# Patient Record
Sex: FEMALE | Race: WHITE | NOT HISPANIC OR LATINO | Employment: FULL TIME | URBAN - METROPOLITAN AREA
[De-identification: names, ages, dates, MRNs, and addresses within clinical notes are randomized per-mention and may not be internally consistent; named-entity substitution may affect disease eponyms.]

---

## 2017-01-10 ENCOUNTER — TELEPHONE (OUTPATIENT)
Dept: PALLIATIVE MEDICINE | Facility: CLINIC | Age: 52
End: 2017-01-10

## 2017-01-10 NOTE — TELEPHONE ENCOUNTER
Patient called wanting to see if Dr. Nunez would refill pain medication.     Elena Blanchard    [facility]

## 2017-01-11 NOTE — TELEPHONE ENCOUNTER
Contact Attempt      Outreach attempted x 1 to pt.  Left message on voicemail with call back information and requested return call with a good time to reach her. Pt is not a pain patient. She has not been seen in the office since 02/2016; she has been seen for injections only.     Plan:  Will await for CB.     Yelitza Saleh RN, [facility]  Pain Clinic Care Coordinator

## 2017-01-12 NOTE — TELEPHONE ENCOUNTER
Patient is procedure only, so I would not prescribe.  She needs to work with her normal prescribers.    Gabbi Nunez MD  [facility]

## 2017-01-12 NOTE — TELEPHONE ENCOUNTER
Called and discussed with pt how she was an injection only patient and that we do not give medications to our injection only patients. She asked if she had to make an appt and explained that we would need an order from her PCP for comprehensive care. She stated she understood.     closing encounter.     Yelitza Saleh RN, [facility]  Pain Clinic Care Coordinator

## 2017-01-12 NOTE — TELEPHONE ENCOUNTER
Dr. Nunez- this is a procedure only pt are you willing to prescribe?    Yelitza Saleh RN, [facility]  Pain Clinic Care Coordinator

## 2017-01-13 ENCOUNTER — OFFICE VISIT (OUTPATIENT)
Dept: FAMILY MEDICINE | Facility: CLINIC | Age: 52
End: 2017-01-13
Payer: COMMERCIAL

## 2017-01-13 VITALS
OXYGEN SATURATION: 99 % | DIASTOLIC BLOOD PRESSURE: 78 MMHG | SYSTOLIC BLOOD PRESSURE: 106 MMHG | WEIGHT: 150 LBS | HEART RATE: 102 BPM | HEIGHT: 68 IN | BODY MASS INDEX: 22.73 KG/M2 | TEMPERATURE: 97.5 F

## 2017-01-13 DIAGNOSIS — M54.2 NECK PAIN: ICD-10-CM

## 2017-01-13 DIAGNOSIS — G47.09 OTHER INSOMNIA: ICD-10-CM

## 2017-01-13 DIAGNOSIS — M19.90 ARTHRITIS: Primary | ICD-10-CM

## 2017-01-13 PROCEDURE — 99213 OFFICE O/P EST LOW 20 MIN: CPT | Performed by: NURSE PRACTITIONER

## 2017-01-13 RX ORDER — HYDROCODONE BITARTRATE AND ACETAMINOPHEN 5; 325 MG/1; MG/1
1 TABLET ORAL DAILY
Qty: 30 TABLET | Refills: 0 | Status: SHIPPED | OUTPATIENT
Start: 2017-02-11 | End: 2017-09-11

## 2017-01-13 RX ORDER — HYDROCODONE BITARTRATE AND ACETAMINOPHEN 5; 325 MG/1; MG/1
TABLET ORAL
Qty: 30 TABLET | Refills: 0 | Status: SHIPPED | OUTPATIENT
Start: 2017-01-13 | End: 2018-01-19

## 2017-01-13 RX ORDER — HYDROCODONE BITARTRATE AND ACETAMINOPHEN 5; 325 MG/1; MG/1
1 TABLET ORAL DAILY PRN
Qty: 30 TABLET | Refills: 0 | Status: SHIPPED | OUTPATIENT
Start: 2017-03-12 | End: 2017-08-04

## 2017-01-13 RX ORDER — HYDROCODONE BITARTRATE AND ACETAMINOPHEN 5; 325 MG/1; MG/1
1 TABLET ORAL DAILY PRN
Qty: 20 TABLET | Refills: 0 | Status: SHIPPED | OUTPATIENT
Start: 2017-01-13 | End: 2017-01-13

## 2017-01-13 RX ORDER — ZOLPIDEM TARTRATE 5 MG/1
5 TABLET ORAL
Qty: 30 TABLET | Refills: 0 | Status: SHIPPED | OUTPATIENT
Start: 2017-01-13 | End: 2017-03-13

## 2017-01-13 NOTE — MR AVS SNAPSHOT
"              After Visit Summary   1/13/2017    Maryann Riggs    MRN: [id number]           Patient Information     Date Of Birth          1965        Visit Information        Provider Department      1/13/2017 10:20 AM Rosa Sanabria APRN CNP [facility]        Today's Diagnoses     Arthritis    -  1     Neck pain         Other insomnia         Need for prophylactic vaccination and inoculation against influenza           Care Instructions    You have prescriptions for 3 months of your pain medication. We will bring prescriptions for the next 2 months over to thee pharmacy.        Follow-ups after your visit        Who to contact     If you have questions or need follow up information about today's clinic visit or your schedule please contact [facility] directly at [phone].  Normal or non-critical lab and imaging results will be communicated to you by [employer], letter or phone within 4 business days after the clinic has received the results. If you do not hear from us within 7 days, please contact the clinic through [employer] or phone. If you have a critical or abnormal lab result, we will notify you by phone as soon as possible.  Submit refill requests through [employer] or call your pharmacy and they will forward the refill request to us. Please allow 3 business days for your refill to be completed.          Additional Information About Your Visit        MyChart Information     [employer] lets you send messages to your doctor, view your test results, renew your prescriptions, schedule appointments and more. To sign up, go to [email or web address] . Click on \"Log in\" on the left side of the screen, which will take you to the Welcome page. Then click on \"Sign up Now\" on the right side of the page.     You will be asked to enter the access code listed below, as well as some personal information. Please follow the directions to create your username and password.     Your " "access code is: [email or web address]  Expires: 2017 11:13 AM     Your access code will  in 90 days. If you need help or a new code, please call your [facility] or [phone].        Care EveryWhere ID     This is your Care EveryWhere ID. This could be used by other organizations to access your [place] medical records  [id number]        Your Vitals Were     Pulse Temperature Height BMI (Body Mass Index) Pulse Oximetry Last Period    102 97.5  F (36.4  C) (Tympanic) 5' 7.75\" (1.721 m) 22.97 kg/m2 99% 2014       Blood Pressure from Last 3 Encounters:   17 106/78   12/15/16 112/75   16 126/80    Weight from Last 3 Encounters:   17 150 lb (68.04 kg)   16 152 lb 6.4 oz (69.128 kg)   16 155 lb (70.308 kg)              Today, you had the following     No orders found for display         Today's Medication Changes          These changes are accurate as of: 17 11:13 AM.  If you have any questions, ask your nurse or doctor.               These medicines have changed or have updated prescriptions.        Dose/Directions    * HYDROcodone-acetaminophen 5-325 MG per tablet   Commonly known as:  NORCO   This may have changed:  Another medication with the same name was added. Make sure you understand how and when to take each.   Used for:  Arthritis, Neck pain   Changed by:  Rosa Sanabria APRN CNP        1/2 -1 daily prn low back pain   Quantity:  30 tablet   Refills:  0       * HYDROcodone-acetaminophen 5-325 MG per tablet   Commonly known as:  NORCO   This may have changed:  You were already taking a medication with the same name, and this prescription was added. Make sure you understand how and when to take each.   Used for:  Arthritis, Neck pain   Changed by:  Rosa Sanabria APRN CNP        Dose:  1 tablet   Take 1 tablet by mouth daily as needed for moderate to severe pain maximum 1 tablet(s) per day   Quantity:  20 tablet   Refills:  0       * " HYDROcodone-acetaminophen 5-325 MG per tablet   Commonly known as:  NORCO   This may have changed:  You were already taking a medication with the same name, and this prescription was added. Make sure you understand how and when to take each.   Used for:  Neck pain, Arthritis   Changed by:  Rosa Sanabria APRN CNP        Dose:  1 tablet   Start taking on:  2/11/2017   Take 1 tablet by mouth daily maximum 1 tablet(s) per day   Quantity:  30 tablet   Refills:  0       * Notice:  This list has 3 medication(s) that are the same as other medications prescribed for you. Read the directions carefully, and ask your doctor or other care provider to review them with you.      Stop taking these medicines if you haven't already. Please contact your care team if you have questions.     gabapentin 300 MG capsule   Commonly known as:  NEURONTIN   Stopped by:  Rosa Sanabria APRN CNP           VITAMIN D PO   Stopped by:  Rosa Sanabria APRN CNP                Where to get your medicines      Some of these will need a paper prescription and others can be bought over the counter.  Ask your nurse if you have questions.     Bring a paper prescription for each of these medications    - HYDROcodone-acetaminophen 5-325 MG per tablet  - HYDROcodone-acetaminophen 5-325 MG per tablet  - HYDROcodone-acetaminophen 5-325 MG per tablet  - zolpidem 5 MG tablet             Primary Care Provider Office Phone # Fax #    CONTRERAS Alvares [phone]       [facility] [address] [place] [id number]        Thank you!     Thank you for choosing [facility]  for your care. Our goal is always to provide you with excellent care. Hearing back from our patients is one way we can continue to improve our services. Please take a few minutes to complete the written survey that you may receive in the mail after your visit with us. Thank you!             Your Updated Medication List -  Protect others around you: Learn how to safely use, store and throw away your medicines at www.disposemymeds.org.          This list is accurate as of: 1/13/17 11:13 AM.  Always use your most recent med list.                   Brand Name Dispense Instructions for use    CALTRATE 600+D 600-400 MG-UNIT Chew   Generic drug:  calcium carbonate-vitamin D      Take  by mouth.       clobetasol 0.05 % ointment    TEMOVATE    45 g    as needed. Only as needed occasionally needs after working in garden Profile Rx: patient will contact pharmacy when needed       cyclobenzaprine 10 MG tablet    FLEXERIL    90 tablet    Take 1 tablet (10 mg) by mouth daily as needed for muscle spasms       GLUCOSAMINE CHONDROITIN COMPLX PO      Take  by mouth.       * HYDROcodone-acetaminophen 5-325 MG per tablet    NORCO    30 tablet    1/2 -1 daily prn low back pain       * HYDROcodone-acetaminophen 5-325 MG per tablet    NORCO    20 tablet    Take 1 tablet by mouth daily as needed for moderate to severe pain maximum 1 tablet(s) per day       * HYDROcodone-acetaminophen 5-325 MG per tablet   Start taking on:  2/11/2017    NORCO    30 tablet    Take 1 tablet by mouth daily maximum 1 tablet(s) per day       IRON CR PO      Take  by mouth.       lidocaine 5 % ointment    XYLOCAINE    70.88 g    Apply dime size amount topically to painful areas (choose 2-3 per application) 3 times daily as needed.       melatonin 3 MG tablet     30 tablet    Take 1 tablet by mouth nightly as needed for sleep.       NIFEdipine ER osmotic 30 MG 24 hr tablet    PROCARDIA XL    90 tablet    Take 1 tablet (30 mg) by mouth daily       nitroglycerin 2 % Oint ointment    NITRO-BID    30 packet    Place 1 inch (15 mg) onto the skin daily       omeprazole 40 MG capsule    priLOSEC    90 capsule    Take 1 capsule (40 mg) by mouth daily Take 30-60 minutes before a meal.       order for DME     1 Units    Equipment being ordered: TENS       POTASSIUM CARBONATE           sucralfate 1 GM/10ML suspension    CARAFATE    420 mL    Take 10 mLs (1 g) by mouth 4 times daily       TYLENOL 325 MG tablet   Generic drug:  acetaminophen      2 TABLETS BY MOUTH EVERY 6 HOURS AS NEEDED       VITAMIN B 12 PO      Take  by mouth.       VITAMIN C CR PO      Take  by mouth.       zolpidem 5 MG tablet    AMBIEN    30 tablet    Take 1 tablet (5 mg) by mouth nightly as needed for sleep       * Notice:  This list has 3 medication(s) that are the same as other medications prescribed for you. Read the directions carefully, and ask your doctor or other care provider to review them with you.

## 2017-01-13 NOTE — PROGRESS NOTES
"  SUBJECTIVE:                                                    Maryann Riggs is a 51 year old female who presents to clinic today for the following health issues:      Back Pain Follow Up       Description:   Location of pain:  bilateral  Character of pain: sharp, stabbing, gnawing and constant  Pain radiation: Does not radiate  Since last visit, pain is:  unchanged  New numbness or weakness in legs, not attributed to pain:  no     Intensity:  moderate    History:   Pain interferes with job: No  Therapies tried without relief: acetaminophen (Tylenol) and chiropractor  Therapies tried with relief: steroid injection, last given         Accompanying Signs & Symptoms:  Risk of Fracture:  None  Risk of Cauda Equina:  None  Risk of Infection:  None  Risk of Cancer:  None         Works as a  for [employer]. Job entails a lot of heavy lifting and transferring of [employer].  Had Injection of the sacral iliac joint on 12/15. Put off injection for 7 months until she had More trouble moving.  Doesn't take hydrocodone unless doing something physical. Tense muscles.  Has used 30 tabs over the past 3 months.  Takes 1/2 tab at a time.         Amount of exercise or physical activity: 4-5 days/week for an average of greater than 60 minutes physical activity in her work.    Problems taking medications regularly: No    Medication side effects: none    Diet: regular (no restrictions)      Problem list and histories reviewed & adjusted, as indicated.  Additional history: as documented    Problem list, Medication list, Allergies, and Medical/Social/Surgical histories reviewed in [facility] and updated as appropriate.    ROS:  Constitutional, HEENT, cardiovascular, pulmonary, gi and gu systems are negative, except as otherwise noted.    OBJECTIVE:                                                    /78 mmHg  Pulse 102  Temp(Src) 97.5  F (36.4  C) (Tympanic)  Ht 5' 7.75\" (1.721 m)  Wt 150 lb (68.04 kg)  BMI 22.97 kg/m2  SpO2 " 99%  LMP 01/04/2014  Body mass index is 22.97 kg/(m^2).  GENERAL: healthy, alert and no distress  MS: deformities of several digit joints bilaterally  PSYCH: mentation appears normal, affect normal/bright    Diagnostic Test Results:  none      ASSESSMENT/PLAN:                                                      1. Arthritis with  Neck pain  Patient uses one half tab when she needs it. Typically on workdays only. She has used 30 tablets since last refill in late October. Responsible with the Norco. Controlled substance agreement signed  - HYDROcodone-acetaminophen (NORCO) 5-325 MG per tablet; 1/2 -1 daily prn low back pain  Dispense: 30 tablet; Refill: 0  - HYDROcodone-acetaminophen (NORCO) 5-325 MG per tablet; Take 1 tablet by mouth daily maximum 1 tablet(s) per day  Dispense: 30 tablet; Refill: 0  - HYDROcodone-acetaminophen (NORCO) 5-325 MG per tablet; Take 1 tablet by mouth daily as needed for moderate to severe pain  Dispense: 30 tablet; Refill: 0    2. Other insomnia  Stable. Has good results with the Ambien.  - zolpidem (AMBIEN) 5 MG tablet; Take 1 tablet (5 mg) by mouth nightly as needed for sleep  Dispense: 30 tablet; Refill: 0        Patient Instructions   You have prescriptions for 3 months of your pain medication. We will bring prescriptions for the next 2 months over to the pharmacy.        Rosa Sanabria, NP, APRN [facility]

## 2017-01-13 NOTE — NURSING NOTE
"Chief Complaint   Patient presents with     Back Pain     refill meds     /78 mmHg  Pulse 102  Temp(Src) 97.5  F (36.4  C) (Tympanic)  Ht 5' 7.75\" (1.721 m)  Wt 150 lb (68.04 kg)  BMI 22.97 kg/m2  SpO2 99%  LMP 01/04/2014 Estimated body mass index is 22.97 kg/(m^2) as calculated from the following:    Height as of this encounter: 5' 7.75\" (1.721 m).    Weight as of this encounter: 150 lb (68.04 kg).  bp completed using cuff size: regular        "

## 2017-01-13 NOTE — Clinical Note
[facility]    01/13/2017    Patient: Maryann Riggs  YOB: 1965  Medical Record Number: [id number]    Controlled Substance Agreement  I understand that my care provider has prescribed controlled substances (narcotics, tranquilizers, and/or stimulants) to help manage my condition(s).  I am taking this medicine to help me function or work.  I know that this is strong medicine.  It could have serious side effects and even cause a dependency on the drug.  If I stop these medicines suddenly, I could have severe withdrawal symptoms.    The risks, benefits, and side effects of these medication(s) were explained to me.  I agree that:  1. I will take part in other treatments as advised by my provider.  This may be psychiatry or counseling, physical therapy, behavioral therapy, group treatment, or a referral to a pain clinic.  I will reduce or stop my medicine when my provider tells me to do so.   2. I will take my medicines as prescribed.  I will not change the dose or schedule unless my provider tells me to.  There will be no refills if I  run out early.   I may be contacted at any time without warning and asked to complete a drug test or pill count.   3. I will keep all my appointments at the clinic.  If I miss appointments or fail to follow instructions, my provider may stop my medicine.  4. I will not ask other providers to prescribe controlled substances. And I will not accept controlled substances from other people. If I need another prescribed controlled substance for a new reason, I will notify my provider within one business day.  5. If I enroll in the [place] Medical Marijuana program, I will tell my provider.  I will also sign an agreement to share my medical records with my provider.  6. I will use one pharmacy to fill all of my controlled substance prescriptions.  If my prescription is mailed to my pharmacy, it may take 5 to 7 days for my medicine to be ready.  7. I  understand that my provider, clinic care team, and pharmacy can track controlled substance prescriptions from other providers through a central database (prescription monitoring program).  8. I will bring in my list of medications (or my medicine bottles) each time I come to the clinic.  Page 1 of 2      [facility]    01/13/2017    Patient: Maryann Riggs  YOB: 1965  Medical Record Number: [id number]    9. Refills of controlled substances will be made only during office hours.  It is up to me to make sure that I do not run out of my medicines on weekends or holidays.    10. I am responsible for my prescriptions.  If the medicine is lost or stolen, it will not be replaced.   I also agree not to share these medicines with anyone.  11. I agree to not use ANY illegal or recreational drugs.  This includes marijuana, cocaine, bath salts or other drugs.  I agree not to use alcohol unless my provider says I may.  I agree to give urine samples whenever asked.  If I fail to give a urine sample, the provider may stop my medicine.     12. I will tell my nurse or provider right away if I become pregnant or have a new medical problem treated outside of [facility].  13. I understand that this medicine can affect my thinking and judgment.  It may be unsafe for me to drive, use machinery and do dangerous tasks.  I will not do any of these things until I know how the medicine affects me.  If my dose changes, I will wait to see how it affects me.  I will contact my provider if I have concerns about medicine side effects.  I understand that if I do not follow any of the conditions above, my prescriptions or treatment may be stopped.    I agree that my provider, clinic care team, and pharmacy may work with any city, state or federal law enforcement agency that investigates the misuse, sale, or other diversion of my controlled medicine. I will allow my provider to discuss my care with or share a copy  of this agreement with any other treating provider, pharmacy or emergency room where I receive care.  I agree to give up (waive) any right of privacy or confidentiality with respect to these authorizations.   I have read this agreement and have asked questions about anything I did not understand.   ___________________________________    ___________________________  Patient Signature                                                             Date and Time  ___________________________________     ____________________________  Witness                                                                              Date and Time  ___________________________________  Rosa Sanabria NP, APRN CNP  Page 2 of 2  Opioid Pain Medicines (also known as Narcotics)  What You Need to Know      What are opioids?   Opioids are pain medicines that must be prescribed by a doctor. Examples are:     morphine (MS Contin, Yanique)    oxycodone (Oxycontin)    oxycodone and acetaminophen (Percocet)    hydrocodone and acetaminophen (Vicodin, Norco)     fentanyl patch (Duragesic)     hydromorphone (Dilaudid)     methadone     What do opioids do well?   Opioids are best for short-term pain after a surgery or injury. They also work well for cancer pain. Unlike other pain medicines, they do not cause liver or kidney failure or ulcers. They may help some people with long-lasting (chronic) pain.     What do opioids NOT do well?   Opioids never get rid of pain entirely, and they do not work well for most patients with chronic pain. Opioids do not reduce swelling, one of the causes of pain. They also don t work well for nerve pain.     Side effects  Talk to your doctor before you start or decide to keep taking one of these medicines. Side effects include:    Lowers your breathing rate enough that it could cause death    Death due to taking more than the prescribed dose    Serious lifelong opioid use      Dependence is not the same as addiction.  Addiction is when people keep using a substance that harms their body, their mind or their relations with others. If you have a history of drug or alcohol abuse, taking opioids can cause a relapse.  Over time, opioids don t work as well. Most people will need higher and higher doses. The higher the dose, the more serious the side effects. We don t know the long-term effects of opioids.   People who have used opioids for a long time have a lower quality of life, worse depression, higher levels of pain and more visits to doctors.  Overdose from prescription drugs is the second leading cause of death in the [place] The risk of overdose rises when opioids are taken with other drugs such as:    Medicines used for anxiety and panic attacks (such as lorazepam, alprazolam, clonazepam    Other sedatives    Alcohol    Illegal drugs such as heroin  Never share your opioids with others. Be sure to store opioids in a secure place, locked if possible.Young children can easily swallow them and overdose.     Are there other ways to manage pain?  Ways to help reduce pain:    Exercise every day.    Treat health problems that may be causing pain.    Treat mental health problems like depression and anxiety.     Worse depression symptoms; Less pleasure in things you usually enjoy    Feeling tired or sluggish    Slower thoughts or cloudy thinking    Being more sensitive to pain over time; Pain is harder to control.    Trouble sleeping or restless sleep    Changes in hormone levels (for example, less testosterone).     Changes in sex drive or ability to have sex    Long lasting nausea and constipation    Trouble breathing while asleep; This is worse with lung problems like COPD or sleep apnea.    Unsafe driving    Getting sick more often    Itching    Feeling dizzy    Dry mouth    Sweating    Trouble emptying the bladder (peeing). This is worse if you have an enlarged prostate or get urinary tract infections (UTIs).    What else should I  know about opioids?  When someone takes opioids for too long or too often, they become dependent. This means that if you stop or reduce the medicine too quickly, you will have withdrawal symptoms.          Practice good sleep habits.  Try to go to bed and get up at the same time every day.    Stop smoking.  Tobacco use can make pain worse.    Do things that you enjoy.    Find a way to work through pain without drugs.  Try deep breathing, meditation, visual imagery and aromatherapy.    Ask your doctor to help you create a plan to manage your pain.

## 2017-01-13 NOTE — PATIENT INSTRUCTIONS
You have prescriptions for 3 months of your pain medication. We will bring prescriptions for the next 2 months over to thee pharmacy.

## 2017-01-16 ENCOUNTER — TELEPHONE (OUTPATIENT)
Dept: FAMILY MEDICINE | Facility: CLINIC | Age: 52
End: 2017-01-16

## 2017-01-16 NOTE — TELEPHONE ENCOUNTER
This pt came on the CV suite weekly scorecard as needing chronic pain care package,   Requests: Controlled substance agreement signed and on file more recent than 1/1/2014  Problem list overview smart phrases: <dot>controlledsubstandproblemlistoverviw  <dot>chronicpainproblemlistoverview more recent than 3/1/2016

## 2017-02-02 DIAGNOSIS — I73.00 RAYNAUD'S DISEASE WITHOUT GANGRENE: Primary | ICD-10-CM

## 2017-02-02 RX ORDER — NIFEDIPINE 30 MG/1
30 TABLET, EXTENDED RELEASE ORAL DAILY
Qty: 90 TABLET | Refills: 1 | Status: SHIPPED | OUTPATIENT
Start: 2017-02-02 | End: 2018-01-19

## 2017-02-02 NOTE — TELEPHONE ENCOUNTER
Procardia xl 30mg      Last Written Prescription Date: 01/04/16  Last Fill Quantity: 90, # refills: 3  Last Office Visit with [facility], [facility] or [facility] prescribing provider: 01/13/17       BP Readings from Last 3 Encounters:   01/13/17 106/78   12/15/16 112/75   09/16/16 126/80       Thank you,  Elizabeth Friedman CPhT  On behalf of [facility]

## 2017-02-02 NOTE — TELEPHONE ENCOUNTER
Routing refill request to provider for review/approval because:  Sending to refill pool as this medication was last prescribed by Dr. Lopez and she is no longer at clinic. Thanks!     Mayra Abarca, Pharm.D.  on behalf of [facility] Pharmacist   [email or web address]

## 2017-03-06 DIAGNOSIS — G47.09 OTHER INSOMNIA: ICD-10-CM

## 2017-03-06 RX ORDER — ZOLPIDEM TARTRATE 5 MG/1
5 TABLET ORAL
Qty: 30 TABLET | Refills: 0 | OUTPATIENT
Start: 2017-03-06

## 2017-03-06 NOTE — TELEPHONE ENCOUNTER
Ambien 5mg        Last Written Prescription Date: 01/13/17  Last Fill Quantity: 30,  # refills: 0   Last Office Visit with [facility], [facility] or [facility] prescribing provider: 01/13/17    Elder Brown Pharmacy Technician  [facility]

## 2017-03-13 RX ORDER — ZOLPIDEM TARTRATE 5 MG/1
5 TABLET ORAL
Qty: 30 TABLET | Refills: 0 | Status: SHIPPED | OUTPATIENT
Start: 2017-03-13 | End: 2017-04-24

## 2017-04-24 DIAGNOSIS — G47.09 OTHER INSOMNIA: ICD-10-CM

## 2017-04-24 RX ORDER — ZOLPIDEM TARTRATE 5 MG/1
5 TABLET ORAL
Qty: 30 TABLET | Refills: 0 | Status: SHIPPED | OUTPATIENT
Start: 2017-04-24 | End: 2017-06-02

## 2017-04-24 NOTE — TELEPHONE ENCOUNTER
Zolpidem      Last Written Prescription Date:  03/13/2017  Last Fill Quantity: 30,   # refills: 0  Last Office Visit with [facility] prescribing provider: 01/13/2017  Future Office visit:       Routing refill request to provider for review/approval because:  Drug not on the [facility] refill protocol or controlled substance      Cynthia Yan [facility]  Float Technician  [place]

## 2017-06-02 DIAGNOSIS — G47.09 OTHER INSOMNIA: ICD-10-CM

## 2017-06-02 RX ORDER — ZOLPIDEM TARTRATE 5 MG/1
5 TABLET ORAL
Qty: 30 TABLET | Refills: 0 | Status: SHIPPED | OUTPATIENT
Start: 2017-06-02 | End: 2017-08-10

## 2017-06-02 NOTE — TELEPHONE ENCOUNTER
Ambien 5mg        Last Written Prescription Date: 04/24/17  Last Fill Quantity: 30,  # refills: 0   Last Office Visit with [facility] or [facility] prescribing provider: 01/13/17    Elder Brown Pharmacy Technician ([facility]

## 2017-06-13 DIAGNOSIS — G47.00 PERSISTENT INSOMNIA: Primary | ICD-10-CM

## 2017-06-13 RX ORDER — ZOLPIDEM TARTRATE 5 MG/1
5 TABLET ORAL
Qty: 30 TABLET | Refills: 1 | Status: SHIPPED | OUTPATIENT
Start: 2017-06-13 | End: 2017-09-11

## 2017-06-13 NOTE — PROGRESS NOTES
Patient arrived to  Rx for Ambien written 6/2/17. Unable to locate Rx in clinic or at pharmacy. Will give one month refill. Patient must see me prior to next refill. Rx given to Nehal Campoverde. Rosa Sanabria RNC, NP  [facility]

## 2017-06-13 NOTE — TELEPHONE ENCOUNTER
Pt stopped in today requesting RX for Ambien.  Did not find RX. MADHURI Sanabria redid the Ambien RX.  Pt waited in Lobby - handed RX to pt.  Nehal [facility]

## 2017-08-04 DIAGNOSIS — M54.2 NECK PAIN: ICD-10-CM

## 2017-08-04 DIAGNOSIS — M19.90 ARTHRITIS: ICD-10-CM

## 2017-08-04 NOTE — TELEPHONE ENCOUNTER
Norco 5/325      Last Written Prescription Date:  3/12/17  Last Fill Quantity: 30,   # refills: 0  Last Office Visit with [facility] prescribing provider: 1/13/17  Future Office visit:       Routing refill request to provider for review/approval because:  Drug not on the [facility], [facility] or [facility] refill protocol or controlled substance      Pedro Tripp CPhT  [facility]    On behalf of [facility]

## 2017-08-09 RX ORDER — HYDROCODONE BITARTRATE AND ACETAMINOPHEN 5; 325 MG/1; MG/1
1 TABLET ORAL DAILY PRN
Qty: 15 TABLET | Refills: 0 | Status: SHIPPED | OUTPATIENT
Start: 2017-08-09 | End: 2018-01-19

## 2017-08-09 NOTE — TELEPHONE ENCOUNTER
Per Beth Sanabria NP, left rx at desk but patient needs appointment before any future rx. Please let patient know needs appointment.

## 2017-08-10 DIAGNOSIS — G47.09 OTHER INSOMNIA: ICD-10-CM

## 2017-08-10 RX ORDER — ZOLPIDEM TARTRATE 5 MG/1
5 TABLET ORAL
Qty: 30 TABLET | Refills: 0 | Status: SHIPPED | OUTPATIENT
Start: 2017-08-10 | End: 2017-09-11

## 2017-08-10 NOTE — TELEPHONE ENCOUNTER
zolpidem      Last Written Prescription Date: 06/02/2017  -  LAST P/U 7/11/2017  Last Fill Quantity: 30,  # refills: 0   Last Office Visit with [facility], [facility] or [facility] prescribing provider: 01/13/2017                                         Next 5 appointments (look out 90 days)     Sep 11, 2017 12:40 PM CDT   SHORT with CONTRERAS Alvares CNP   [facility] ([facility])    [address] [facility] [address]   [phone]                 ALEJANDRA ANDINO [facility]

## 2017-09-05 ENCOUNTER — RADIANT APPOINTMENT (OUTPATIENT)
Dept: GENERAL RADIOLOGY | Facility: CLINIC | Age: 52
End: 2017-09-05
Attending: FAMILY MEDICINE
Payer: COMMERCIAL

## 2017-09-05 ENCOUNTER — OFFICE VISIT (OUTPATIENT)
Dept: ORTHOPEDICS | Facility: CLINIC | Age: 52
End: 2017-09-05
Payer: COMMERCIAL

## 2017-09-05 VITALS
BODY MASS INDEX: 22.62 KG/M2 | SYSTOLIC BLOOD PRESSURE: 140 MMHG | WEIGHT: 158 LBS | HEIGHT: 70 IN | DIASTOLIC BLOOD PRESSURE: 87 MMHG

## 2017-09-05 DIAGNOSIS — M17.0 PRIMARY OSTEOARTHRITIS OF BOTH KNEES: ICD-10-CM

## 2017-09-05 DIAGNOSIS — G89.29 CHRONIC PAIN OF LEFT KNEE: Primary | ICD-10-CM

## 2017-09-05 DIAGNOSIS — G89.29 CHRONIC PAIN OF LEFT KNEE: ICD-10-CM

## 2017-09-05 DIAGNOSIS — M25.562 CHRONIC PAIN OF LEFT KNEE: Primary | ICD-10-CM

## 2017-09-05 DIAGNOSIS — M25.562 CHRONIC PAIN OF LEFT KNEE: ICD-10-CM

## 2017-09-05 PROCEDURE — 73562 X-RAY EXAM OF KNEE 3: CPT

## 2017-09-05 PROCEDURE — 20610 DRAIN/INJ JOINT/BURSA W/O US: CPT | Mod: LT | Performed by: FAMILY MEDICINE

## 2017-09-05 PROCEDURE — 99214 OFFICE O/P EST MOD 30 MIN: CPT | Mod: 25 | Performed by: FAMILY MEDICINE

## 2017-09-05 NOTE — MR AVS SNAPSHOT
After Visit Summary   9/5/2017    Maryann Riggs    MRN: [id number]           Patient Information     Date Of Birth          1965        Visit Information        Provider Department      9/5/2017 1:00 PM Philipp Peña,  [facility]        Today's Diagnoses     Chronic pain of left knee    -  1    Primary osteoarthritis of both knees           Follow-ups after your visit        Your next 10 appointments already scheduled     Nov 06, 2017  2:15 PM CST   MA SCREENING DIGITAL BILATERAL with [id number]   [facility]    [address] [id number] [phone]           Do not use any powder, lotion or deodorant under your arms or on your breast. If you do, we will ask you to remove it before your exam.  Wear comfortable, two-piece clothing.  If you have any allergies, tell your care team.  Bring any previous mammograms from other facilities or have them mailed to the breast center.              Who to contact     If you have questions or need follow up information about today's clinic visit or your schedule please contact [facility] directly at [phone].  Normal or non-critical lab and imaging results will be communicated to you by MyChart, letter or phone within 4 business days after the clinic has received the results. If you do not hear from us within 7 days, please contact the clinic through [employer] or phone. If you have a critical or abnormal lab result, we will notify you by phone as soon as possible.  Submit refill requests through [employer] or call your pharmacy and they will forward the refill request to us. Please allow 3 business days for your refill to be completed.          Additional Information About Your Visit        MyChart Information     [employer] lets you send messages to your doctor, view your test results, renew your prescriptions, schedule  "appointments and more. To sign up, go to [email or web address] . Click on \"Log in\" on the left side of the screen, which will take you to the Welcome page. Then click on \"Sign up Now\" on the right side of the page.     You will be asked to enter the access code listed below, as well as some personal information. Please follow the directions to create your username and password.     Your access code is: [email or web address]  Expires: 12/10/2017  1:34 PM     Your access code will  in 90 days. If you need help or a new code, please call your [place] clinic or [phone].        Care EveryWhere ID     This is your Care EveryWhere ID. This could be used by other organizations to access your [place] medical records  [id number]        Your Vitals Were     Height Last Period BMI (Body Mass Index)             5' 9.5\" (1.765 m) 2014 23 kg/m2          Blood Pressure from Last 3 Encounters:   17 124/64   17 140/87   17 106/78    Weight from Last 3 Encounters:   17 149 lb 12.8 oz (67.9 kg)   17 158 lb (71.7 kg)   17 150 lb (68 kg)              We Performed the Following     DRAIN/INJECT LARGE JOINT/BURSA     SYNVISC-ONE INJECTION          Today's Medication Changes          These changes are accurate as of: 17 11:59 PM.  If you have any questions, ask your nurse or doctor.               Start taking these medicines.        Dose/Directions    Hylan 48 MG/6ML Sosy injection   Used for:  Chronic pain of left knee   Started by:  Philipp Peña DO        Dose:  48 mg   48 mg by INTRA-ARTICULAR route once for 1 dose   Quantity:  6 mL   Refills:  0            Where to get your medicines      Some of these will need a paper prescription and others can be bought over the counter.  Ask your nurse if you have questions.     You don't need a prescription for these medications     Hylan 48 MG/6ML Sosy injection                Primary Care Provider Office Phone # Fax #    Rosa " Charmaine Sanabria, APRN [phone]       [address] [facility] [address]        Equal Access to Services     USMAN MAYER : Hadii aad ku hadpeggyjose Cohen, kevinda prudencejohnyha, cristina kadanny gonzalez, helen fabiin hayaajose greenmanjit dodge migdalia chery. So [facility] [phone].    ATENCIÓN: Si habla español, tiene a iglesias disposición servicios gratuitos de asistencia lingüística. Llame al [phone].    We comply with applicable federal civil rights laws and [place] laws. We do not discriminate on the basis of race, color, national origin, age, disability, sex, sexual orientation, or gender identity.            Thank you!     Thank you for choosing [facility]  for your care. Our goal is always to provide you with excellent care. Hearing back from our patients is one way we can continue to improve our services. Please take a few minutes to complete the written survey that you may receive in the mail after your visit with us. Thank you!             Your Updated Medication List - Protect others around you: Learn how to safely use, store and throw away your medicines at www.disposemymeds.org.          This list is accurate as of: 9/5/17 11:59 PM.  Always use your most recent med list.                   Brand Name Dispense Instructions for use Diagnosis    CALTRATE 600+D 600-400 MG-UNIT Chew   Generic drug:  calcium carbonate-vitamin D      Take  by mouth.        clobetasol 0.05 % ointment    TEMOVATE    45 g    as needed. Only as needed occasionally needs after working in garden Profile Rx: patient will contact pharmacy when needed    Dermatitis       GLUCOSAMINE CHONDROITIN COMPLX PO      Take  by mouth.        * HYDROcodone-acetaminophen 5-325 MG per tablet    NORCO    30 tablet    1/2 -1 daily prn low back pain    Arthritis, Neck pain       * HYDROcodone-acetaminophen 5-325 MG per tablet    NORCO    15 tablet    Take 1 tablet by mouth daily as needed for moderate to severe pain     Arthritis, Neck pain       Hylan 48 MG/6ML Sosy injection     6 mL    48 mg by INTRA-ARTICULAR route once for 1 dose    Chronic pain of left knee       IRON CR PO      Take  by mouth.        lidocaine 5 % ointment    XYLOCAINE    70.88 g    Apply dime size amount topically to painful areas (choose 2-3 per application) 3 times daily as needed.    Pain of finger of left hand, SI (sacroiliac) joint dysfunction       melatonin 3 MG tablet     30 tablet    Take 1 tablet by mouth nightly as needed for sleep.    Medication monitoring encounter       NIFEdipine ER osmotic 30 MG 24 hr tablet    PROCARDIA XL    90 tablet    Take 1 tablet (30 mg) by mouth daily    Raynaud's disease without gangrene       nitroGLYcerin 2 % Oint ointment    NITRO-BID    30 packet    Place 1 inch (15 mg) onto the skin daily    Raynaud's disease without gangrene       omeprazole 40 MG capsule    priLOSEC    90 capsule    Take 1 capsule (40 mg) by mouth daily Take 30-60 minutes before a meal.    Esophageal stricture       order for DME     1 Units    Equipment being ordered: TENS    SI (sacroiliac) joint dysfunction, Myofascial pain, Facet arthropathy       POTASSIUM CARBONATE           TYLENOL 325 MG tablet   Generic drug:  acetaminophen      2 TABLETS BY MOUTH EVERY 6 HOURS AS NEEDED        VITAMIN B 12 PO      Take  by mouth.        VITAMIN C CR PO      Take  by mouth.        * Notice:  This list has 2 medication(s) that are the same as other medications prescribed for you. Read the directions carefully, and ask your doctor or other care provider to review them with you.

## 2017-09-05 NOTE — PROGRESS NOTES
"Maryann Riggs  :  1965  DOS: 2017  MRN: [id number]    Sports Medicine Clinic Visit    PCP: Rosa Sanabria    Maryann Riggs is a 52 year old female who is seen as a self referral presenting with left knee pain.    Injury: Gradual onset of left knee pain over the last ~ 2 months.  Pain located over left deep medial knee, nonradiating.  Additional Features:  Positive: swelling and deep aching sensation.  Symptoms are better with Rest.  Symptoms are worse with: prolonged driving, going from sit to stand, descending stairs, kneeling.  Other evaluation and/or treatments so far consists of: Ibuprofen and Rest.  Recent imaging completed: No recent imaging completed.  Prior History of related problems: Previously treated by Sports Medicine - Zoie Parisi PA-C for similar knee in .  Synvisc/steroid injection completed at that time provided good relief for ~ 18+ months.  She is desiring repeat injection today.     Social History: works as semi-truck truck    Review of Systems  Musculoskeletal: as above  Remainder of review of systems is negative including constitutional, CV, pulmonary, GI, Skin and Neurologic except as noted in HPI or medical history.    Past Medical History:   Diagnosis Date     Anger reaction     buspar helping     Cholelithiasis      Esophageal stricture     dilatation every 3 months     Generalized osteoarthrosis, unspecified site (aka ARTHRITIS)     small joints     PTSD (post-traumatic stress disorder)      Raynaud's disease      Past Surgical History:   Procedure Laterality Date     MAMMOPLASTY REDUCTION       OTHER SURGICAL HISTORY      s/p left knee arthroscopinc surgery     ROTATOR CUFF REPAIR RT/LT       SMALL BOWEL RESECTION       TUBAL LIGATION         Objective  /87  Ht 5' 9.5\" (1.765 m)  Wt 158 lb (71.7 kg)  LMP 2014  BMI 23 kg/m2    General: healthy, alert and in no distress    HEENT: no scleral icterus or conjunctival erythema   Skin: no " suspicious lesions or rash. No jaundice.   CV: regular rhythm by palpation, 2+ distal pulses, no pedal edema    Resp: normal respiratory effort without conversational dyspnea   Psych: normal mood and affect    Gait: nonantalgic, appropriate coordination and balance   Neuro: normal light touch sensory exam of the extremities. Motor strength as noted below     Left Knee exam    ROM:        Flexion 140 degrees       Extension 0 degrees       Range of motion limited by mild pain in terminal flexion    Inspection:       no visible ecchymosis        effusion noted trace    Skin:       no visible deformities       well perfused       capillary refill brisk    Patellar Motion:        Lateral tilt noted in patella       Crepitus noted in the patellofemoral joint    Tender:        lateral patellar border       medial joint line    Non Tender:         remainder of knee area        medial patellar border        lateral joint line        along MCL        distal IT Band        infrapatellar tendon        tibial tubercle       pes anserine bursa    Special Tests:        + Sandie       neg (-) Lachmans       neg (-) anterior drawer       neg (-) posterior drawer       neg (-) varus at 0 deg and 30 deg       neg (-) valgus at 0 deg and 30 deg    Evaluation of ipsilateral kinetic chain       normal strength with hip extension and abduction, but somewhat deconditioned b/l       Decreased strength with single leg squat b/l, R>L    Procedure  After risks, benefits and complications of steroid injection were discussed with the patient, a consent form was signed and the patient elected to proceed.  Using sterile technique, the area was first prepped with betadyne and an alcohol swab.  A 22 gauge needle was used to inject 6cc of SynviscOne into the knee on the left.  The patient tolerated the procedure well without complications.      Radiology  XR KNEE STANDING AP BILAT SUNRISE BILAT LAT LT, 9/5/2017 1:23 PM     Comparison:  5/1/2014     History: Pain in left knee, Other chronic pain         Impression: Small amount of fluid in the left knee joint. No fractures  are seen. Joint spaces are preserved and in normal alignment. No  significant soft tissue swelling.    Assessment:  1. Chronic pain of left knee    2. Primary osteoarthritis of both knees        Plan:  Discussed the assessment with the patient.  Follow up: prn based on improvement  likely chondromalacia flare   Prior synvisc/steroid combination  Will try synvisc only today, can return for CSI for labile sx after one month  No instability on exam, no mechanical sx reported  consider advanced imaging based on progress  PT offered, HEP reviewed  Expectations and limitations of synvisc were reviewed in detail  Often 4-6 weeks before full effect may be noticed  Usually covered up to every 6 months by insurance, but does not need to be repeated unless pain returns, at which point we would re-evaluate  Potential use of CSI in future for flares of pain reviewed in detail  Encouraged modified progressive pain-free activity as tolerated  HEP and Supportive care reviewed  All questions were answered today  Contact us with additional questions or concerns  Signs and sx of concern reviewed      Philipp Peña DO, JOSELINE  Primary Care Sports Medicine  [facility]               Disclaimer: This note consists of symbols derived from keyboarding, dictation and/or voice recognition software. As a result, there may be errors in the script that have gone undetected. Please consider this when interpreting information found in this chart.

## 2017-09-05 NOTE — LETTER
2017         RE: Maryann Riggs  [address]  [place] [address]        Dear Colleague,    Thank you for referring your patient, Maryann Riggs, to the [facility]. Please see a copy of my visit note below.    Maryann Riggs  :  1965  DOS: 2017  MRN: [id number]    Sports Medicine Clinic Visit    PCP: Rosa Sanabria    Maryann Riggs is a 52 year old female who is seen as a self referral presenting with left knee pain.    Injury: Gradual onset of left knee pain over the last ~ 2 months.  Pain located over left deep medial knee, nonradiating.  Additional Features:  Positive: swelling and deep aching sensation.  Symptoms are better with Rest.  Symptoms are worse with: prolonged driving, going from sit to stand, descending stairs, kneeling.  Other evaluation and/or treatments so far consists of: Ibuprofen and Rest.  Recent imaging completed: No recent imaging completed.  Prior History of related problems: Previously treated by Sports Medicine - Zoie Parisi PA-C for similar knee in .  Synvisc/steroid injection completed at that time provided good relief for ~ 18+ months.  She is desiring repeat injection today.     Social History: works as semi-truck truck    Review of Systems  Musculoskeletal: as above  Remainder of review of systems is negative including constitutional, CV, pulmonary, GI, Skin and Neurologic except as noted in HPI or medical history.    Past Medical History:   Diagnosis Date     Anger reaction     buspar helping     Cholelithiasis      Esophageal stricture     dilatation every 3 months     Generalized osteoarthrosis, unspecified site (aka ARTHRITIS)     small joints     PTSD (post-traumatic stress disorder)      Raynaud's disease      Past Surgical History:   Procedure Laterality Date     MAMMOPLASTY REDUCTION       OTHER SURGICAL HISTORY      s/p left knee arthroscopinc surgery     ROTATOR CUFF REPAIR RT/LT    "    SMALL BOWEL RESECTION       TUBAL LIGATION         Objective  /87  Ht 5' 9.5\" (1.765 m)  Wt 158 lb (71.7 kg)  LMP 01/04/2014  BMI 23 kg/m2    General: healthy, alert and in no distress    HEENT: no scleral icterus or conjunctival erythema   Skin: no suspicious lesions or rash. No jaundice.   CV: regular rhythm by palpation, 2+ distal pulses, no pedal edema    Resp: normal respiratory effort without conversational dyspnea   Psych: normal mood and affect    Gait: nonantalgic, appropriate coordination and balance   Neuro: normal light touch sensory exam of the extremities. Motor strength as noted below     Left Knee exam    ROM:        Flexion 140 degrees       Extension 0 degrees       Range of motion limited by mild pain in terminal flexion    Inspection:       no visible ecchymosis        effusion noted trace    Skin:       no visible deformities       well perfused       capillary refill brisk    Patellar Motion:        Lateral tilt noted in patella       Crepitus noted in the patellofemoral joint    Tender:        lateral patellar border       medial joint line    Non Tender:         remainder of knee area        medial patellar border        lateral joint line        along MCL        distal IT Band        infrapatellar tendon        tibial tubercle       pes anserine bursa    Special Tests:        + Sandie       neg (-) Lachmans       neg (-) anterior drawer       neg (-) posterior drawer       neg (-) varus at 0 deg and 30 deg       neg (-) valgus at 0 deg and 30 deg    Evaluation of ipsilateral kinetic chain       normal strength with hip extension and abduction, but somewhat deconditioned b/l       Decreased strength with single leg squat b/l, R>L    Procedure  After risks, benefits and complications of steroid injection were discussed with the patient, a consent form was signed and the patient elected to proceed.  Using sterile technique, the area was first prepped with betadyne and an alcohol " swab.  A 22 gauge needle was used to inject 6cc of SynviscOne into the knee on the left.  The patient tolerated the procedure well without complications.      Radiology  XR KNEE STANDING AP BILAT SUNRISE BILAT LAT LT, 9/5/2017 1:23 PM     Comparison: 5/1/2014     History: Pain in left knee, Other chronic pain         Impression: Small amount of fluid in the left knee joint. No fractures  are seen. Joint spaces are preserved and in normal alignment. No  significant soft tissue swelling.    Assessment:  1. Chronic pain of left knee    2. Primary osteoarthritis of both knees        Plan:  Discussed the assessment with the patient.  Follow up: prn based on improvement  likely chondromalacia flare   Prior synvisc/steroid combination  Will try synvisc only today, can return for CSI for labile sx after one month  No instability on exam, no mechanical sx reported  consider advanced imaging based on progress  PT offered, HEP reviewed  Expectations and limitations of synvisc were reviewed in detail  Often 4-6 weeks before full effect may be noticed  Usually covered up to every 6 months by insurance, but does not need to be repeated unless pain returns, at which point we would re-evaluate  Potential use of CSI in future for flares of pain reviewed in detail  Encouraged modified progressive pain-free activity as tolerated  HEP and Supportive care reviewed  All questions were answered today  Contact us with additional questions or concerns  Signs and sx of concern reviewed      Philipp Peña DO, JOSELINE  Primary Care Sports Medicine  [facility]               Disclaimer: This note consists of symbols derived from keyboarding, dictation and/or voice recognition software. As a result, there may be errors in the script that have gone undetected. Please consider this when interpreting information found in this chart.    Again, thank you for allowing me to participate in the care of your patient.         Sincerely,        Philipp Peña, DO

## 2017-09-11 ENCOUNTER — OFFICE VISIT (OUTPATIENT)
Dept: FAMILY MEDICINE | Facility: CLINIC | Age: 52
End: 2017-09-11
Payer: COMMERCIAL

## 2017-09-11 VITALS
TEMPERATURE: 98.7 F | BODY MASS INDEX: 21.8 KG/M2 | DIASTOLIC BLOOD PRESSURE: 64 MMHG | SYSTOLIC BLOOD PRESSURE: 124 MMHG | HEART RATE: 78 BPM | OXYGEN SATURATION: 98 % | RESPIRATION RATE: 24 BRPM | WEIGHT: 149.8 LBS

## 2017-09-11 DIAGNOSIS — M19.90 ARTHRITIS: ICD-10-CM

## 2017-09-11 DIAGNOSIS — K20.90 ESOPHAGITIS: ICD-10-CM

## 2017-09-11 DIAGNOSIS — M54.2 NECK PAIN: Primary | ICD-10-CM

## 2017-09-11 DIAGNOSIS — M26.609 TMJ (TEMPOROMANDIBULAR JOINT SYNDROME): ICD-10-CM

## 2017-09-11 DIAGNOSIS — G47.09 OTHER INSOMNIA: ICD-10-CM

## 2017-09-11 DIAGNOSIS — Z51.81 ENCOUNTER FOR THERAPEUTIC DRUG MONITORING: ICD-10-CM

## 2017-09-11 PROCEDURE — 99214 OFFICE O/P EST MOD 30 MIN: CPT | Performed by: NURSE PRACTITIONER

## 2017-09-11 RX ORDER — HYDROCODONE BITARTRATE AND ACETAMINOPHEN 5; 325 MG/1; MG/1
1 TABLET ORAL DAILY
Qty: 30 TABLET | Refills: 0 | Status: SHIPPED | OUTPATIENT
Start: 2017-09-11 | End: 2017-11-06

## 2017-09-11 RX ORDER — SUCRALFATE ORAL 1 G/10ML
1 SUSPENSION ORAL 4 TIMES DAILY
Qty: 210 ML | Refills: 11 | Status: SHIPPED | OUTPATIENT
Start: 2017-09-11 | End: 2019-01-21

## 2017-09-11 RX ORDER — HYDROCODONE BITARTRATE AND ACETAMINOPHEN 5; 325 MG/1; MG/1
1 TABLET ORAL DAILY PRN
Qty: 15 TABLET | Refills: 0 | Status: CANCELLED | OUTPATIENT
Start: 2017-09-11

## 2017-09-11 RX ORDER — CYCLOBENZAPRINE HCL 10 MG
10 TABLET ORAL DAILY PRN
Qty: 90 TABLET | Refills: 4 | Status: SHIPPED | OUTPATIENT
Start: 2017-09-11 | End: 2018-10-01

## 2017-09-11 RX ORDER — HYDROCODONE BITARTRATE AND ACETAMINOPHEN 5; 325 MG/1; MG/1
TABLET ORAL
Qty: 30 TABLET | Refills: 0 | Status: CANCELLED | OUTPATIENT
Start: 2017-09-11

## 2017-09-11 RX ORDER — ZOLPIDEM TARTRATE 5 MG/1
5 TABLET ORAL
Qty: 30 TABLET | Refills: 0 | Status: SHIPPED | OUTPATIENT
Start: 2017-09-11 | End: 2017-10-11

## 2017-09-11 ASSESSMENT — PATIENT HEALTH QUESTIONNAIRE - PHQ9
SUM OF ALL RESPONSES TO PHQ QUESTIONS 1-9: 1
5. POOR APPETITE OR OVEREATING: NOT AT ALL

## 2017-09-11 ASSESSMENT — ANXIETY QUESTIONNAIRES
IF YOU CHECKED OFF ANY PROBLEMS ON THIS QUESTIONNAIRE, HOW DIFFICULT HAVE THESE PROBLEMS MADE IT FOR YOU TO DO YOUR WORK, TAKE CARE OF THINGS AT HOME, OR GET ALONG WITH OTHER PEOPLE: NOT DIFFICULT AT ALL
3. WORRYING TOO MUCH ABOUT DIFFERENT THINGS: NOT AT ALL
GAD7 TOTAL SCORE: 0
1. FEELING NERVOUS, ANXIOUS, OR ON EDGE: NOT AT ALL
2. NOT BEING ABLE TO STOP OR CONTROL WORRYING: NOT AT ALL
7. FEELING AFRAID AS IF SOMETHING AWFUL MIGHT HAPPEN: NOT AT ALL
6. BECOMING EASILY ANNOYED OR IRRITABLE: NOT AT ALL
5. BEING SO RESTLESS THAT IT IS HARD TO SIT STILL: NOT AT ALL

## 2017-09-11 NOTE — PROGRESS NOTES
SUBJECTIVE:   Maryann Riggs is a 52 year old female who presents to clinic today for the following health issues:      Esophagitis - Medication Followup of Carafate    Taking Medication as prescribed: NO-taking once a day because the bottle is too large to bring with her, will reorder half the bottle    Side Effects:  None    Medication Helping Symptoms:  yes       Chronic Pain Follow-Up       Type / Location of Pain: low back and knee, neck  Analgesia/pain control:       Recent changes:  same      Overall control: Tolerable with discomfort  Activity level/function:      Daily activities:  Able to do all daily activities    Work:  Able to work  Adverse effects:  No  Adherance    Taking medication as directed?  Yes    Participating in other treatments: yes seeing ortho for knee  Risk Factors:    Sleep:  Good with use of Ambien    Mood/anxiety:  controlled    Recent family or social stressors:  none noted    Other aggravating factors: prolonged sitting  PHQ-9 SCORE 12/16/2011 2/23/2016 9/11/2017   Total Score 2 - -   Total Score - 0 1     JEFFREY-7 SCORE 10/7/2011 12/16/2011 9/11/2017   Total Score 6 0 -   Total Score - - 0     Encounter-Level CSA - 01/13/2017:          Controlled Substance Agreement - Scan on 1/24/2017 10:42 AM : CONTROLLED SUBSTANCE AGREEMENT (below)            Encounter-Level CSA - 01/13/2017:          Controlled Substance Agreement - Scan on 3/17/2016  2:14 PM : CONTROLLED SUBSTANCE AGREEMENT 3/1/2016 (below)              Increased stress  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   Lots of drivers leaving company so hours have increased. Lots of tension at work as company has been sold and new management difficult to work with.  Tries to get away up to [facility] for [employer].      Insomnia  Hours vary- often home in the morning   Sleeps well with Ambien. Wants to make sure that she gets adequate rest especially given the hours she is working. Concerned about hers and others safety on  the road    TMJ   Pain managed with Norco        Amount of exercise or physical activity: 6-7 days/week for an average of 15-30 minutes    Problems taking medications regularly: No    Medication side effects: none  Diet: regular (no restrictions)    Problem list and histories reviewed & adjusted, as indicated.  Additional history: as documented    Reviewed and updated as needed this visit by clinical staffTobacco  Allergies  Meds  Problems  Med Hx  Surg Hx  Fam Hx  Soc Hx        Reviewed and updated as needed this visit by Provider  Allergies  Meds  Problems         ROS:  Constitutional, HEENT, cardiovascular, pulmonary, gi and gu systems are negative, except as otherwise noted.      OBJECTIVE:   /64 (BP Location: Left arm, Patient Position: Left side, Cuff Size: Adult Regular)  Pulse 78  Temp 98.7  F (37.1  C) (Tympanic)  Resp 24  Wt 149 lb 12.8 oz (67.9 kg)  LMP 01/04/2014  SpO2 98%  Breastfeeding? No  BMI 21.8 kg/m2  Body mass index is 21.8 kg/(m^2).  GENERAL: healthy, alert and no distress  HENT: normal cephalic/atraumatic, ear canals and TM's normal, nose and mouth without ulcers or lesions, oropharynx clear, oral mucous membranes moist   RESP: normal     Diagnostic Test Results:      ASSESSMENT/PLAN:     ASSESSMENT:  1. Neck pain    2. Arthritis    3. Esophagitis        4. Other insomnia    5. TMJ (temporomandibular joint syndrome)        PLAN:  Orders Placed This Encounter     HYDROcodone-acetaminophen (NORCO) 5-325 MG per tablet     sucralfate (CARAFATE) 1 GM/10ML suspension     zolpidem (AMBIEN) 5 MG tablet     cyclobenzaprine (FLEXERIL) 10 MG tablet       Patient Instructions   No change in plan of care.      Rosa Sanabria, NP, APRN [facility]

## 2017-09-11 NOTE — NURSING NOTE
"Chief Complaint   Patient presents with     Pain       Initial LMP 01/04/2014 Estimated body mass index is 23 kg/(m^2) as calculated from the following:    Height as of 9/5/17: 5' 9.5\" (1.765 m).    Weight as of 9/5/17: 158 lb (71.7 kg).  Medication Reconciliation: complete      Health Maintenance that is potentially due pending provider review:  PHQ9 and GAD7    Possibly completing today per provider review.  "

## 2017-09-11 NOTE — MR AVS SNAPSHOT
"              After Visit Summary   2017    Maryann Riggs    MRN: [id number]           Patient Information     Date Of Birth          1965        Visit Information        Provider Department      2017 12:40 PM Rosa Sanabria APRN CNP [facility]        Today's Diagnoses     Encounter for therapeutic drug monitoring    -  1    Arthritis        Neck pain        Esophagitis          Care Instructions    No change in plan of care.            Follow-ups after your visit        Who to contact     If you have questions or need follow up information about today's clinic visit or your schedule please contact [facility] directly at [phone].  Normal or non-critical lab and imaging results will be communicated to you by [employer], letter or phone within 4 business days after the clinic has received the results. If you do not hear from us within 7 days, please contact the clinic through [employer] or phone. If you have a critical or abnormal lab result, we will notify you by phone as soon as possible.  Submit refill requests through [employer] or call your pharmacy and they will forward the refill request to us. Please allow 3 business days for your refill to be completed.          Additional Information About Your Visit        MyChart Information     [employer] lets you send messages to your doctor, view your test results, renew your prescriptions, schedule appointments and more. To sign up, go to [email or web address] . Click on \"Log in\" on the left side of the screen, which will take you to the Welcome page. Then click on \"Sign up Now\" on the right side of the page.     You will be asked to enter the access code listed below, as well as some personal information. Please follow the directions to create your username and password.     Your access code is: [email or web address]  Expires: 12/10/2017  1:34 PM     Your access code will  in 90 days. If you need help or a new code, " please call your [facility] clinic or [phone].        Care EveryWhere ID     This is your Care EveryWhere ID. This could be used by other organizations to access your [facility] medical records  [id number]        Your Vitals Were     Pulse Temperature Respirations Last Period Pulse Oximetry Breastfeeding?    78 98.7  F (37.1  C) (Tympanic) 24 01/04/2014 98% No    BMI (Body Mass Index)                   21.8 kg/m2            Blood Pressure from Last 3 Encounters:   09/11/17 124/64   09/05/17 140/87   01/13/17 106/78    Weight from Last 3 Encounters:   09/11/17 149 lb 12.8 oz (67.9 kg)   09/05/17 158 lb (71.7 kg)   01/13/17 150 lb (68 kg)              We Performed the Following     Drug Abuse Screen Panel 13, Urine (Pain Care Package)          Where to get your medicines      These medications were sent to [facility] [place] [id number]     Phone:  [phone]     sucralfate 1 GM/10ML suspension         Some of these will need a paper prescription and others can be bought over the counter.  Ask your nurse if you have questions.     Bring a paper prescription for each of these medications     HYDROcodone-acetaminophen 5-325 MG per tablet          Primary Care Provider Office Phone # Fax #    Rosa Sanabria CONTRERAS [facility] [phone]       [address] [place] [id number]        Equal Access to Services     USMAN MAYER AH: Hadii isabel goodmano Somarisela, waaxda luqadaha, qaybta kaalmada adeegyada, wax oscar chery. So [facility] [phone].    ATENCIÓN: Si habla español, tiene a iglesias disposición servicios gratuitos de asistencia lingüística. Llame al [phone].    We comply with applicable federal civil rights laws and [place] laws. We do not discriminate on the basis of race, color, national origin, age, disability sex, sexual orientation or gender identity.            Thank you!     Thank you for choosing  [facility]  for your care. Our goal is always to provide you with excellent care. Hearing back from our patients is one way we can continue to improve our services. Please take a few minutes to complete the written survey that you may receive in the mail after your visit with us. Thank you!             Your Updated Medication List - Protect others around you: Learn how to safely use, store and throw away your medicines at www.disposemymeds.org.          This list is accurate as of: 9/11/17  1:34 PM.  Always use your most recent med list.                   Brand Name Dispense Instructions for use Diagnosis    CALTRATE 600+D 600-400 MG-UNIT Chew   Generic drug:  calcium carbonate-vitamin D      Take  by mouth.        clobetasol 0.05 % ointment    TEMOVATE    45 g    as needed. Only as needed occasionally needs after working in garden Profile Rx: patient will contact pharmacy when needed    Dermatitis       cyclobenzaprine 10 MG tablet    FLEXERIL    90 tablet    Take 1 tablet (10 mg) by mouth daily as needed for muscle spasms    TMJ (temporomandibular joint syndrome)       GLUCOSAMINE CHONDROITIN COMPLX PO      Take  by mouth.        * HYDROcodone-acetaminophen 5-325 MG per tablet    NORCO    30 tablet    1/2 -1 daily prn low back pain    Arthritis, Neck pain       * HYDROcodone-acetaminophen 5-325 MG per tablet    NORCO    15 tablet    Take 1 tablet by mouth daily as needed for moderate to severe pain    Arthritis, Neck pain       * HYDROcodone-acetaminophen 5-325 MG per tablet    NORCO    30 tablet    Take 1 tablet by mouth daily maximum 1 tablet(s) per day    Neck pain, Arthritis       IRON CR PO      Take  by mouth.        lidocaine 5 % ointment    XYLOCAINE    70.88 g    Apply dime size amount topically to painful areas (choose 2-3 per application) 3 times daily as needed.    Pain of finger of left hand, SI (sacroiliac) joint dysfunction       melatonin 3 MG tablet     30 tablet    Take 1 tablet  by mouth nightly as needed for sleep.    Medication monitoring encounter       NIFEdipine ER osmotic 30 MG 24 hr tablet    PROCARDIA XL    90 tablet    Take 1 tablet (30 mg) by mouth daily    Raynaud's disease without gangrene       nitroGLYcerin 2 % Oint ointment    NITRO-BID    30 packet    Place 1 inch (15 mg) onto the skin daily    Raynaud's disease without gangrene       omeprazole 40 MG capsule    priLOSEC    90 capsule    Take 1 capsule (40 mg) by mouth daily Take 30-60 minutes before a meal.    Esophageal stricture       order for DME     1 Units    Equipment being ordered: TENS    SI (sacroiliac) joint dysfunction, Myofascial pain, Facet arthropathy       POTASSIUM CARBONATE           sucralfate 1 GM/10ML suspension    CARAFATE    210 mL    Take 10 mLs (1 g) by mouth 4 times daily    Esophagitis       TYLENOL 325 MG tablet   Generic drug:  acetaminophen      2 TABLETS BY MOUTH EVERY 6 HOURS AS NEEDED        VITAMIN B 12 PO      Take  by mouth.        VITAMIN C CR PO      Take  by mouth.        * zolpidem 5 MG tablet    AMBIEN    30 tablet    Take 1 tablet (5 mg) by mouth nightly as needed for sleep    Persistent insomnia       * zolpidem 5 MG tablet    AMBIEN    30 tablet    Take 1 tablet (5 mg) by mouth nightly as needed for sleep    Other insomnia       * Notice:  This list has 5 medication(s) that are the same as other medications prescribed for you. Read the directions carefully, and ask your doctor or other care provider to review them with you.

## 2017-09-12 ASSESSMENT — ANXIETY QUESTIONNAIRES: GAD7 TOTAL SCORE: 0

## 2017-10-11 DIAGNOSIS — G47.09 OTHER INSOMNIA: ICD-10-CM

## 2017-10-11 NOTE — TELEPHONE ENCOUNTER
zolpidem (AMBIEN) 5 MG tablet      Last Written Prescription Date: 09/11/2017  Last Fill Quantity: 30 tablet,  # refills: 0   Last Office Visit with [facility], [facility] or [facility] prescribing provider: 09/11/2017

## 2017-10-12 RX ORDER — ZOLPIDEM TARTRATE 5 MG/1
5 TABLET ORAL
Qty: 30 TABLET | Refills: 0 | Status: SHIPPED | OUTPATIENT
Start: 2017-10-12 | End: 2017-11-24

## 2017-10-18 DIAGNOSIS — G47.09 OTHER INSOMNIA: ICD-10-CM

## 2017-10-18 RX ORDER — ZOLPIDEM TARTRATE 5 MG/1
5 TABLET ORAL
Qty: 30 TABLET | Refills: 0 | Status: CANCELLED | OUTPATIENT
Start: 2017-10-18

## 2017-10-24 NOTE — TELEPHONE ENCOUNTER
Called in Ambian that was ordered on 10/12/17 as pharmacy indicates pt has not filled since 9/12/17 and prescription has been lost.    Meri GOLDEN RN

## 2017-11-06 ENCOUNTER — RADIANT APPOINTMENT (OUTPATIENT)
Dept: MAMMOGRAPHY | Facility: CLINIC | Age: 52
End: 2017-11-06
Attending: NURSE PRACTITIONER
Payer: COMMERCIAL

## 2017-11-06 DIAGNOSIS — M19.90 ARTHRITIS: ICD-10-CM

## 2017-11-06 DIAGNOSIS — M54.2 NECK PAIN: ICD-10-CM

## 2017-11-06 DIAGNOSIS — Z12.31 VISIT FOR SCREENING MAMMOGRAM: ICD-10-CM

## 2017-11-06 PROCEDURE — G0202 SCR MAMMO BI INCL CAD: HCPCS | Mod: TC

## 2017-11-06 RX ORDER — HYDROCODONE BITARTRATE AND ACETAMINOPHEN 5; 325 MG/1; MG/1
1 TABLET ORAL DAILY
Qty: 30 TABLET | Refills: 0 | Status: SHIPPED | OUTPATIENT
Start: 2017-11-06 | End: 2018-01-19

## 2017-11-06 NOTE — TELEPHONE ENCOUNTER
norco 5-325mg      Last Written Prescription Date:  09/11/17  Last Fill Quantity: 30,   # refills: 0  Future Office visit:       Routing refill request to provider for review/approval because:  Drug not on the [facility], [facility] or [facility] refill protocol or controlled substance    Thank you,    Elizabeth Ingram  [facility]  [place], [place]  [phone]

## 2017-11-07 NOTE — TELEPHONE ENCOUNTER
Made pt aware that prescription is available and will walk over to the pharmacy.    Meri GOLDEN RN

## 2017-11-22 ENCOUNTER — TELEPHONE (OUTPATIENT)
Dept: FAMILY MEDICINE | Facility: CLINIC | Age: 52
End: 2017-11-22

## 2017-11-22 DIAGNOSIS — G89.29 CHRONIC BILATERAL LOW BACK PAIN WITHOUT SCIATICA: Primary | ICD-10-CM

## 2017-11-22 DIAGNOSIS — M53.3 SI (SACROILIAC) JOINT DYSFUNCTION: ICD-10-CM

## 2017-11-22 DIAGNOSIS — M54.50 CHRONIC BILATERAL LOW BACK PAIN WITHOUT SCIATICA: Primary | ICD-10-CM

## 2017-11-22 NOTE — TELEPHONE ENCOUNTER
Reason for call:  Patient reporting a symptom    Symptom or request: Back Pain-Lower back- Pt is requesting a referral/Order back injection. Dr[facility]. Pt has had in the past but needs a new order. Dr. Lopez ordered the last one.    Duration (how long have symptoms been present): yrs.    Have you been treated for this before? Yes    Phone Number patient can be reached at:  Home number on file [phone] (home)    Best Time:  Any Time      Can we leave a detailed message on this number:  YES    Call taken on 11/22/2017 at 12:34 PM by Nehal Campoverde

## 2017-11-24 DIAGNOSIS — G47.09 OTHER INSOMNIA: ICD-10-CM

## 2017-11-24 NOTE — TELEPHONE ENCOUNTER
Routing refill request to provider for review/approval because:  Drug not on the [facility] refill protocol     Xiomraa Gomez RN

## 2017-11-28 RX ORDER — ZOLPIDEM TARTRATE 5 MG/1
5 TABLET ORAL
Qty: 30 TABLET | Refills: 0 | Status: SHIPPED | OUTPATIENT
Start: 2017-11-28 | End: 2018-01-10

## 2017-11-28 NOTE — TELEPHONE ENCOUNTER
Order in place.    Genaro- patient called on 11/22, I was off until today. Should have been routed to another provider. Rosa Sanabria RNC, NP  [facility]

## 2017-11-29 ENCOUNTER — TELEPHONE (OUTPATIENT)
Dept: FAMILY MEDICINE | Facility: CLINIC | Age: 52
End: 2017-11-29

## 2017-11-29 ENCOUNTER — TELEPHONE (OUTPATIENT)
Dept: PALLIATIVE MEDICINE | Facility: CLINIC | Age: 52
End: 2017-11-29

## 2017-11-29 DIAGNOSIS — K22.9 ESOPHAGEAL ABNORMALITY: Primary | ICD-10-CM

## 2017-11-29 NOTE — TELEPHONE ENCOUNTER
Patient is calling stating her copay for her Carafate is the same no matter what the dose. She is calling to request 420 ml like it was before. So she can get a larger dose for the same copay      Angela [facility]

## 2017-11-29 NOTE — TELEPHONE ENCOUNTER
Left VM for patient to schedule Lumbar TBD vs SI joint injection.        Delmy MOE    [facility]

## 2017-11-29 NOTE — TELEPHONE ENCOUNTER
Pre-screening questions for Radiology Injections:    Injection to be done at which interventional clinic site? [facility]    Procedure ordered by Dr. Sanabria, Rosa Montano    Procedure ordered? TBD Medial Branch Block    What insurance would patient like us to bill for this procedure? selectcare      Worker's comp- Any injection DO NOT SCHEDULE and route to Elena Blanchard.      [employer] insurance - If scheduling an SI joint injection DO NOT SCHEDULE and route to Delmy Hines.     [employer]- MBB's must be scheduled at LEAST two weeks apart      [employer] - Any injection besides hip/shoulder/knee joint DO NOT SCHEDULE and route to Delmy Hines. She will obtain PA and call pt back to schedule procedure or notify pt of denial.     [employer]- PA required for all MBB's    Any chance of pregnancy? Not Applicable   If YES, do NOT schedule and route to RN pool    Is an  needed? No     Patient has a drive home? (mandatory) Yes     Is patient taking any blood thinners (plavix, coumadin, jantoven, warfarin, heparin, pradaxa or dabigatran )? No    If hold needed, do NOT schedule, route to RN pool     Is patient taking any aspirin products? No     If more than 325mg/day do NOT schedule; route to RN pool     For CERVICAL procedures, hold all aspirin products for 6 days.      Does the patient have a bleeding or clotting disorder? No    If YES, okay to schedule AND route to RN nurse pool    **For any patients with platelet count <100, must be forwarded to provider**    Is patient diabetic? no If YES, have them bring their glucometer.    Does patient have an active infection or treated for one within the past week? No    Is patient currently taking any antibiotics?  No    For patients on chronic, preventative, or prophylacti antibiotics, procedures can be scheduled.     For patients on antibiotics for active or recent infection:    Vivek Lobo, Enrique Wade Burton, Snitzer-antibiotic course must  have been completed for 4 days     Dr. Umanzor-antibiotic course must have been completed for 7 days    Is patient currently taking any steroid medications? (i.e. Prednisone, Medrol)  No     For patients on steroid medications:    Enrique Mccain Burton, Snitzer-steroid course must have been completed for 4 days    Dr. Umanzor-steroid course must have been completed for 7 days    Review with patient:  If you are started on any steroids or antibiotics between now and your appointment, you must contact us because it may affect our ability to perform your procedure     Is patient actively being treated for cancer or immunocompromised? No   If YES, do NOT schedule and route to RN    Are you able to get on and off an exam table with minimal or no assistance? Yes   If NO, do NOT schedule and route to RN    Are you able to roll over and lay on your stomach with minimal or no assistance? Yes   If NO, do NOT schedule and route to RN    Any allergies to contrast dye, iodine, shellfish, or numbing and steroid medications? No  (If so, inform nursing and note in scheduling comments.)    Allergies: Desyrel [trazodone hcl] and Lamictal [lamotrigine]     Has the patient had a flu shot or any other vaccinations within 7 days before or after the procedure.  No     Does patient have an MRI/CT?  NO  (SI joint, hip injections, lumbar sympathetic blocks, and stellate ganglion blocks do not require an MRI)    Was the MRI done w/in the last 3 years?  NA    Was MRI done at [facility]? No      If not, where was it done? N/A       If [facility] was not done at [facility], [facility] do NOT schedule and route to nursing.  If pt has an imaging disc, the injection may be scheduled but pt has to bring disc to appt. If they show up w/out disc the injection cannot be done    **Must be scheduled with elapsed time interval of at least 2 weeks and not more than 6 months between the First MBB and the Second MBB**       Medial Branch Block  Pre-Procedure Instructions    It is okay to take long acting pain medications (if you are on them) the day of the procedure but try not to take any short acting medications unless absolutely necessary.         Long acting meds would include: Gabapentin (Neurontin), MS Contin, Oxycontin        Short acting meds would include:  Percocet, Oxycodone, Vicodin, Ibuprofen     The day of the procedure, you should try to do things that provoke your pain, since the injection is being done to see if it will relieve your pain .     If your pain level is a 4 out of 10 or less on the day of the procedure, please call [phone] to reschedule.    Reminders (please tell patient if applicable):      Instructed pt to arrive 30 minutes early for IV start if this is for a cervical procedure, ALL sympathetic (stellate ganglion, hypogastric, or lumbar sympathetic block) and all sedation procedures (RFA, spinal cord stimulation trials).         -IVs are not routinely placed for Dr. Abel cervical cases        -Dr. Tafoya: no IV needed for CMBB       If NPO for sedation, it is okay to take medications with sips of water (except if they are to hold blood thinners).    *DO take blood pressure medication if it is prescribed*      If this is for a cervical MBB aspirin needs to be held for 6 days.        Do not schedule procedures requiring IV placement in the first appointment after lunch       For patients 85 or older we recommend having an adult stay w/ them for the remainder of the day.      Does the patient have any questions? no

## 2017-11-30 RX ORDER — SUCRALFATE ORAL 1 G/10ML
1 SUSPENSION ORAL 4 TIMES DAILY
Qty: 420 ML | Refills: 3 | Status: SHIPPED | OUTPATIENT
Start: 2017-11-30 | End: 2019-01-21

## 2017-12-12 ENCOUNTER — RADIANT APPOINTMENT (OUTPATIENT)
Dept: GENERAL RADIOLOGY | Facility: CLINIC | Age: 52
End: 2017-12-12
Attending: PAIN MEDICINE
Payer: COMMERCIAL

## 2017-12-12 ENCOUNTER — RADIOLOGY INJECTION OFFICE VISIT (OUTPATIENT)
Dept: PALLIATIVE MEDICINE | Facility: CLINIC | Age: 52
End: 2017-12-12
Payer: COMMERCIAL

## 2017-12-12 VITALS — DIASTOLIC BLOOD PRESSURE: 88 MMHG | SYSTOLIC BLOOD PRESSURE: 140 MMHG | HEART RATE: 80 BPM | OXYGEN SATURATION: 98 %

## 2017-12-12 DIAGNOSIS — G89.29 CHRONIC BILATERAL LOW BACK PAIN WITHOUT SCIATICA: ICD-10-CM

## 2017-12-12 DIAGNOSIS — M53.3 SI (SACROILIAC) JOINT DYSFUNCTION: Primary | ICD-10-CM

## 2017-12-12 DIAGNOSIS — M54.50 CHRONIC BILATERAL LOW BACK PAIN WITHOUT SCIATICA: ICD-10-CM

## 2017-12-12 PROCEDURE — 27096 INJECT SACROILIAC JOINT: CPT | Mod: 50 | Performed by: PAIN MEDICINE

## 2017-12-12 NOTE — NURSING NOTE
Discharge Information    IV Discontiued Time:  NA    Amount of Fluid Infused:  NA    Discharge Criteria = When patient returns to baseline or as per MD order    Consciousness:  Pt is fully awake    Circulation:  BP +/- 20% of pre-procedure level    Respiration:  Patient is able to breathe deeply    O2 Sat:  Patient is able to maintain O2 Sat >92% on room air    Activity:  Moves 4 extremities on command    Ambulation:  Patient is able to stand and walk or stand and pivot into wheelchair    Dressing:  Clean/dry or No Dressing    Notes:   Discharge instructions and AVS given to patient    Patient meets criteria for discharge?  YES    Admitted to PCU?  No    Responsible adult present to accompany patient home?  Yes    Signature/Title:    Darline Tsang  RN Care Coordinator  [facility]

## 2017-12-12 NOTE — PROGRESS NOTES
Pre procedure Diagnosis: SI joint dysfunction    Post procedure Diagnosis: Same  Procedure performed: bilateral SI joint injection  Anesthesia: none  Complications: none  Operators: Bay Tafoya MD     Indications:   Maryann Riggs is a 52 year old female. The patient has a history of chronic bilat lbp/buttocks pain. The pt has SI joint injections in the past with sig relief. Other conservative treatments prior to injection include meds.    Options/alternatives, benefits and risks were discussed with the patient including bleeding, infection, tissue trauma, exposure to radiation, reaction to medications including seizure, nerve injury, weakness, and numbness.  Questions were answered to her satisfaction and she agrees to proceed. Voluntary informed consent was obtained and signed.     Vitals were reviewed: Yes  Allergies were reviewed:  Yes   Medications were reviewed:  Yes   Pre-procedure pain score: 6/10    Procedure:  After obtaining signed informed consent, the patient was brought into the procedure suite and was placed in a prone position on the procedure table.   A Pause for the Cause was performed.  The patient was prepped and draped in the usual sterile fashion.     After identifying the bilateral SI joint, the C-arm was rotated obliquely to obtain the best view of the inferior angle of the joint.  Then 5 ml of Lidocaine 1%  was used to anesthetize the skin at a skin entry site coaxial with the fluoroscopy beam at this location.  A 22 gauge 3.5 inch needle was advanced under intermittent fluoroscopy until it was felt to enter the SI joint.  Aspiration was negative.    A total of 1ml of Omnipaque-300 was injected, confirming appropriate position, with spread into the intraarticular space, with no intravascular uptake noted.  9ml of Omnipaque was wasted. Location was verified in lateral view.    2 ml of 0.5% bupivacaine with 40mg of Kenalog was injected.  The needle was removed.     Hemostasis was  achieved, the area was cleaned, and bandaids were placed when appropriate.  The patient tolerated the procedure well, and was taken to the recovery room.  Images were saved to PACS.    Post-procedure pain score: 0/10  Follow-up includes:   -f/u phone call in one week  -f/u with referring Physician     Bay Tafoya MD  [facility]

## 2017-12-12 NOTE — NURSING NOTE
Pre-procedure Intake    Have you been fasting? NA    If yes, for how long?     Are you taking a prescribed blood thinner such as coumadin, Plavix, Xarelto?    No    If yes, when did you take your last dose?     Do you take aspirin?  No    If cervical procedure, have you held aspirin for 6 days?   NA    Do you have any allergies to contrast dye, iodine, steroid and/or numbing medications?  NO    Are you currently taking antibiotics or have an active infection?  NO    Have you had a fever/elevated temperature within the past week? NO    Are you currently taking oral steroids? NO    Do you have a ? Yes       Are you pregnant or breastfeeding?  No    Are the vital signs normal?  Yes

## 2017-12-12 NOTE — PATIENT INSTRUCTIONS
[facility] Procedure Discharge Instructions    Today you saw: Dr. Bay Tafoya    Your procedure:  Sacro-iliac joint injection      Medications used:  Lidocaine (anesthetic)  Bupivacaine (anesthetic) Kenalog (steroid)  Omnipaque (contrast)             Be cautious when walking as numbness and/or weakness in the legs may occur up to 6-8 hours after the procedure due to effect of the local anesthetic    Do not drive for 6 hours. The effect of the local anesthetic could slow your reflexes.     Avoid strenuous activity for the first 24 hours. You may resume your regular activities after that.     You may shower, however avoid swimming, tub baths or hot tubs for 24 hours following your procedure    You may have a mild to moderate increase in pain for several days following the injection.      You may use ice packs for 10-15 minutes, 3 to 4 times a day at the injection site for comfort    Do not use heat to painful areas for 6 to 8 hours. This will give the local anesthetic time to wear off and prevent you from accidentally burning your skin.    You may use anti-inflammatory medications (such as Ibuprofen/Advil or Aleve) or Tylenol for pain control if necessary    With diabetes, check your blood sugar more frequently than usual as your blood sugar may be higher than normal for 10-14 days following a steroid injection. Contact your doctor who manages your diabetes if your blood sugar is higher than usual    It may take up to 14 days for the steroid medication to start working although you may feel the effect as early as a few days after the procedure.     Follow up with your referring provider in 2-3 weeks      If you experience any of the following, call the pain center line during work hours at [phone] or on-call physician after hours at [phone]:  -Fever over 100 degree F  -Swelling, bleeding, redness, drainage, warmth at the injection site  -Progressive weakness or numbness in your legs or  arms  -Loss of bowel or bladder function  -Unusual headache that is not relieved by Tylenol or your regular headache medication  -Unusual new onset of pain that is not improving    Phone #s:  Nurse triage line for general questions: [phone]

## 2017-12-12 NOTE — MR AVS SNAPSHOT
After Visit Summary   12/12/2017    Maryann Riggs    MRN: [id number]           Patient Information     Date Of Birth          1965        Visit Information        Provider Department      12/12/2017 10:45 AM Bay Tafoya MD [facility]        Care Instructions    [facility] Procedure Discharge Instructions    Today you saw: Dr. Bay Tafoya    Your procedure:  Sacro-iliac joint injection      Medications used:  Lidocaine (anesthetic)  Bupivacaine (anesthetic) Kenalog (steroid)  Omnipaque (contrast)             Be cautious when walking as numbness and/or weakness in the legs may occur up to 6-8 hours after the procedure due to effect of the local anesthetic    Do not drive for 6 hours. The effect of the local anesthetic could slow your reflexes.     Avoid strenuous activity for the first 24 hours. You may resume your regular activities after that.     You may shower, however avoid swimming, tub baths or hot tubs for 24 hours following your procedure    You may have a mild to moderate increase in pain for several days following the injection.      You may use ice packs for 10-15 minutes, 3 to 4 times a day at the injection site for comfort    Do not use heat to painful areas for 6 to 8 hours. This will give the local anesthetic time to wear off and prevent you from accidentally burning your skin.    You may use anti-inflammatory medications (such as Ibuprofen/Advil or Aleve) or Tylenol for pain control if necessary    With diabetes, check your blood sugar more frequently than usual as your blood sugar may be higher than normal for 10-14 days following a steroid injection. Contact your doctor who manages your diabetes if your blood sugar is higher than usual    It may take up to 14 days for the steroid medication to start working although you may feel the effect as early as a few days after the procedure.     Follow up with your referring provider in  "2-3 weeks      If you experience any of the following, call the pain center line during work hours at [phone] or on-call physician after hours at [phone]:  -Fever over 100 degree F  -Swelling, bleeding, redness, drainage, warmth at the injection site  -Progressive weakness or numbness in your legs or arms  -Loss of bowel or bladder function  -Unusual headache that is not relieved by Tylenol or your regular headache medication  -Unusual new onset of pain that is not improving    Phone #s:  Nurse triage line for general questions: [phone]            Follow-ups after your visit        Who to contact     If you have questions or need follow up information about today's clinic visit or your schedule please contact [facility] directly at [phone].  Normal or non-critical lab and imaging results will be communicated to you by Kaseyahart, letter or phone within 4 business days after the clinic has received the results. If you do not hear from us within 7 days, please contact the clinic through Kaseyahart or phone. If you have a critical or abnormal lab result, we will notify you by phone as soon as possible.  Submit refill requests through [employer] or call your pharmacy and they will forward the refill request to us. Please allow 3 business days for your refill to be completed.          Additional Information About Your Visit        [employer] Information     [employer] lets you send messages to your doctor, view your test results, renew your prescriptions, schedule appointments and more. To sign up, go to [email or web address] . Click on \"Log in\" on the left side of the screen, which will take you to the Welcome page. Then click on \"Sign up Now\" on the right side of the page.     You will be asked to enter the access code listed below, as well as some personal information. Please follow the directions to create your username and password.     Your access code is: [email or web address]  Expires: 3/12/2018 " 10:49 AM     Your access code will  in 90 days. If you need help or a new code, please call your [facility] clinic or [phone].        Care EveryWhere ID     This is your Care EveryWhere ID. This could be used by other organizations to access your [facility] medical records  [id number]        Your Vitals Were     Pulse Last Period Pulse Oximetry             72 2014 99%          Blood Pressure from Last 3 Encounters:   17 145/85   17 124/64   17 140/87    Weight from Last 3 Encounters:   17 67.9 kg (149 lb 12.8 oz)   17 71.7 kg (158 lb)   17 68 kg (150 lb)              Today, you had the following     No orders found for display       Primary Care Provider Office Phone # Fax #    CONTRERAS [facility] [phone]       [address] [place] [address]        Equal Access to Services     [facility] : Hadii aad ku hadasho Soomaali, waaxda luqadaha, qaybta kaalmada adeegyada, waxay idiin hayaan ella villaaramoi mauricio'heather . So [facility] [phone].    ATENCIÓN: Si habla español, tiene a iglesias disposición servicios gratuitos de asistencia lingüística. Llame al [phone].    We comply with applicable federal civil rights laws and [place] laws. We do not discriminate on the basis of race, color, national origin, age, disability, sex, sexual orientation, or gender identity.            Thank you!     Thank you for choosing [facility]  for your care. Our goal is always to provide you with excellent care. Hearing back from our patients is one way we can continue to improve our services. Please take a few minutes to complete the written survey that you may receive in the mail after your visit with us. Thank you!             Your Updated Medication List - Protect others around you: Learn how to safely use, store and throw away your medicines at www.disposemymeds.org.          This list is accurate as of: 17 10:49 AM.  Always use your most  recent med list.                   Brand Name Dispense Instructions for use Diagnosis    CALTRATE 600+D 600-400 MG-UNIT Chew   Generic drug:  calcium carbonate-vitamin D      Take  by mouth.        clobetasol 0.05 % ointment    TEMOVATE    45 g    as needed. Only as needed occasionally needs after working in garden Profile Rx: patient will contact pharmacy when needed    Dermatitis       cyclobenzaprine 10 MG tablet    FLEXERIL    90 tablet    Take 1 tablet (10 mg) by mouth daily as needed for muscle spasms    TMJ (temporomandibular joint syndrome)       GLUCOSAMINE CHONDROITIN COMPLX PO      Take  by mouth.        * HYDROcodone-acetaminophen 5-325 MG per tablet    NORCO    30 tablet    1/2 -1 daily prn low back pain    Arthritis, Neck pain       * HYDROcodone-acetaminophen 5-325 MG per tablet    NORCO    15 tablet    Take 1 tablet by mouth daily as needed for moderate to severe pain    Arthritis, Neck pain       * HYDROcodone-acetaminophen 5-325 MG per tablet    NORCO    30 tablet    Take 1 tablet by mouth daily maximum 1 tablet(s) per day    Neck pain, Arthritis       IRON CR PO      Take  by mouth.        lidocaine 5 % ointment    XYLOCAINE    70.88 g    Apply dime size amount topically to painful areas (choose 2-3 per application) 3 times daily as needed.    Pain of finger of left hand, SI (sacroiliac) joint dysfunction       melatonin 3 MG tablet     30 tablet    Take 1 tablet by mouth nightly as needed for sleep.    Medication monitoring encounter       NIFEdipine ER osmotic 30 MG 24 hr tablet    PROCARDIA XL    90 tablet    Take 1 tablet (30 mg) by mouth daily    Raynaud's disease without gangrene       nitroGLYcerin 2 % Oint ointment    NITRO-BID    30 packet    Place 1 inch (15 mg) onto the skin daily    Raynaud's disease without gangrene       omeprazole 40 MG capsule    priLOSEC    90 capsule    Take 1 capsule (40 mg) by mouth daily Take 30-60 minutes before a meal.    Esophageal stricture       order for  DME     1 Units    Equipment being ordered: TENS    SI (sacroiliac) joint dysfunction, Myofascial pain, Facet arthropathy       POTASSIUM CARBONATE           * sucralfate 1 GM/10ML suspension    CARAFATE    210 mL    Take 10 mLs (1 g) by mouth 4 times daily    Esophagitis       * sucralfate 1 GM/10ML suspension    CARAFATE    420 mL    Take 10 mLs (1 g) by mouth 4 times daily    Esophageal abnormality       TYLENOL 325 MG tablet   Generic drug:  acetaminophen      2 TABLETS BY MOUTH EVERY 6 HOURS AS NEEDED        VITAMIN B 12 PO      Take  by mouth.        VITAMIN C CR PO      Take  by mouth.        zolpidem 5 MG tablet    AMBIEN    30 tablet    Take 1 tablet (5 mg) by mouth nightly as needed for sleep    Other insomnia       * Notice:  This list has 5 medication(s) that are the same as other medications prescribed for you. Read the directions carefully, and ask your doctor or other care provider to review them with you.

## 2017-12-19 ENCOUNTER — TELEPHONE (OUTPATIENT)
Dept: PALLIATIVE MEDICINE | Facility: CLINIC | Age: 52
End: 2017-12-19

## 2017-12-19 NOTE — TELEPHONE ENCOUNTER
Patient had a bilateral SI joint injection on 12/12/17.  Called patient for an update.      Left message that we were calling for an update about how she was doing after the injection.  LM that if she has any problems or questions to call the nurse line at [phone].

## 2017-12-26 DIAGNOSIS — I73.00 RAYNAUD'S DISEASE WITHOUT GANGRENE: ICD-10-CM

## 2017-12-26 NOTE — TELEPHONE ENCOUNTER
Maryann requesting a refill on Nitro-Bid ointment. Pharmacy not able to order the packets so have been filling with a 30 gram tube. Last filled 11/15/16    ALEJANDRA MONTANO [facility]

## 2018-01-15 DIAGNOSIS — M54.2 NECK PAIN: ICD-10-CM

## 2018-01-15 DIAGNOSIS — M19.90 ARTHRITIS: ICD-10-CM

## 2018-01-15 RX ORDER — HYDROCODONE BITARTRATE AND ACETAMINOPHEN 5; 325 MG/1; MG/1
1 TABLET ORAL DAILY
Qty: 30 TABLET | Refills: 0 | Status: CANCELLED | OUTPATIENT
Start: 2018-01-15

## 2018-01-16 NOTE — TELEPHONE ENCOUNTER
HYDROcodone-acetaminophen (NORCO) 5-325 MG per tablet      Last Written Prescription Date:  1/13/17  Last Fill Quantity: 30,   # refills: 1  Last Office Visit: 9/11/17  Future Office visit:       Routing refill request to provider for review/approval because:  Drug not on the [facility], [facility] or [facility] refill protocol or controlled substance

## 2018-01-16 NOTE — TELEPHONE ENCOUNTER
Pain medications - Narcotics need follow up appt in clinic for pain management. .  Thanks Maria Del Carmen Rodriguez P-BC

## 2018-01-19 ENCOUNTER — OFFICE VISIT (OUTPATIENT)
Dept: FAMILY MEDICINE | Facility: CLINIC | Age: 53
End: 2018-01-19
Payer: COMMERCIAL

## 2018-01-19 VITALS
SYSTOLIC BLOOD PRESSURE: 122 MMHG | DIASTOLIC BLOOD PRESSURE: 78 MMHG | TEMPERATURE: 97.7 F | WEIGHT: 149 LBS | RESPIRATION RATE: 16 BRPM | BODY MASS INDEX: 21.69 KG/M2

## 2018-01-19 DIAGNOSIS — Z23 NEED FOR PROPHYLACTIC VACCINATION AND INOCULATION AGAINST INFLUENZA: ICD-10-CM

## 2018-01-19 DIAGNOSIS — I73.00 RAYNAUD'S DISEASE WITHOUT GANGRENE: ICD-10-CM

## 2018-01-19 DIAGNOSIS — M79.645 PAIN OF FINGER OF LEFT HAND: ICD-10-CM

## 2018-01-19 DIAGNOSIS — M19.90 ARTHRITIS: ICD-10-CM

## 2018-01-19 DIAGNOSIS — M53.3 SI (SACROILIAC) JOINT DYSFUNCTION: ICD-10-CM

## 2018-01-19 DIAGNOSIS — M54.2 NECK PAIN: Primary | ICD-10-CM

## 2018-01-19 PROCEDURE — 90686 IIV4 VACC NO PRSV 0.5 ML IM: CPT | Performed by: NURSE PRACTITIONER

## 2018-01-19 PROCEDURE — 99214 OFFICE O/P EST MOD 30 MIN: CPT | Mod: 25 | Performed by: NURSE PRACTITIONER

## 2018-01-19 PROCEDURE — 90471 IMMUNIZATION ADMIN: CPT | Performed by: NURSE PRACTITIONER

## 2018-01-19 RX ORDER — HYDROCODONE BITARTRATE AND ACETAMINOPHEN 5; 325 MG/1; MG/1
TABLET ORAL
Qty: 30 TABLET | Refills: 0 | Status: SHIPPED | OUTPATIENT
Start: 2018-01-19 | End: 2018-11-16

## 2018-01-19 RX ORDER — LIDOCAINE 50 MG/G
OINTMENT TOPICAL
Qty: 70.88 G | Refills: 2 | Status: SHIPPED | OUTPATIENT
Start: 2018-01-19 | End: 2019-08-29

## 2018-01-19 RX ORDER — HYDROCODONE BITARTRATE AND ACETAMINOPHEN 5; 325 MG/1; MG/1
1 TABLET ORAL DAILY
Qty: 30 TABLET | Refills: 0 | Status: SHIPPED | OUTPATIENT
Start: 2018-03-19 | End: 2018-05-25

## 2018-01-19 RX ORDER — HYDROCODONE BITARTRATE AND ACETAMINOPHEN 5; 325 MG/1; MG/1
1 TABLET ORAL DAILY PRN
Qty: 30 TABLET | Refills: 0 | Status: SHIPPED | OUTPATIENT
Start: 2018-02-19 | End: 2018-11-16

## 2018-01-19 RX ORDER — NIFEDIPINE 30 MG/1
30 TABLET, EXTENDED RELEASE ORAL DAILY
Qty: 90 TABLET | Refills: 1 | Status: SHIPPED | OUTPATIENT
Start: 2018-01-19 | End: 2019-01-21

## 2018-01-19 NOTE — PROGRESS NOTES
SUBJECTIVE:   Maryann Riggs is a 52 year old female who presents to clinic today for the following health issues:      Medication Followup of pain    Taking Medication as prescribed: yes    Side Effects:  None    Medication Helping Symptoms:  yes   Primary area of pain is in the low back.  She does have a history of SI joint dysfunction.  Also has neck pain and generalized arthritis as well as Raynaud's disease.    Recently has increased low back pain when clearing her driveway of ice and snow.  She had acute back pain.  Following work on the driveway week ago she describes this as feeling like she has fists poking into her back.    This acute pain is in the low back. Has some pressure points in base of skull    Nifedipine helps with raynauds- hands get very cold, and uses nitrobid cream which is also helpful.       Problem list and histories reviewed & adjusted, as indicated.  Additional history: as documented    Patient Active Problem List   Diagnosis     Raynaud's disease     TMJ (temporomandibular joint syndrome)     CARDIOVASCULAR SCREENING; LDL GOAL LESS THAN 160     PTSD (post-traumatic stress disorder)     Muscle tension headache     Esophageal abnormality     Episodic mood disorder (H)     Esophageal stricture     Sprain of MCP joint of hand     Rotator cuff injury; bilateral repairs 2005     Primary generalized (osteo)arthritis     SI (sacroiliac) joint dysfunction; see details, pain management contract     Chronic bilateral low back pain without sciatica     Past Surgical History:   Procedure Laterality Date     MAMMOPLASTY REDUCTION       OTHER SURGICAL HISTORY      s/p left knee arthroscopinc surgery     ROTATOR CUFF REPAIR RT/LT  2005     SMALL BOWEL RESECTION       TUBAL LIGATION         Social History   Substance Use Topics     Smoking status: Never Smoker     Smokeless tobacco: Never Used     Alcohol use Yes      Comment: scoial     Family History   Problem Relation Age of Onset      Unknown/Adopted Mother      Unknown/Adopted Father      Unknown/Adopted Maternal Grandmother      Unknown/Adopted Maternal Grandfather      Unknown/Adopted Paternal Grandmother      Unknown/Adopted Paternal Grandfather          Current Outpatient Prescriptions   Medication Sig Dispense Refill     [START ON 3/19/2018] HYDROcodone-acetaminophen (NORCO) 5-325 MG per tablet Take 1 tablet by mouth daily maximum 1 tablet(s) per day 30 tablet 0     [START ON 2/19/2018] HYDROcodone-acetaminophen (NORCO) 5-325 MG per tablet Take 1 tablet by mouth daily as needed for moderate to severe pain 30 tablet 0     HYDROcodone-acetaminophen (NORCO) 5-325 MG per tablet 1/2 -1 daily prn low back pain 30 tablet 0     NIFEdipine ER osmotic (PROCARDIA XL) 30 MG 24 hr tablet Take 1 tablet (30 mg) by mouth daily 90 tablet 1     lidocaine (XYLOCAINE) 5 % ointment Apply dime size amount topically to painful areas (choose 2-3 per application) 3 times daily as needed. 70.88 g 2     zolpidem (AMBIEN) 5 MG tablet Take 1 tablet (5 mg) by mouth nightly as needed for sleep 30 tablet 0     nitroGLYcerin (NITRO-BID) 2 % OINT ointment Place 1 inch (15 mg) onto the skin daily 30 packet 12     sucralfate (CARAFATE) 1 GM/10ML suspension Take 10 mLs (1 g) by mouth 4 times daily 420 mL 3     sucralfate (CARAFATE) 1 GM/10ML suspension Take 10 mLs (1 g) by mouth 4 times daily 210 mL 11     cyclobenzaprine (FLEXERIL) 10 MG tablet Take 1 tablet (10 mg) by mouth daily as needed for muscle spasms 90 tablet 4     clobetasol (TEMOVATE) 0.05 % ointment as needed. Only as needed occasionally needs after working in garden  Profile Rx: patient will contact pharmacy when needed 45 g 5     order for DME Equipment being ordered: TENS 1 Units 0     omeprazole (PRILOSEC) 40 MG capsule Take 1 capsule (40 mg) by mouth daily Take 30-60 minutes before a meal. 90 capsule 3     melatonin 3 MG tablet Take 1 tablet by mouth nightly as needed for sleep. 30 tablet 0     Cyanocobalamin  (VITAMIN B 12 PO) Take  by mouth.       POTASSIUM CARBONATE        Ascorbic Acid (VITAMIN C CR PO) Take  by mouth.       Calcium Carbonate-Vitamin D (CALTRATE 600+D) 600-400 MG-UNIT CHEW Take  by mouth.       GLUCOSAMINE CHONDROITIN COMPLX PO Take  by mouth.       Ferrous Sulfate (IRON CR PO) Take  by mouth.       TYLENOL 325 MG OR TABS 2 TABLETS BY MOUTH EVERY 6 HOURS AS NEEDED       BP Readings from Last 3 Encounters:   01/19/18 122/78   12/12/17 140/88   09/11/17 124/64    Wt Readings from Last 3 Encounters:   01/19/18 149 lb (67.6 kg)   09/11/17 149 lb 12.8 oz (67.9 kg)   09/05/17 158 lb (71.7 kg)                    Reviewed and updated as needed this visit by clinical staffTobacco  Allergies  Meds  Problems  Med Hx  Surg Hx  Fam Hx  Soc Hx        Reviewed and updated as needed this visit by Provider  Allergies  Meds  Problems         ROS:  Constitutional, HEENT, cardiovascular, pulmonary, gi and gu systems are negative, except as otherwise noted.    OBJECTIVE:     /78  Temp 97.7  F (36.5  C) (Tympanic)  Resp 16  Wt 149 lb (67.6 kg)  LMP 06/26/2014  BMI 21.69 kg/m2  Body mass index is 21.69 kg/(m^2).  GENERAL: healthy, alert and no distress  EYES: Eyes grossly normal to inspection and conjunctivae and sclerae normal  NECK: no adenopathy, no asymmetry, masses, or scars, thyroid normal to palpation and tenderness at posterior base of skull.  RESP: lungs clear to auscultation - no rales, rhonchi or wheezes  CV: regular rate and rhythm, normal S1 S2, no S3 or S4, no murmur, click or rub, no peripheral edema and peripheral pulses strong  ABDOMEN: soft, nontender, no hepatosplenomegaly, no masses and bowel sounds normal  MS: no gross musculoskeletal defects noted, no edema  Comprehensive back pain exam:  Tenderness of Paralumbar spine, Range of motion not limited by pain and Lower extremity strength functional and equal on both sides  PSYCH: mentation appears normal, affect  normal/bright    Diagnostic Test Results:  none     ASSESSMENT/PLAN:     ASSESSMENT:  1. Neck pain.  Acute without injury.   2. Arthritis.  Chronic   3. Raynaud's disease without gangrene.  Chronic, relieved with nifedipine and Nitro-Bid cream.   4. Pain of finger of left hand    5. SI (sacroiliac) joint dysfunction.  Chronic   6. Need for prophylactic vaccination and inoculation against influenza        PLAN:  Orders Placed This Encounter     FLU VAC, SPLIT VIRUS IM > 3 YO (QUADRIVALENT) [62224]     Vaccine Administration, Initial [63080]     HYDROcodone-acetaminophen (NORCO) 5-325 MG per tablet     HYDROcodone-acetaminophen (NORCO) 5-325 MG per tablet     HYDROcodone-acetaminophen (NORCO) 5-325 MG per tablet     NIFEdipine ER osmotic (PROCARDIA XL) 30 MG 24 hr tablet     lidocaine (XYLOCAINE) 5 % ointment       Patient Instructions   Return in 3 months    Patient agrees with plan of care as outlined  This note consists of symbols derived from keyboarding, dictation and/or voice recognition software. As a result, there may be errors in the script that have gone undetected. Please consider this when interpreting information found in this chart.    Rosa Sanabria, NP, APRN [facility]    Injectable Influenza Immunization Documentation    1.  Is the person to be vaccinated sick today?   No    2. Does the person to be vaccinated have an allergy to a component   of the vaccine?   No  Egg Allergy Algorithm Link    3. Has the person to be vaccinated ever had a serious reaction   to influenza vaccine in the past?   No    4. Has the person to be vaccinated ever had Guillain-Barré syndrome?   No    Form completed by misael

## 2018-01-19 NOTE — MR AVS SNAPSHOT
"              After Visit Summary   2018    Maryann Riggs    MRN: [id number]           Patient Information     Date Of Birth          1965        Visit Information        Provider Department      2018 1:20 PM Rosa Sanabria APRN CNP [facility]        Today's Diagnoses     Need for prophylactic vaccination and inoculation against influenza    -  1    Neck pain        Arthritis        Raynaud's disease without gangrene          Care Instructions    Return in 3 months          Follow-ups after your visit        Who to contact     If you have questions or need follow up information about today's clinic visit or your schedule please contact [facility] directly at [phone].  Normal or non-critical lab and imaging results will be communicated to you by MyChart, letter or phone within 4 business days after the clinic has received the results. If you do not hear from us within 7 days, please contact the clinic through MyChart or phone. If you have a critical or abnormal lab result, we will notify you by phone as soon as possible.  Submit refill requests through [employer] or call your pharmacy and they will forward the refill request to us. Please allow 3 business days for your refill to be completed.          Additional Information About Your Visit        MyChart Information     [employer] lets you send messages to your doctor, view your test results, renew your prescriptions, schedule appointments and more. To sign up, go to [email or web address] . Click on \"Log in\" on the left side of the screen, which will take you to the Welcome page. Then click on \"Sign up Now\" on the right side of the page.     You will be asked to enter the access code listed below, as well as some personal information. Please follow the directions to create your username and password.     Your access code is: [email or web address]  Expires: 3/12/2018 10:49 AM     Your access code will  in 90 " days. If you need help or a new code, please call your [place] clinic or [phone].        Care EveryWhere ID     This is your Care EveryWhere ID. This could be used by other organizations to access your [place] medical records  [id number]        Your Vitals Were     Temperature Respirations Last Period BMI (Body Mass Index)          97.7  F (36.5  C) (Tympanic) 16 01/04/2014 21.69 kg/m2         Blood Pressure from Last 3 Encounters:   01/19/18 122/78   12/12/17 140/88   09/11/17 124/64    Weight from Last 3 Encounters:   01/19/18 149 lb (67.6 kg)   09/11/17 149 lb 12.8 oz (67.9 kg)   09/05/17 158 lb (71.7 kg)              We Performed the Following     FLU VAC, SPLIT VIRUS IM > 3 YO (QUADRIVALENT) [71934]     Vaccine Administration, Initial [08910]          Where to get your medicines      These medications were sent to [facility] [address] [address]     Phone:  [phone]     NIFEdipine ER osmotic 30 MG 24 hr tablet         Some of these will need a paper prescription and others can be bought over the counter.  Ask your nurse if you have questions.     Bring a paper prescription for each of these medications     HYDROcodone-acetaminophen 5-325 MG per tablet    HYDROcodone-acetaminophen 5-325 MG per tablet    HYDROcodone-acetaminophen 5-325 MG per tablet          Primary Care Provider Office Phone # Fax #    Rosa Sanabria, APRN [facility] [phone]       [address] [place] [id number]        Equal Access to Services     [facility] MORENO : Hadii aad ku hadasho Sorichardali, waaxda luqadaha, qaybta kaalmada helen gonzalez. So [facility] [phone].    ATENCIÓN: Si habla español, tiene a iglesias disposición servicios gratuitos de asistencia lingüística. Llame al [phone].    We comply with applicable federal civil rights laws and [place] laws. We do not discriminate on the basis of race, color,  national origin, age, disability, sex, sexual orientation, or gender identity.            Thank you!     Thank you for choosing [facility]  for your care. Our goal is always to provide you with excellent care. Hearing back from our patients is one way we can continue to improve our services. Please take a few minutes to complete the written survey that you may receive in the mail after your visit with us. Thank you!             Your Updated Medication List - Protect others around you: Learn how to safely use, store and throw away your medicines at www.disposemymeds.org.          This list is accurate as of: 1/19/18  2:25 PM.  Always use your most recent med list.                   Brand Name Dispense Instructions for use Diagnosis    CALTRATE 600+D 600-400 MG-UNIT Chew   Generic drug:  calcium carbonate-vitamin D      Take  by mouth.        clobetasol 0.05 % ointment    TEMOVATE    45 g    as needed. Only as needed occasionally needs after working in garden Profile Rx: patient will contact pharmacy when needed    Dermatitis       cyclobenzaprine 10 MG tablet    FLEXERIL    90 tablet    Take 1 tablet (10 mg) by mouth daily as needed for muscle spasms    TMJ (temporomandibular joint syndrome)       GLUCOSAMINE CHONDROITIN COMPLX PO      Take  by mouth.        * HYDROcodone-acetaminophen 5-325 MG per tablet    NORCO    30 tablet    1/2 -1 daily prn low back pain    Arthritis, Neck pain       * HYDROcodone-acetaminophen 5-325 MG per tablet   Start taking on:  2/19/2018    NORCO    30 tablet    Take 1 tablet by mouth daily as needed for moderate to severe pain    Arthritis, Neck pain       * HYDROcodone-acetaminophen 5-325 MG per tablet   Start taking on:  3/19/2018    NORCO    30 tablet    Take 1 tablet by mouth daily maximum 1 tablet(s) per day    Neck pain, Arthritis       IRON CR PO      Take  by mouth.        lidocaine 5 % ointment    XYLOCAINE    70.88 g    Apply dime size amount topically to  painful areas (choose 2-3 per application) 3 times daily as needed.    Pain of finger of left hand, SI (sacroiliac) joint dysfunction       melatonin 3 MG tablet     30 tablet    Take 1 tablet by mouth nightly as needed for sleep.    Medication monitoring encounter       NIFEdipine ER osmotic 30 MG 24 hr tablet    PROCARDIA XL    90 tablet    Take 1 tablet (30 mg) by mouth daily    Raynaud's disease without gangrene       nitroGLYcerin 2 % Oint ointment    NITRO-BID    30 packet    Place 1 inch (15 mg) onto the skin daily    Raynaud's disease without gangrene       omeprazole 40 MG capsule    priLOSEC    90 capsule    Take 1 capsule (40 mg) by mouth daily Take 30-60 minutes before a meal.    Esophageal stricture       order for DME     1 Units    Equipment being ordered: TENS    SI (sacroiliac) joint dysfunction, Myofascial pain, Facet arthropathy       POTASSIUM CARBONATE           * sucralfate 1 GM/10ML suspension    CARAFATE    210 mL    Take 10 mLs (1 g) by mouth 4 times daily    Esophagitis       * sucralfate 1 GM/10ML suspension    CARAFATE    420 mL    Take 10 mLs (1 g) by mouth 4 times daily    Esophageal abnormality       TYLENOL 325 MG tablet   Generic drug:  acetaminophen      2 TABLETS BY MOUTH EVERY 6 HOURS AS NEEDED        VITAMIN B 12 PO      Take  by mouth.        VITAMIN C CR PO      Take  by mouth.        zolpidem 5 MG tablet    AMBIEN    30 tablet    Take 1 tablet (5 mg) by mouth nightly as needed for sleep    Other insomnia       * Notice:  This list has 5 medication(s) that are the same as other medications prescribed for you. Read the directions carefully, and ask your doctor or other care provider to review them with you.

## 2018-01-19 NOTE — NURSING NOTE
"Chief Complaint   Patient presents with     Pain     refill meds     Flu Shot       Initial /78  Temp 97.7  F (36.5  C) (Tympanic)  Resp 16  Wt 149 lb (67.6 kg)  LMP 01/04/2014  BMI 21.69 kg/m2 Estimated body mass index is 21.69 kg/(m^2) as calculated from the following:    Height as of 9/5/17: 5' 9.5\" (1.765 m).    Weight as of this encounter: 149 lb (67.6 kg).  Medication Reconciliation: complete    Health Maintenance that is potentially due pending provider review:  NONE    n/a    Is there anyone who you would like to be able to receive your results? No  If yes have patient fill out [facility]    "

## 2018-02-20 DIAGNOSIS — G47.09 OTHER INSOMNIA: ICD-10-CM

## 2018-02-26 NOTE — TELEPHONE ENCOUNTER
Any word on refills for Maryann's Zolpidem. Original request sent 02/20/2018  Thank you    ALEJANDRA ANDINO [facility]

## 2018-02-28 RX ORDER — ZOLPIDEM TARTRATE 5 MG/1
5 TABLET ORAL
Qty: 30 TABLET | Refills: 0 | Status: SHIPPED | OUTPATIENT
Start: 2018-02-28 | End: 2018-03-19

## 2018-03-19 DIAGNOSIS — G47.09 OTHER INSOMNIA: ICD-10-CM

## 2018-03-19 NOTE — TELEPHONE ENCOUNTER
Controlled Substance Refill Request for zolpidem (AMBIEN) 5 MG tablet  Problem List Complete:  Yes, Other insomnia [G47.09]    checked in past 6 months?  No, route to RN     Last Written Prescription Date:  2/28/16  Last Fill Quantity: 30,  # refills: 0   Last office visit: 1/19/2018 with prescribing provider:     Future Office Visit:

## 2018-03-19 NOTE — TELEPHONE ENCOUNTER
Last P/U 30 days on 3/2/18. Pat aware it is early but knows Beth does not work everyday and needs refill for future fills.    ALEJANDRA ANDINO [facility]

## 2018-03-21 RX ORDER — ZOLPIDEM TARTRATE 5 MG/1
5 TABLET ORAL
Qty: 30 TABLET | Refills: 0 | Status: SHIPPED | OUTPATIENT
Start: 2018-03-21 | End: 2018-05-01

## 2018-05-01 DIAGNOSIS — G47.09 OTHER INSOMNIA: ICD-10-CM

## 2018-05-01 RX ORDER — ZOLPIDEM TARTRATE 5 MG/1
5 TABLET ORAL
Qty: 30 TABLET | Refills: 2 | Status: SHIPPED | OUTPATIENT
Start: 2018-05-01 | End: 2018-08-16

## 2018-05-25 ENCOUNTER — OFFICE VISIT (OUTPATIENT)
Dept: FAMILY MEDICINE | Facility: CLINIC | Age: 53
End: 2018-05-25
Payer: COMMERCIAL

## 2018-05-25 VITALS
WEIGHT: 148 LBS | BODY MASS INDEX: 21.19 KG/M2 | HEIGHT: 70 IN | TEMPERATURE: 98.6 F | HEART RATE: 86 BPM | RESPIRATION RATE: 12 BRPM | DIASTOLIC BLOOD PRESSURE: 87 MMHG | OXYGEN SATURATION: 96 % | SYSTOLIC BLOOD PRESSURE: 126 MMHG

## 2018-05-25 DIAGNOSIS — M54.2 NECK PAIN: Primary | ICD-10-CM

## 2018-05-25 DIAGNOSIS — M19.90 ARTHRITIS: ICD-10-CM

## 2018-05-25 DIAGNOSIS — F11.90 CHRONIC, CONTINUOUS USE OF OPIOIDS: ICD-10-CM

## 2018-05-25 LAB
AMPHETAMINES UR QL: NOT DETECTED NG/ML
BARBITURATES UR QL SCN: NOT DETECTED NG/ML
BENZODIAZ UR QL SCN: NOT DETECTED NG/ML
BUPRENORPHINE UR QL: NOT DETECTED NG/ML
CANNABINOIDS UR QL: NOT DETECTED NG/ML
COCAINE UR QL SCN: NOT DETECTED NG/ML
D-METHAMPHET UR QL: NOT DETECTED NG/ML
METHADONE UR QL SCN: NOT DETECTED NG/ML
OPIATES UR QL SCN: NOT DETECTED NG/ML
OXYCODONE UR QL SCN: NOT DETECTED NG/ML
PCP UR QL SCN: NOT DETECTED NG/ML
PROPOXYPH UR QL: NOT DETECTED NG/ML
TRICYCLICS UR QL SCN: NOT DETECTED NG/ML

## 2018-05-25 PROCEDURE — 99213 OFFICE O/P EST LOW 20 MIN: CPT | Performed by: NURSE PRACTITIONER

## 2018-05-25 PROCEDURE — 80306 DRUG TEST PRSMV INSTRMNT: CPT | Performed by: NURSE PRACTITIONER

## 2018-05-25 RX ORDER — HYDROCODONE BITARTRATE AND ACETAMINOPHEN 5; 325 MG/1; MG/1
1 TABLET ORAL DAILY
Qty: 30 TABLET | Refills: 0 | Status: SHIPPED | OUTPATIENT
Start: 2018-05-25 | End: 2018-05-25

## 2018-05-25 RX ORDER — HYDROCODONE BITARTRATE AND ACETAMINOPHEN 5; 325 MG/1; MG/1
1 TABLET ORAL DAILY
Qty: 30 TABLET | Refills: 0 | Status: SHIPPED | OUTPATIENT
Start: 2018-07-25 | End: 2018-11-16

## 2018-05-25 RX ORDER — HYDROCODONE BITARTRATE AND ACETAMINOPHEN 5; 325 MG/1; MG/1
1 TABLET ORAL DAILY
Qty: 30 TABLET | Refills: 0 | Status: SHIPPED | OUTPATIENT
Start: 2018-05-25 | End: 2018-11-16

## 2018-05-25 RX ORDER — HYDROCODONE BITARTRATE AND ACETAMINOPHEN 5; 325 MG/1; MG/1
1 TABLET ORAL DAILY
Qty: 30 TABLET | Refills: 0 | Status: SHIPPED | OUTPATIENT
Start: 2018-06-25 | End: 2018-11-16

## 2018-05-25 ASSESSMENT — PAIN SCALES - GENERAL: PAINLEVEL: MODERATE PAIN (4)

## 2018-05-25 NOTE — PROGRESS NOTES
SUBJECTIVE:   Maryann Riggs is a 53 year old female who presents to clinic today for the following health issues:      Back Pain Follow Up      Description:   Location of pain:  bilateral  Character of pain: dull ache and gnawing  Pain radiation: Does not radiate  Since last visit, pain is:  worsened  New numbness or weakness in legs, not attributed to pain:  no     Intensity: Currently 4/10    History:   Pain interferes with job: No, is painful but manage  Therapies tried without relief: cold and heat  Therapies tried with relief: cold, opioids and steroid injection, stretch       Accompanying Signs & Symptoms:  Risk of Fracture:  None  Risk of Cauda Equina:  None  Risk of Infection:  None  Risk of Cancer:  None      Amount of exercise or physical activity: 2-3 days/week for an average of 15-30 minutes    Problems taking medications regularly: No    Medication side effects: none    Diet: regular (no restrictions)    No change in patient's normal activities.  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  Take one Norco per day        Problem list and histories reviewed & adjusted, as indicated.  Additional history: as documented    Patient Active Problem List   Diagnosis     Raynaud's disease     TMJ (temporomandibular joint syndrome)     CARDIOVASCULAR SCREENING; LDL GOAL LESS THAN 160     PTSD (post-traumatic stress disorder)     Muscle tension headache     Esophageal abnormality     Episodic mood disorder (H)     Esophageal stricture     Sprain of MCP joint of hand     Rotator cuff injury; bilateral repairs 2005     Primary generalized (osteo)arthritis     SI (sacroiliac) joint dysfunction; see details, pain management contract     Chronic bilateral low back pain without sciatica     Past Surgical History:   Procedure Laterality Date     MAMMOPLASTY REDUCTION       OTHER  "SURGICAL HISTORY      s/p left knee arthroscopinc surgery     ROTATOR CUFF REPAIR RT/LT  2005     SMALL BOWEL RESECTION       TUBAL LIGATION         Social History   Substance Use Topics     Smoking status: Never Smoker     Smokeless tobacco: Never Used     Alcohol use Yes      Comment: scoial     Family History   Problem Relation Age of Onset     Unknown/Adopted Mother      Unknown/Adopted Father      Unknown/Adopted Maternal Grandmother      Unknown/Adopted Maternal Grandfather      Unknown/Adopted Paternal Grandmother      Unknown/Adopted Paternal Grandfather            Reviewed and updated as needed this visit by clinical staff  Tobacco  Allergies  Meds  Problems  Med Hx  Surg Hx  Fam Hx  Soc Hx        Reviewed and updated as needed this visit by Provider  Allergies  Meds  Problems         ROS:  Constitutional, HEENT, cardiovascular, pulmonary, gi and gu systems are negative, except as otherwise noted.    OBJECTIVE:     /87 (BP Location: Right arm, Patient Position: Chair, Cuff Size: Adult Large)  Pulse 86  Temp 98.6  F (37  C) (Tympanic)  Resp 12  Ht 5' 9.5\" (1.765 m)  Wt 148 lb (67.1 kg)  LMP 06/26/2014  SpO2 96%  Breastfeeding? No  BMI 21.54 kg/m2  Body mass index is 21.54 kg/(m^2).  GENERAL: healthy, alert and no distress  NECK: no adenopathy, no asymmetry, masses, or scars and thyroid normal to palpation  RESP: lungs clear to auscultation - no rales, rhonchi or wheezes  CV: regular rate and rhythm, normal S1 S2, no S3 or S4, no murmur, click or rub, no peripheral edema and peripheral pulses strong  MS: no gross musculoskeletal defects noted, no edema    Diagnostic Test Results:    ASSESSMENT/PLAN:     ASSESSMENT:  1. Neck pain. managed with current plan of care.  No changes   2. Arthritis    3. Chronic, continuous use of opioids        PLAN:  Orders Placed This Encounter     Drug Abuse Screen Panel 13, Urine (Pain Care Package)     HYDROcodone-acetaminophen (NORCO) 5-325 MG per " tablet     HYDROcodone-acetaminophen (NORCO) 5-325 MG per tablet     HYDROcodone-acetaminophen (NORCO) 5-325 MG per tablet       Patient Instructions   We will call you with the results of your labs     Return in 3 months      Rosa Sanabria NP, APRN [facility]

## 2018-05-25 NOTE — MR AVS SNAPSHOT
"              After Visit Summary   5/25/2018    Maryann Riggs    MRN: [id number]           Patient Information     Date Of Birth          1965        Visit Information        Provider Department      5/25/2018 10:00 AM Rosa Sanabria APRN CNP [facility]        Today's Diagnoses     Chronic, continuous use of opioids    -  1    Neck pain        Arthritis          Care Instructions    We will call you with the results of your labs     Return in 3 months          Follow-ups after your visit        Who to contact     If you have questions or need follow up information about today's clinic visit or your schedule please contact [facility] directly at [phone].  Normal or non-critical lab and imaging results will be communicated to you by MyChart, letter or phone within 4 business days after the clinic has received the results. If you do not hear from us within 7 days, please contact the clinic through MyChart or phone. If you have a critical or abnormal lab result, we will notify you by phone as soon as possible.  Submit refill requests through [employer] or call your pharmacy and they will forward the refill request to us. Please allow 3 business days for your refill to be completed.          Additional Information About Your Visit        Care EveryWhere ID     This is your Care EveryWhere ID. This could be used by other organizations to access your [facility] medical records  [id number]        Your Vitals Were     Pulse Temperature Respirations Height Last Period Pulse Oximetry    86 98.6  F (37  C) (Tympanic) 12 5' 9.5\" (1.765 m) 06/26/2014 96%    Breastfeeding? BMI (Body Mass Index)                No 21.54 kg/m2           Blood Pressure from Last 3 Encounters:   05/25/18 126/87   01/19/18 122/78   12/12/17 140/88    Weight from Last 3 Encounters:   05/25/18 148 lb (67.1 kg)   01/19/18 149 lb (67.6 kg)   09/11/17 149 lb 12.8 oz (67.9 kg)              We Performed " the Following     Drug  Screen Comprehensive , Urine with Reported Meds (Med[facility]ox) (Pain Care Package)          Today's Medication Changes          These changes are accurate as of 5/25/18 10:45 AM.  If you have any questions, ask your nurse or doctor.               These medicines have changed or have updated prescriptions.        Dose/Directions    * HYDROcodone-acetaminophen 5-325 MG per tablet   Commonly known as:  NORCO   This may have changed:  Another medication with the same name was added. Make sure you understand how and when to take each.   Used for:  Arthritis, Neck pain   Changed by:  Rosa Sanabria APRN CNP        1/2 -1 daily prn low back pain   Quantity:  30 tablet   Refills:  0       * HYDROcodone-acetaminophen 5-325 MG per tablet   Commonly known as:  NORCO   This may have changed:  Another medication with the same name was added. Make sure you understand how and when to take each.   Used for:  Arthritis, Neck pain   Changed by:  Rosa Sanabria APRN CNP        Dose:  1 tablet   Take 1 tablet by mouth daily as needed for moderate to severe pain   Quantity:  30 tablet   Refills:  0       * HYDROcodone-acetaminophen 5-325 MG per tablet   Commonly known as:  NORCO   This may have changed:  Another medication with the same name was added. Make sure you understand how and when to take each.   Used for:  Neck pain, Arthritis   Changed by:  Rosa Sanabria APRN CNP        Dose:  1 tablet   Take 1 tablet by mouth daily maximum 1 tablet(s) per day   Quantity:  30 tablet   Refills:  0       * HYDROcodone-acetaminophen 5-325 MG per tablet   Commonly known as:  NORCO   This may have changed:  You were already taking a medication with the same name, and this prescription was added. Make sure you understand how and when to take each.   Used for:  Neck pain, Arthritis   Changed by:  Rosa Sanabria APRN CNP        Dose:  1 tablet   Start taking on:  6/25/2018   Take 1 tablet by mouth daily    Quantity:  30 tablet   Refills:  0       * HYDROcodone-acetaminophen 5-325 MG per tablet   Commonly known as:  NORCO   This may have changed:  You were already taking a medication with the same name, and this prescription was added. Make sure you understand how and when to take each.   Used for:  Neck pain, Arthritis   Changed by:  Rosa Sanabria APRN CNP        Dose:  1 tablet   Start taking on:  7/25/2018   Take 1 tablet by mouth daily   Quantity:  30 tablet   Refills:  0       * Notice:  This list has 5 medication(s) that are the same as other medications prescribed for you. Read the directions carefully, and ask your doctor or other care provider to review them with you.         Where to get your medicines      Some of these will need a paper prescription and others can be bought over the counter.  Ask your nurse if you have questions.     Bring a paper prescription for each of these medications     HYDROcodone-acetaminophen 5-325 MG per tablet    HYDROcodone-acetaminophen 5-325 MG per tablet    HYDROcodone-acetaminophen 5-325 MG per tablet               Information about OPIOIDS     PRESCRIPTION OPIOIDS: WHAT YOU NEED TO KNOW   You have a prescription for an opioid (narcotic) pain medicine. Opioids can cause addiction. If you have a history of chemical dependency of any type, you are at a higher risk of becoming addicted to opioids. Only take this medicine after all other options have been tried. Take it for as short a time and as few doses as possible.     Do not:    Drive. If you drive while taking these medicines, you could be arrested for driving under the influence (DUI).    Operate heavy machinery    Do any other dangerous activities while taking these medicines.     Drink any alcohol while taking these medicines.      Take with any other medicines that contain acetaminophen. Read all labels carefully. Look for the word  acetaminophen  or  Tylenol.  Ask your pharmacist if you have questions or are  unsure.    Store your pills in a secure place, locked if possible. We will not replace any lost or stolen medicine. If you don t finish your medicine, please throw away (dispose) as directed by your pharmacist. The [place] [employer] has more information about safe disposal: https://www.pca.[facility].[place].us/living-green/managing-unwanted-medications    All opioids tend to cause constipation. Drink plenty of water and eat foods that have a lot of fiber, such as fruits, vegetables, prune juice, apple juice and high-fiber cereal. Take a laxative (Miralax, milk of magnesia, Colace, Senna) if you don t move your bowels at least every other day.          Primary Care Provider Office Phone # Fax #    Rosa MatuteCONTRERAS [facility] [phone]       [address] [place] [id number]        Equal Access to Services     [facility] : Hadii isabel vale hadasho Soomaali, waaxda luqadaha, qaybta kaalmada adeegyada, helen negron . So [facility] [phone].    ATENCIÓN: Si habla español, tiene a iglesias disposición servicios gratuitos de asistencia lingüística. Timmyame al [phone].    We comply with applicable federal civil rights laws and [place] laws. We do not discriminate on the basis of race, color, national origin, age, disability, sex, sexual orientation, or gender identity.            Thank you!     Thank you for choosing [facility]  for your care. Our goal is always to provide you with excellent care. Hearing back from our patients is one way we can continue to improve our services. Please take a few minutes to complete the written survey that you may receive in the mail after your visit with us. Thank you!             Your Updated Medication List - Protect others around you: Learn how to safely use, store and throw away your medicines at www.disposemymeds.org.          This list is accurate as of 5/25/18 10:45 AM.  Always use your most recent med list.                    Brand Name Dispense Instructions for use Diagnosis    CALTRATE 600+D 600-400 MG-UNIT Chew   Generic drug:  calcium carbonate-vitamin D      Take  by mouth.        clobetasol 0.05 % ointment    TEMOVATE    45 g    as needed. Only as needed occasionally needs after working in garden Profile Rx: patient will contact pharmacy when needed    Dermatitis       cyclobenzaprine 10 MG tablet    FLEXERIL    90 tablet    Take 1 tablet (10 mg) by mouth daily as needed for muscle spasms    TMJ (temporomandibular joint syndrome)       GLUCOSAMINE CHONDROITIN COMPLX PO      Take  by mouth.        * HYDROcodone-acetaminophen 5-325 MG per tablet    NORCO    30 tablet    1/2 -1 daily prn low back pain    Arthritis, Neck pain       * HYDROcodone-acetaminophen 5-325 MG per tablet    NORCO    30 tablet    Take 1 tablet by mouth daily as needed for moderate to severe pain    Arthritis, Neck pain       * HYDROcodone-acetaminophen 5-325 MG per tablet    NORCO    30 tablet    Take 1 tablet by mouth daily maximum 1 tablet(s) per day    Neck pain, Arthritis       * HYDROcodone-acetaminophen 5-325 MG per tablet   Start taking on:  6/25/2018    NORCO    30 tablet    Take 1 tablet by mouth daily    Neck pain, Arthritis       * HYDROcodone-acetaminophen 5-325 MG per tablet   Start taking on:  7/25/2018    NORCO    30 tablet    Take 1 tablet by mouth daily    Neck pain, Arthritis       IRON CR PO      Take  by mouth.        lidocaine 5 % ointment    XYLOCAINE    70.88 g    Apply dime size amount topically to painful areas (choose 2-3 per application) 3 times daily as needed.    Pain of finger of left hand, SI (sacroiliac) joint dysfunction       melatonin 3 MG tablet     30 tablet    Take 1 tablet by mouth nightly as needed for sleep.    Medication monitoring encounter       NIFEdipine ER osmotic 30 MG 24 hr tablet    PROCARDIA XL    90 tablet    Take 1 tablet (30 mg) by mouth daily    Raynaud's disease without gangrene        nitroGLYcerin 2 % Oint ointment    NITRO-BID    30 packet    Place 1 inch (15 mg) onto the skin daily    Raynaud's disease without gangrene       omeprazole 40 MG capsule    priLOSEC    90 capsule    Take 1 capsule (40 mg) by mouth daily Take 30-60 minutes before a meal.    Esophageal stricture       order for DME     1 Units    Equipment being ordered: TENS    SI (sacroiliac) joint dysfunction, Myofascial pain, Facet arthropathy       POTASSIUM CARBONATE           * sucralfate 1 GM/10ML suspension    CARAFATE    210 mL    Take 10 mLs (1 g) by mouth 4 times daily    Esophagitis       * sucralfate 1 GM/10ML suspension    CARAFATE    420 mL    Take 10 mLs (1 g) by mouth 4 times daily    Esophageal abnormality       TYLENOL 325 MG tablet   Generic drug:  acetaminophen      2 TABLETS BY MOUTH EVERY 6 HOURS AS NEEDED        VITAMIN B 12 PO      Take  by mouth.        VITAMIN C CR PO      Take  by mouth.        zolpidem 5 MG tablet    AMBIEN    30 tablet    Take 1 tablet (5 mg) by mouth nightly as needed for sleep    Other insomnia       * Notice:  This list has 7 medication(s) that are the same as other medications prescribed for you. Read the directions carefully, and ask your doctor or other care provider to review them with you.

## 2018-08-16 DIAGNOSIS — G47.09 OTHER INSOMNIA: ICD-10-CM

## 2018-08-16 NOTE — TELEPHONE ENCOUNTER
Requested Prescriptions   Pending Prescriptions Disp Refills     zolpidem (AMBIEN) 5 MG tablet 30 tablet 2     Sig: Take 1 tablet (5 mg) by mouth nightly as needed for sleep    There is no refill protocol information for this order        Routing refill request to provider for review/approval because:  Drug not on the [facility] refill protocol     Harper OROZCO Rn

## 2018-08-17 RX ORDER — ZOLPIDEM TARTRATE 5 MG/1
5 TABLET ORAL
Qty: 30 TABLET | Refills: 2 | Status: SHIPPED | OUTPATIENT
Start: 2018-08-17 | End: 2018-11-16

## 2018-10-01 DIAGNOSIS — M26.609 TMJ (TEMPOROMANDIBULAR JOINT SYNDROME): ICD-10-CM

## 2018-10-01 NOTE — TELEPHONE ENCOUNTER
cyclobenzaprine (FLEXERIL) 10 MG tablet      Last Written Prescription Date:  9/11/17  Last Fill Quantity: 90,   # refills: 4  Last Office Visit: 5/26/18  Future Office visit:       Routing refill request to provider for review/approval because:  Drug not on the [facility], [facility] or [facility] refill protocol or controlled substance

## 2018-10-01 NOTE — TELEPHONE ENCOUNTER
Routing refill request to provider for review/approval because:  Drug not on the [facility] refill protocol   Terese HOPSON RN

## 2018-10-08 NOTE — TELEPHONE ENCOUNTER
What is the status of the refill for Maryann's Cyclobenzaprine? Please address. Thank you.  Original request sent 10/01/18    ALEJANDRA ANDINO [facility]

## 2018-10-09 RX ORDER — CYCLOBENZAPRINE HCL 10 MG
TABLET ORAL
Qty: 90 TABLET | Refills: 4 | Status: SHIPPED | OUTPATIENT
Start: 2018-10-09 | End: 2019-09-17

## 2018-11-15 DIAGNOSIS — M19.90 ARTHRITIS: ICD-10-CM

## 2018-11-15 DIAGNOSIS — M54.2 NECK PAIN: ICD-10-CM

## 2018-11-15 DIAGNOSIS — G47.09 OTHER INSOMNIA: ICD-10-CM

## 2018-11-15 RX ORDER — HYDROCODONE BITARTRATE AND ACETAMINOPHEN 5; 325 MG/1; MG/1
1 TABLET ORAL DAILY
Qty: 30 TABLET | Refills: 0 | Status: CANCELLED | OUTPATIENT
Start: 2018-11-15

## 2018-11-15 RX ORDER — ZOLPIDEM TARTRATE 5 MG/1
5 TABLET ORAL
Qty: 30 TABLET | Refills: 2 | Status: CANCELLED | OUTPATIENT
Start: 2018-11-15

## 2018-11-15 NOTE — TELEPHONE ENCOUNTER
Controlled Substance Refill Request for HYDROcodone-acetaminophen (NORCO) 5-325 MG per tablet  Problem List Complete:  Yes   checked in past 3 months?  No, route to RN       Last Written Prescription Date:  7/25/18  Last Fill Quantity: 30,  # refills: 0   Last office visit: 5/25/2018 with prescribing provider:     Future Office Visit:   Next 5 appointments (look out 90 days)     Nov 16, 2018  2:40 PM CST   SHORT with CONTRERAS Alvares CNP   [facility]    [id number] [facility] [address]   [phone]                 Associated Diagnoses   Neck pain [M54.2]  - Primary       Arthritis [M19.90]               zolpidem (AMBIEN) 5 MG tablet      Last Written Prescription Date:  8/17/18  Last Fill Quantity: 30,   # refills: 2  Last Office Visit: 5/25/18  Future Office visit:    Next 5 appointments (look out 90 days)     Nov 16, 2018  2:40 PM CST   SHORT with CONTRERAS Alvares CNP   [facility]    [address] [facility] [address]   [phone]                   Routing refill request to provider for review/approval because:  Drug not on the [facility], [facility] or [facility] refill protocol or controlled substance

## 2018-11-15 NOTE — TELEPHONE ENCOUNTER
Routing refill requests to provider for review/approval because:  Drugs not on the [facility] refill protocol   Scheduled for OV with Beth Sanabria, CNP 11/16/18    Terese HOPSON RN

## 2018-11-15 NOTE — TELEPHONE ENCOUNTER
Last P/U of Zolpidem  30 days on 10/26/18, she knows she is early but we are hoping to fill before Thanksgiving    Last P/U of Hydrocodone 30 days on 9/18/18    ALEJANDRA ANDINO [facility]

## 2018-11-16 ENCOUNTER — OFFICE VISIT (OUTPATIENT)
Dept: FAMILY MEDICINE | Facility: CLINIC | Age: 53
End: 2018-11-16
Payer: COMMERCIAL

## 2018-11-16 VITALS
OXYGEN SATURATION: 97 % | SYSTOLIC BLOOD PRESSURE: 127 MMHG | BODY MASS INDEX: 22.19 KG/M2 | RESPIRATION RATE: 12 BRPM | HEART RATE: 93 BPM | DIASTOLIC BLOOD PRESSURE: 79 MMHG | WEIGHT: 155 LBS | HEIGHT: 70 IN | TEMPERATURE: 97.8 F

## 2018-11-16 DIAGNOSIS — Z23 NEED FOR PROPHYLACTIC VACCINATION AND INOCULATION AGAINST INFLUENZA: ICD-10-CM

## 2018-11-16 DIAGNOSIS — M53.3 SI (SACROILIAC) JOINT DYSFUNCTION: Primary | ICD-10-CM

## 2018-11-16 DIAGNOSIS — G47.09 OTHER INSOMNIA: ICD-10-CM

## 2018-11-16 DIAGNOSIS — M19.90 ARTHRITIS: ICD-10-CM

## 2018-11-16 DIAGNOSIS — M54.2 NECK PAIN: ICD-10-CM

## 2018-11-16 PROCEDURE — 90686 IIV4 VACC NO PRSV 0.5 ML IM: CPT | Performed by: NURSE PRACTITIONER

## 2018-11-16 PROCEDURE — 90471 IMMUNIZATION ADMIN: CPT | Performed by: NURSE PRACTITIONER

## 2018-11-16 PROCEDURE — 99214 OFFICE O/P EST MOD 30 MIN: CPT | Mod: 25 | Performed by: NURSE PRACTITIONER

## 2018-11-16 RX ORDER — ZOLPIDEM TARTRATE 5 MG/1
5 TABLET ORAL
Qty: 30 TABLET | Refills: 2 | Status: SHIPPED | OUTPATIENT
Start: 2018-11-17 | End: 2019-01-21

## 2018-11-16 RX ORDER — HYDROCODONE BITARTRATE AND ACETAMINOPHEN 5; 325 MG/1; MG/1
1 TABLET ORAL DAILY
Qty: 30 TABLET | Refills: 0 | Status: SHIPPED | OUTPATIENT
Start: 2019-01-16 | End: 2019-01-21

## 2018-11-16 RX ORDER — HYDROCODONE BITARTRATE AND ACETAMINOPHEN 5; 325 MG/1; MG/1
1 TABLET ORAL DAILY
Qty: 30 TABLET | Refills: 0 | Status: SHIPPED | OUTPATIENT
Start: 2018-11-16 | End: 2019-06-06

## 2018-11-16 RX ORDER — HYDROCODONE BITARTRATE AND ACETAMINOPHEN 5; 325 MG/1; MG/1
1 TABLET ORAL DAILY
Qty: 30 TABLET | Refills: 0 | Status: SHIPPED | OUTPATIENT
Start: 2018-12-16 | End: 2019-06-06

## 2018-11-16 ASSESSMENT — ANXIETY QUESTIONNAIRES
IF YOU CHECKED OFF ANY PROBLEMS ON THIS QUESTIONNAIRE, HOW DIFFICULT HAVE THESE PROBLEMS MADE IT FOR YOU TO DO YOUR WORK, TAKE CARE OF THINGS AT HOME, OR GET ALONG WITH OTHER PEOPLE: NOT DIFFICULT AT ALL
5. BEING SO RESTLESS THAT IT IS HARD TO SIT STILL: NOT AT ALL
6. BECOMING EASILY ANNOYED OR IRRITABLE: NOT AT ALL
1. FEELING NERVOUS, ANXIOUS, OR ON EDGE: NOT AT ALL
7. FEELING AFRAID AS IF SOMETHING AWFUL MIGHT HAPPEN: NOT AT ALL
2. NOT BEING ABLE TO STOP OR CONTROL WORRYING: NOT AT ALL
GAD7 TOTAL SCORE: 0
3. WORRYING TOO MUCH ABOUT DIFFERENT THINGS: NOT AT ALL

## 2018-11-16 ASSESSMENT — PATIENT HEALTH QUESTIONNAIRE - PHQ9
5. POOR APPETITE OR OVEREATING: NOT AT ALL
SUM OF ALL RESPONSES TO PHQ QUESTIONS 1-9: 0

## 2018-11-16 ASSESSMENT — PAIN SCALES - GENERAL: PAINLEVEL: SEVERE PAIN (7)

## 2018-11-16 NOTE — PROGRESS NOTES

## 2018-11-16 NOTE — MR AVS SNAPSHOT
"              After Visit Summary   11/16/2018    Maryann Riggs    MRN: [id number]           Patient Information     Date Of Birth          1965        Visit Information        Provider Department      11/16/2018 2:20 PM Rosa Sanabria APRN CNP [facility]        Today's Diagnoses     Neck pain        Arthritis        Other insomnia           Follow-ups after your visit        Follow-up notes from your care team     Return in about 3 months (around 2/16/2019) for med check.      Who to contact     If you have questions or need follow up information about today's clinic visit or your schedule please contact [facility] directly at [phone].  Normal or non-critical lab and imaging results will be communicated to you by MyChart, letter or phone within 4 business days after the clinic has received the results. If you do not hear from us within 7 days, please contact the clinic through MyChart or phone. If you have a critical or abnormal lab result, we will notify you by phone as soon as possible.  Submit refill requests through [facility] or call your pharmacy and they will forward the refill request to us. Please allow 3 business days for your refill to be completed.          Additional Information About Your Visit        Care EveryWhere ID     This is your Care EveryWhere ID. This could be used by other organizations to access your [facility] medical records  [id number]        Your Vitals Were     Pulse Temperature Respirations Height Last Period Pulse Oximetry    93 97.8  F (36.6  C) (Tympanic) 12 5' 9.5\" (1.765 m) 06/26/2014 97%    Breastfeeding? BMI (Body Mass Index)                No 22.56 kg/m2           Blood Pressure from Last 3 Encounters:   11/16/18 127/79   05/25/18 126/87   01/19/18 122/78    Weight from Last 3 Encounters:   11/16/18 155 lb (70.3 kg)   05/25/18 148 lb (67.1 kg)   01/19/18 149 lb (67.6 kg)              Today, you had the following     No " orders found for display         Today's Medication Changes          These changes are accurate as of 11/16/18  3:06 PM.  If you have any questions, ask your nurse or doctor.               These medicines have changed or have updated prescriptions.        Dose/Directions    * HYDROcodone-acetaminophen 5-325 MG per tablet   Commonly known as:  NORCO   This may have changed:  Another medication with the same name was added. Make sure you understand how and when to take each.   Used for:  Neck pain, Arthritis   Changed by:  Rosa Sanabria APRN CNP        Dose:  1 tablet   Take 1 tablet by mouth daily   Quantity:  30 tablet   Refills:  0       * HYDROcodone-acetaminophen 5-325 MG per tablet   Commonly known as:  NORCO   This may have changed:  You were already taking a medication with the same name, and this prescription was added. Make sure you understand how and when to take each.   Used for:  Neck pain, Arthritis   Changed by:  Rosa Sanabria APRN CNP        Dose:  1 tablet   Start taking on:  12/16/2018   Take 1 tablet by mouth daily   Quantity:  30 tablet   Refills:  0       * HYDROcodone-acetaminophen 5-325 MG per tablet   Commonly known as:  NORCO   This may have changed:  You were already taking a medication with the same name, and this prescription was added. Make sure you understand how and when to take each.   Used for:  Neck pain, Arthritis   Changed by:  Rosa Sanabria APRN CNP        Dose:  1 tablet   Start taking on:  1/16/2019   Take 1 tablet by mouth daily   Quantity:  30 tablet   Refills:  0       zolpidem 5 MG tablet   Commonly known as:  AMBIEN   This may have changed:  These instructions start on 11/17/2018. If you are unsure what to do until then, ask your doctor or other care provider.   Used for:  Other insomnia   Changed by:  Rosa Sanabria APRN CNP        Dose:  5 mg   Start taking on:  11/17/2018   Take 1 tablet (5 mg) by mouth nightly as needed for sleep   Quantity:   30 tablet   Refills:  2       * Notice:  This list has 3 medication(s) that are the same as other medications prescribed for you. Read the directions carefully, and ask your doctor or other care provider to review them with you.         Where to get your medicines      Some of these will need a paper prescription and others can be bought over the counter.  Ask your nurse if you have questions.     Bring a paper prescription for each of these medications     HYDROcodone-acetaminophen 5-325 MG per tablet    HYDROcodone-acetaminophen 5-325 MG per tablet    HYDROcodone-acetaminophen 5-325 MG per tablet    zolpidem 5 MG tablet               Information about OPIOIDS     PRESCRIPTION OPIOIDS: WHAT YOU NEED TO KNOW   We gave you an opioid (narcotic) pain medicine. It is important to manage your pain, but opioids are not always the best choice. You should first try all the other options your care team gave you. Take this medicine for as short a time (and as few doses) as possible.    Some activities can increase your pain, such as bandage changes or therapy sessions. It may help to take your pain medicine 30 to 60 minutes before these activities. Reduce your stress by getting enough sleep, working on hobbies you enjoy and practicing relaxation or meditation. Talk to your care team about ways to manage your pain beyond prescription opioids.    These medicines have risks:    DO NOT drive when on new or higher doses of pain medicine. These medicines can affect your alertness and reaction times, and you could be arrested for driving under the influence (DUI). If you need to use opioids long-term, talk to your care team about driving.    DO NOT operate heavy machinery    DO NOT do any other dangerous activities while taking these medicines.    DO NOT drink any alcohol while taking these medicines.     If the opioid prescribed includes acetaminophen, DO NOT take with any other medicines that contain acetaminophen. Read all labels  carefully. Look for the word  acetaminophen  or  Tylenol.  Ask your pharmacist if you have questions or are unsure.    You can get addicted to pain medicines, especially if you have a history of addiction (chemical, alcohol or substance dependence). Talk to your care team about ways to reduce this risk.    All opioids tend to cause constipation. Drink plenty of water and eat foods that have a lot of fiber, such as fruits, vegetables, prune juice, apple juice and high-fiber cereal. Take a laxative (Miralax, milk of magnesia, Colace, Senna) if you don t move your bowels at least every other day. Other side effects include upset stomach, sleepiness, dizziness, throwing up, tolerance (needing more of the medicine to have the same effect), physical dependence and slowed breathing.    Store your pills in a secure place, locked if possible. We will not replace any lost or stolen medicine. If you don t finish your medicine, please throw away (dispose) as directed by your pharmacist. The [place] Pollution [employer] has more information about safe disposal: https://www.pca.[facility].mn.us/living-green/managing-unwanted-medications         Primary Care Provider Office Phone # Fax #    Rosa MatuteCONTRERAS [facility] [phone]       [address] [place] [id number]        Equal Access to Services     USMAN MAYER : Saúl goodmano Sorichardali, waaxda luqadaha, qaybta kaalmada adeegyada, helen chery. So [facility] [phone].    ATENCIÓN: Si habla español, tiene a iglesias disposición servicios gratuitos de asistencia lingüística. Peewee al [phone].    We comply with applicable federal civil rights laws and [place] laws. We do not discriminate on the basis of race, color, national origin, age, disability, sex, sexual orientation, or gender identity.            Thank you!     Thank you for choosing [facility]  for your care. Our goal is always to provide you with  excellent care. Hearing back from our patients is one way we can continue to improve our services. Please take a few minutes to complete the written survey that you may receive in the mail after your visit with us. Thank you!             Your Updated Medication List - Protect others around you: Learn how to safely use, store and throw away your medicines at www.disposemymeds.org.          This list is accurate as of 11/16/18  3:06 PM.  Always use your most recent med list.                   Brand Name Dispense Instructions for use Diagnosis    CALTRATE 600+D 600-400 MG-UNIT chewable tablet   Generic drug:  calcium carbonate-vitamin D      Take  by mouth.        clobetasol 0.05 % ointment    TEMOVATE    45 g    as needed. Only as needed occasionally needs after working in garden Profile Rx: patient will contact pharmacy when needed    Dermatitis       cyclobenzaprine 10 MG tablet    FLEXERIL    90 tablet    TAKE ONE TABLET BY MOUTH DAILY AS NEEDED FOR MUSCLE SPASMS    TMJ (temporomandibular joint syndrome)       GLUCOSAMINE CHONDROITIN COMPLX PO      Take  by mouth.        * HYDROcodone-acetaminophen 5-325 MG per tablet    NORCO    30 tablet    Take 1 tablet by mouth daily    Neck pain, Arthritis       * HYDROcodone-acetaminophen 5-325 MG per tablet   Start taking on:  12/16/2018    NORCO    30 tablet    Take 1 tablet by mouth daily    Neck pain, Arthritis       * HYDROcodone-acetaminophen 5-325 MG per tablet   Start taking on:  1/16/2019    NORCO    30 tablet    Take 1 tablet by mouth daily    Neck pain, Arthritis       IRON CR PO      Take  by mouth.        lidocaine 5 % ointment    XYLOCAINE    70.88 g    Apply dime size amount topically to painful areas (choose 2-3 per application) 3 times daily as needed.    Pain of finger of left hand, SI (sacroiliac) joint dysfunction       melatonin 3 MG tablet     30 tablet    Take 1 tablet by mouth nightly as needed for sleep.    Medication monitoring encounter        NIFEdipine ER osmotic 30 MG 24 hr tablet    PROCARDIA XL    90 tablet    Take 1 tablet (30 mg) by mouth daily    Raynaud's disease without gangrene       nitroGLYcerin 2 % Oint ointment    NITRO-BID    30 packet    Place 1 inch (15 mg) onto the skin daily    Raynaud's disease without gangrene       omeprazole 40 MG capsule    priLOSEC    90 capsule    Take 1 capsule (40 mg) by mouth daily Take 30-60 minutes before a meal.    Esophageal stricture       order for DME     1 Units    Equipment being ordered: TENS    SI (sacroiliac) joint dysfunction, Myofascial pain, Facet arthropathy       POTASSIUM CARBONATE           * sucralfate 1 GM/10ML suspension    CARAFATE    210 mL    Take 10 mLs (1 g) by mouth 4 times daily    Esophagitis       * sucralfate 1 GM/10ML suspension    CARAFATE    420 mL    Take 10 mLs (1 g) by mouth 4 times daily    Esophageal abnormality       TYLENOL 325 MG tablet   Generic drug:  acetaminophen      2 TABLETS BY MOUTH EVERY 6 HOURS AS NEEDED        VITAMIN B 12 PO      Take  by mouth.        VITAMIN C CR PO      Take  by mouth.        zolpidem 5 MG tablet   Start taking on:  11/17/2018    AMBIEN    30 tablet    Take 1 tablet (5 mg) by mouth nightly as needed for sleep    Other insomnia       * Notice:  This list has 5 medication(s) that are the same as other medications prescribed for you. Read the directions carefully, and ask your doctor or other care provider to review them with you.

## 2018-11-16 NOTE — PROGRESS NOTES
SUBJECTIVE:   Maryann Riggs is a 53 year old female who presents to clinic today for the following health issues:      Medication Followup of Norco, Zolpidem    Taking Medication as prescribed: yes    Side Effects:  None    Medication Helping Symptoms:  yes       Problem list and histories reviewed & adjusted, as indicated.  Further history obtained, clarified or corrected by provider:  Pain.  Continues to have chronic neck pain as well as back pain.  History of sacroiliac joint dysfunction and chronic  low back pain for over 20 years.  Patient has a very physically demanding job as a .  She does a lot of heavy lifting for work as well as long distance over the road trips.  Back pain has been more severe over the past month or so.  She felt that her back was really out of alignment and saw a chiropractor recently.  She felt better following the treatment and intends to return next week for another session.  Does stretches for her back daily.  Takes 1/2 to one tablet of Norco daily after she returns home from work.    Insomnia.  This remains an issue for her. She does take zolpidem nightly and this is effective.      Patient Active Problem List   Diagnosis     Raynaud's disease     TMJ (temporomandibular joint syndrome)     CARDIOVASCULAR SCREENING; LDL GOAL LESS THAN 160     PTSD (post-traumatic stress disorder)     Muscle tension headache     Esophageal abnormality     Episodic mood disorder (H)     Esophageal stricture     Sprain of MCP joint of hand     Rotator cuff injury; bilateral repairs 2005     Primary generalized (osteo)arthritis     SI (sacroiliac) joint dysfunction; see details, pain management contract     Chronic bilateral low back pain without sciatica     Past Surgical History:   Procedure Laterality Date     MAMMOPLASTY REDUCTION       OTHER SURGICAL HISTORY      s/p left knee arthroscopinc surgery     ROTATOR CUFF REPAIR RT/LT  2005     SMALL BOWEL RESECTION       TUBAL  "LIGATION         Social History   Substance Use Topics     Smoking status: Never Smoker     Smokeless tobacco: Never Used     Alcohol use Yes      Comment: scoial     Family History   Problem Relation Age of Onset     Unknown/Adopted Mother      Unknown/Adopted Father      Unknown/Adopted Maternal Grandmother      Unknown/Adopted Maternal Grandfather      Unknown/Adopted Paternal Grandmother      Unknown/Adopted Paternal Grandfather            Reviewed and updated as needed this visit by clinical staff  Tobacco  Allergies  Meds  Problems  Med Hx  Surg Hx  Fam Hx  Soc Hx        Reviewed and updated as needed this visit by Provider  Allergies  Meds  Problems         ROS:  Constitutional, HEENT, cardiovascular, pulmonary, gi and gu systems are negative, except as otherwise noted.    OBJECTIVE:     /79 (BP Location: Right arm, Patient Position: Chair, Cuff Size: Adult Large)  Pulse 93  Temp 97.8  F (36.6  C) (Tympanic)  Resp 12  Ht 5' 9.5\" (1.765 m)  Wt 155 lb (70.3 kg)  LMP 06/26/2014  SpO2 97%  Breastfeeding? No  BMI 22.56 kg/m2  Body mass index is 22.56 kg/(m^2).  GENERAL: healthy, alert and no distress  EYES: Eyes grossly normal to inspection and conjunctivae and sclerae normal  MS: no gross musculoskeletal defects noted, no edema  Comprehensive back pain exam:  Tenderness of Lumbar paraspinal region, Range of motion not limited by pain and Lower extremity sensation normal and equal on both sides  PSYCH: mentation appears normal, affect normal/bright    Diagnostic Test Results:  none     ASSESSMENT/PLAN:     ASSESSMENT:  1. SI (sacroiliac) joint dysfunction; see details, pain management contract.  Managed with small amount of Norco.  Will refill prescriptions for next 3 months.   2. Neck pain.  Pain adequately managed with Norco.   3. Arthritis.   4. Other insomnia.  Does well with Ambien.  No change in plan of care.   5. Need for prophylactic vaccination and inoculation against influenza  "       PLAN:  Orders Placed This Encounter     FLU VACCINE, SPLIT VIRUS, IM (QUADRIVALENT) [30237]- >3 YRS     Vaccine Administration, Initial [57030]     HYDROcodone-acetaminophen (NORCO) 5-325 MG per tablet     HYDROcodone-acetaminophen (NORCO) 5-325 MG per tablet     HYDROcodone-acetaminophen (NORCO) 5-325 MG per tablet     zolpidem (AMBIEN) 5 MG tablet       There are no Patient Instructions on file for this visit.  Patient agrees with plan of care as outlined. Call or return to the clinic with any worsening of symptoms or no resolution. Medication side effects reviewed.      Rosa Sanabria, SILVINA, APRN [facility]

## 2018-11-17 ASSESSMENT — ANXIETY QUESTIONNAIRES: GAD7 TOTAL SCORE: 0

## 2019-01-21 ENCOUNTER — OFFICE VISIT (OUTPATIENT)
Dept: FAMILY MEDICINE | Facility: CLINIC | Age: 54
End: 2019-01-21
Payer: COMMERCIAL

## 2019-01-21 DIAGNOSIS — M54.2 NECK PAIN: Primary | ICD-10-CM

## 2019-01-21 DIAGNOSIS — G47.09 OTHER INSOMNIA: ICD-10-CM

## 2019-01-21 DIAGNOSIS — I73.00 RAYNAUD'S DISEASE WITHOUT GANGRENE: ICD-10-CM

## 2019-01-21 DIAGNOSIS — M19.90 ARTHRITIS: ICD-10-CM

## 2019-01-21 PROCEDURE — 99214 OFFICE O/P EST MOD 30 MIN: CPT | Performed by: NURSE PRACTITIONER

## 2019-01-21 RX ORDER — NIFEDIPINE 30 MG/1
30 TABLET, EXTENDED RELEASE ORAL DAILY
Qty: 90 TABLET | Refills: 3 | Status: SHIPPED | OUTPATIENT
Start: 2019-01-21 | End: 2021-02-02

## 2019-01-21 RX ORDER — HYDROCODONE BITARTRATE AND ACETAMINOPHEN 5; 325 MG/1; MG/1
1 TABLET ORAL DAILY
Qty: 30 TABLET | Refills: 0 | Status: SHIPPED | OUTPATIENT
Start: 2019-02-16 | End: 2019-06-06

## 2019-01-21 RX ORDER — ZOLPIDEM TARTRATE 5 MG/1
5 TABLET ORAL
Qty: 90 TABLET | Refills: 0 | Status: SHIPPED | OUTPATIENT
Start: 2019-01-21 | End: 2019-06-06

## 2019-01-21 NOTE — PROGRESS NOTES
SUBJECTIVE:   Maryann Riggs is a 53 year old female who presents to clinic today for the following health issues:      Medication Followup of meds have to be in 90 day supply or pt will have to pay full price and had to change pharmacies    Taking Medication as prescribed: yes    Side Effects:  None    Medication Helping Symptoms:  yes       Problem list and histories reviewed & adjusted, as indicated.  Further history obtained, clarified or corrected by provider:    Back Pain Follow Up       Description:   Location of pain:   Chronic neck and back pain.  Character of pain: dull ache and gnawing  Pain radiation: Does not radiate  Since last visit, pain is:  worsened  New numbness or weakness in legs, not attributed to pain:  no     Intensity: Currently 4/10    History:   Pain interferes with job: No, is painful but manage  Therapies tried without relief: cold and heat  Therapies tried with relief: cold, opioids and steroid injection, stretch        Accompanying Signs & Symptoms:  Risk of Fracture:  None  Risk of Cauda Equina:  None  Risk of Infection:  None  Risk of Cancer:  None       Amount of exercise or physical activity: 2-3 days/week for an average of 15-30 minutes    Problems taking medications regularly: No    Medication side effects: none    Diet: regular (no restrictions)    Further history obtained, clarified or corrected by provider:  Patient has a 20-year history of sacroiliac joint dysfunction and chronic low back pain. Patient has a very physically demanding job as a .  She does a lot of heavy lifting for work as well as long distance over the road trips. 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  There have been no changes in her normal activities.  She is doing daily stretches for her back and takes one Norco per day.    Insomnia.  She takes zolpidem on work days and this is  effective for her.    Raynaud's disease.  On nifedipine and needs refill.      Patient Active Problem List   Diagnosis     Raynaud's disease     TMJ (temporomandibular joint syndrome)     CARDIOVASCULAR SCREENING; LDL GOAL LESS THAN 160     PTSD (post-traumatic stress disorder)     Muscle tension headache     Esophageal abnormality     Episodic mood disorder (H)     Esophageal stricture     Sprain of MCP joint of hand     Rotator cuff injury; bilateral repairs 2005     Primary generalized (osteo)arthritis     SI (sacroiliac) joint dysfunction; see details, pain management contract     Chronic bilateral low back pain without sciatica     Past Surgical History:   Procedure Laterality Date     MAMMOPLASTY REDUCTION       OTHER SURGICAL HISTORY      s/p left knee arthroscopinc surgery     ROTATOR CUFF REPAIR RT/LT  2005     SMALL BOWEL RESECTION       TUBAL LIGATION         Social History     Tobacco Use     Smoking status: Never Smoker     Smokeless tobacco: Never Used   Substance Use Topics     Alcohol use: Yes     Comment: scoial     Family History   Problem Relation Age of Onset     Unknown/Adopted Mother      Unknown/Adopted Father      Unknown/Adopted Maternal Grandmother      Unknown/Adopted Maternal Grandfather      Unknown/Adopted Paternal Grandmother      Unknown/Adopted Paternal Grandfather          Current Outpatient Medications   Medication Sig Dispense Refill     Ascorbic Acid (VITAMIN C CR PO) Take  by mouth.       Calcium Carbonate-Vitamin D (CALTRATE 600+D) 600-400 MG-UNIT CHEW Take  by mouth.       clobetasol (TEMOVATE) 0.05 % ointment as needed. Only as needed occasionally needs after working in garden  Profile Rx: patient will contact pharmacy when needed 45 g 5     Cyanocobalamin (VITAMIN B 12 PO) Take  by mouth.       cyclobenzaprine (FLEXERIL) 10 MG tablet TAKE ONE TABLET BY MOUTH DAILY AS NEEDED FOR MUSCLE SPASMS 90 tablet 4     Ferrous Sulfate (IRON CR PO) Take  by mouth.       GLUCOSAMINE  CHONDROITIN COMPLX PO Take  by mouth.       HYDROcodone-acetaminophen (NORCO) 5-325 MG per tablet Take 1 tablet by mouth daily 30 tablet 0     HYDROcodone-acetaminophen (NORCO) 5-325 MG per tablet Take 1 tablet by mouth daily 30 tablet 0     [START ON 2/16/2019] HYDROcodone-acetaminophen (NORCO) 5-325 MG tablet Take 1 tablet by mouth daily 30 tablet 0     lidocaine (XYLOCAINE) 5 % ointment Apply dime size amount topically to painful areas (choose 2-3 per application) 3 times daily as needed. 70.88 g 2     melatonin 3 MG tablet Take 1 tablet by mouth nightly as needed for sleep. 30 tablet 0     NIFEdipine ER OSMOTIC (PROCARDIA XL) 30 MG 24 hr tablet Take 1 tablet (30 mg) by mouth daily 90 tablet 3     omeprazole (PRILOSEC) 40 MG capsule Take 1 capsule (40 mg) by mouth daily Take 30-60 minutes before a meal. 90 capsule 3     order for DME Equipment being ordered: TENS 1 Units 0     POTASSIUM CARBONATE        TYLENOL 325 MG OR TABS 2 TABLETS BY MOUTH EVERY 6 HOURS AS NEEDED       zolpidem (AMBIEN) 5 MG tablet Take 1 tablet (5 mg) by mouth nightly as needed for sleep 90 tablet 0     nitroGLYcerin (NITRO-BID) 2 % OINT ointment Place 1 inch (15 mg) onto the skin daily (Patient not taking: Reported on 5/25/2018) 30 packet 12       Reviewed and updated as needed this visit by clinical staff  Tobacco  Allergies  Meds  Problems  Med Hx  Surg Hx  Fam Hx  Soc Hx        Reviewed and updated as needed this visit by Provider  Tobacco  Allergies  Meds  Problems  Med Hx  Surg Hx  Fam Hx         ROS:  Constitutional, HEENT, cardiovascular, pulmonary, gi and gu systems are negative, except as otherwise noted.    OBJECTIVE:     LMP 06/26/2014   There is no height or weight on file to calculate BMI.  GENERAL: healthy, alert and no distress  BACK: no CVA tenderness, no paralumbar tenderness    Diagnostic Test Results:  none     ASSESSMENT/PLAN:     ASSESSMENT:  1. Neck pain.  Chronic neck and back pain.  She takes one Norco  daily after she returns home from work.  Will refill Norco.   2. Arthritis    3. Raynaud's disease without gangrene.  Stable, refill nifedipine   4. Other insomnia.  Managed with zolpidem.       PLAN:  Orders Placed This Encounter     HYDROcodone-acetaminophen (NORCO) 5-325 MG tablet     NIFEdipine ER OSMOTIC (PROCARDIA XL) 30 MG 24 hr tablet     zolpidem (AMBIEN) 5 MG tablet       Patient Instructions   Schedule follow up with new primary provider    Patient agrees with plan of care as outlined. Call or return to the clinic with any worsening of symptoms or no resolution. Medication side effects reviewed.    Rosa Sanabria, SILVINA, APRN [facility]

## 2019-06-06 ENCOUNTER — OFFICE VISIT (OUTPATIENT)
Dept: FAMILY MEDICINE | Facility: CLINIC | Age: 54
End: 2019-06-06
Payer: COMMERCIAL

## 2019-06-06 VITALS
SYSTOLIC BLOOD PRESSURE: 134 MMHG | HEART RATE: 85 BPM | HEIGHT: 67 IN | TEMPERATURE: 98.8 F | WEIGHT: 157.2 LBS | OXYGEN SATURATION: 97 % | DIASTOLIC BLOOD PRESSURE: 72 MMHG | RESPIRATION RATE: 16 BRPM | BODY MASS INDEX: 24.67 KG/M2

## 2019-06-06 DIAGNOSIS — M54.2 NECK PAIN: ICD-10-CM

## 2019-06-06 DIAGNOSIS — D50.9 IRON DEFICIENCY ANEMIA, UNSPECIFIED IRON DEFICIENCY ANEMIA TYPE: ICD-10-CM

## 2019-06-06 DIAGNOSIS — M54.50 LUMBAR PAIN: Primary | ICD-10-CM

## 2019-06-06 DIAGNOSIS — M79.645 PAIN OF FINGER OF LEFT HAND: ICD-10-CM

## 2019-06-06 DIAGNOSIS — M53.3 SI (SACROILIAC) JOINT DYSFUNCTION: ICD-10-CM

## 2019-06-06 DIAGNOSIS — M19.90 ARTHRITIS: ICD-10-CM

## 2019-06-06 DIAGNOSIS — G47.09 OTHER INSOMNIA: ICD-10-CM

## 2019-06-06 LAB
ALBUMIN SERPL-MCNC: 3.6 G/DL (ref 3.4–5)
ALP SERPL-CCNC: 112 U/L (ref 40–150)
ALT SERPL W P-5'-P-CCNC: 24 U/L (ref 0–50)
ANION GAP SERPL CALCULATED.3IONS-SCNC: 6 MMOL/L (ref 3–14)
AST SERPL W P-5'-P-CCNC: 17 U/L (ref 0–45)
BASOPHILS # BLD AUTO: 0 10E9/L (ref 0–0.2)
BASOPHILS NFR BLD AUTO: 0.6 %
BILIRUB SERPL-MCNC: 0.2 MG/DL (ref 0.2–1.3)
BUN SERPL-MCNC: 16 MG/DL (ref 7–30)
CALCIUM SERPL-MCNC: 8.7 MG/DL (ref 8.5–10.1)
CHLORIDE SERPL-SCNC: 104 MMOL/L (ref 94–109)
CO2 SERPL-SCNC: 26 MMOL/L (ref 20–32)
CREAT SERPL-MCNC: 0.73 MG/DL (ref 0.52–1.04)
DIFFERENTIAL METHOD BLD: ABNORMAL
EOSINOPHIL # BLD AUTO: 0.2 10E9/L (ref 0–0.7)
EOSINOPHIL NFR BLD AUTO: 3.6 %
ERYTHROCYTE [DISTWIDTH] IN BLOOD BY AUTOMATED COUNT: 14.8 % (ref 10–15)
FERRITIN SERPL-MCNC: 6 NG/ML (ref 8–252)
GFR SERPL CREATININE-BSD FRML MDRD: >90 ML/MIN/{1.73_M2}
GLUCOSE SERPL-MCNC: 130 MG/DL (ref 70–99)
HCT VFR BLD AUTO: 34.8 % (ref 35–47)
HGB BLD-MCNC: 10.6 G/DL (ref 11.7–15.7)
IRON SATN MFR SERPL: 4 % (ref 15–46)
IRON SERPL-MCNC: 21 UG/DL (ref 35–180)
LYMPHOCYTES # BLD AUTO: 1.9 10E9/L (ref 0.8–5.3)
LYMPHOCYTES NFR BLD AUTO: 28.7 %
MCH RBC QN AUTO: 22.8 PG (ref 26.5–33)
MCHC RBC AUTO-ENTMCNC: 30.5 G/DL (ref 31.5–36.5)
MCV RBC AUTO: 75 FL (ref 78–100)
MONOCYTES # BLD AUTO: 0.7 10E9/L (ref 0–1.3)
MONOCYTES NFR BLD AUTO: 11.1 %
NEUTROPHILS # BLD AUTO: 3.8 10E9/L (ref 1.6–8.3)
NEUTROPHILS NFR BLD AUTO: 56 %
PLATELET # BLD AUTO: 297 10E9/L (ref 150–450)
POTASSIUM SERPL-SCNC: 3.6 MMOL/L (ref 3.4–5.3)
PROT SERPL-MCNC: 6.8 G/DL (ref 6.8–8.8)
RBC # BLD AUTO: 4.64 10E12/L (ref 3.8–5.2)
SODIUM SERPL-SCNC: 136 MMOL/L (ref 133–144)
TIBC SERPL-MCNC: 501 UG/DL (ref 240–430)
WBC # BLD AUTO: 6.7 10E9/L (ref 4–11)

## 2019-06-06 PROCEDURE — 83540 ASSAY OF IRON: CPT | Performed by: NURSE PRACTITIONER

## 2019-06-06 PROCEDURE — 83550 IRON BINDING TEST: CPT | Performed by: NURSE PRACTITIONER

## 2019-06-06 PROCEDURE — 36415 COLL VENOUS BLD VENIPUNCTURE: CPT | Performed by: NURSE PRACTITIONER

## 2019-06-06 PROCEDURE — 80053 COMPREHEN METABOLIC PANEL: CPT | Performed by: NURSE PRACTITIONER

## 2019-06-06 PROCEDURE — 99214 OFFICE O/P EST MOD 30 MIN: CPT | Performed by: NURSE PRACTITIONER

## 2019-06-06 PROCEDURE — 82728 ASSAY OF FERRITIN: CPT | Performed by: NURSE PRACTITIONER

## 2019-06-06 PROCEDURE — 85025 COMPLETE CBC W/AUTO DIFF WBC: CPT | Performed by: NURSE PRACTITIONER

## 2019-06-06 RX ORDER — ZOLPIDEM TARTRATE 5 MG/1
5 TABLET ORAL
Qty: 90 TABLET | Refills: 0 | Status: SHIPPED | OUTPATIENT
Start: 2019-06-06 | End: 2019-08-29

## 2019-06-06 RX ORDER — LIDOCAINE 50 MG/G
OINTMENT TOPICAL
Qty: 70.88 G | Refills: 2 | Status: CANCELLED | OUTPATIENT
Start: 2019-06-06

## 2019-06-06 RX ORDER — HYDROCODONE BITARTRATE AND ACETAMINOPHEN 5; 325 MG/1; MG/1
1 TABLET ORAL DAILY
Qty: 30 TABLET | Refills: 0 | Status: SHIPPED | OUTPATIENT
Start: 2019-06-06 | End: 2019-08-29

## 2019-06-06 ASSESSMENT — MIFFLIN-ST. JEOR: SCORE: 1339.64

## 2019-06-06 NOTE — LETTER
[facility]  [address] [place] [id number]  [phone]        September 16, 2019    Maryann Riggs  [address] [facility] [address]              Dear Maryann Riggs    This is to remind you that your Hemoglobin is due.    You may call our office at [phone] to schedule an appointment.    Please disregard this notice if you have already had your labs drawn or made an appointment.        Sincerely,        Debbie Vila CNP/ lea

## 2019-06-06 NOTE — NURSING NOTE
"Chief Complaint   Patient presents with     Establish Care     Pain       Initial /72 (BP Location: Right arm, Patient Position: Chair, Cuff Size: Adult Large)   Pulse 85   Temp 98.8  F (37.1  C) (Tympanic)   Resp 16   Ht 1.708 m (5' 7.25\")   Wt 71.3 kg (157 lb 3.2 oz)   LMP 06/26/2014   SpO2 97%   BMI 24.44 kg/m   Estimated body mass index is 24.44 kg/m  as calculated from the following:    Height as of this encounter: 1.708 m (5' 7.25\").    Weight as of this encounter: 71.3 kg (157 lb 3.2 oz).    Patient presents to the clinic using No DME    Health Maintenance that is potentially due pending provider review:  NONE    n/a    Is there anyone who you would like to be able to receive your results? No  If yes have patient fill out [facility]    "

## 2019-06-06 NOTE — PROGRESS NOTES
Aaron Riggs is a 54 year old female who presents to clinic today for the following health issues:    HPI   Chronic Pain Follow-Up       Type / Location of Pain: low back and neck  Analgesia/pain control:       Recent changes:  worse      Overall control: Tolerable with discomfort  Activity level/function:      Daily activities:  Able to do all daily activities    Work:  Pain does not limit any  work  Adverse effects:  No  Adherance    Taking medication as directed?  Yes    Participating in other treatments: no - requesting referral to pain clinic  Risk Factors:    Sleep:  Good    Mood/anxiety:  controlled    Recent family or social stressors:  none noted    Other aggravating factors: prolonged sitting  PHQ-9 SCORE 2/23/2016 9/11/2017 11/16/2018   PHQ-9 Total Score - - -   PHQ-9 Total Score 0 1 0     JEFFREY-7 SCORE 12/16/2011 9/11/2017 11/16/2018   Total Score 0 - -   Total Score - 0 0     Encounter-Level CSA - 01/13/2017:    Controlled Substance Agreement - Scan on 1/24/2017 10:42 AM: CONTROLLED SUBSTANCE AGREEMENT (below)       Encounter-Level CSA - 03/01/2016:    Controlled Substance Agreement - Scan on 3/17/2016  2:14 PM: CONTROLLED SUBSTANCE AGREEMENT 3/1/2016 (below)       Patient-Level CSA:    There are no patient-level csa.         Amount of exercise or physical activity: None    Problems taking medications regularly: No    Medication side effects: none    Diet: regular (no restrictions)      Patient Active Problem List   Diagnosis     Raynaud's disease     TMJ (temporomandibular joint syndrome)     CARDIOVASCULAR SCREENING; LDL GOAL LESS THAN 160     PTSD (post-traumatic stress disorder)     Muscle tension headache     Esophageal abnormality     Episodic mood disorder (H)     Esophageal stricture     Sprain of MCP joint of hand     Rotator cuff injury; bilateral repairs 2005     Primary generalized (osteo)arthritis     SI (sacroiliac) joint dysfunction; see details, pain management  contract     Chronic bilateral low back pain without sciatica     Past Surgical History:   Procedure Laterality Date     MAMMOPLASTY REDUCTION       OTHER SURGICAL HISTORY      s/p left knee arthroscopinc surgery     ROTATOR CUFF REPAIR RT/LT  2005     SMALL BOWEL RESECTION       TUBAL LIGATION         Social History     Tobacco Use     Smoking status: Never Smoker     Smokeless tobacco: Never Used   Substance Use Topics     Alcohol use: Yes     Comment: scoial     Family History   Problem Relation Age of Onset     Unknown/Adopted Mother      Unknown/Adopted Father      Unknown/Adopted Maternal Grandmother      Unknown/Adopted Maternal Grandfather      Unknown/Adopted Paternal Grandmother      Unknown/Adopted Paternal Grandfather          Current Outpatient Medications   Medication Sig Dispense Refill     Ascorbic Acid (VITAMIN C CR PO) Take  by mouth.       Calcium Carbonate-Vitamin D (CALTRATE 600+D) 600-400 MG-UNIT CHEW Take  by mouth.       clobetasol (TEMOVATE) 0.05 % ointment as needed. Only as needed occasionally needs after working in garden  Profile Rx: patient will contact pharmacy when needed 45 g 5     Cyanocobalamin (VITAMIN B 12 PO) Take  by mouth.       cyclobenzaprine (FLEXERIL) 10 MG tablet TAKE ONE TABLET BY MOUTH DAILY AS NEEDED FOR MUSCLE SPASMS 90 tablet 4     ferrous sulfate (FEROSUL) 325 (65 Fe) MG tablet Take 1 tablet (325 mg) by mouth daily (with breakfast) 90 tablet 1     Ferrous Sulfate (IRON CR PO) Take  by mouth.       GLUCOSAMINE CHONDROITIN COMPLX PO Take  by mouth.       HYDROcodone-acetaminophen (NORCO) 5-325 MG tablet Take 1 tablet by mouth daily 30 tablet 0     lidocaine (XYLOCAINE) 5 % ointment Apply dime size amount topically to painful areas (choose 2-3 per application) 3 times daily as needed. 70.88 g 2     melatonin 3 MG tablet Take 1 tablet by mouth nightly as needed for sleep. 30 tablet 0     NIFEdipine ER OSMOTIC (PROCARDIA XL) 30 MG 24 hr tablet Take 1 tablet (30 mg) by  mouth daily 90 tablet 3     nitroGLYcerin (NITRO-BID) 2 % OINT ointment Place 1 inch (15 mg) onto the skin daily 30 packet 12     omeprazole (PRILOSEC) 40 MG capsule Take 1 capsule (40 mg) by mouth daily Take 30-60 minutes before a meal. 90 capsule 3     order for DME Equipment being ordered: TENS 1 Units 0     POTASSIUM CARBONATE        TYLENOL 325 MG OR TABS 2 TABLETS BY MOUTH EVERY 6 HOURS AS NEEDED       zolpidem (AMBIEN) 5 MG tablet Take 1 tablet (5 mg) by mouth nightly as needed for sleep 90 tablet 0     Allergies   Allergen Reactions     Desyrel [Trazodone Hcl]      Sig mood disturbance       Lamictal [Lamotrigine]      Recent Labs   Lab Test 06/06/19  1419 09/12/14 03/05/14 11/01/13  1024 08/23/13 08/25/12   A1C  --  5.5 6.0 5.7  --    < >  --    LDL  --  81  --   --  100  100  --  96   HDL  --  73  --   --  66  66  --  69   TRIG  --  70  --   --  94  94  --  86   ALT 24 28  --   --  18  --  20   CR 0.73 0.79  --   --  0.75  --  0.76   GFRESTIMATED >90 87  --   --   --   --  92   GFRESTBLACK >90 100  --   --   --   --  107   POTASSIUM 3.6 3.9  --   --  3.6  --  3.8   TSH  --  1.750  --   --  1.550  4.550  --  1.450    < > = values in this interval not displayed.      BP Readings from Last 3 Encounters:   06/06/19 134/72   11/16/18 127/79   05/25/18 126/87    Wt Readings from Last 3 Encounters:   06/06/19 71.3 kg (157 lb 3.2 oz)   11/16/18 70.3 kg (155 lb)   05/25/18 67.1 kg (148 lb)               Reviewed and updated as needed this visit by Provider         Review of Systems   ROS COMP: Constitutional, HEENT, cardiovascular, pulmonary, gi and gu systems are negative, except as otherwise noted.      Objective    LMP 06/26/2014   There is no height or weight on file to calculate BMI.  Physical Exam   GENERAL: healthy, alert and no distress  EYES: Eyes grossly normal to inspection, PERRL and conjunctivae and sclerae normal  HENT: ear canals and TM's normal, nose and mouth without ulcers or lesions  NECK:  no adenopathy, no asymmetry, masses, or scars and thyroid normal to palpation  RESP: lungs clear to auscultation - no rales, rhonchi or wheezes  CV: regular rate and rhythm, normal S1 S2, no S3 or S4, no murmur, click or rub, no peripheral edema and peripheral pulses strong  ABDOMEN: soft, nontender, no hepatosplenomegaly, no masses and bowel sounds normal  MS: no gross musculoskeletal defects noted, no edema  SKIN: no suspicious lesions or rashes  NEURO: Normal strength and tone, mentation intact and speech normal  PSYCH: mentation appears normal, affect normal/bright    Tender:  left para lumbar muscles, right para lumbar muscles  Non-tender:  thoracic spinous processes, thoracic facet joints, left parathoracic muscles, right parathoracic muscles, lumbar spinous processes, lumbar facet joints, left SI joint, right SI joint, left sciatic notch, right sciatic notch  Range of Motion:  left lateral thoracic bending   full, right lateral thoracic bending  full, left thoracic rotation  full, right thoracic rotation  full, lumbar flexion  full, lumbar extension  decreased, left lateral lumbar bending  decreased, right lateral lumbar bending  decreased, left lateral lumbar rotation  decreased, right lateral lumbar rotation  decreased  Strength:  able to heel walk, able to toe walk  Special tests:  negative straight leg raises    Hip Exam: Hip ROM full    Results for orders placed or performed in visit on 06/06/19   Comprehensive metabolic panel   Result Value Ref Range    Sodium 136 133 - 144 mmol/L    Potassium 3.6 3.4 - 5.3 mmol/L    Chloride 104 94 - 109 mmol/L    Carbon Dioxide 26 20 - 32 mmol/L    Anion Gap 6 3 - 14 mmol/L    Glucose 130 (H) 70 - 99 mg/dL    Urea Nitrogen 16 7 - 30 mg/dL    Creatinine 0.73 0.52 - 1.04 mg/dL    GFR Estimate >90 >60 mL/min/[1.73_m2]    GFR Estimate If Black >90 >60 mL/min/[1.73_m2]    Calcium 8.7 8.5 - 10.1 mg/dL    Bilirubin Total 0.2 0.2 - 1.3 mg/dL    Albumin 3.6 3.4 - 5.0 g/dL     Protein Total 6.8 6.8 - 8.8 g/dL    Alkaline Phosphatase 112 40 - 150 U/L    ALT 24 0 - 50 U/L    AST 17 0 - 45 U/L   CBC with platelets differential   Result Value Ref Range    WBC 6.7 4.0 - 11.0 10e9/L    RBC Count 4.64 3.8 - 5.2 10e12/L    Hemoglobin 10.6 (L) 11.7 - 15.7 g/dL    Hematocrit 34.8 (L) 35.0 - 47.0 %    MCV 75 (L) 78 - 100 fl    MCH 22.8 (L) 26.5 - 33.0 pg    MCHC 30.5 (L) 31.5 - 36.5 g/dL    RDW 14.8 10.0 - 15.0 %    Platelet Count 297 150 - 450 10e9/L    % Neutrophils 56.0 %    % Lymphocytes 28.7 %    % Monocytes 11.1 %    % Eosinophils 3.6 %    % Basophils 0.6 %    Absolute Neutrophil 3.8 1.6 - 8.3 10e9/L    Absolute Lymphocytes 1.9 0.8 - 5.3 10e9/L    Absolute Monocytes 0.7 0.0 - 1.3 10e9/L    Absolute Eosinophils 0.2 0.0 - 0.7 10e9/L    Absolute Basophils 0.0 0.0 - 0.2 10e9/L    Diff Method Automated Method    Iron and iron binding capacity   Result Value Ref Range    Iron 21 (L) 35 - 180 ug/dL    Iron Binding Cap 501 (H) 240 - 430 ug/dL    Iron Saturation Index 4 (L) 15 - 46 %   Ferritin   Result Value Ref Range    Ferritin 6 (L) 8 - 252 ng/mL             Assessment & Plan     (M54.5) Lumbar pain  (primary encounter diagnosis)  Comment: Refill patient  Norco.  Patient would like a referral to pain clinic for spinal injections.  One was made will also need a more current MRI.  MRI ordered  Plan: PAIN MANAGEMENT REFERRAL, Comprehensive         metabolic panel, CBC with platelets         differential, MR Lumbar Spine w/o Contrast           (G47.09) Other insomnia  Comment: Patient has insomnia controlled with Ambien patient not to take Norco with Ambien  Plan: zolpidem (AMBIEN) 5 MG tablet         (M54.2) Neck pain  Comment: Pain controlled with current Norco renewed today  Plan: HYDROcodone-acetaminophen (NORCO) 5-325 MG         tablet      (M19.90) Arthritis  Comment:   Plan: HYDROcodone-acetaminophen (NORCO) 5-325 MG         tablet    (M53.3) SI (sacroiliac) joint dysfunction  Comment: Pain  management referral made today for patient  Plan: PAIN MANAGEMENT REFERRAL      (D50.9) Iron deficiency anemia, unspecified iron deficiency anemia type  Comment: Patient noted to have a hemoglobin of 10 iron deficiency anemia present will start on iron supplements patient also have a fit test and recheck CBC in 6 to 8 weeks.  Plan: Iron and iron binding capacity, Ferritin, **CBC        with platelets FUTURE anytime, ferrous sulfate         (FEROSUL) 325 (65 Fe) MG tablet, Fecal         colorectal cancer screen (FIT)             See Patient Instructions    Return in about 1 month (around 7/4/2019) for Pain .    [employer] [facility]

## 2019-06-07 ENCOUNTER — TELEPHONE (OUTPATIENT)
Dept: PALLIATIVE MEDICINE | Facility: CLINIC | Age: 54
End: 2019-06-07

## 2019-06-07 NOTE — TELEPHONE ENCOUNTER
Left VM for patient to schedule a Lumbar TBD injection          Delmy MOE    [facility]

## 2019-06-09 RX ORDER — FERROUS SULFATE 325(65) MG
325 TABLET ORAL
Qty: 90 TABLET | Refills: 1 | Status: SHIPPED | OUTPATIENT
Start: 2019-06-09

## 2019-06-10 NOTE — TELEPHONE ENCOUNTER
Patient needs a MRI done first      Pre-screening Questions for Radiology Injections:    Injection to be done at which interventional clinic site? [facility] [place] [facility] [place]    Instruct patient to arrive as directed prior to the scheduled appointment time:    [place] minutes before      [place]: 30 minutes before; if IV needed 1 hour before     Procedure ordered by Dr. Moncada    Procedure ordered? Lumbar TBD         Transforaminal Cervical BETITO - Dr. Sara Lobo ONLY    What insurance would patient like us to bill for this procedure? [facility]      Worker's comp or MVA (motor vehicle accident) -Any injection DO NOT SCHEDULE and route to Delmy Hines.      [employer] insurance - For SI joint injections, DO NOT SCHEDULE and route Delmy Hines.       [employer] - Any injection besides hip/shoulder/knee joint DO NOT SCHEDULE and route to Delmy Hines. She will obtain PA and call pt back to schedule procedure or notify pt of denial.        [employer]-Route to Delmy for review      IF SCHEDULING IN [place] AND NEEDS A PA, IT IS OKAY TO SCHEDULE. [place] HANDLES THEIR OWN PA'S AFTER THE PATIENT IS SCHEDULED. PLEASE SCHEDULE AT LEAST 1 WEEK OUT SO A PA CAN BE OBTAINED.      Any chance of pregnancy? NO   If YES, do NOT schedule and route to RN pool    Is an  needed? No     Patient has a drive home? (mandatory) YES: Informed     Is patient taking any blood thinners (plavix, coumadin, jantoven, warfarin, heparin, pradaxa or dabigatran )? No   If hold needed, do NOT schedule, route to RN pool     Is patient taking any aspirin products (includes Excedrin and Fiorinal)? No     If more than 325mg/day do NOT schedule; route to RN pool     For CERVICAL procedures, hold all aspirin products for 6 days.     Tell pt that if aspirin product is not held for 6 days, the procedure WILL BE cancelled.      Does the patient have a bleeding or clotting disorder? No     If YES, okay to schedule AND route to RN nurse  pool    For any patients with platelet count <100, must be forwarded to provider    Is patient diabetic?  No  If YES, have them bring their glucometer.    Does patient have an active infection or treated for one within the past week? No     Is patient currently taking any antibiotics?  No     For patients on chronic, preventative, or prophylactic antibiotics, procedures may be scheduled.     For patients on antibiotics for active or recent infection:antibiotic course must have been completed for 4 days    Is patient currently taking any steroid medications? (i.e. Prednisone, Medrol)  No     For patients on steroid medications, course must have been completed for 4 days    Reviewed with patient:  If you are started on any steroids or antibiotics between now and your appointment, you must contact us because the procedure may need to be cancelled.  Yes    Is patient actively being treated for cancer or immunocompromised? No  If YES, do NOT schedule and route to RN pool     Are you able to get on and off an exam table with minimal or no assistance? Yes  If NO, do NOT schedule and route to RN pool    Are you able to roll over and lay on your stomach with minimal or no assistance? Yes  If NO, do NOT schedule and route to RN pool     Any allergies to contrast dye, iodine, shellfish, or numbing and steroid medications? No  If YES, route to RN pool AND add allergy information to appointment notes    Allergies: Desyrel [trazodone hcl] and Lamictal [lamotrigine]      Has the patient had a flu shot or any other vaccinations within 7 days before or after the procedure.  No     Does patient have an MRI/CT? MRI  (SI joint, hip injections, lumbar sympathetic blocks, and stellate ganglion blocks do not require an MRI)    Was the MRI done w/in the last 3 years?  Yes    Was MRI done at [facility]? Yes      If not, where was it done? N/A       If MRI was not done at [facility], [facility] or [facility] do NOT schedule and route to nursing.   If pt has an imaging disc, the injection may be scheduled but pt has to bring disc to appt. If they show up w/out disc the injection cannot be done    Reminders (please tell patient if applicable):       Instructed pt to arrive 30 minutes early for IV start if this is for a cervical procedure, ALL sympathetic (stellate ganglion, hypogastric, or lumbar sympathetic block) and all sedation procedures (RFA, spinal cord stimulation trials).  Not Applicable   -IVs are not routinely placed for Dr. Abel cervical cases   -Dr. Tafoya: IVs for cervical ESIs and cervical TBDs (not CMBBs/facet inj)      If NPO for sedation, informed patient that it is okay to take medications with sips of water (except if they are to hold blood thinners).  Not Applicable   *DO take blood pressure medication if it is prescribed*      If this is for a cervical BETITO, informed patient that aspirin needs to be held for 6 days.   Not Applicable      For all patients not having spinal cord stimulator (SCS) trials or radiofrequency ablations (RFAs), informed patient:    IV sedation is not provided for this procedure.  If you feel that an oral anti-anxiety medication is needed, you can discuss this further with your referring provider or primary care provider.  The Pain Clinic provider will discuss specifics of what the procedure includes at your appointment.  Most procedures last 10-20 minutes.  We use numbing medications to help with any discomfort during the procedure.  Not Applicable      Do not schedule procedures requiring IV placement in the first appointment of the day or first appointment after lunch. Do NOT schedule at 0745, 0815 or 1245.       For patients 85 or older we recommend having an adult stay w/ them for the remainder of the day.       Does the patient have any questions?  NO  Danni Gifford  [facility]

## 2019-06-11 ENCOUNTER — HOSPITAL ENCOUNTER (OUTPATIENT)
Dept: MRI IMAGING | Facility: CLINIC | Age: 54
Discharge: HOME | End: 2019-06-11
Attending: NURSE PRACTITIONER | Admitting: NURSE PRACTITIONER
Payer: COMMERCIAL

## 2019-06-11 DIAGNOSIS — M54.50 LUMBAR PAIN: ICD-10-CM

## 2019-06-11 PROCEDURE — 72148 MRI LUMBAR SPINE W/O DYE: CPT

## 2019-06-11 NOTE — LETTER
June 14, 2019      Maryann Riggs  [address]  [place] [address]        Dear ,    We are writing to inform you of your test results.    We have been trying to contact you but have been unsuccessful. Your test results of your MRI shows Degenerative Disease.  As dicussed will have you follow-up lumbar injection. Please let us know if you have any questions.     Resulted Orders   MR Lumbar Spine w/o Contrast    Narrative    MRI LUMBAR SPINE WITHOUT CONTRAST   6/11/2019 4:43 PM     HISTORY: Abnormal x-ray lumbosacral spine, degenerative joint disease.  Lumbar pain.    TECHNIQUE: Multiplanar multisequence MRI of the lumbar spine without  contrast.    COMPARISON: X-rays 1/4/2016     FINDINGS:  Alignment is significant for S-shaped scoliosis with  dextroconvex curvature of the thoracolumbar junction and levoconvex  curvature of the lumbar spine. There is also grade 1 anterolisthesis  of L4 on L5 and grade 1 retrolisthesis of L1 on L2. Multilevel  moderate disc height loss is present. Multilevel facet arthropathy is  present as detailed below. The conus medullaris and cauda equina are  unremarkable. Paraspinal soft tissues are unremarkable.    Segmental Analysis:     T12-L1:  No significant spinal canal or foraminal stenosis.     L1-L2:  Broad-based disc bulge. No significant spinal canal stenosis.  Mild left foraminal stenosis.      L2-L3:  Broad-based disc bulge. Moderate bilateral facet arthropathy.  Moderate bilateral foraminal stenosis.      L3-L4:  Broad-based disc bulge. Moderate to severe right facet  arthropathy. Mild to moderate left facet arthropathy. Moderate right  foraminal stenosis. No significant left foraminal stenosis. Mild  spinal canal stenosis.    L4-L5:  Broad-based disc bulge. Moderate bilateral facet arthropathy,  right worse than left. Moderate right foraminal stenosis. Mild left  foraminal stenosis. No significant spinal canal stenosis.      L5-S1:  Broad-based disc bulge.  Mild bilateral facet arthropathy. No  significant spinal canal stenosis. Mild left foraminal stenosis. No  significant right foraminal stenosis.      Impression    IMPRESSION:  Scoliosis and moderate degenerative change.    RIZWAN CASTRO MD       If you have any questions or concerns, please call the clinic at the number listed above.       Sincerely,      Debbie Vila CNP/hp

## 2019-06-12 NOTE — RESULT ENCOUNTER NOTE
Please Notify Maryann  of test results Mri shows degenerative  Disease.  As dicussed will have you follow-up lumbar injection.    Debbie Vila CNP

## 2019-06-14 NOTE — ADDENDUM NOTE
Encounter addended by: Mayra Agosto MA on: 6/14/2019 11:11 AM   Actions taken: Letter saved, Result note filed

## 2019-06-18 ENCOUNTER — OFFICE VISIT (OUTPATIENT)
Dept: ORTHOPEDICS | Facility: CLINIC | Age: 54
End: 2019-06-18
Payer: COMMERCIAL

## 2019-06-18 VITALS
HEIGHT: 67 IN | SYSTOLIC BLOOD PRESSURE: 146 MMHG | DIASTOLIC BLOOD PRESSURE: 85 MMHG | WEIGHT: 157 LBS | BODY MASS INDEX: 24.64 KG/M2

## 2019-06-18 DIAGNOSIS — M17.0 PRIMARY OSTEOARTHRITIS OF BOTH KNEES: ICD-10-CM

## 2019-06-18 DIAGNOSIS — M25.562 CHRONIC PAIN OF LEFT KNEE: Primary | ICD-10-CM

## 2019-06-18 DIAGNOSIS — G89.29 CHRONIC PAIN OF LEFT KNEE: Primary | ICD-10-CM

## 2019-06-18 PROCEDURE — 99213 OFFICE O/P EST LOW 20 MIN: CPT | Mod: 25 | Performed by: FAMILY MEDICINE

## 2019-06-18 PROCEDURE — 20611 DRAIN/INJ JOINT/BURSA W/US: CPT | Mod: LT | Performed by: FAMILY MEDICINE

## 2019-06-18 ASSESSMENT — MIFFLIN-ST. JEOR: SCORE: 1338.74

## 2019-06-18 NOTE — LETTER
2019         RE: Maryann Riggs  [address]  [place] [address]        Dear Colleague,    Thank you for referring your patient, Maryann Riggs, to the [facility]. Please see a copy of my visit note below.    Maryann Riggs  :  1965  DOS: 19  MRN: [id number]    Sports Medicine Clinic Visit    PCP: Rosa Sanabria    Maryann Riggs is a 52 year old female who is seen as a self referral presenting with left knee pain.    Injury: Gradual onset of left knee pain over the last ~ 2 months.  Pain located over left deep medial knee, nonradiating.  Additional Features:  Positive: swelling and deep aching sensation.  Symptoms are better with Rest.  Symptoms are worse with: prolonged driving, going from sit to stand, descending stairs, kneeling.  Other evaluation and/or treatments so far consists of: Ibuprofen and Rest.  Recent imaging completed: No recent imaging completed.  Prior History of related problems: Previously treated by Sports Medicine - Zoie Parisi PA-C for similar knee in .  Synvisc/steroid injection completed at that time provided good relief for ~ 18+ months.  She is desiring repeat injection today.     Social History: works as semi-truck truck    Interim History - 2019  Since last visit on 17 patient has moderate left knee pain and crepitus over the last ~ 3 months.  Left knee SynviscOne injection completed on  provided good relief for ~ 18 months.  Would like to complete repeat injection today.  No new injury in the interim.    Review of Systems  Musculoskeletal: as above  Remainder of review of systems is negative including constitutional, CV, pulmonary, GI, Skin and Neurologic except as noted in HPI or medical history.    Past Medical History:   Diagnosis Date     Anger reaction     buspar helping     Cholelithiasis      Esophageal stricture     dilatation every 3 months     Generalized osteoarthrosis,  "unspecified site (aka ARTHRITIS)     small joints     PTSD (post-traumatic stress disorder)      Raynaud's disease      Past Surgical History:   Procedure Laterality Date     MAMMOPLASTY REDUCTION       OTHER SURGICAL HISTORY      s/p left knee arthroscopinc surgery     ROTATOR CUFF REPAIR RT/LT  2005     SMALL BOWEL RESECTION       TUBAL LIGATION         Objective  /85   Ht 1.708 m (5' 7.25\")   Wt 71.2 kg (157 lb)   LMP 06/26/2014   BMI 24.41 kg/m       General: healthy, alert and in no distress    HEENT: no scleral icterus or conjunctival erythema   Skin: no suspicious lesions or rash. No jaundice.   CV: regular rhythm by palpation, 2+ distal pulses, no pedal edema    Resp: normal respiratory effort without conversational dyspnea   Psych: normal mood and affect    Gait: nonantalgic, appropriate coordination and balance   Neuro: normal light touch sensory exam of the extremities. Motor strength as noted below     Left Knee exam    ROM:        Flexion 140 degrees       Extension 0 degrees       Range of motion limited by mild pain in terminal flexion    Inspection:       no visible ecchymosis        effusion noted trace    Skin:       no visible deformities       well perfused       capillary refill brisk    Patellar Motion:        Lateral tilt noted in patella       Crepitus noted in the patellofemoral joint    Tender:        lateral patellar border       medial joint line    Non Tender:         remainder of knee area        medial patellar border        lateral joint line        along MCL        distal IT Band        infrapatellar tendon        tibial tubercle       pes anserine bursa    Special Tests:        + Sandie       neg (-) Lachmans       neg (-) anterior drawer       neg (-) posterior drawer       neg (-) varus at 0 deg and 30 deg       neg (-) valgus at 0 deg and 30 deg    Evaluation of ipsilateral kinetic chain       normal strength with hip extension and abduction, but somewhat " deconditioned b/l       Decreased strength with single leg squat b/l, R>L    Radiology  XR KNEE STANDING AP BILAT SUNRISE BILAT LAT LT, 9/5/2017 1:23 PM     Comparison: 5/1/2014     History: Pain in left knee, Other chronic pain         Impression: Small amount of fluid in the left knee joint. No fractures  are seen. Joint spaces are preserved and in normal alignment. No  significant soft tissue swelling.    Large Joint Injection/Arthocentesis: L knee joint  Date/Time: 6/18/2019 4:00 PM  Performed by: Philipp Peña DO  Authorized by: Philipp Peña DO     Indications:  Osteoarthritis  Needle Size:  22 G  Guidance: ultrasound    Approach:  Superolateral  Location:  Knee      Medications:  48 mg hylan 48 MG/6ML  Aspirate amount (mL):  2  Aspirate:  Serous and yellow  Outcome:  Tolerated well, no immediate complications  Consent Given by:  Patient  Prep: patient was prepped and draped in usual sterile fashion        Assessment:  1. Chronic pain of left knee    2. Primary osteoarthritis of both knees        Plan:  Discussed the assessment with the patient.  Follow up: prn based on improvement  Recurrence of OA flare   Prior synvisc/steroid combination worked well  Will try synvisc only today, can return for CSI for labile sx after one month  No instability on exam, no mechanical sx reported  consider advanced imaging based on progress  PT offered, HEP reviewed  Expectations and limitations of synvisc were reviewed in detail  Often 4-6 weeks before full effect may be noticed  Usually covered up to every 6 months by insurance, but does not need to be repeated unless pain returns, at which point we would re-evaluate  Potential use of CSI in future for flares of pain reviewed in detail  Encouraged modified progressive pain-free activity as tolerated  HEP and Supportive care reviewed  All questions were answered today  Contact us with additional questions or concerns  Signs and sx of concern  reviewed      Philipp Peña DO, JOSELINE  Primary Care Sports Medicine  [facility]               Disclaimer: This note consists of symbols derived from keyboarding, dictation and/or voice recognition software. As a result, there may be errors in the script that have gone undetected. Please consider this when interpreting information found in this chart.    Again, thank you for allowing me to participate in the care of your patient.        Sincerely,        Philipp Peña DO     Yes

## 2019-06-18 NOTE — PROGRESS NOTES
Maryann Riggs  :  1965  DOS: 19  MRN: [id number]    Sports Medicine Clinic Visit    PCP: Rosa Sanabria    Maryann Riggs is a 52 year old female who is seen as a self referral presenting with left knee pain.    Injury: Gradual onset of left knee pain over the last ~ 2 months.  Pain located over left deep medial knee, nonradiating.  Additional Features:  Positive: swelling and deep aching sensation.  Symptoms are better with Rest.  Symptoms are worse with: prolonged driving, going from sit to stand, descending stairs, kneeling.  Other evaluation and/or treatments so far consists of: Ibuprofen and Rest.  Recent imaging completed: No recent imaging completed.  Prior History of related problems: Previously treated by Sports Medicine - Zoie Parisi PA-C for similar knee in .  Synvisc/steroid injection completed at that time provided good relief for ~ 18+ months.  She is desiring repeat injection today.     Social History: works as semi-truck truck    Interim History - 2019  Since last visit on 17 patient has moderate left knee pain and crepitus over the last ~ 3 months.  Left knee SynviscOne injection completed on  provided good relief for ~ 18 months.  Would like to complete repeat injection today.  No new injury in the interim.    Review of Systems  Musculoskeletal: as above  Remainder of review of systems is negative including constitutional, CV, pulmonary, GI, Skin and Neurologic except as noted in HPI or medical history.    Past Medical History:   Diagnosis Date     Anger reaction     buspar helping     Cholelithiasis      Esophageal stricture     dilatation every 3 months     Generalized osteoarthrosis, unspecified site (aka ARTHRITIS)     small joints     PTSD (post-traumatic stress disorder)      Raynaud's disease      Past Surgical History:   Procedure Laterality Date     MAMMOPLASTY REDUCTION       OTHER SURGICAL HISTORY      s/p left knee arthroscopinc  "surgery     ROTATOR CUFF REPAIR RT/LT  2005     SMALL BOWEL RESECTION       TUBAL LIGATION         Objective  /85   Ht 1.708 m (5' 7.25\")   Wt 71.2 kg (157 lb)   LMP 06/26/2014   BMI 24.41 kg/m      General: healthy, alert and in no distress    HEENT: no scleral icterus or conjunctival erythema   Skin: no suspicious lesions or rash. No jaundice.   CV: regular rhythm by palpation, 2+ distal pulses, no pedal edema    Resp: normal respiratory effort without conversational dyspnea   Psych: normal mood and affect    Gait: nonantalgic, appropriate coordination and balance   Neuro: normal light touch sensory exam of the extremities. Motor strength as noted below     Left Knee exam    ROM:        Flexion 140 degrees       Extension 0 degrees       Range of motion limited by mild pain in terminal flexion    Inspection:       no visible ecchymosis        effusion noted trace    Skin:       no visible deformities       well perfused       capillary refill brisk    Patellar Motion:        Lateral tilt noted in patella       Crepitus noted in the patellofemoral joint    Tender:        lateral patellar border       medial joint line    Non Tender:         remainder of knee area        medial patellar border        lateral joint line        along MCL        distal IT Band        infrapatellar tendon        tibial tubercle       pes anserine bursa    Special Tests:        + Sandie       neg (-) Lachmans       neg (-) anterior drawer       neg (-) posterior drawer       neg (-) varus at 0 deg and 30 deg       neg (-) valgus at 0 deg and 30 deg    Evaluation of ipsilateral kinetic chain       normal strength with hip extension and abduction, but somewhat deconditioned b/l       Decreased strength with single leg squat b/l, R>L    Radiology  XR KNEE STANDING AP BILAT SUNRISE BILAT LAT LT, 9/5/2017 1:23 PM     Comparison: 5/1/2014     History: Pain in left knee, Other chronic pain         Impression: Small amount of fluid " in the left knee joint. No fractures  are seen. Joint spaces are preserved and in normal alignment. No  significant soft tissue swelling.    Large Joint Injection/Arthocentesis: L knee joint  Date/Time: 6/18/2019 4:00 PM  Performed by: Philipp Peña DO  Authorized by: Philipp Peña DO     Indications:  Osteoarthritis  Needle Size:  22 G  Guidance: ultrasound    Approach:  Superolateral  Location:  Knee      Medications:  48 mg hylan 48 MG/6ML  Aspirate amount (mL):  2  Aspirate:  Serous and yellow  Outcome:  Tolerated well, no immediate complications  Consent Given by:  Patient  Prep: patient was prepped and draped in usual sterile fashion        Assessment:  1. Chronic pain of left knee    2. Primary osteoarthritis of both knees        Plan:  Discussed the assessment with the patient.  Follow up: prn based on improvement  Recurrence of OA flare   Prior synvisc/steroid combination worked well  Will try synvisc only today, can return for CSI for labile sx after one month  No instability on exam, no mechanical sx reported  consider advanced imaging based on progress  PT offered, HEP reviewed  Expectations and limitations of synvisc were reviewed in detail  Often 4-6 weeks before full effect may be noticed  Usually covered up to every 6 months by insurance, but does not need to be repeated unless pain returns, at which point we would re-evaluate  Potential use of CSI in future for flares of pain reviewed in detail  Encouraged modified progressive pain-free activity as tolerated  HEP and Supportive care reviewed  All questions were answered today  Contact us with additional questions or concerns  Signs and sx of concern reviewed      Philipp Peña DO, CAQ  Primary Care Sports Medicine  [facility]               Disclaimer: This note consists of symbols derived from keyboarding, dictation and/or voice recognition software. As a result, there may be errors in the script that  have gone undetected. Please consider this when interpreting information found in this chart.

## 2019-06-30 PROCEDURE — 82274 ASSAY TEST FOR BLOOD FECAL: CPT | Performed by: NURSE PRACTITIONER

## 2019-07-02 DIAGNOSIS — D50.9 IRON DEFICIENCY ANEMIA, UNSPECIFIED IRON DEFICIENCY ANEMIA TYPE: ICD-10-CM

## 2019-07-02 LAB — HEMOCCULT STL QL IA: NEGATIVE

## 2019-07-02 NOTE — LETTER
July 2, 2019      Maryann S Oneil  [address] [address]        Dear MsFaridehOneil,    We are writing to inform you of your test results.    Your Colon Cancer screening test (FIT Test) was normal. Please repeat in one year.       Resulted Orders   Fecal colorectal cancer screen (FIT)   Result Value Ref Range    Occult Blood Scn FIT Negative NEG^Negative       If you have any questions or concerns, please call the clinic at the number listed above.       Sincerely,        Debbie Vila, OSCAR/kaf

## 2019-07-15 ENCOUNTER — RADIOLOGY INJECTION OFFICE VISIT (OUTPATIENT)
Dept: PALLIATIVE MEDICINE | Facility: CLINIC | Age: 54
End: 2019-07-15
Attending: NURSE PRACTITIONER
Payer: COMMERCIAL

## 2019-07-15 ENCOUNTER — ANCILLARY PROCEDURE (OUTPATIENT)
Dept: RADIOLOGY | Facility: CLINIC | Age: 54
End: 2019-07-15
Attending: PSYCHIATRY & NEUROLOGY
Payer: COMMERCIAL

## 2019-07-15 VITALS
OXYGEN SATURATION: 98 % | SYSTOLIC BLOOD PRESSURE: 143 MMHG | DIASTOLIC BLOOD PRESSURE: 83 MMHG | HEART RATE: 86 BPM | RESPIRATION RATE: 16 BRPM

## 2019-07-15 DIAGNOSIS — M53.3 SI (SACROILIAC) JOINT DYSFUNCTION: Primary | ICD-10-CM

## 2019-07-15 DIAGNOSIS — M54.16 LUMBAR RADICULOPATHY: ICD-10-CM

## 2019-07-15 PROCEDURE — 27096 INJECT SACROILIAC JOINT: CPT | Mod: 50 | Performed by: PSYCHIATRY & NEUROLOGY

## 2019-07-15 ASSESSMENT — PAIN SCALES - GENERAL: PAINLEVEL: SEVERE PAIN (7)

## 2019-07-15 NOTE — PROGRESS NOTES
Pre procedure Diagnosis:SI joint dysfunction  Post procedure Diagnosis: Same  Procedure performed: bilateral SI Joint injection   Anesthesia: none  Complications: none    Operators: Gabbi Nunez MD and Gina Bartlett MD     Indications:   Maryann Riggs is a 54 year old female was sent by Dr. Vila for injection for low back pain.  They have a history of low back pain for many years, non radiating, 7/10, grinding pain.  Exam shows mild tenderness to palpation b/l SIJ R>L, mild pain with lumbar facet loading, + standing flexion test on the right, + Luis Enrique.  She has tried conservative treatment including medications, PT, chiropractor, massage, acupuncture.     MRI was done on 6/11/19  FINDINGS:  Alignment is significant for S-shaped scoliosis with  dextroconvex curvature of the thoracolumbar junction and levoconvex  curvature of the lumbar spine. There is also grade 1 anterolisthesis  of L4 on L5 and grade 1 retrolisthesis of L1 on L2. Multilevel  moderate disc height loss is present. Multilevel facet arthropathy is  present as detailed below. The conus medullaris and cauda equina are  unremarkable. Paraspinal soft tissues are unremarkable.     Segmental Analysis:      T12-L1:  No significant spinal canal or foraminal stenosis.     L1-L2:  Broad-based disc bulge. No significant spinal canal stenosis.  Mild left foraminal stenosis.       L2-L3:  Broad-based disc bulge. Moderate bilateral facet arthropathy.  Moderate bilateral foraminal stenosis.       L3-L4:  Broad-based disc bulge. Moderate to severe right facet  arthropathy. Mild to moderate left facet arthropathy. Moderate right  foraminal stenosis. No significant left foraminal stenosis. Mild  spinal canal stenosis.     L4-L5:  Broad-based disc bulge. Moderate bilateral facet arthropathy,  right worse than left. Moderate right foraminal stenosis. Mild left  foraminal stenosis. No significant spinal canal stenosis.       L5-S1:  Broad-based disc bulge. Mild  bilateral facet arthropathy. No  significant spinal canal stenosis. Mild left foraminal stenosis. No  significant right foraminal stenosis.                                                                      IMPRESSION:  Scoliosis and moderate degenerative change. which showed   B/l SI joint dysfunction    Options/alternatives, benefits and risks were discussed with the patient including bleeding, infection, tissue trauma, exposure to radiation, reaction to medications including seizure, nerve injury, weakness, and numbness.  Questions were answered to her satisfaction and she agrees to proceed. Voluntary informed consent was obtained and signed.     Vitals were reviewed: Yes  Allergies were reviewed:  Yes   Medications were reviewed:  Yes   Pre-procedure pain score: 7/10    Procedure:  After getting informed consent, patient was brought into the procedure suite and was placed in a prone position on the procedure table.   A Pause for the Cause was performed.  Patient was prepped and draped in sterile fashion.     After identifying the bilateral SI joint, the C-arm was rotated to a obliquely to obtain the best view of the inferior angle of the joints.  A total of 8 ml of Lidocaine 1%  was used to anesthetize the skin at a skin entry site coaxial with the fluoroscopy beam at this location.  A 22gauge 3.5 inch needle was advanced under intermittent fluoroscopy until it was felt to enter the SI joint.    A total of 1ml of Omnipaque-300 was injected, confirming appropriate position, with spread into the intraarticular space, with no intravascular uptake noted. 9ml was wasted. Location was verified in lateral view.    3ml of 0.2% ropivacaine with 80mg of kenalog was injected, divided equally between the two sides.  The needle was flushed with lidocaine and removed.    Hemostasis was achieved, the area was cleaned, and bandaids were placed when appropriate.  The patient tolerated the procedure well, and was taken to the  recovery room.    Images were saved to PACS.    Post-procedure pain score: 8/10  Follow-up includes:   -f/u phone call in one week  -f/u with referring provider    Gabbi Nunez MD  [facility]

## 2019-07-15 NOTE — NURSING NOTE
Pre-procedure Intake    Have you been fasting? NA    If yes, for how long? NA    Are you taking a prescribed blood thinner such as coumadin, Plavix, Xarelto?    No    If yes, when did you take your last dose? NA    Do you take aspirin?  NO       If cervical procedure, have you held aspirin for 6 days?   NA    Do you have any allergies to contrast dye, iodine, steroid and/or numbing medications?  NO    Are you currently taking antibiotics or have an active infection?  NO    Have you had a fever/elevated temperature within the past week? NO    Are you currently taking oral steroids? NO    Do you have a ? Yes       Are you pregnant or breastfeeding?  NO    Are the vital signs normal?  No: Elevated BP      Norah Givens CMA ([facility])

## 2019-07-15 NOTE — PATIENT INSTRUCTIONS
[facility]   Procedure Discharge Instructions    Today you saw:    Dr. Vonnie Nunez     You had an:   sacroiliac joint injection       Medications used:  Lidocaine  Omnipaque  Ropivicaine   Kenalog             If you were holding your blood thinning medication, please restart taking it: N/A    Be cautious when walking. Numbness and/or weakness in the lower extremities may occur for up to 6-8 hours after the procedure due to effect of the local anesthetic    Do not drive for 6 hours. The effect of the local anesthetic could slow your reflexes.     You may resume your regular activities after 24 hours    Avoid strenuous activity for the first 24 hours    You may shower, however avoid swimming, tub baths or hot tubs for 24 hours following your procedure    You may have a mild to moderate increase in pain for several days following the injection.    It may take up to 14 days for the steroid medication to start working although you may feel the effect as early as a few days after the procedure.       You may use ice packs for 10-15 minutes, 3 to 4 times a day at the injection site for comfort    Do not use heat to painful areas for 6 to 8 hours. This will give the local anesthetic time to wear off and prevent you from accidentally burning your skin.     Unless you have been directed to avoid the use of anti-inflammatory medications (NSAIDS), you may use medications such as ibuprofen, Aleve or Tylenol for pain control if needed.     Possible side effects of steroids that you may experience include flushing, elevated blood pressure, increased appetite, mild headaches and restlessness.  All of these symptoms will get better with time.    If you experience any of the following, call the Pain Clinic during work hours at [phone] or the Provider Line after hours at [phone]:  -Fever over 100 degree F  -Swelling, bleeding, redness, drainage, warmth at the injection site  -Progressive weakness or  numbness in your legs   -Unusual new onset of pain that is not improving

## 2019-08-29 ENCOUNTER — OFFICE VISIT (OUTPATIENT)
Dept: FAMILY MEDICINE | Facility: CLINIC | Age: 54
End: 2019-08-29
Payer: COMMERCIAL

## 2019-08-29 VITALS
HEART RATE: 88 BPM | TEMPERATURE: 98.4 F | RESPIRATION RATE: 16 BRPM | BODY MASS INDEX: 24 KG/M2 | SYSTOLIC BLOOD PRESSURE: 134 MMHG | DIASTOLIC BLOOD PRESSURE: 86 MMHG | WEIGHT: 154.4 LBS | OXYGEN SATURATION: 96 %

## 2019-08-29 DIAGNOSIS — M53.3 SI (SACROILIAC) JOINT DYSFUNCTION: Primary | ICD-10-CM

## 2019-08-29 DIAGNOSIS — Z13.220 SCREENING FOR HYPERLIPIDEMIA: ICD-10-CM

## 2019-08-29 DIAGNOSIS — M19.90 ARTHRITIS: ICD-10-CM

## 2019-08-29 DIAGNOSIS — M54.2 NECK PAIN: ICD-10-CM

## 2019-08-29 DIAGNOSIS — G47.09 OTHER INSOMNIA: ICD-10-CM

## 2019-08-29 DIAGNOSIS — M79.645 PAIN OF FINGER OF LEFT HAND: ICD-10-CM

## 2019-08-29 PROCEDURE — 99214 OFFICE O/P EST MOD 30 MIN: CPT | Performed by: NURSE PRACTITIONER

## 2019-08-29 RX ORDER — ZOLPIDEM TARTRATE 5 MG/1
5 TABLET ORAL
Qty: 90 TABLET | Refills: 0 | Status: SHIPPED | OUTPATIENT
Start: 2019-08-29 | End: 2019-11-26

## 2019-08-29 RX ORDER — HYDROCODONE BITARTRATE AND ACETAMINOPHEN 5; 325 MG/1; MG/1
1 TABLET ORAL DAILY
Qty: 28 TABLET | Refills: 0 | Status: SHIPPED | OUTPATIENT
Start: 2019-08-29 | End: 2019-11-13

## 2019-08-29 RX ORDER — LIDOCAINE 50 MG/G
OINTMENT TOPICAL
Qty: 70.88 G | Refills: 2 | Status: SHIPPED | OUTPATIENT
Start: 2019-08-29 | End: 2020-07-19

## 2019-08-29 RX ORDER — HYDROCODONE BITARTRATE AND ACETAMINOPHEN 5; 325 MG/1; MG/1
1 TABLET ORAL EVERY 6 HOURS PRN
Qty: 30 TABLET | Refills: 0 | Status: SHIPPED | OUTPATIENT
Start: 2019-09-28 | End: 2019-09-17

## 2019-08-29 ASSESSMENT — PAIN SCALES - GENERAL: PAINLEVEL: MODERATE PAIN (5)

## 2019-08-29 NOTE — NURSING NOTE
"Chief Complaint   Patient presents with     Pain       Initial /86 (BP Location: Right arm, Patient Position: Chair, Cuff Size: Adult Large)   Pulse 88   Temp 98.4  F (36.9  C) (Tympanic)   Resp 16   Wt 70 kg (154 lb 6.4 oz)   LMP 06/26/2014   SpO2 96%   BMI 24.00 kg/m   Estimated body mass index is 24 kg/m  as calculated from the following:    Height as of 6/18/19: 1.708 m (5' 7.25\").    Weight as of this encounter: 70 kg (154 lb 6.4 oz).    Patient presents to the clinic using No DME    Health Maintenance that is potentially due pending provider review:  NONE    n/a    Is there anyone who you would like to be able to receive your results? No  If yes have patient fill out [facility]    "

## 2019-08-29 NOTE — PROGRESS NOTES
Aaron Riggs is a 54 year old female who presents to clinic today for the following health issues:    HPI   Chronic Pain Follow-Up       Type / Location of Pain: low back and neck  Analgesia/pain control:       Recent changes:  same      Overall control: Tolerable with discomfort  Activity level/function:      Daily activities:  Able to do all daily activities    Work:  Pain does not limit any  work  Adverse effects:  No  Adherance    Taking medication as directed?  Yes    Participating in other treatments: no -   Risk Factors:    Sleep:  Good    Mood/anxiety:  improved    Recent family or social stressors:  none noted    Other aggravating factors: none  PHQ-9 SCORE 2/23/2016 9/11/2017 11/16/2018   PHQ-9 Total Score - - -   PHQ-9 Total Score 0 1 0     JEFFREY-7 SCORE 12/16/2011 9/11/2017 11/16/2018   Total Score 0 - -   Total Score - 0 0     Encounter-Level CSA - 01/13/2017:    Controlled Substance Agreement - Scan on 1/24/2017 10:42 AM: CONTROLLED SUBSTANCE AGREEMENT (below)       Encounter-Level CSA - 03/01/2016:    Controlled Substance Agreement - Scan on 3/17/2016  2:14 PM: CONTROLLED SUBSTANCE AGREEMENT 3/1/2016 (below)       Patient-Level CSA:    There are no patient-level csa.           Patient Active Problem List   Diagnosis     Raynaud's disease     TMJ (temporomandibular joint syndrome)     CARDIOVASCULAR SCREENING; LDL GOAL LESS THAN 160     PTSD (post-traumatic stress disorder)     Muscle tension headache     Esophageal abnormality     Episodic mood disorder (H)     Esophageal stricture     Sprain of MCP joint of hand     Rotator cuff injury; bilateral repairs 2005     Primary generalized (osteo)arthritis     SI (sacroiliac) joint dysfunction; see details, pain management contract     Chronic bilateral low back pain without sciatica     Past Surgical History:   Procedure Laterality Date     MAMMOPLASTY REDUCTION       OTHER SURGICAL HISTORY      s/p left knee arthroscopinc surgery      ROTATOR CUFF REPAIR RT/LT  2005     SMALL BOWEL RESECTION       TUBAL LIGATION         Social History     Tobacco Use     Smoking status: Never Smoker     Smokeless tobacco: Never Used   Substance Use Topics     Alcohol use: Yes     Comment: scoial     Family History   Problem Relation Age of Onset     Unknown/Adopted Mother      Unknown/Adopted Father      Unknown/Adopted Maternal Grandmother      Unknown/Adopted Maternal Grandfather      Unknown/Adopted Paternal Grandmother      Unknown/Adopted Paternal Grandfather          Current Outpatient Medications   Medication Sig Dispense Refill     Ascorbic Acid (VITAMIN C CR PO) Take  by mouth.       Calcium Carbonate-Vitamin D (CALTRATE 600+D) 600-400 MG-UNIT CHEW Take  by mouth.       clobetasol (TEMOVATE) 0.05 % ointment as needed. Only as needed occasionally needs after working in garden  Profile Rx: patient will contact pharmacy when needed 45 g 5     Cyanocobalamin (VITAMIN B 12 PO) Take  by mouth.       cyclobenzaprine (FLEXERIL) 10 MG tablet TAKE ONE TABLET BY MOUTH DAILY AS NEEDED FOR MUSCLE SPASMS 90 tablet 4     ferrous sulfate (FEROSUL) 325 (65 Fe) MG tablet Take 1 tablet (325 mg) by mouth daily (with breakfast) 90 tablet 1     Ferrous Sulfate (IRON CR PO) Take  by mouth.       GLUCOSAMINE CHONDROITIN COMPLX PO Take  by mouth.       HYDROcodone-acetaminophen (NORCO) 5-325 MG tablet Take 1 tablet by mouth daily 30 tablet 0     lidocaine (XYLOCAINE) 5 % ointment Apply dime size amount topically to painful areas (choose 2-3 per application) 3 times daily as needed. 70.88 g 2     melatonin 3 MG tablet Take 1 tablet by mouth nightly as needed for sleep. 30 tablet 0     NIFEdipine ER OSMOTIC (PROCARDIA XL) 30 MG 24 hr tablet Take 1 tablet (30 mg) by mouth daily 90 tablet 3     nitroGLYcerin (NITRO-BID) 2 % OINT ointment Place 1 inch (15 mg) onto the skin daily 30 packet 12     omeprazole (PRILOSEC) 40 MG capsule Take 1 capsule (40 mg) by mouth daily Take 30-60  minutes before a meal. 90 capsule 3     order for DME Equipment being ordered: TENS 1 Units 0     POTASSIUM CARBONATE        TYLENOL 325 MG OR TABS 2 TABLETS BY MOUTH EVERY 6 HOURS AS NEEDED       zolpidem (AMBIEN) 5 MG tablet Take 1 tablet (5 mg) by mouth nightly as needed for sleep 90 tablet 0     Allergies   Allergen Reactions     Desyrel [Trazodone Hcl]      Sig mood disturbance       Lamictal [Lamotrigine]      Recent Labs   Lab Test 06/06/19  1419 09/12/14 03/05/14 11/01/13  1024 08/23/13 08/25/12   A1C  --  5.5 6.0 5.7  --    < >  --    LDL  --  81  --   --  100  100  --  96   HDL  --  73  --   --  66  66  --  69   TRIG  --  70  --   --  94  94  --  86   ALT 24 28  --   --  18  --  20   CR 0.73 0.79  --   --  0.75  --  0.76   GFRESTIMATED >90 87  --   --   --   --  92   GFRESTBLACK >90 100  --   --   --   --  107   POTASSIUM 3.6 3.9  --   --  3.6  --  3.8   TSH  --  1.750  --   --  1.550  4.550  --  1.450    < > = values in this interval not displayed.      BP Readings from Last 3 Encounters:   08/29/19 134/86   07/15/19 143/83   06/18/19 146/85    Wt Readings from Last 3 Encounters:   08/29/19 70 kg (154 lb 6.4 oz)   06/18/19 71.2 kg (157 lb)   06/06/19 71.3 kg (157 lb 3.2 oz)                 Reviewed and updated as needed this visit by Provider         Review of Systems   ROS COMP: Constitutional, HEENT, cardiovascular, pulmonary, gi and gu systems are negative, except as otherwise noted.      Objective    LMP 06/26/2014   Body mass index is 24 kg/m .   /86 (BP Location: Right arm, Patient Position: Chair, Cuff Size: Adult Large)   Pulse 88   Temp 98.4  F (36.9  C) (Tympanic)   Resp 16   Wt 70 kg (154 lb 6.4 oz)   LMP 06/26/2014   SpO2 96%   BMI 24.00 kg/m      Physical Exam   GENERAL: healthy, alert and no distress  EYES: Eyes grossly normal to inspection, PERRL and conjunctivae and sclerae normal  HENT: ear canals and TM's normal, nose and mouth without ulcers or lesions  NECK: no  adenopathy, no asymmetry, masses, or scars and thyroid normal to palpation  RESP: lungs clear to auscultation - no rales, rhonchi or wheezes  CV: regular rate and rhythm, normal S1 S2, no S3 or S4, no murmur, click or rub, no peripheral edema and peripheral pulses strong  MS: no gross musculoskeletal defects noted, no edema  SKIN: no suspicious lesions or rashes  NEURO: Normal strength and tone, mentation intact and speech normal  PSYCH: mentation appears normal, affect normal/bright    Inspection: normal cervical lordosis  Tender:  left paracervical muscles, right paracervical muscles  Non-tender:  scapula, left trapezius muscles, right trapezius muscles  Range of Motion:  Full ROM of cervical spine  Strength: Full strength of all neck muscles  Special tests:  Reproduction of radicular pattern    Tender:  left para lumbar muscles, right para lumbar muscles  Non-tender:  thoracic spinous processes, thoracic facet joints, left parathoracic muscles, right parathoracic muscles, lumbar spinous processes, lumbar facet joints, left SI joint, right SI joint, left sciatic notch, right sciatic notch  Range of Motion:  left lateral thoracic bending   full, right lateral thoracic bending  full, left thoracic rotation  full, right thoracic rotation  full, lumbar flexion  full, lumbar extension  decreased, left lateral lumbar bending  decreased, right lateral lumbar bending  decreased, left lateral lumbar rotation  decreased, right lateral lumbar rotation  decreased  Strength:  able to heel walk, able to toe walk  Special tests:  negative straight leg raises     Hip Exam: Hip ROM full           Assessment & Plan     (M53.3) SI (sacroiliac) joint dysfunction  (primary encounter diagnosis)  Comment: Pain controlled with current dose of medication will renew today  Plan: lidocaine (XYLOCAINE) 5 % external ointment,         Drug Abuse Screen Panel 13, Urine (Pain Care         Package), CANCELED: Drug Abuse Screen Panel 13,         Urine (Pain Care Package)      (M54.2) Neck pain  Comment: Pain controlled with current dose of medication renewed today  Plan: HYDROcodone-acetaminophen (NORCO) 5-325 MG         tablet, HYDROcodone-acetaminophen (NORCO) 5-325        MG tablet, Drug Abuse Screen Panel 13, Urine         (Pain Care Package), CANCELED: Drug Abuse         Screen Panel 13, Urine (Pain Care Package)            (M79.645) Pain of finger of left hand  Comment:   Plan: lidocaine (XYLOCAINE) 5 % external ointment,         HYDROcodone-acetaminophen (NORCO) 5-325 MG         tablet      (M19.90) Arthritis  Comment:   Plan: HYDROcodone-acetaminophen (NORCO) 5-325 MG         tablet      (Z13.220) Screening for hyperlipidemia  Comment:   Plan: Lipid panel reflex to direct LDL Fasting            (G47.09) Other insomnia  Comment: Insomnia controlled with current dose of Ambien  Plan: zolpidem (AMBIEN) 5 MG tablet           See Patient Instructions    No follow-ups on file.    CONTRERAS Sales CNP  [facility]

## 2019-08-29 NOTE — PATIENT INSTRUCTIONS
Fill second prescription by 9/28/2019  Patient Education     General Neck and Back Pain    Both neck and back pain are usually caused by injury to the muscles or ligaments of the spine. Sometimes the disks that separate each bone of the spine may cause pain by pressing on a nearby nerve. Back and neck pain may appear after a sudden twisting or bending force (such as in a car accident), or sometimes after a simple awkward movement. In either case, muscle spasm is often present and adds to the pain.  Acute neck and back pain usually gets better in 1 to 2 weeks. Pain related to disk disease, arthritis in the spinal joints or spinal stenosis (narrowing of the spinal canal) can become chronic and last for months or years.  Back and neck pain are common problems. Most people feel better in 1 or 2 weeks, and most of the rest in 1 to 2 months. Most people can remain active.  People have and describe pain differently.    Pain can be sharp, stabbing, shooting, aching, cramping, or burning    Movement, standing, bending, lifting, sitting, or walking may worsen the pain    Pain can be localized to one spot or area, or it can be more generalized    Pain can spread or radiate upwards, downwards, to the front, or go down your arms    Muscle spasm may occur.  Most of the time mechanical problems with the muscles or spine cause the pain. it is usually caused by an injury, whether known or not, to the muscles or ligaments. While illnesses can cause back pain, it is usually not caused by a serious illness. Pain is usually related to physical activity, whether sports, exercise, work, or normal activity. Sometimes it can occur without an identifiable cause. This can happen simply by stretching or moving wrong, without noting pain at the time. Other causes include:    Overexertion, lifting, pushing, pulling incorrectly or too aggressively.    Sudden twisting, bending or stretching from an accident (car or fall), or accidental  movement.    Poor posture    Poor conditioning, lack of regular exercise    Spinal disc disease or arthritis    Stress    Pregnancy, or illness like appendicitis, bladder or kidney infection, pelvic infections   Home care    For neck pain: Use a comfortable pillow that supports the head and keeps the spine in a neutral position. The position of the head should not be tilted forward or backward.    When in bed, try to find a position of comfort. A firm mattress is best. Try lying flat on your back with pillows under your knees. You can also try lying on your side with your knees bent up towards your chest and a pillow between your knees.    At first, do not try to stretch out the sore spots. If there is a strain, it is not like the good soreness you get after exercising without an injury. In this case, stretching may make it worse.    Don't sit for long periods, as in long car rides or other travel. This puts more stress on the lower back than standing or walking.    During the first 24 to 72 hours after an injury, apply an ice pack to the painful area for 20 minutes and then remove it for 20 minutes over a period of 60 to 90 minutes or several times a day.     You can alternate ice and heat therapies. Talk with your healthcare provider about the best treatment for your back or neck pain. As a safety precaution, do not use a heating pad at bedtime. Sleeping with a heating pad can lead to skin burns or tissue damage.    Therapeutic massage can help relax the back and neck muscles without stretching them.    Be aware of safe lifting methods and do not lift anything over 15 pounds until all the pain is gone.  Medicines  Talk to your healthcare provider before using medicine, especially if you have other medical problems or are taking other medicines.    You may use over-the-counter medicine to control pain, unless another pain medicine was prescribed. If you have chronic conditions like diabetes, liver or kidney  disease, stomach ulcers,  gastrointestinal bleeding, or are taking blood thinner medicines.    Be careful if you are given pain medicines, narcotics, or medicine for muscle spasm. They can cause drowsiness, and can affect your coordination, reflexes, and judgment. Do not drive or operate heavy machinery.  Follow-up care  Follow up with your healthcare provider, or as advised. Physical therapy or further tests may be needed.  If X-rays were taken, you will be notified of any new findings that may affect your care.  Call 911  Call 911 if any of the following occur:    Trouble breathing    Confusion    Very drowsy or trouble awakening    Fainting or loss of consciousness    Rapid or very slow heart rate    Loss of bowel or bladder control  When to seek medical advice  Call your healthcare provider right away if any of these occur:    Pain becomes worse or spreads into your arms or legs    Weakness, numbness or pain in one or both arms or legs    Numbness in the groin area    Difficulty walking    Fever of 100.4 F (38 C) or higher, or as directed by your healthcare provider  Date Last Reviewed: 7/1/2016 2000-2018 [employer]. [address] [place], [place] [id number]. All rights reserved. This information is not intended as a substitute for professional medical care. Always follow your healthcare professional's instructions.           Patient Education     Relieving Back Pain  Back pain is a common problem. You can strain back muscles by lifting too much weight or just by moving the wrong way. Back strain can be uncomfortable, even painful. And it can take weeks or months to improve. To help yourself feel better and prevent future back strains, try these tips.  Important: Don't give aspirin to children or teens without first discussing it with your child's healthcare provider.  Ice    Ice reduces muscle pain and swelling. It helps most during the first 24 to 48 hours after an injury.    Wrap an ice pack  or a bag of frozen peas in a thin towel. Never put ice directly on your skin.    Place the ice where your back hurts the most.    Don t ice for more than 20 minutes at a time.    You can use ice several times a day.  Medicines  Over-the-counter pain relievers include acetaminophen and anti-inflammatory medicines, which includes aspirin, naproxen, or ibuprofen. They can help ease discomfort. Some also reduce swelling.    Tell your healthcare provider about any medicines you are already taking.    Take medicines only as directed.  Manipulation and massage  Having manipulation by an osteopathic doctor or chiropractor may be helpful. Getting a massage also may help.   Heat  After the first 48 hours, heat can relax sore muscles and improve blood flow.    Try a warm bath or shower. Or use a heating pad set on low. To prevent a burn, keep a cloth between you and the heating pad.    Don t use a heating pad for more than 15 minutes at a time. Never sleep on a heating pad.  Date Last Reviewed: 6/1/2018 2000-2018 [employer]. [address] [place] [address], [place] [address]. All rights reserved. This information is not intended as a substitute for professional medical care. Always follow your healthcare professional's instructions.

## 2019-08-29 NOTE — LETTER
[facility]  08/29/19    Patient: Maryann Riggs  YOB: 1965  Medical Record Number: [id number]                                                                  Opioid / Opioid Plus Controlled Substance Agreement    I understand that my care provider has prescribed an opioid (narcotic) controlled substance to help manage my condition(s). I am taking this medicine to help me function or work. I know this is strong medicine, and that it can cause serious side effects. Opioid medicine can be sedating, addicting and may cause a dependency on the drug. They can affect my ability to drive or think, and cause depression. They need to be taken exactly as prescribed. Combining opioids with certain medicines or chemicals (such as cocaine, sedatives and tranquilizers, sleeping pills, meth) can be dangerous or even fatal. Also, if I stop opioids suddenly, I may have severe withdrawal symptoms. Last, I understand that opioids do not work for all types of pain nor for all patients. If not helpful, I may be asked to stop them.        The risks, benefits, and side effects of these medicine(s) were explained to me. I agree that:    1. I will take part in other treatments as advised by my care team. This may be psychiatry or counseling, physical therapy, behavioral therapy, group treatment or a referral to a pain clinic. I will reduce or stop my medicine when my care team tells me to do so.  2. I will take my medicines as prescribed. I will not change the dose or schedule unless my care team tells me to. There will be no refills if I  run out early.   I may be contactedwithout warning and asked to complete a urine drug test or pill count at any time.   3. I will keep all my appointments, and understand this is part of the monitoring of opioids. My care team may require an office visit for EVERY opioid/controlled substance refill. If I miss appointments or don t follow instructions, my care team may  stop my medicine.  4. I will not ask other providers to prescribe controlled substances, and I will not accept controlled substances from other people. If I need another prescribed controlled substance for a new reason, I will tell my care team within 1 business day.  5. I will use one pharmacy to fill all of my controlled substance prescriptions, and it is up to me to make sure that I do not run out of my medicines on weekends or holidays. If my care team is willing to refill my opioid prescription without a visit, I must request refills only during office hours, refills may take up to 3 days to process, and it may take up to 5 to 7 days for my medicine to be mailed and ready at my pharmacy. Prescriptions will not be mailed anywhere except my pharmacy.        [id number]  Rev 12/18         Registration to scan to EHR                             Page 1 of 2               Controlled Substance Agreement Opioid        [facility]  08/29/19  Patient: Maryann Riggs  YOB: 1965  Medical Record Number: [id number]                                                                  6. I am responsible for my prescriptions. If the medicine/prescription is lost or stolen, it will not be replaced. I also agree not to share controlled substance medicines with anyone.  7. I agree to not use ANY illegal or recreational drugs. This includes marijuana, cocaine, bath salts or other drugs. I agree not to use alcohol unless my care team says I may.          I agree to give urine samples whenever asked. If I don t give a urine sample, the care team may stop my medicine.    8. If I enroll in the [place] Medical Marijuana program, I will tell my care team. I will also sign an agreement to share my medical records with my care team.   9. I will bring in my list of medicines (or my medicine bottles) each time I come to the clinic.   10. I will tell my care team right away if I become pregnant or have a new  medical problem treated outside of my regular clinic.  11. I understand that this medicine can affect my thinking and judgment. It may be unsafe for me to drive, use machinery and do dangerous tasks. I will not do any of these things until I know how the medicine affects me. If my dose changes, I will wait to see how it affects me. I will contact my care team if I have concerns about medicine side effects.    I understand that if I do not follow any of the conditions above, my prescriptions or treatment may be stopped.      I agree that my provider, clinic care team, and pharmacy may work with any city, state or federal law enforcement agency that investigates the misuse, sale, or other diversion of my controlled medicine. I will allow my provider to discuss my care with or share a copy of this agreement with any other treating provider, pharmacy or emergency room where I receive care. I agree to give up (waive) any right of privacy or confidentiality with respect to these consents.     I have read this agreement and have asked questions about anything I did not understand.      ________________________________________________________________________  Patient signature - Date/Time -  Maryann Riggs                                      ________________________________________________________________________  Witness signature                                                            ________________________________________________________________________  Provider signature - CONTRERAS Sales CNP      [id number]  Rev 12/18         Registration to scan to EHR                         Page 2 of 2                   Controlled Substance Agreement Opioid           Page 1 of 2  Opioid Pain Medicines (also known as Narcotics)  What You Need to Know    What are opioids?   Opioids are pain medicines that must be prescribed by a doctor.  They are also known as narcotics.    Examples are:     morphine (MS Contin,  Yanique)    oxycodone (Oxycontin)    oxycodone and acetaminophen (Percocet)    hydrocodone and acetaminophen (Vicodin, Norco)     fentanyl patch (Duragesic)     hydromorphone (Dilaudid)     methadone     What do opioids do well?   Opioids are best for short-term pain after a surgery or injury. They also work well for cancer pain. Unlike other pain medicines, they do not cause liver or kidney failure or ulcers. They may help some people with long-lasting (chronic) pain.     What do opioids NOT do well?   Opioids never get rid of pain entirely, and they do not work well for most patients with chronic pain. Opioids do not reduce swelling, one of the causes of pain. They also don t work well for nerve pain.                           For informational purposes only.  Not to replace the advice of your care provider.  Copyright [facility]. All right reserved. Revenew 215429-Vea 02/18.      Page 2 of 2    Risks and side effects   Talk to your doctor before you start or decide to keep taking one of these medicines. Side effects include:    Lowering your breathing rate enough to cause death    Overdose, including death, especially if taking higher than prescribed doses    Long-term opioid use    Worse depression symptoms; less pleasure in things you usually enjoy    Feeling tired or sluggish    Slower thoughts or cloudy thinking    Being more sensitive to pain over time; pain is harder to control    Trouble sleeping or restless sleep    Changes in hormone levels (for example, less testosterone)    Changes in sex drive or ability to have sex    Constipation    Unsafe driving    Itching and sweating    Feeling dizzy    Nausea, vomiting and dry mouth    What else should I know about opioids?  When someone takes opioids for too long or too often, they become dependent. This means that if you stop or reduce the medicine too quickly, you will have withdrawal symptoms.    Dependence is not the same as addiction.  Addiction is when people keep using a substance that harms their body, their mind or their relations with others. If you have a history of drug or alcohol abuse, taking opioids can cause a relapse.    Over time, opioids don t work as well. Most people will need higher and higher doses. The higher the dose, the more serious the side effects. We don t know the long-term effects of opioids.      Prescribed opioids aren't the best way to manage chronic pain    Other ways to manage pain include:      Ibuprofen or acetaminophen.  You should always try this first.      Treat health problems that may be causing pain.      acupuncture or massage, deep breathing, meditation, visual imagery, aromatherapy.      Use heat or ice at the pain site      Physical therapy and exercise      Stop smoking      See a counselor or therapist                                                  People who have used opioids for a long time may have a lower quality of life, worse depression, higher levels of pain and more visits to doctors.    Never share your opioids with others. Be sure to store opioids in a secure place, locked if possible.Young children can easily swallow them and overdose.     You can overdose on opioids.  Signs of overdose include decrease or loss of consciousness, slowed breathing, trouble waking and blue lips.  If someone is worried about overdose, they should call 911.    If you are at risk for overdose, you may get naloxone (Narcan, a medicine that reverses the effects of opioids.  If you overdose, a friend or family member can give you Narcan while waiting for the ambulance.  They need to know the signs of overdose and how to give Narcan.    While you're taking opioids:    Don't use alcohol or street drugs. Taking them together can cause death.    Don't take any of these medicines unless your doctor says its okay.  Taking these with opioids can cause death.    Benzodiazepines (such as lorazepam         or  diazepam)    Muscle relaxers (such as cyclobenzaprine)    sleeping pills    other opioids    Safe disposal of opioids  Find your area drug take-back program, your pharmacy mail-back program, buy a special disposal bag (such as Deterra) from your pharmacy or flush them down the toilet.  Use the guidelines at:  www.fda.gov/drugs/resourcesforyou

## 2019-09-09 DIAGNOSIS — G47.09 OTHER INSOMNIA: ICD-10-CM

## 2019-09-09 RX ORDER — ZOLPIDEM TARTRATE 5 MG/1
5 TABLET ORAL
Qty: 90 TABLET | Refills: 0 | Status: CANCELLED | OUTPATIENT
Start: 2019-09-09

## 2019-09-17 ENCOUNTER — OFFICE VISIT (OUTPATIENT)
Dept: FAMILY MEDICINE | Facility: CLINIC | Age: 54
End: 2019-09-17
Payer: COMMERCIAL

## 2019-09-17 VITALS
TEMPERATURE: 97.8 F | BODY MASS INDEX: 23.86 KG/M2 | WEIGHT: 152 LBS | HEIGHT: 67 IN | DIASTOLIC BLOOD PRESSURE: 80 MMHG | SYSTOLIC BLOOD PRESSURE: 138 MMHG | HEART RATE: 92 BPM | RESPIRATION RATE: 18 BRPM

## 2019-09-17 DIAGNOSIS — E87.6 LOW BLOOD POTASSIUM: ICD-10-CM

## 2019-09-17 DIAGNOSIS — G89.4 CHRONIC PAIN SYNDROME: ICD-10-CM

## 2019-09-17 DIAGNOSIS — D64.9 LOW HEMOGLOBIN: ICD-10-CM

## 2019-09-17 DIAGNOSIS — Z11.4 SCREENING FOR HIV (HUMAN IMMUNODEFICIENCY VIRUS): ICD-10-CM

## 2019-09-17 DIAGNOSIS — Z23 NEED FOR PROPHYLACTIC VACCINATION AND INOCULATION AGAINST INFLUENZA: ICD-10-CM

## 2019-09-17 DIAGNOSIS — Z00.00 ROUTINE GENERAL MEDICAL EXAMINATION AT A HEALTH CARE FACILITY: Primary | ICD-10-CM

## 2019-09-17 DIAGNOSIS — M26.609 TMJ (TEMPOROMANDIBULAR JOINT SYNDROME): ICD-10-CM

## 2019-09-17 LAB
ANION GAP SERPL CALCULATED.3IONS-SCNC: 5 MMOL/L (ref 3–14)
BASOPHILS # BLD AUTO: 0 10E9/L (ref 0–0.2)
BASOPHILS NFR BLD AUTO: 0.4 %
BUN SERPL-MCNC: 11 MG/DL (ref 7–30)
CALCIUM SERPL-MCNC: 8.9 MG/DL (ref 8.5–10.1)
CHLORIDE SERPL-SCNC: 102 MMOL/L (ref 94–109)
CHOLEST SERPL-MCNC: 191 MG/DL
CO2 SERPL-SCNC: 30 MMOL/L (ref 20–32)
CREAT SERPL-MCNC: 0.8 MG/DL (ref 0.52–1.04)
DIFFERENTIAL METHOD BLD: ABNORMAL
EOSINOPHIL # BLD AUTO: 0.2 10E9/L (ref 0–0.7)
EOSINOPHIL NFR BLD AUTO: 2.4 %
ERYTHROCYTE [DISTWIDTH] IN BLOOD BY AUTOMATED COUNT: 18.6 % (ref 10–15)
GFR SERPL CREATININE-BSD FRML MDRD: 83 ML/MIN/{1.73_M2}
GLUCOSE SERPL-MCNC: 102 MG/DL (ref 70–99)
HCT VFR BLD AUTO: 37.6 % (ref 35–47)
HDLC SERPL-MCNC: 84 MG/DL
HGB BLD-MCNC: 11.4 G/DL (ref 11.7–15.7)
LDLC SERPL CALC-MCNC: 99 MG/DL
LYMPHOCYTES # BLD AUTO: 1.8 10E9/L (ref 0.8–5.3)
LYMPHOCYTES NFR BLD AUTO: 27.3 %
MCH RBC QN AUTO: 22.5 PG (ref 26.5–33)
MCHC RBC AUTO-ENTMCNC: 30.3 G/DL (ref 31.5–36.5)
MCV RBC AUTO: 74 FL (ref 78–100)
MONOCYTES # BLD AUTO: 0.7 10E9/L (ref 0–1.3)
MONOCYTES NFR BLD AUTO: 9.9 %
NEUTROPHILS # BLD AUTO: 4.1 10E9/L (ref 1.6–8.3)
NEUTROPHILS NFR BLD AUTO: 60 %
NONHDLC SERPL-MCNC: 107 MG/DL
PLATELET # BLD AUTO: 337 10E9/L (ref 150–450)
POTASSIUM SERPL-SCNC: 3.3 MMOL/L (ref 3.4–5.3)
RBC # BLD AUTO: 5.07 10E12/L (ref 3.8–5.2)
SODIUM SERPL-SCNC: 137 MMOL/L (ref 133–144)
TRIGL SERPL-MCNC: 41 MG/DL
WBC # BLD AUTO: 6.8 10E9/L (ref 4–11)

## 2019-09-17 PROCEDURE — 99213 OFFICE O/P EST LOW 20 MIN: CPT | Mod: 25 | Performed by: NURSE PRACTITIONER

## 2019-09-17 PROCEDURE — 80061 LIPID PANEL: CPT | Performed by: NURSE PRACTITIONER

## 2019-09-17 PROCEDURE — 99396 PREV VISIT EST AGE 40-64: CPT | Mod: 25 | Performed by: NURSE PRACTITIONER

## 2019-09-17 PROCEDURE — 85025 COMPLETE CBC W/AUTO DIFF WBC: CPT | Performed by: NURSE PRACTITIONER

## 2019-09-17 PROCEDURE — 80306 DRUG TEST PRSMV INSTRMNT: CPT | Performed by: NURSE PRACTITIONER

## 2019-09-17 PROCEDURE — 36415 COLL VENOUS BLD VENIPUNCTURE: CPT | Performed by: NURSE PRACTITIONER

## 2019-09-17 PROCEDURE — 90471 IMMUNIZATION ADMIN: CPT | Performed by: NURSE PRACTITIONER

## 2019-09-17 PROCEDURE — 90682 RIV4 VACC RECOMBINANT DNA IM: CPT | Performed by: NURSE PRACTITIONER

## 2019-09-17 PROCEDURE — 80048 BASIC METABOLIC PNL TOTAL CA: CPT | Performed by: NURSE PRACTITIONER

## 2019-09-17 RX ORDER — CYCLOBENZAPRINE HCL 10 MG
TABLET ORAL
Qty: 90 TABLET | Refills: 4 | Status: SHIPPED | OUTPATIENT
Start: 2019-09-17 | End: 2019-11-26

## 2019-09-17 ASSESSMENT — MIFFLIN-ST. JEOR: SCORE: 1316.06

## 2019-09-17 ASSESSMENT — ENCOUNTER SYMPTOMS
ABDOMINAL PAIN: 0
HEADACHES: 0
PARESTHESIAS: 0
NERVOUS/ANXIOUS: 0
PALPITATIONS: 0
HEMATURIA: 0
SORE THROAT: 0
FEVER: 0
DIARRHEA: 0
HEMATOCHEZIA: 0
DIZZINESS: 0
CONSTIPATION: 0
DYSURIA: 0
JOINT SWELLING: 0
CHILLS: 0
EYE PAIN: 0
SHORTNESS OF BREATH: 0
MYALGIAS: 0
FREQUENCY: 0
BREAST MASS: 0
HEARTBURN: 0
NAUSEA: 0
WEAKNESS: 0
COUGH: 0
ARTHRALGIAS: 0

## 2019-09-17 NOTE — LETTER
[facility]  [address] [place] [id number]  [phone]          September 18, 2019    Maryann Riggs                                                                                                                     [address] [place] [address]            Dear Maryann,    Your labs are within normal limits with the exception of your hemoglobin.  Your hemoglobin was 11.4 normal  is above 11.7.  We will recheck it in 1-2 weeks with further iron studies.  This is a lab only appointment that you can schedule.        Your form was faxed and I have enclosed the original copy.     Results for orders placed or performed in visit on 09/17/19   Lipid panel reflex to direct LDL Fasting   Result Value Ref Range    Cholesterol 191 <200 mg/dL    Triglycerides 41 <150 mg/dL    HDL Cholesterol 84 >49 mg/dL    LDL Cholesterol Calculated 99 <100 mg/dL    Non HDL Cholesterol 107 <130 mg/dL   Drug Abuse Screen Panel 13, Urine (Pain Care Package)   Result Value Ref Range    Cannabinoids (26-[id number]-1-[id number]-9-THC) Not Detected NDET^Not Detected ng/mL    Phencyclidine (Phencyclidine) Not Detected NDET^Not Detected ng/mL    Cocaine (Benzoylecgonine) Not Detected NDET^Not Detected ng/mL    Methamphetamine (d-Methamphetamine) Not Detected NDET^Not Detected ng/mL    Opiates (Morphine) Not Detected NDET^Not Detected ng/mL    Amphetamine (d-Amphetamine) Not Detected NDET^Not Detected ng/mL    Benzodiazepines (Nordiazepam) Not Detected NDET^Not Detected ng/mL    Tricyclic Antidepressants (Desipramine) Not Detected NDET^Not Detected ng/mL    Methadone (Methadone) Not Detected NDET^Not Detected ng/mL    Barbiturates (Butalbital) Not Detected NDET^Not Detected ng/mL    Oxycodone (Oxycodone) Not Detected NDET^Not Detected ng/mL    Propoxyphene (Norpropoxyphene) Not Detected NDET^Not Detected ng/mL    Buprenorphine (Buprenorphine) Not Detected NDET^Not Detected ng/mL   Basic metabolic panel   Result  Value Ref Range    Sodium 137 133 - 144 mmol/L    Potassium 3.3 (L) 3.4 - 5.3 mmol/L    Chloride 102 94 - 109 mmol/L    Carbon Dioxide 30 20 - 32 mmol/L    Anion Gap 5 3 - 14 mmol/L    Glucose 102 (H) 70 - 99 mg/dL    Urea Nitrogen 11 7 - 30 mg/dL    Creatinine 0.80 0.52 - 1.04 mg/dL    GFR Estimate 83 >60 mL/min/[1.73_m2]    GFR Estimate If Black >90 >60 mL/min/[1.73_m2]    Calcium 8.9 8.5 - 10.1 mg/dL   CBC with platelets differential   Result Value Ref Range    WBC 6.8 4.0 - 11.0 10e9/L    RBC Count 5.07 3.8 - 5.2 10e12/L    Hemoglobin 11.4 (L) 11.7 - 15.7 g/dL    Hematocrit 37.6 35.0 - 47.0 %    MCV 74 (L) 78 - 100 fl    MCH 22.5 (L) 26.5 - 33.0 pg    MCHC 30.3 (L) 31.5 - 36.5 g/dL    RDW 18.6 (H) 10.0 - 15.0 %    Platelet Count 337 150 - 450 10e9/L    % Neutrophils 60.0 %    % Lymphocytes 27.3 %    % Monocytes 9.9 %    % Eosinophils 2.4 %    % Basophils 0.4 %    Absolute Neutrophil 4.1 1.6 - 8.3 10e9/L    Absolute Lymphocytes 1.8 0.8 - 5.3 10e9/L    Absolute Monocytes 0.7 0.0 - 1.3 10e9/L    Absolute Eosinophils 0.2 0.0 - 0.7 10e9/L    Absolute Basophils 0.0 0.0 - 0.2 10e9/L    Diff Method Automated Method      Sincerely,     Debbie Vila CNP

## 2019-09-17 NOTE — PROGRESS NOTES
SUBJECTIVE:   CC: Maryann Riggs is an 54 year old woman who presents for preventive health visit.     Healthy Habits:     Getting at least 3 servings of Calcium per day:  Yes    Bi-annual eye exam:  Yes    Dental care twice a year:  Yes    Sleep apnea or symptoms of sleep apnea:  None    Diet:  Regular (no restrictions)    Frequency of exercise:  4-5 days/week    Duration of exercise:  15-30 minutes    Taking medications regularly:  Yes    Medication side effects:  None    PHQ-2 Total Score: 0    Additional concerns today:  No        Today's PHQ-2 Score:   PHQ-2 ( 1999 [employer]) 9/17/2019   Q1: Little interest or pleasure in doing things 0   Q2: Feeling down, depressed or hopeless 0   PHQ-2 Score 0   Q1: Little interest or pleasure in doing things Not at all   Q2: Feeling down, depressed or hopeless Not at all   PHQ-2 Score 0       Abuse: Current or Past(Physical, Sexual or Emotional)- No  Do you feel safe in your environment? Yes    Social History     Tobacco Use     Smoking status: Never Smoker     Smokeless tobacco: Never Used   Substance Use Topics     Alcohol use: Yes     Comment: scoial     If you drink alcohol do you typically have >3 drinks per day or >7 drinks per week? No    Alcohol Use 9/17/2019   Prescreen: >3 drinks/day or >7 drinks/week? No   Prescreen: >3 drinks/day or >7 drinks/week? -   No flowsheet data found.    Reviewed orders with patient.  Reviewed health maintenance and updated orders accordingly - Yes  BP Readings from Last 3 Encounters:   09/17/19 138/80   08/29/19 134/86   07/15/19 143/83    Wt Readings from Last 3 Encounters:   09/17/19 68.9 kg (152 lb)   08/29/19 70 kg (154 lb 6.4 oz)   06/18/19 71.2 kg (157 lb)                  Patient Active Problem List   Diagnosis     Raynaud's disease     TMJ (temporomandibular joint syndrome)     CARDIOVASCULAR SCREENING; LDL GOAL LESS THAN 160     PTSD (post-traumatic stress disorder)     Muscle tension headache     Esophageal abnormality      Episodic mood disorder (H)     Esophageal stricture     Sprain of MCP joint of hand     Rotator cuff injury; bilateral repairs 2005     Primary generalized (osteo)arthritis     SI (sacroiliac) joint dysfunction; see details, pain management contract     Chronic bilateral low back pain without sciatica     Past Surgical History:   Procedure Laterality Date     MAMMOPLASTY REDUCTION       OTHER SURGICAL HISTORY      s/p left knee arthroscopinc surgery     ROTATOR CUFF REPAIR RT/LT  2005     SMALL BOWEL RESECTION       TUBAL LIGATION         Social History     Tobacco Use     Smoking status: Never Smoker     Smokeless tobacco: Never Used   Substance Use Topics     Alcohol use: Yes     Comment: scoial     Family History   Problem Relation Age of Onset     Unknown/Adopted Mother      Unknown/Adopted Father      Unknown/Adopted Maternal Grandmother      Unknown/Adopted Maternal Grandfather      Unknown/Adopted Paternal Grandmother      Unknown/Adopted Paternal Grandfather          Current Outpatient Medications   Medication Sig Dispense Refill     Ascorbic Acid (VITAMIN C CR PO) Take  by mouth.       Calcium Carbonate-Vitamin D (CALTRATE 600+D) 600-400 MG-UNIT CHEW Take  by mouth.       clobetasol (TEMOVATE) 0.05 % ointment as needed. Only as needed occasionally needs after working in garden  Profile Rx: patient will contact pharmacy when needed 45 g 5     Cyanocobalamin (VITAMIN B 12 PO) Take  by mouth.       cyclobenzaprine (FLEXERIL) 10 MG tablet TAKE ONE TABLET BY MOUTH DAILY AS NEEDED FOR MUSCLE SPASMS 90 tablet 4     ferrous sulfate (FEROSUL) 325 (65 Fe) MG tablet Take 1 tablet (325 mg) by mouth daily (with breakfast) 90 tablet 1     Ferrous Sulfate (IRON CR PO) Take  by mouth.       GLUCOSAMINE CHONDROITIN COMPLX PO Take  by mouth.       HYDROcodone-acetaminophen (NORCO) 5-325 MG tablet Take 1 tablet by mouth daily 28 tablet 0     lidocaine (XYLOCAINE) 5 % external ointment Apply dime size amount topically to  painful areas (choose 2-3 per application) 3 times daily as needed. 70.88 g 2     melatonin 3 MG tablet Take 1 tablet by mouth nightly as needed for sleep. 30 tablet 0     NIFEdipine ER OSMOTIC (PROCARDIA XL) 30 MG 24 hr tablet Take 1 tablet (30 mg) by mouth daily 90 tablet 3     nitroGLYcerin (NITRO-BID) 2 % OINT ointment Place 1 inch (15 mg) onto the skin daily 30 packet 12     omeprazole (PRILOSEC) 40 MG capsule Take 1 capsule (40 mg) by mouth daily Take 30-60 minutes before a meal. 90 capsule 3     order for DME Equipment being ordered: TENS 1 Units 0     POTASSIUM CARBONATE        TYLENOL 325 MG OR TABS 2 TABLETS BY MOUTH EVERY 6 HOURS AS NEEDED       zolpidem (AMBIEN) 5 MG tablet Take 1 tablet (5 mg) by mouth nightly as needed for sleep 90 tablet 0     Allergies   Allergen Reactions     Desyrel [Trazodone Hcl]      Sig mood disturbance       Lamictal [Lamotrigine]      Recent Labs   Lab Test 06/06/19  1419 09/12/14 03/05/14 11/01/13  1024 08/23/13 08/25/12   A1C  --  5.5 6.0 5.7  --    < >  --    LDL  --  81  --   --  100  100  --  96   HDL  --  73  --   --  66  66  --  69   TRIG  --  70  --   --  94  94  --  86   ALT 24 28  --   --  18  --  20   CR 0.73 0.79  --   --  0.75  --  0.76   GFRESTIMATED >90 87  --   --   --   --  92   GFRESTBLACK >90 100  --   --   --   --  107   POTASSIUM 3.6 3.9  --   --  3.6  --  3.8   TSH  --  1.750  --   --  1.550  4.550  --  1.450    < > = values in this interval not displayed.        Mammogram Screening: Patient over age 50, mutual decision to screen reflected in health maintenance.    Pertinent mammograms are reviewed under the imaging tab.  History of abnormal Pap smear: NO - age 30-65 PAP every 5 years with negative HPV co-testing recommended  PAP / HPV Latest Ref Rng & Units 9/16/2016 1/22/2013 5/27/2011   PAP - NIL OTHER-NIL, See Result NIL   HPV 16 DNA NEG Negative - -   HPV 18 DNA NEG Negative - -   OTHER HR HPV NEG Negative - -     Reviewed and updated as needed  "this visit by clinical staff         Reviewed and updated as needed this visit by Provider        Past Medical History:   Diagnosis Date     Anger reaction     buspar helping     Cholelithiasis      Esophageal stricture     dilatation every 3 months     Generalized osteoarthrosis, unspecified site (aka ARTHRITIS)     small joints     PTSD (post-traumatic stress disorder)      Raynaud's disease       Past Surgical History:   Procedure Laterality Date     MAMMOPLASTY REDUCTION       OTHER SURGICAL HISTORY      s/p left knee arthroscopinc surgery     ROTATOR CUFF REPAIR RT/LT  2005     SMALL BOWEL RESECTION       TUBAL LIGATION       OB History   No data available       Review of Systems   Constitutional: Negative for chills and fever.   HENT: Negative for congestion, ear pain, hearing loss and sore throat.    Eyes: Negative for pain and visual disturbance.   Respiratory: Negative for cough and shortness of breath.    Cardiovascular: Negative for chest pain, palpitations and peripheral edema.   Gastrointestinal: Negative for abdominal pain, constipation, diarrhea, heartburn, hematochezia and nausea.   Breasts:  Negative for tenderness, breast mass and discharge.   Genitourinary: Negative for dysuria, frequency, genital sores, hematuria, pelvic pain, urgency, vaginal bleeding and vaginal discharge.   Musculoskeletal: Negative for arthralgias, joint swelling and myalgias.   Skin: Negative for rash.   Neurological: Negative for dizziness, weakness, headaches and paresthesias.   Psychiatric/Behavioral: Negative for mood changes. The patient is not nervous/anxious.           OBJECTIVE:   LMP 06/26/2014    /80 (BP Location: Right arm, Cuff Size: Adult Regular)   Pulse 92   Temp 97.8  F (36.6  C) (Tympanic)   Resp 18   Ht 1.708 m (5' 7.25\")   Wt 68.9 kg (152 lb)   LMP 06/26/2014   BMI 23.63 kg/m      Physical Exam  GENERAL: healthy, alert and no distress  EYES: Eyes grossly normal to inspection, PERRL and " conjunctivae and sclerae normal  HENT: ear canals and TM's normal, nose and mouth without ulcers or lesions  NECK: no adenopathy, no asymmetry, masses, or scars and thyroid normal to palpation  RESP: lungs clear to auscultation - no rales, rhonchi or wheezes  BREAST: normal without masses, tenderness or nipple discharge and no palpable axillary masses or adenopathy  CV: regular rate and rhythm, normal S1 S2, no S3 or S4, no murmur, click or rub, no peripheral edema and peripheral pulses strong  ABDOMEN: soft, nontender, no hepatosplenomegaly, no masses and bowel sounds normal  MS: no gross musculoskeletal defects noted, no edema  SKIN: no suspicious lesions or rashes  NEURO: Normal strength and tone, mentation intact and speech normal  PSYCH: mentation appears normal, affect normal/bright    Results for orders placed or performed in visit on 09/17/19   Lipid panel reflex to direct LDL Fasting   Result Value Ref Range    Cholesterol 191 <200 mg/dL    Triglycerides 41 <150 mg/dL    HDL Cholesterol 84 >49 mg/dL    LDL Cholesterol Calculated 99 <100 mg/dL    Non HDL Cholesterol 107 <130 mg/dL   Drug Abuse Screen Panel 13, Urine (Pain Care Package)   Result Value Ref Range    Cannabinoids (35-iag-8-carboxy-9-THC) Not Detected NDET^Not Detected ng/mL    Phencyclidine (Phencyclidine) Not Detected NDET^Not Detected ng/mL    Cocaine (Benzoylecgonine) Not Detected NDET^Not Detected ng/mL    Methamphetamine (d-Methamphetamine) Not Detected NDET^Not Detected ng/mL    Opiates (Morphine) Not Detected NDET^Not Detected ng/mL    Amphetamine (d-Amphetamine) Not Detected NDET^Not Detected ng/mL    Benzodiazepines (Nordiazepam) Not Detected NDET^Not Detected ng/mL    Tricyclic Antidepressants (Desipramine) Not Detected NDET^Not Detected ng/mL    Methadone (Methadone) Not Detected NDET^Not Detected ng/mL    Barbiturates (Butalbital) Not Detected NDET^Not Detected ng/mL    Oxycodone (Oxycodone) Not Detected NDET^Not Detected ng/mL     Propoxyphene (Norpropoxyphene) Not Detected NDET^Not Detected ng/mL    Buprenorphine (Buprenorphine) Not Detected NDET^Not Detected ng/mL   Basic metabolic panel   Result Value Ref Range    Sodium 137 133 - 144 mmol/L    Potassium 3.3 (L) 3.4 - 5.3 mmol/L    Chloride 102 94 - 109 mmol/L    Carbon Dioxide 30 20 - 32 mmol/L    Anion Gap 5 3 - 14 mmol/L    Glucose 102 (H) 70 - 99 mg/dL    Urea Nitrogen 11 7 - 30 mg/dL    Creatinine 0.80 0.52 - 1.04 mg/dL    GFR Estimate 83 >60 mL/min/[1.73_m2]    GFR Estimate If Black >90 >60 mL/min/[1.73_m2]    Calcium 8.9 8.5 - 10.1 mg/dL   CBC with platelets differential   Result Value Ref Range    WBC 6.8 4.0 - 11.0 10e9/L    RBC Count 5.07 3.8 - 5.2 10e12/L    Hemoglobin 11.4 (L) 11.7 - 15.7 g/dL    Hematocrit 37.6 35.0 - 47.0 %    MCV 74 (L) 78 - 100 fl    MCH 22.5 (L) 26.5 - 33.0 pg    MCHC 30.3 (L) 31.5 - 36.5 g/dL    RDW 18.6 (H) 10.0 - 15.0 %    Platelet Count 337 150 - 450 10e9/L    % Neutrophils 60.0 %    % Lymphocytes 27.3 %    % Monocytes 9.9 %    % Eosinophils 2.4 %    % Basophils 0.4 %    Absolute Neutrophil 4.1 1.6 - 8.3 10e9/L    Absolute Lymphocytes 1.8 0.8 - 5.3 10e9/L    Absolute Monocytes 0.7 0.0 - 1.3 10e9/L    Absolute Eosinophils 0.2 0.0 - 0.7 10e9/L    Absolute Basophils 0.0 0.0 - 0.2 10e9/L    Diff Method Automated Method          ASSESSMENT/PLAN:   (Z00.00) Routine general medical examination at a health care facility  (primary encounter diagnosis)  Comment: Potasium mild low will recheck in 1 week, Not medications a cause will continue to monitor closely   Plan: Lipid panel reflex to direct LDL Fasting, Basic        metabolic panel, CBC with platelets         differential           (Z23) Need for prophylactic vaccination and inoculation against influenza  Comment  Plan: INFLUENZA QUAD, RECOMBINANT, P-FREE (RIV4)         (FLUBLOCK) [57488], Vaccine Administration,         Initial [27875]            (Z11.4) Screening for HIV (human immunodeficiency  "virus)  Comment:   Plan:     (M26.609) TMJ (temporomandibular joint syndrome)  Comment: Controlled with Flexeril   Plan: cyclobenzaprine (FLEXERIL) 10 MG tablet            (G89.4) Chronic pain syndrome  Comment:   Plan: Drug Abuse Screen Panel 13, Urine (Pain Care         Package)           (D64.9) Low hemoglobin  Comment: Patient hemoglobin mild low with do iron studies with next blood draw next week   Plan: **CBC with platelets FUTURE anytime, Ferritin,         Iron and iron binding capacity               COUNSELING:  Reviewed preventive health counseling, as reflected in patient instructions       Regular exercise       Healthy diet/nutrition       Vision screening    Estimated body mass index is 24 kg/m  as calculated from the following:    Height as of 6/18/19: 1.708 m (5' 7.25\").    Weight as of 8/29/19: 70 kg (154 lb 6.4 oz).         reports that she has never smoked. She has never used smokeless tobacco.      Counseling Resources:  ATP IV Guidelines  Pooled Cohorts Equation Calculator  Breast Cancer Risk Calculator  FRAX Risk Assessment  ICSI Preventive Guidelines  Dietary Guidelines for Americans, 2010  USDA's MyPlate  ASA Prophylaxis  Lung CA Screening    CONTRERAS Sales CNP  [facility]  "

## 2019-09-17 NOTE — LETTER
[facility]  [address] [place] [id number]  [phone]        January 2, 2020    Maryann Riggs  [address] [facility] [id number]              Dear Maryann Riggs    This is to remind you that your anemia labs are due.    You may call our office at [phone] to schedule an appointment.    Please disregard this notice if you have already had your labs drawn or made an appointment.        Sincerely,        Debbie Vila CNP/ lea

## 2019-10-08 DIAGNOSIS — I73.00 RAYNAUD'S DISEASE WITHOUT GANGRENE: ICD-10-CM

## 2019-10-08 NOTE — TELEPHONE ENCOUNTER
Reason for call:  Medication   If this is a refill request, has the caller requested the refill from the pharmacy already? No  Will the patient be using a [facility]? No  Name of the pharmacy and phone number for the current request: [facility]     Name of the medication requested:nitroGLYcerin (NITRO-BID) 2 % OINT ointment    Last filled 12/28/2017    Other request:     Phone number to reach patient:  Home number on file [phone] (home)    Best Time:  any    Can we leave a detailed message on this number?  YES

## 2019-11-13 ENCOUNTER — OFFICE VISIT (OUTPATIENT)
Dept: FAMILY MEDICINE | Facility: CLINIC | Age: 54
End: 2019-11-13
Payer: OTHER MISCELLANEOUS

## 2019-11-13 ENCOUNTER — ANCILLARY PROCEDURE (OUTPATIENT)
Dept: GENERAL RADIOLOGY | Facility: CLINIC | Age: 54
End: 2019-11-13
Attending: NURSE PRACTITIONER
Payer: OTHER MISCELLANEOUS

## 2019-11-13 ENCOUNTER — OFFICE VISIT (OUTPATIENT)
Dept: FAMILY MEDICINE | Facility: CLINIC | Age: 54
End: 2019-11-13
Payer: COMMERCIAL

## 2019-11-13 VITALS
TEMPERATURE: 99.5 F | HEART RATE: 86 BPM | HEIGHT: 67 IN | RESPIRATION RATE: 16 BRPM | BODY MASS INDEX: 24.33 KG/M2 | SYSTOLIC BLOOD PRESSURE: 128 MMHG | WEIGHT: 155 LBS | DIASTOLIC BLOOD PRESSURE: 64 MMHG

## 2019-11-13 VITALS
BODY MASS INDEX: 24.33 KG/M2 | DIASTOLIC BLOOD PRESSURE: 64 MMHG | HEIGHT: 67 IN | HEART RATE: 86 BPM | WEIGHT: 155 LBS | RESPIRATION RATE: 20 BRPM | SYSTOLIC BLOOD PRESSURE: 128 MMHG | TEMPERATURE: 99.5 F

## 2019-11-13 DIAGNOSIS — S39.012D STRAIN OF LUMBAR REGION, SUBSEQUENT ENCOUNTER: ICD-10-CM

## 2019-11-13 DIAGNOSIS — G89.4 CHRONIC PAIN SYNDROME: Primary | ICD-10-CM

## 2019-11-13 DIAGNOSIS — M54.2 NECK PAIN: ICD-10-CM

## 2019-11-13 DIAGNOSIS — M54.50 LUMBAR PAIN: ICD-10-CM

## 2019-11-13 DIAGNOSIS — M53.3 PAIN IN THE COCCYX: Primary | ICD-10-CM

## 2019-11-13 DIAGNOSIS — S30.0XXD CONTUSION OF LOWER BACK, SUBSEQUENT ENCOUNTER: ICD-10-CM

## 2019-11-13 DIAGNOSIS — M54.16 LUMBAR RADICULOPATHY: ICD-10-CM

## 2019-11-13 DIAGNOSIS — M19.90 ARTHRITIS: ICD-10-CM

## 2019-11-13 DIAGNOSIS — M53.3 PAIN IN THE COCCYX: ICD-10-CM

## 2019-11-13 PROCEDURE — 72220 X-RAY EXAM SACRUM TAILBONE: CPT

## 2019-11-13 PROCEDURE — 99214 OFFICE O/P EST MOD 30 MIN: CPT | Performed by: NURSE PRACTITIONER

## 2019-11-13 RX ORDER — HYDROCODONE BITARTRATE AND ACETAMINOPHEN 5; 325 MG/1; MG/1
1 TABLET ORAL DAILY
Qty: 28 TABLET | Refills: 0 | Status: CANCELLED | OUTPATIENT
Start: 2019-11-13

## 2019-11-13 RX ORDER — HYDROCODONE BITARTRATE AND ACETAMINOPHEN 5; 325 MG/1; MG/1
1 TABLET ORAL DAILY
Qty: 30 TABLET | Refills: 0 | Status: SHIPPED | OUTPATIENT
Start: 2019-12-11 | End: 2019-12-18

## 2019-11-13 RX ORDER — HYDROCODONE BITARTRATE AND ACETAMINOPHEN 5; 325 MG/1; MG/1
1 TABLET ORAL DAILY
Qty: 28 TABLET | Refills: 0 | Status: SHIPPED | OUTPATIENT
Start: 2019-11-13 | End: 2019-11-13

## 2019-11-13 ASSESSMENT — ANXIETY QUESTIONNAIRES
6. BECOMING EASILY ANNOYED OR IRRITABLE: SEVERAL DAYS
3. WORRYING TOO MUCH ABOUT DIFFERENT THINGS: NOT AT ALL
GAD7 TOTAL SCORE: 1
2. NOT BEING ABLE TO STOP OR CONTROL WORRYING: NOT AT ALL
7. FEELING AFRAID AS IF SOMETHING AWFUL MIGHT HAPPEN: NOT AT ALL
5. BEING SO RESTLESS THAT IT IS HARD TO SIT STILL: NOT AT ALL
1. FEELING NERVOUS, ANXIOUS, OR ON EDGE: NOT AT ALL

## 2019-11-13 ASSESSMENT — PATIENT HEALTH QUESTIONNAIRE - PHQ9
SUM OF ALL RESPONSES TO PHQ QUESTIONS 1-9: 0
5. POOR APPETITE OR OVEREATING: NOT AT ALL

## 2019-11-13 ASSESSMENT — MIFFLIN-ST. JEOR
SCORE: 1329.67
SCORE: 1329.67

## 2019-11-13 NOTE — PATIENT INSTRUCTIONS
Patient Education     General Neck and Back Pain    Both neck and back pain are usually caused by injury to the muscles or ligaments of the spine. Sometimes the disks that separate each bone of the spine may cause pain by pressing on a nearby nerve. Back and neck pain may appear after a sudden twisting or bending force (such as in a car accident), or sometimes after a simple awkward movement. In either case, muscle spasm is often present and adds to the pain.  Acute neck and back pain usually gets better in 1 to 2 weeks. Pain related to disk disease, arthritis in the spinal joints or spinal stenosis (narrowing of the spinal canal) can become chronic and last for months or years.  Back and neck pain are common problems. Most people feel better in 1 or 2 weeks, and most of the rest in 1 to 2 months. Most people can remain active.  People have and describe pain differently.    Pain can be sharp, stabbing, shooting, aching, cramping, or burning    Movement, standing, bending, lifting, sitting, or walking may worsen the pain    Pain can be localized to one spot or area, or it can be more generalized    Pain can spread or radiate upwards, downwards, to the front, or go down your arms    Muscle spasm may occur.  Most of the time mechanical problems with the muscles or spine cause the pain. it is usually caused by an injury, whether known or not, to the muscles or ligaments. While illnesses can cause back pain, it is usually not caused by a serious illness. Pain is usually related to physical activity, whether sports, exercise, work, or normal activity. Sometimes it can occur without an identifiable cause. This can happen simply by stretching or moving wrong, without noting pain at the time. Other causes include:    Overexertion, lifting, pushing, pulling incorrectly or too aggressively.    Sudden twisting, bending or stretching from an accident (car or fall), or accidental movement.    Poor posture    Poor conditioning,  lack of regular exercise    Spinal disc disease or arthritis    Stress    Pregnancy, or illness like appendicitis, bladder or kidney infection, pelvic infections   Home care    For neck pain: Use a comfortable pillow that supports the head and keeps the spine in a neutral position. The position of the head should not be tilted forward or backward.    When in bed, try to find a position of comfort. A firm mattress is best. Try lying flat on your back with pillows under your knees. You can also try lying on your side with your knees bent up towards your chest and a pillow between your knees.    At first, do not try to stretch out the sore spots. If there is a strain, it is not like the good soreness you get after exercising without an injury. In this case, stretching may make it worse.    Don't sit for long periods, as in long car rides or other travel. This puts more stress on the lower back than standing or walking.    During the first 24 to 72 hours after an injury, apply an ice pack to the painful area for 20 minutes and then remove it for 20 minutes over a period of 60 to 90 minutes or several times a day.     You can alternate ice and heat therapies. Talk with your healthcare provider about the best treatment for your back or neck pain. As a safety precaution, do not use a heating pad at bedtime. Sleeping with a heating pad can lead to skin burns or tissue damage.    Therapeutic massage can help relax the back and neck muscles without stretching them.    Be aware of safe lifting methods and do not lift anything over 15 pounds until all the pain is gone.  Medicines  Talk to your healthcare provider before using medicine, especially if you have other medical problems or are taking other medicines.    You may use over-the-counter medicine to control pain, unless another pain medicine was prescribed. If you have chronic conditions like diabetes, liver or kidney disease, stomach ulcers,  gastrointestinal bleeding, or  are taking blood thinner medicines.    Be careful if you are given pain medicines, narcotics, or medicine for muscle spasm. They can cause drowsiness, and can affect your coordination, reflexes, and judgment. Do not drive or operate heavy machinery.  Follow-up care  Follow up with your healthcare provider, or as advised. Physical therapy or further tests may be needed.  If X-rays were taken, you will be notified of any new findings that may affect your care.  Call 911  Call 911 if any of the following occur:    Trouble breathing    Confusion    Very drowsy or trouble awakening    Fainting or loss of consciousness    Rapid or very slow heart rate    Loss of bowel or bladder control  When to seek medical advice  Call your healthcare provider right away if any of these occur:    Pain becomes worse or spreads into your arms or legs    Weakness, numbness or pain in one or both arms or legs    Numbness in the groin area    Difficulty walking    Fever of 100.4 F (38 C) or higher, or as directed by your healthcare provider  Date Last Reviewed: 7/1/2016 2000-2018 [employer]. [address] [place] [address]. All rights reserved. This information is not intended as a substitute for professional medical care. Always follow your healthcare professional's instructions.           Patient Education     Back Sprain or Strain    Injury to the muscles (strain) or ligaments (sprain) around the spine can be troubling. Injury may occur after a sudden forceful twisting or bending force such as in a car accident, after a simple awkward movement, or after lifting something heavy with poor body positioning. In any case, muscle spasm is often present and adds to the pain.  Thankfully, most people feel better in 1 to 2 weeks, and most of the rest in 1 to 2 months. Most people can remain active. Unless you had a forceful or traumatic physical injury such as a car accident or fall, X-rays may not be ordered for the  first evaluation of a back sprain or strain. If pain continues and does not respond to medical treatment, your healthcare provider may then order X-rays and other tests.  Home care  The following guidelines will help you care for your injury at home:    When in bed, try to find a comfortable position. A firm mattress is best. Try lying flat on your back with pillows under your knees. You can also try lying on your side with your knees bent up toward your chest and a pillow between your knees.    Don't sit for long periods. Try not to take long car rides or take other trips that have you sitting for a long time. This puts more stress on the lower back than standing or walking.    During the first 24 to 72 hours after an injury or flare-up, apply an ice pack to the painful area for 20 minutes. Then remove it for 20 minutes. Do this for 60 to 90 minutes, or several times a day. This will reduce swelling and pain. Be sure to wrap the ice pack in a thin towel or plastic to protect your skin.    You can start with ice, then switch to heat. Heat from a hot shower, hot bath, or heating pad reduces pain and works well for muscle spasms. Put heat on the painful area for 20 minutes, then remove for 20 minutes. Do this for 60 to 90 minutes, or several times a day. Do not use a heating pad while sleeping. It can burn the skin.    You can alternate the ice and heat. Talk with your healthcare provider to find out the best treatment or therapy for your back pain.    Therapeutic massage will help relax the back muscles without stretching them.    Be aware of safe lifting methods. Do not lift anything over 15 pounds until all of the pain is gone.  Medicines  Talk to your healthcare provider before using medicines, especially if you have other health problems or are taking other medicines.    You may use acetaminophen or ibuprofen to control pain, unless another pain medicine was prescribed. If you have chronic conditions like  diabetes, liver or kidney disease, stomach ulcers, or gastrointestinal bleeding, or are taking blood-thinner medicines, talk with your doctor before taking any medicines.    Be careful if you are given prescription medicines, narcotics, or medicine for muscle spasm. They can cause drowsiness, and affect your coordination, reflexes, and judgment. Do not drive or operate heavy machinery when taking these types of medicines. Only take pain medicine as prescribed by your healthcare provider.  Follow-up care  Follow up with your healthcare provider, or as advised. You may need physical therapy or more tests if your symptoms get worse.  If you had X-rays your healthcare provider may be checking for any broken bones, breaks, or fractures. Bruises and sprains can sometimes hurt as much as a fracture. These injuries can take time to heal completely. If your symptoms don t improve or they get worse, talk with your healthcare provider. You may need a repeat X-ray or other tests.  Call 911  Call 911 if any of the following occur:    Trouble breathing    Confused    Very drowsy or trouble awakening    Fainting or loss of consciousness    Rapid or very slow heart rate    Loss of bowel or bladder control  When to seek medical advice  Call your healthcare provider right away if any of the following occur:    Pain gets worse or spreads to your arms or legs    Weakness or numbness in one or both arms or legs    Numbness in the groin or genital area  Date Last Reviewed: 6/1/2016 2000-2018 [employer]. [address] [place] [id number]. All rights reserved. This information is not intended as a substitute for professional medical care. Always follow your healthcare professional's instructions.           Patient Education     Coccyx or Sacrum Contusion    You have a contusion (bruise) of the coccyx or sacrum. The sacrum is the triangular bone at the base of the spine that joins the pelvic bones.  The coccyx (tailbone) is the last bone of the sacrum that hangs down in a point like a small tail. Symptoms include swelling and some bleeding under the skin. This injury generaly takes a few weeks to heal. During that time, the bruise will typically change in color from reddish, to purple-blue, to greenish-yellow, then to yellow-brown. A crack (fracture) in the coccyx bone causes the same symptoms as a contusion in this area. Often, X-rays are not taken since the treatment is the same. If you have fracture of the tailbone as well as a contusion, healing generally takes about 4 weeks or longer.  Home care    Try to find a position of comfort. Try lying on your side with your knees bent up towards your chest and a pillow between your knees.    A bruised tailbone causes pain when sitting. You may try using a donut pillow. This is a foam pillow with a hole in the center to prevent pressure on the tailbone. You can buy this at a pharmacy or orthopedic supply store.    Ice the injured area to help reduce pain and swelling. Wrap a cold source (ice pack or ice cubes in a plastic bag) in a thin towel. Apply to the bruised area for 20 minutes every 1 to 2 hours the first day. Continue this 3 to 4 times a day until the pain and swelling goes away.    Unless another medicine was prescribed, you can take acetaminophen, ibuprofen, or naproxen to control pain. (If you have chronic liver or kidney disease or ever had a stomach ulcer or gastrointestinal bleeding, talk with your doctor before using these medicines.)  Follow-up care  Follow up with your healthcare provider, or as advised. Call if you are not improving within 2 weeks.  When to seek medical advice   Call your healthcare provider right away if you have any of the following:    Increased pain or swelling    Pain that becomes worse or spreads to one or both legs    Weakness or numbness in one or both legs    Loss of bowel or bladder control    Numbness in the groin  area    Signs of infection, including warmth, drainage, or increased redness    Frequent bruising for unknown reasons  Date Last Reviewed: 2/1/2017 2000-2018 [employer]. [address] [place] [address], [place] [address]. All rights reserved. This information is not intended as a substitute for professional medical care. Always follow your healthcare professional's instructions.

## 2019-11-13 NOTE — NURSING NOTE
"Chief Complaint   Patient presents with     Work Comp       Initial /64   Pulse 86   Temp 99.5  F (37.5  C) (Tympanic)   Resp 16   Ht 1.708 m (5' 7.25\")   Wt 70.3 kg (155 lb)   LMP 06/26/2014   BMI 24.10 kg/m   Estimated body mass index is 24.1 kg/m  as calculated from the following:    Height as of this encounter: 1.708 m (5' 7.25\").    Weight as of this encounter: 70.3 kg (155 lb).    Patient presents to the clinic using No DME    Health Maintenance that is potentially due pending provider review:  NONE    n/a    Is there anyone who you would like to be able to receive your results? Not Applicable  If yes have patient fill out CLIFFORD    "

## 2019-11-13 NOTE — NURSING NOTE
"Chief Complaint   Patient presents with     Work Comp       Initial /64   Pulse 86   Temp 99.5  F (37.5  C) (Tympanic)   Resp 16   Ht 1.708 m (5' 7.25\")   Wt 70.3 kg (155 lb)   LMP 06/26/2014   BMI 24.10 kg/m   Estimated body mass index is 24.1 kg/m  as calculated from the following:    Height as of this encounter: 1.708 m (5' 7.25\").    Weight as of this encounter: 70.3 kg (155 lb).    Patient presents to the clinic using No DME    Health Maintenance that is potentially due pending provider review:  NA    n/a    Is there anyone who you would like to be able to receive your results? Not Applicable  If yes have patient fill out CLIFFORD    "

## 2019-11-13 NOTE — PROGRESS NOTES
Subjective     Maryann Riggs is a 54 year old female who presents to clinic today for the following health issues:    HPI   Chronic/Recurring Back Pain Follow Up      Where is your back pain located? (Select all that apply) low back both    How would you describe your back pain?  burning and dull ache    Where does your back pain spread? nowhere    Since your last clinic visit for back pain, how has your pain changed? unchanged    Does your back pain interfere with your job? YES    Since your last visit, have you tried any new treatment? No      How many servings of fruits and vegetables do you eat daily?  2-3    On average, how many sweetened beverages do you drink each day (soda, juice, sweet tea, etc)?   0    How many days per week do you miss taking your medication? 0      Patient Active Problem List   Diagnosis     Raynaud's disease     TMJ (temporomandibular joint syndrome)     CARDIOVASCULAR SCREENING; LDL GOAL LESS THAN 160     PTSD (post-traumatic stress disorder)     Muscle tension headache     Esophageal abnormality     Episodic mood disorder (H)     Esophageal stricture     Sprain of MCP joint of hand     Rotator cuff injury; bilateral repairs 2005     Primary generalized (osteo)arthritis     SI (sacroiliac) joint dysfunction; see details, pain management contract     Chronic bilateral low back pain without sciatica     Past Surgical History:   Procedure Laterality Date     MAMMOPLASTY REDUCTION       OTHER SURGICAL HISTORY      s/p left knee arthroscopinc surgery     ROTATOR CUFF REPAIR RT/LT  2005     SMALL BOWEL RESECTION       TUBAL LIGATION         Social History     Tobacco Use     Smoking status: Never Smoker     Smokeless tobacco: Never Used   Substance Use Topics     Alcohol use: Yes     Comment: scoial     Family History   Problem Relation Age of Onset     Unknown/Adopted Mother      Unknown/Adopted Father      Unknown/Adopted Maternal Grandmother      Unknown/Adopted Maternal Grandfather       Unknown/Adopted Paternal Grandmother      Unknown/Adopted Paternal Grandfather          Current Outpatient Medications   Medication Sig Dispense Refill     Ascorbic Acid (VITAMIN C CR PO) Take  by mouth.       Calcium Carbonate-Vitamin D (CALTRATE 600+D) 600-400 MG-UNIT CHEW Take  by mouth.       clobetasol (TEMOVATE) 0.05 % ointment as needed. Only as needed occasionally needs after working in garden  Profile Rx: patient will contact pharmacy when needed (Patient not taking: Reported on 11/20/2019) 45 g 5     Cyanocobalamin (VITAMIN B 12 PO) Take  by mouth.       cyclobenzaprine (FLEXERIL) 10 MG tablet TAKE ONE TABLET BY MOUTH DAILY AS NEEDED FOR MUSCLE SPASMS 90 tablet 4     ferrous sulfate (FEROSUL) 325 (65 Fe) MG tablet Take 1 tablet (325 mg) by mouth daily (with breakfast) 90 tablet 1     GLUCOSAMINE CHONDROITIN COMPLX PO Take  by mouth.       [START ON 12/11/2019] HYDROcodone-acetaminophen (NORCO) 5-325 MG tablet Take 1 tablet by mouth daily 30 tablet 0     melatonin 3 MG tablet Take 1 tablet by mouth nightly as needed for sleep. 30 tablet 0     NIFEdipine ER OSMOTIC (PROCARDIA XL) 30 MG 24 hr tablet Take 1 tablet (30 mg) by mouth daily 90 tablet 3     omeprazole (PRILOSEC) 40 MG capsule Take 1 capsule (40 mg) by mouth daily Take 30-60 minutes before a meal. 90 capsule 3     TYLENOL 325 MG OR TABS 2 TABLETS BY MOUTH EVERY 6 HOURS AS NEEDED       zolpidem (AMBIEN) 5 MG tablet Take 1 tablet (5 mg) by mouth nightly as needed for sleep 90 tablet 0     lidocaine (XYLOCAINE) 5 % external ointment Apply dime size amount topically to painful areas (choose 2-3 per application) 3 times daily as needed. 70.88 g 2     meclizine (ANTIVERT) 25 MG tablet Take 1 tablet (25 mg) by mouth 3 times daily as needed for dizziness 20 tablet 0     nitroGLYcerin (NITRO-BID) 2 % OINT ointment Place 1 inch (15 mg) onto the skin daily (Patient not taking: Reported on 11/20/2019) 30 packet 12     order for DME Equipment being ordered:  "TENS 1 Units 0     POTASSIUM CARBONATE        Allergies   Allergen Reactions     Desyrel [Trazodone Hcl]      Sig mood disturbance       Lamictal [Lamotrigine]      Recent Labs   Lab Test 09/17/19  1419 06/06/19  1419 09/12/14 03/05/14 11/01/13  1024 08/23/13   A1C  --   --  5.5 6.0 5.7  --    LDL 99  --  81  --   --  100  100   HDL 84  --  73  --   --  66  66   TRIG 41  --  70  --   --  94  94   ALT  --  24 28  --   --  18   CR 0.80 0.73 0.79  --   --  0.75   GFRESTIMATED 83 >90 87  --   --   --    GFRESTBLACK >90 >90 100  --   --   --    POTASSIUM 3.3* 3.6 3.9  --   --  3.6   TSH  --   --  1.750  --   --  1.550  4.550      BP Readings from Last 3 Encounters:   11/20/19 138/84   11/13/19 128/64   11/13/19 128/64    Wt Readings from Last 3 Encounters:   11/20/19 69.4 kg (153 lb)   11/13/19 70.3 kg (155 lb)   11/13/19 70.3 kg (155 lb)                  Reviewed and updated as needed this visit by Provider  Tobacco         Review of Systems   ROS COMP: Constitutional, HEENT, cardiovascular, pulmonary, gi and gu systems are negative, except as otherwise noted.      Objective    /64   Pulse 86   Temp 99.5  F (37.5  C) (Tympanic)   Resp 20   Ht 1.708 m (5' 7.25\")   Wt 70.3 kg (155 lb)   [facility] 06/26/2014   BMI 24.10 kg/m    Body mass index is 24.1 kg/m .  Physical Exam     GENERAL: healthy, alert and no distress  EYES: Eyes grossly normal to inspection, PERRL and conjunctivae and sclerae normal  NECK: no adenopathy, no asymmetry, masses, or scars and thyroid normal to palpation  RESP: lungs clear to auscultation - no rales, rhonchi or wheezes  CV: regular rate and rhythm, normal S1 S2, no S3 or S4, no murmur, click or rub, no peripheral edema and peripheral pulses strong  ABDOMEN: soft, nontender, no hepatosplenomegaly, no masses and bowel sounds normal  MS: no gross musculoskeletal defects noted, no edema  SKIN: no suspicious lesions or rashes  NEURO: Normal strength and tone, mentation intact and speech " normal  PSYCH: mentation appears normal, affect normal/bright      Inspection: normal cervical lordosis  Tender:  left paracervical muscles, right paracervical muscles  Non-tender:  scapula, left trapezius muscles, right trapezius muscles  Range of Motion:  Full ROM of cervical spine  Strength: Full strength of all neck muscles  Special tests:  Reproduction of radicular pattern     Tender:  left para lumbar muscles, right para lumbar muscles contusion to lower back area and right hip  Non-tender:  thoracic spinous processes, thoracic facet joints, left parathoracic muscles, right parathoracic muscles, lumbar spinous processes, lumbar facet joints, left SI joint, right SI joint, left sciatic notch, right sciatic notch  Range of Motion:  left lateral thoracic bending   full, right lateral thoracic bending  full, left thoracic rotation  full, right thoracic rotation  full, lumbar flexion  full, lumbar extension  decreased, left lateral lumbar bending  decreased, right lateral lumbar bending  decreased, left lateral lumbar rotation  decreased, right lateral lumbar rotation  decreased  Strength:  able to heel walk, able to toe walk  Special tests:  negative straight leg raises     Hip Exam: Hip ROM full             Assessment & Plan     (G89.4) Chronic pain syndrome  (primary encounter diagnosis)  Comment: Controlled with current dose of Norco renewed today  Plan: Follow-up in 1 month    (M54.5) Lumbar pain  Comment: Controlled with current dose of Norco renewed today  Plan: Follow-up 1 month    (M54.2) Neck pain  Comment: Controlled  Plan: HYDROcodone-acetaminophen (NORCO) 5-325 MG         tablet, DISCONTINUED: HYDROcodone-acetaminophen        (NORCO) 5-325 MG tablet    (M19.90) Arthritis  Comment: Controlled  Plan: HYDROcodone-acetaminophen (NORCO) 5-325 MG         tablet, DISCONTINUED: HYDROcodone-acetaminophen        (NORCO) 5-325 MG tablet      See Patient Instructions    Return in about 1 month (around 12/13/2019)  for Pain.    CONTRERAS Sales CNP  [facility]

## 2019-11-13 NOTE — PROGRESS NOTES
Aaron Riggs is a 54 year old female who presents to clinic today for the following health issues:    HPI   ED/UC Followup: Work Comp Staple Removal     Facility:  [facility]  Date of visit: 11/06/2019  Reason for visit: Fell 5 feet scalp laceration, low back and Rt hip pain  Current Status: 7-8/10 back 5-10/10 for hip pain        Patient Active Problem List   Diagnosis     Raynaud's disease     TMJ (temporomandibular joint syndrome)     CARDIOVASCULAR SCREENING; LDL GOAL LESS THAN 160     PTSD (post-traumatic stress disorder)     Muscle tension headache     Esophageal abnormality     Episodic mood disorder (H)     Esophageal stricture     Sprain of MCP joint of hand     Rotator cuff injury; bilateral repairs 2005     Primary generalized (osteo)arthritis     SI (sacroiliac) joint dysfunction; see details, pain management contract     Chronic bilateral low back pain without sciatica     Past Surgical History:   Procedure Laterality Date     MAMMOPLASTY REDUCTION       OTHER SURGICAL HISTORY      s/p left knee arthroscopinc surgery     ROTATOR CUFF REPAIR RT/LT  2005     SMALL BOWEL RESECTION       TUBAL LIGATION         Social History     Tobacco Use     Smoking status: Never Smoker     Smokeless tobacco: Never Used   Substance Use Topics     Alcohol use: Yes     Comment: scoial     Family History   Problem Relation Age of Onset     Unknown/Adopted Mother      Unknown/Adopted Father      Unknown/Adopted Maternal Grandmother      Unknown/Adopted Maternal Grandfather      Unknown/Adopted Paternal Grandmother      Unknown/Adopted Paternal Grandfather          Current Outpatient Medications   Medication Sig Dispense Refill     Ascorbic Acid (VITAMIN C CR PO) Take  by mouth.       Calcium Carbonate-Vitamin D (CALTRATE 600+D) 600-400 MG-UNIT CHEW Take  by mouth.       Cyanocobalamin (VITAMIN B 12 PO) Take  by mouth.       cyclobenzaprine (FLEXERIL) 10 MG tablet TAKE ONE TABLET BY MOUTH  DAILY AS NEEDED FOR MUSCLE SPASMS 90 tablet 4     ferrous sulfate (FEROSUL) 325 (65 Fe) MG tablet Take 1 tablet (325 mg) by mouth daily (with breakfast) 90 tablet 1     GLUCOSAMINE CHONDROITIN COMPLX PO Take  by mouth.       HYDROcodone-acetaminophen (NORCO) 5-325 MG tablet Take 1 tablet by mouth daily 28 tablet 0     lidocaine (XYLOCAINE) 5 % external ointment Apply dime size amount topically to painful areas (choose 2-3 per application) 3 times daily as needed. 70.88 g 2     melatonin 3 MG tablet Take 1 tablet by mouth nightly as needed for sleep. 30 tablet 0     NIFEdipine ER OSMOTIC (PROCARDIA XL) 30 MG 24 hr tablet Take 1 tablet (30 mg) by mouth daily 90 tablet 3     nitroGLYcerin (NITRO-BID) 2 % OINT ointment Place 1 inch (15 mg) onto the skin daily 30 packet 12     TYLENOL 325 MG OR TABS 2 TABLETS BY MOUTH EVERY 6 HOURS AS NEEDED       zolpidem (AMBIEN) 5 MG tablet Take 1 tablet (5 mg) by mouth nightly as needed for sleep 90 tablet 0     clobetasol (TEMOVATE) 0.05 % ointment as needed. Only as needed occasionally needs after working in garden  Profile Rx: patient will contact pharmacy when needed 45 g 5     omeprazole (PRILOSEC) 40 MG capsule Take 1 capsule (40 mg) by mouth daily Take 30-60 minutes before a meal. 90 capsule 3     order for DME Equipment being ordered: TENS 1 Units 0     POTASSIUM CARBONATE        Allergies   Allergen Reactions     Desyrel [Trazodone Hcl]      Sig mood disturbance       Lamictal [Lamotrigine]      Recent Labs   Lab Test 09/17/19  1419 06/06/19  1419 09/12/14 03/05/14 11/01/13  1024 08/23/13   A1C  --   --  5.5 6.0 5.7  --    LDL 99  --  81  --   --  100  100   HDL 84  --  73  --   --  66  66   TRIG 41  --  70  --   --  94  94   ALT  --  24 28  --   --  18   CR 0.80 0.73 0.79  --   --  0.75   GFRESTIMATED 83 >90 87  --   --   --    GFRESTBLACK >90 >90 100  --   --   --    POTASSIUM 3.3* 3.6 3.9  --   --  3.6   TSH  --   --  1.750  --   --  1.550  4.550      BP Readings from  "Last 3 Encounters:   11/13/19 128/64   11/13/19 128/64   09/17/19 138/80    Wt Readings from Last 3 Encounters:   11/13/19 70.3 kg (155 lb)   11/13/19 70.3 kg (155 lb)   09/17/19 68.9 kg (152 lb)                    Reviewed and updated as needed this visit by Provider  Tobacco  Meds         Review of Systems   ROS COMP: Constitutional, HEENT, cardiovascular, pulmonary, GI, , musculoskeletal, neuro, skin, endocrine and psych systems are negative, except as otherwise noted.      Objective    /64   Pulse 86   Temp 99.5  F (37.5  C) (Tympanic)   Resp 16   Ht 1.708 m (5' 7.25\")   Wt 70.3 kg (155 lb)   LMP 06/26/2014   BMI 24.10 kg/m    Body mass index is 24.1 kg/m .  Physical Exam   GENERAL: healthy, alert and no distress  EYES: Eyes grossly normal to inspection, PERRL and conjunctivae and sclerae normal  HENT: ear canals and TM's normal, nose and mouth without ulcers or lesions  NECK: no adenopathy, no asymmetry, masses, or scars and thyroid normal to palpation  RESP: lungs clear to auscultation - no rales, rhonchi or wheezes  CV: regular rate and rhythm, normal S1 S2, no S3 or S4, no murmur, click or rub, no peripheral edema and peripheral pulses strong  MS: no gross musculoskeletal defects noted, no edema  SKIN: no suspicious lesions or rashes  NEURO: Normal strength and tone, mentation intact and speech normal  PSYCH: mentation appears normal, affect normal/bright  BACK:  contusion noted to lower lumbar area and right hip  Tender:  lumbar spinous processes, lumbar facet joints, left para lumbar muscles, right para lumbar muscles, coccyx area   Non-tender:  thoracic spinous processes, thoracic facet joints, left parathoracic muscles, right parathoracic muscles, left SI joint, right SI joint, left sciatic notch, right sciatic notch  Range of Motion:  left lateral thoracic bending   full, right lateral thoracic bending  full, left thoracic rotation  full, right thoracic rotation  full, lumbar flexion  " painful, lumbar extension  painful, left lateral lumbar bending  painful, right lateral lumbar bending  painful, left lateral lumbar rotation  painful, right lateral lumbar rotation  painful  Strength:  able to heel walk  Special tests:  negative straight leg raises    Hip Exam: Hip ROM full, left greater trocanter non-tender, right greater trocanter non-tender    X-ray of lumbar spine from ER was negative.  Patient's symptoms still continue recommend MRI     X-ray reviewed and interpreted by myself in office no acute findings will await final radiology report  XR SACRUM AND COCCYX 2 VW 11/13/2019 12:24 PM      HISTORY: tailbone pain post fall; Pain in the coccyx     COMPARISON: None.                                                                      IMPRESSION: No displaced fracture identified. Alignment appears  normal. Degenerative changes in the lower lumbar spine.     Assessment & Plan     (M53.3) Pain in the coccyx  (primary encounter diagnosis)  Comment: Symptoms representative of a coccyx sprain  Plan: XR Sacrum and Coccyx 2 Views, PHYSICAL THERAPY         REFERRAL          (S39.012D) Strain of lumbar region, subsequent encounter  Comment: Lumbar x-ray was negative from ED based on patient's symptoms and contusion and fall history recommend MRI for further evaluation if she is not improving  Plan: MR Lumbar Spine w/o Contrast, PHYSICAL THERAPY         REFERRAL    (S30.0XXD) Contusion of lower back, subsequent encounter  Comment:   Plan: MR Lumbar Spine w/o Contrast        (M54.16) Lumbar radiculopathy  Comment: We will obtain MRI patient is to start physical therapy after MRI results back  Plan: MR Lumbar Spine w/o Contrast, PHYSICAL THERAPY         REFERRAL            See Patient Instructions    Return in about 1 week (around 11/20/2019), or if symptoms worsen or fail to improve.    CONTRERAS Sales [facility]

## 2019-11-13 NOTE — LETTER
[facility]  [address] [place] [id number]  Phone: [phone]  Fax: [phone]    November 13, 2019        Maryann Riggs  [address] [facility] [address]          To whom it may concern:    RE: Maryann Riggs    Patient was seen and treated today at our clinic and missed work.    Off of work for the next 7 days.  Will need reevaluation at that time.     Please contact me for questions or concerns.      Sincerely,        CONTRERAS Sales CNP

## 2019-11-13 NOTE — PATIENT INSTRUCTIONS
Refilled norco today.   your second prescription on 12/11/2019.      Patient Education     Chronic Pain  Pain serves an important role. It lets you know something is wrong that needs your attention. When the body heals, pain normally goes away.  When pain lasts longer than 6 months, it is called  chronic  pain. This is pain that is present even after the body has healed. Chronic pain can cause mood problems and get in the way of your relationships and your daily life.  A number of conditions can cause chronic pain. Some of the more common include:    Previous surgery    An old injury    Infection    Diseases such as diabetes    Nerve damage    Back injury    Arthritis    Migraine or other headaches    Fibromyalgia    Cancer  Depression and stress can make chronic pain symptoms worse. In some cases, a cause for the pain can't be found.   Treatment  Treatment can greatly reduce pain. In many cases, pain can become less severe, occur less often, and interfere less with your daily life. Chronic pain is often treated with a combination of medicines, therapies, and lifestyle changes. You will work closely with your healthcare provider to find a treatment plan that works best for you.    Ask your healthcare provider for a referral to a pain management specialty center. These can provide the most recent and proven pain management strategies, along with emotional support and comprehensive services.    Several different types of medicines may be prescribed for chronic pain. Work with your healthcare provider to develop a medicine plan that helps manage your pain.    Physical therapy can help reduce certain types of chronic pain.    Occupational therapy teaches you how to do routine tasks of daily living in ways that lessen your discomfort.    Counseling can help you cope better with stress and pain.    Other therapies such as meditation, yoga, biofeedback, massage, and acupuncture can also help manage chronic  pain.    Changing certain habits can help reduce chronic pain. They include:  ? Eating healthy  ? Developing an exercise routine  ? Getting enough sleep   ? Stopping smoking and limiting alcohol use  ? Losing excess weight  Follow-up care  Follow up with your healthcare provider, or as advised. Let your healthcare provider know if your current treatment plan is working or if changes are needed.  Resources  For more information, contact:    American Headache and Migraine Association, [facility].[employer] or 414-034-6905    American Chronic Pain Association, theacpa.org or 917-486-5150  Date Last Reviewed: 8/1/2017 2000-2018 [employer]. [address] [place] [address], [place] [address]. All rights reserved. This information is not intended as a substitute for professional medical care. Always follow your healthcare professional's instructions.

## 2019-11-13 NOTE — NURSING NOTE
"Chief Complaint   Patient presents with     Back Pain       Initial /64   Pulse 86   Temp 99.5  F (37.5  C) (Tympanic)   Resp 20   Ht 1.708 m (5' 7.25\")   Wt 70.3 kg (155 lb)   LMP 06/26/2014   BMI 24.10 kg/m   Estimated body mass index is 24.1 kg/m  as calculated from the following:    Height as of this encounter: 1.708 m (5' 7.25\").    Weight as of this encounter: 70.3 kg (155 lb).    Patient presents to the clinic using No DME    Health Maintenance that is potentially due pending provider review:  PHQ9 and GAD7    Done.    Is there anyone who you would like to be able to receive your results? No  If yes have patient fill out CLIFFORD    "

## 2019-11-14 ASSESSMENT — ANXIETY QUESTIONNAIRES: GAD7 TOTAL SCORE: 1

## 2019-11-20 ENCOUNTER — OFFICE VISIT (OUTPATIENT)
Dept: FAMILY MEDICINE | Facility: CLINIC | Age: 54
End: 2019-11-20
Payer: OTHER MISCELLANEOUS

## 2019-11-20 VITALS
HEIGHT: 67 IN | HEART RATE: 98 BPM | SYSTOLIC BLOOD PRESSURE: 138 MMHG | BODY MASS INDEX: 24.01 KG/M2 | RESPIRATION RATE: 20 BRPM | WEIGHT: 153 LBS | OXYGEN SATURATION: 100 % | DIASTOLIC BLOOD PRESSURE: 84 MMHG | TEMPERATURE: 96.8 F

## 2019-11-20 DIAGNOSIS — M54.16 LUMBAR RADICULOPATHY: ICD-10-CM

## 2019-11-20 DIAGNOSIS — H81.13 BENIGN PAROXYSMAL POSITIONAL VERTIGO DUE TO BILATERAL VESTIBULAR DISORDER: ICD-10-CM

## 2019-11-20 DIAGNOSIS — S30.0XXD CONTUSION OF LOWER BACK, SUBSEQUENT ENCOUNTER: ICD-10-CM

## 2019-11-20 DIAGNOSIS — E87.6 LOW BLOOD POTASSIUM: ICD-10-CM

## 2019-11-20 DIAGNOSIS — S39.012D STRAIN OF LUMBAR REGION, SUBSEQUENT ENCOUNTER: Primary | ICD-10-CM

## 2019-11-20 DIAGNOSIS — R22.32 MASS OF LEFT UPPER EXTREMITY: ICD-10-CM

## 2019-11-20 PROCEDURE — 99214 OFFICE O/P EST MOD 30 MIN: CPT | Performed by: NURSE PRACTITIONER

## 2019-11-20 RX ORDER — MECLIZINE HYDROCHLORIDE 25 MG/1
25 TABLET ORAL 3 TIMES DAILY PRN
Qty: 20 TABLET | Refills: 0 | Status: SHIPPED | OUTPATIENT
Start: 2019-11-20 | End: 2019-12-10

## 2019-11-20 ASSESSMENT — MIFFLIN-ST. JEOR: SCORE: 1320.59

## 2019-11-20 NOTE — NURSING NOTE
"Chief Complaint   Patient presents with     Work Comp       Initial /84   Pulse 98   Temp 96.8  F (36  C) (Tympanic)   Resp 20   Ht 1.708 m (5' 7.25\")   Wt 69.4 kg (153 lb)   LMP 06/26/2014   SpO2 100%   BMI 23.79 kg/m   Estimated body mass index is 23.79 kg/m  as calculated from the following:    Height as of this encounter: 1.708 m (5' 7.25\").    Weight as of this encounter: 69.4 kg (153 lb).    Patient presents to the clinic using No DME    Health Maintenance that is potentially due pending provider review:  Not addressed due to Work Comp        Is there anyone who you would like to be able to receive your results? No  If yes have patient fill out CLIFFORD    Jovita Spence CMA(AAMA)    "

## 2019-11-20 NOTE — LETTER
[facility]  [address] [place] [id number]  Phone: [phone]  Fax: [phone]    November 20, 2019        Maryann Riggs  [address] [facility] [address]          To whom it may concern:    RE: Maryann Riggs    Patient was seen and treated today at our clinic and missed work.    Patient off of work for the next week until MRI obtained and starting physical therapy.     Please contact me for questions or concerns.      Sincerely,        CONTRERAS Sales CNP

## 2019-11-20 NOTE — PATIENT INSTRUCTIONS
Patient Education     Benign Paroxysmal Positional Vertigo     Your health care provider may move your head in certain ways to treat your BPPV.     Benign paroxysmal positional vertigo (BPPV) is a problem with the inner ear. The inner ear contains the vestibular system. This system is what helps you keep your balance. BPPV causes a feeling of spinning. It is a common problem of the vestibular system.  Understanding the vestibular system  The vestibular system of the ear is made up of very tiny parts. They include the utricle, saccule, and semicircular canals. The utricle is a tiny organ that contains calcium crystals. In some people, the crystals can move into the semicircular canals. When this happens, the system no longer works as it should. This causes BPPV. Benign means it is not life threatening. Paroxysmal means it happens suddenly. Positional means that it happens when you move your head. Vertigo is a feeling of spinning.  What causes BPPV?  Causes include injury to your head or neck. Other problems with the vestibular system may cause BPPV. In many people, the cause of BPPV is not known.  Symptoms of BPPV  You many have repeated feelings of spinning (vertigo). The vertigo usually lasts less than 1 minute. Some movements, such as rolling over in bed, can bring on vertigo.  Diagnosing BPPV  Your primary healthcare provider may diagnose and treat your BPPV. Or you may see an ear, nose, and throat doctor (otolaryngologist). In some cases, you may see a nervous system doctor (neurologist).  The healthcare provider will ask about your symptoms and your medical history. He or she will examine you. You may have hearing and balance tests. As part of the exam, your healthcare provider may have you move your head and body in certain ways. If you have BPPV, the movements can bring on vertigo. Your provider will also look for abnormal movements of your eyes. You may have other tests to check your vestibular or nervous  systems.  Treatment for BPPV  Your healthcare provider may try to move the calcium crystals. This is done by having you move your head and neck in certain ways. This treatment is safe and often works well. You may also be told to do these movements at home. You may still have vertigo for a few weeks. Your healthcare provider will recheck your symptoms, usually in about a month. Special physical therapy may also be part of treatment. In rare cases, surgery may be needed for BPPV that does not go away.     When to call the healthcare provider  Call your healthcare provider right away if you have any of these:    Symptoms that do not go away with treatment    Symptoms that get worse    New symptoms   Date Last Reviewed: 5/1/2017 2000-2018 [employer]. [address] [place] [address], [place] [id number]. All rights reserved. This information is not intended as a substitute for professional medical care. Always follow your healthcare professional's instructions.           Patient Education     Benign Paroxysmal Positional Vertigo    Benign paroxysmal positional vertigo is a common condition. You feel as if the room is spinning after changing position, moving your head quickly, or even just rolling over in bed.  Vertigo is a false feeling of motion plus disorientation that makes it seem as though the room is spinning. A vertigo attack may cause sudden nausea, vomiting, and heavy sweating. Severe vertigo causes a loss of balance. You may even fall down.  Vertigo is caused by a problem with the inner ear. The inner ear is located behind the middle ear. It is a part of the balance center of the body. It contains small calcium particles within fluid-filled canals (semi-circular canals). These particles can move out of position. This may happen as a result of aging, head injury, or disease of the inner ear. Once that happens, moving your head in certain ways may cause the particles to stimulate the inner ear. This  creates the feeling of vertigo.  An episode of vertigo may last seconds, minutes, or hours. Once you are over the first episode of vertigo, it may never return. Sometimes symptoms return off and on for several weeks or longer.  Home care  Follow these guidelines when caring for yourself at home:    Rest quietly in bed if your symptoms are severe. Change position slowly. There is usually 1 position that will feel best. This might be lying on 1 side or lying on your back with your head slightly raised on pillows. Until you have no symptoms, you are at a higher risk of falling. Let someone help you when you get up. Get rid of home hazards such as loose electrical cords and throw rugs. Don t walk in unfamiliar areas that are not lighted. Use night lights in bathrooms and kitchen areas.    Do not drive or work with dangerous machinery for 1 week after symptoms go away. This is in case symptoms return suddenly.    Take medicine as prescribed to relieve your symptoms. Unless another medicine was prescribed for nausea, vomiting, and vertigo, you may use over-the-counter motion sickness medicine. Examples of this include meclizine and dimenhydrinate.  Follow-up care  Follow up with your healthcare provider, or as directed. Tell your provider about any ringing in your ear or hearing loss.  If you had a CT or MRI scan, a specialist will review it. You will be told of any new findings that may affect your care.  When to seek medical advice  Call your healthcare provider right away if any of these occur:    Vertigo gets worse even after taking prescribed medicine    Repeated vomiting even after taking prescribed medicine    Weakness that gets worse    Fainting    Severe headache or unusual drowsiness or confusion    Weakness of an arm or leg or 1 side of the face    Trouble walking    Trouble with speech or vision    Seizure    Trouble hearing    Fever of 100.4 F (38 C) or higher, or as directed by your healthcare  provider    Fast heart rate    Chest pain   Date Last Reviewed: 11/1/2017 2000-2018 [employer]. [address] [facility], [place] [address]. All rights reserved. This information is not intended as a substitute for professional medical care. Always follow your healthcare professional's instructions.           Patient Education     Back Sprain or Strain    Injury to the muscles (strain) or ligaments (sprain) around the spine can be troubling. Injury may occur after a sudden forceful twisting or bending force such as in a car accident, after a simple awkward movement, or after lifting something heavy with poor body positioning. In any case, muscle spasm is often present and adds to the pain.  Thankfully, most people feel better in 1 to 2 weeks, and most of the rest in 1 to 2 months. Most people can remain active. Unless you had a forceful or traumatic physical injury such as a car accident or fall, X-rays may not be ordered for the first evaluation of a back sprain or strain. If pain continues and does not respond to medical treatment, your healthcare provider may then order X-rays and other tests.  Home care  The following guidelines will help you care for your injury at home:    When in bed, try to find a comfortable position. A firm mattress is best. Try lying flat on your back with pillows under your knees. You can also try lying on your side with your knees bent up toward your chest and a pillow between your knees.    Don't sit for long periods. Try not to take long car rides or take other trips that have you sitting for a long time. This puts more stress on the lower back than standing or walking.    During the first 24 to 72 hours after an injury or flare-up, apply an ice pack to the painful area for 20 minutes. Then remove it for 20 minutes. Do this for 60 to 90 minutes, or several times a day. This will reduce swelling and pain. Be sure to wrap the ice pack in a thin towel or plastic to  protect your skin.    You can start with ice, then switch to heat. Heat from a hot shower, hot bath, or heating pad reduces pain and works well for muscle spasms. Put heat on the painful area for 20 minutes, then remove for 20 minutes. Do this for 60 to 90 minutes, or several times a day. Do not use a heating pad while sleeping. It can burn the skin.    You can alternate the ice and heat. Talk with your healthcare provider to find out the best treatment or therapy for your back pain.    Therapeutic massage will help relax the back muscles without stretching them.    Be aware of safe lifting methods. Do not lift anything over 15 pounds until all of the pain is gone.  Medicines  Talk to your healthcare provider before using medicines, especially if you have other health problems or are taking other medicines.    You may use acetaminophen or ibuprofen to control pain, unless another pain medicine was prescribed. If you have chronic conditions like diabetes, liver or kidney disease, stomach ulcers, or gastrointestinal bleeding, or are taking blood-thinner medicines, talk with your doctor before taking any medicines.    Be careful if you are given prescription medicines, narcotics, or medicine for muscle spasm. They can cause drowsiness, and affect your coordination, reflexes, and judgment. Do not drive or operate heavy machinery when taking these types of medicines. Only take pain medicine as prescribed by your healthcare provider.  Follow-up care  Follow up with your healthcare provider, or as advised. You may need physical therapy or more tests if your symptoms get worse.  If you had X-rays your healthcare provider may be checking for any broken bones, breaks, or fractures. Bruises and sprains can sometimes hurt as much as a fracture. These injuries can take time to heal completely. If your symptoms don t improve or they get worse, talk with your healthcare provider. You may need a repeat X-ray or other tests.  Call  911  Call 911 if any of the following occur:    Trouble breathing    Confused    Very drowsy or trouble awakening    Fainting or loss of consciousness    Rapid or very slow heart rate    Loss of bowel or bladder control  When to seek medical advice  Call your healthcare provider right away if any of the following occur:    Pain gets worse or spreads to your arms or legs    Weakness or numbness in one or both arms or legs    Numbness in the groin or genital area  Date Last Reviewed: 6/1/2016 2000-2018 [employer]. [address] [place] [address]. All rights reserved. This information is not intended as a substitute for professional medical care. Always follow your healthcare professional's instructions.

## 2019-11-22 ENCOUNTER — TELEPHONE (OUTPATIENT)
Dept: FAMILY MEDICINE | Facility: CLINIC | Age: 54
End: 2019-11-22

## 2019-11-22 ENCOUNTER — ALLIED HEALTH/NURSE VISIT (OUTPATIENT)
Dept: FAMILY MEDICINE | Facility: CLINIC | Age: 54
End: 2019-11-22
Payer: OTHER MISCELLANEOUS

## 2019-11-22 DIAGNOSIS — S39.012D STRAIN OF LUMBAR REGION, SUBSEQUENT ENCOUNTER: Primary | ICD-10-CM

## 2019-11-22 PROCEDURE — 99207 ZZC NO CHARGE NURSE ONLY: CPT

## 2019-11-22 NOTE — TELEPHONE ENCOUNTER
Patient is here today as she is needing Debbie Vila to addend the office visit on 11-20-19 to state why the HC US Extremity Non-Vascular image is related to the bump like area noted on the left inner arm. Workman's comp is needing this and verifies that all that is needed is to addend the office visit notes. Once completed then would need to fax to Elaine at [facility] at [phone]. Sending to Debbie Vila in separate telephone encounter.     SANDY Alex

## 2019-11-22 NOTE — NURSING NOTE
Patient is here today as she is needing Debbie Vila to addend the office visit on 11-20-19 to state why the HC US Extremity Non-Vascular image is related to the bump like area noted on the left inner arm. Workman's comp is needing this and verifies that all that is needed is to addend the office visit notes. Once completed then would need to fax to Elaine at [facility] at [phone]. Sending to Debbie Vila in separate telephone encounter.     SANDY Alex

## 2019-11-26 ENCOUNTER — TRANSFERRED RECORDS (OUTPATIENT)
Dept: HEALTH INFORMATION MANAGEMENT | Facility: CLINIC | Age: 54
End: 2019-11-26

## 2019-11-26 ENCOUNTER — OFFICE VISIT (OUTPATIENT)
Dept: FAMILY MEDICINE | Facility: CLINIC | Age: 54
End: 2019-11-26
Payer: OTHER MISCELLANEOUS

## 2019-11-26 VITALS
TEMPERATURE: 98 F | HEART RATE: 107 BPM | OXYGEN SATURATION: 100 % | DIASTOLIC BLOOD PRESSURE: 82 MMHG | WEIGHT: 152.6 LBS | SYSTOLIC BLOOD PRESSURE: 128 MMHG | BODY MASS INDEX: 23.13 KG/M2 | HEIGHT: 68 IN

## 2019-11-26 DIAGNOSIS — G47.09 OTHER INSOMNIA: Primary | ICD-10-CM

## 2019-11-26 DIAGNOSIS — E87.6 LOW BLOOD POTASSIUM: ICD-10-CM

## 2019-11-26 DIAGNOSIS — L03.311 CELLULITIS OF ABDOMINAL WALL: ICD-10-CM

## 2019-11-26 DIAGNOSIS — M26.609 TMJ (TEMPOROMANDIBULAR JOINT SYNDROME): ICD-10-CM

## 2019-11-26 LAB
ANION GAP SERPL CALCULATED.3IONS-SCNC: 4 MMOL/L (ref 3–14)
BUN SERPL-MCNC: 13 MG/DL (ref 7–30)
CALCIUM SERPL-MCNC: 9.4 MG/DL (ref 8.5–10.1)
CHLORIDE SERPL-SCNC: 105 MMOL/L (ref 94–109)
CO2 SERPL-SCNC: 27 MMOL/L (ref 20–32)
CREAT SERPL-MCNC: 0.7 MG/DL (ref 0.52–1.04)
ERYTHROCYTE [DISTWIDTH] IN BLOOD BY AUTOMATED COUNT: 18.5 % (ref 10–15)
GFR SERPL CREATININE-BSD FRML MDRD: >90 ML/MIN/{1.73_M2}
GLUCOSE SERPL-MCNC: 118 MG/DL (ref 70–99)
HCT VFR BLD AUTO: 43.3 % (ref 35–47)
HGB BLD-MCNC: 13.4 G/DL (ref 11.7–15.7)
MCH RBC QN AUTO: 25.5 PG (ref 26.5–33)
MCHC RBC AUTO-ENTMCNC: 30.9 G/DL (ref 31.5–36.5)
MCV RBC AUTO: 82 FL (ref 78–100)
PLATELET # BLD AUTO: 299 10E9/L (ref 150–450)
POTASSIUM SERPL-SCNC: 3.6 MMOL/L (ref 3.4–5.3)
RBC # BLD AUTO: 5.26 10E12/L (ref 3.8–5.2)
SODIUM SERPL-SCNC: 136 MMOL/L (ref 133–144)
WBC # BLD AUTO: 7.2 10E9/L (ref 4–11)

## 2019-11-26 PROCEDURE — 85027 COMPLETE CBC AUTOMATED: CPT | Performed by: NURSE PRACTITIONER

## 2019-11-26 PROCEDURE — 36415 COLL VENOUS BLD VENIPUNCTURE: CPT | Performed by: NURSE PRACTITIONER

## 2019-11-26 PROCEDURE — 99214 OFFICE O/P EST MOD 30 MIN: CPT | Performed by: NURSE PRACTITIONER

## 2019-11-26 PROCEDURE — 80048 BASIC METABOLIC PNL TOTAL CA: CPT | Performed by: NURSE PRACTITIONER

## 2019-11-26 RX ORDER — ZOLPIDEM TARTRATE 5 MG/1
5 TABLET ORAL
Qty: 90 TABLET | Refills: 0 | Status: SHIPPED | OUTPATIENT
Start: 2019-11-26 | End: 2020-02-07

## 2019-11-26 RX ORDER — CYCLOBENZAPRINE HCL 10 MG
TABLET ORAL
Qty: 90 TABLET | Refills: 4 | Status: SHIPPED | OUTPATIENT
Start: 2019-11-26 | End: 2020-02-07

## 2019-11-26 RX ORDER — CEPHALEXIN 500 MG/1
500 CAPSULE ORAL 4 TIMES DAILY
Qty: 40 CAPSULE | Refills: 0 | Status: SHIPPED | OUTPATIENT
Start: 2019-11-26 | End: 2020-01-02

## 2019-11-26 ASSESSMENT — MIFFLIN-ST. JEOR: SCORE: 1322.75

## 2019-11-26 NOTE — LETTER
[facility]  [address] [place] [address]  Phone: [phone]  Fax: [phone]    November 26, 2019        Maryann Riggs  [address] [facility] [address]            To whom it may concern:    Patient was seen and treated today at our clinic and missed work.     Patient off of work for the next  2 week until MRI obtained and starting physical therapy.  Once completed we will assess patient's work ability.     Please contact me for questions or concerns.        Sincerely,           CONTRERAS Sales CNP

## 2019-11-26 NOTE — LETTER
[facility]  [address] [place] [address]  Phone: [phone]  Fax: [phone]    November 26, 2019        Maryann Riggs  [address] [facility] [address]          To whom it may concern:    Patient was seen and treated today at our clinic and missed work.     Patient off of work for the 2 week until MRI obtained and starting physical therapy.      Please contact me for questions or concerns.        Sincerely,           CONTRERAS Sales CNP

## 2019-11-26 NOTE — PATIENT INSTRUCTIONS
Follow-up with me once MRI obtained we will start physical therapy after first session of physical therapy will determine workability.      Patient Education     Insomnia  Insomnia is repeated difficulty going to sleep or staying asleep, or both. Whether you have insomnia is not defined by a specific amount of sleep. Different people need different amounts of sleep, and you may need more or less sleep at different times of your life.  There are 3 major types of insomnia: short-term, chronic, and  other.  Short-term, or acute insomnia lasts less than 3 months. The symptoms are temporary and can be linked directly to a stressor, such as the death of a loved one, financial problems, or a new physical problem. Short-term insomnia stops when the stressor resolves or the person adapts to its presence.  Chronic insomnia occurs at least 3 times a week and lasts longer than 3 months. Chronic insomnia can occur when either the cause of the sleeping problem is not clear, or the insomnia does not get better when the stressor is resolved. A number of other criteria are also used to make the diagnosis of chronic insomnia.    Other insomnia  is the third type of insomnia-related sleep disorders. This description applies to people who have problems getting to sleep or staying asleep, but don't meet all of the factors that describe either short-term or chronic insomnia.    Many things cause insomnia. Different people may have different causes. It can be from an underlying medical or psychological condition, or lifestyle. It can also be primary insomnia, which means no cause can be found.  Causes of insomnia include:    Chronic medical problems- heart disease, gastrointestinal problems, hormonal changes, breathing problems    Anxiety    Stress    Depression    Pain    Work schedule    Sleep apnea    Illegal drugs    Certain medicines  Many different medicines can affect your sleep, such as stimulants, caffeine, alcohol, some  decongestants, and diet pills. Other medicines may include some types of blood pressure pills, steroids, asthma medicines, antihistamines, antidepressants, seizure medicines and statins. Not all of these will affect your sleep, and they shouldn t be stopped without talking to your doctor.  Symptoms of insomnia can include:    Lying awake for long periods at night before falling asleep    Waking up several times during the night    Waking up early in the morning and not being able to get back to sleep    Feeling tired and not refreshed by sleep    Not being able to function properly during the day and finding it hard to concentrate    Irritability    Tiredness and fatigue during the day  Home care    Review your medicines with your doctor or pharmacist to find out if they can cause insomnia. Not all medicines will affect your sleep, but they shouldn't be stopped without reviewing them with your doctor. There may be serious side effects and consequences from suddenly stopping your medicines. Not taking them may cause strokes, heart attacks, and many other problems.    Caffeine, smoking, and alcohol also affect sleep. Limit your daily use and don't use these before bedtime. Alcohol may make you sleepy at first, but as its effects wear off, you may awaken a few hours later and have trouble returning to sleep.    Don't exercise, eat or drink large amounts of liquid within 2 hours of your bedtime.    Improve your sleep habits. Have a fixed bed and wake-up time. Try to keep noise, light and heat in your bedroom at a comfortable level. Try using earplugs or eyeshades if needed.     Don't watch TV in bed.    If you don't fall asleep within 30 minutes, try to relax by reading or listening to soft music.    Limit daytime napping to one 30 minute period, early in the day.    Get regular exercise. Find other ways to lessen your stress level.    If a medicine was prescribed to help reset your sleep patterns, take it as directed.  Sleeping pills are intended for short-term use, only. If taken for too long, the effect wears off while the risk of physical addiction and psychological dependence increases.  Sleep diary  If the cause isn t obvious and it is not improving, try keeping a  sleep diary  for a couple of weeks. Include in it:    The time you go to bed    How long it takes to fall asleep    How many times you wake up    What time you wake up    Your meal times and what you eat    What time you drink alcohol and caffeine    Your exercise habits and times  Follow-up care  Follow up with your healthcare provider, or as advised. If an X-ray or CT scan was done, you will be notified if there is a change in the reading, especially if it affects treatment.  Call 911  Call 911 if any of these occur:    Trouble breathing    Confusion or trouble waking    Fainting or loss of consciousness    Rapid heart rate    New chest, arm, shoulder, neck or upper back pain    Trouble with speech or vision, weakness of an arm or leg    Trouble walking or talking, loss of balance, numbness or weakness in one side of your body, facial droop  When to seek medical advice  Call your healthcare provider right away if any of these occur:    Extreme restlessness or irritability    Confusion or hallucinations (seeing or hearing things that are not there)    Anxiety, depression    Several days without sleeping  Date Last Reviewed: 5/1/2018 2000-2018 [employer]. [address] [place] [address]. All rights reserved. This information is not intended as a substitute for professional medical care. Always follow your healthcare professional's instructions.           Patient Education     Discharge Instructions for Cellulitis  You have been diagnosed with cellulitis. This is an infection in the deepest layer of the skin. In some cases, the infection also affects the muscle. Cellulitis is caused by bacteria. The bacteria can enter the body through  broken skin. This can happen with a cut, scratch, animal bite, or an insect bite that has been scratched. You may have been treated in the hospital with antibiotics and fluids. You will likely be given a prescription for antibiotics to take at home. This sheet will help you take care of yourself at home.  Home care  When you are home:    Take the prescribed antibiotic medicine you are given as directed until it is gone. Take it even if you feel better. It treats the infection and stops it from returning. Not taking all the medicine can make future infections hard to treat.    Keep the infected area clean.    When possible, raise the infected area above the level of your heart. This helps keep swelling down.    Talk with your healthcare provider if you are in pain. Ask what kind of over-the-counter medicine you can take for pain.    Apply clean bandages as advised.    Take your temperature once a day for a week.    Wash your hands often to prevent spreading the infection.  In the future, wash your hands before and after you touch cuts, scratches, or bandages. This will help prevent infection.   When to call your healthcare provider  Call your healthcare provider immediately if you have any of the following:    Difficulty or pain when moving the joints above or below the infected area    Discharge or pus draining from the area    Fever of 100.4 F (38 C) or higher, or as directed by your healthcare provider    Pain that gets worse in or around the infected     Redness that gets worse in or around the infected area, particularly if the area of redness expands to a wider area    Shaking chills    Swelling of the infected area    Vomiting   Date Last Reviewed: 8/1/2016 2000-2018 [employer]. [address] [place] [address], [place] [address]. All rights reserved. This information is not intended as a substitute for professional medical care. Always follow your healthcare professional's instructions.

## 2019-11-26 NOTE — PROGRESS NOTES
Subjective     Maryann Riggs is a 54 year old female who presents to clinic today for the following health issues:    HPI      Needs work note today to continue to stay out of work. Still waiting on MRI to be scheduled.   Look at both notes on 1/22/19; please advise.   Refills on Ambien and Flexeril   Wondering when she can peel a scab off her laceration on her head.       Rash      Duration: Monday     Description  Location: right side under breast   Itching: no    Intensity:  moderate    Accompanying signs and symptoms: redness, raised, no head or discharge, feels hard, swelling last night.     History (similar episodes/previous evaluation): cats jump up on her and wondering if this could of happened from a cat claw.     Precipitating or alleviating factors:  New exposures:  None  Recent travel: no      Therapies tried and outcome: tried popping it- nothing comes out       Patient Active Problem List   Diagnosis     Raynaud's disease     TMJ (temporomandibular joint syndrome)     CARDIOVASCULAR SCREENING; LDL GOAL LESS THAN 160     PTSD (post-traumatic stress disorder)     Muscle tension headache     Esophageal abnormality     Episodic mood disorder (H)     Esophageal stricture     Sprain of MCP joint of hand     Rotator cuff injury; bilateral repairs 2005     Primary generalized (osteo)arthritis     SI (sacroiliac) joint dysfunction; see details, pain management contract     Chronic bilateral low back pain without sciatica     Past Surgical History:   Procedure Laterality Date     MAMMOPLASTY REDUCTION       OTHER SURGICAL HISTORY      s/p left knee arthroscopinc surgery     ROTATOR CUFF REPAIR RT/LT  2005     SMALL BOWEL RESECTION       TUBAL LIGATION         Social History     Tobacco Use     Smoking status: Never Smoker     Smokeless tobacco: Never Used   Substance Use Topics     Alcohol use: Yes     Comment: scoial     Family History   Problem Relation Age of Onset     Unknown/Adopted Mother       Unknown/Adopted Father      Unknown/Adopted Maternal Grandmother      Unknown/Adopted Maternal Grandfather      Unknown/Adopted Paternal Grandmother      Unknown/Adopted Paternal Grandfather          Current Outpatient Medications   Medication Sig Dispense Refill     Ascorbic Acid (VITAMIN C CR PO) Take  by mouth.       Calcium Carbonate-Vitamin D (CALTRATE 600+D) 600-400 MG-UNIT CHEW Take  by mouth.       Cyanocobalamin (VITAMIN B 12 PO) Take  by mouth.       cyclobenzaprine (FLEXERIL) 10 MG tablet TAKE ONE TABLET BY MOUTH DAILY AS NEEDED FOR MUSCLE SPASMS 90 tablet 4     ferrous sulfate (FEROSUL) 325 (65 Fe) MG tablet Take 1 tablet (325 mg) by mouth daily (with breakfast) 90 tablet 1     GLUCOSAMINE CHONDROITIN COMPLX PO Take  by mouth.       [START ON 12/11/2019] HYDROcodone-acetaminophen (NORCO) 5-325 MG tablet Take 1 tablet by mouth daily 30 tablet 0     lidocaine (XYLOCAINE) 5 % external ointment Apply dime size amount topically to painful areas (choose 2-3 per application) 3 times daily as needed. 70.88 g 2     meclizine (ANTIVERT) 25 MG tablet Take 1 tablet (25 mg) by mouth 3 times daily as needed for dizziness 20 tablet 0     melatonin 3 MG tablet Take 1 tablet by mouth nightly as needed for sleep. 30 tablet 0     NIFEdipine ER OSMOTIC (PROCARDIA XL) 30 MG 24 hr tablet Take 1 tablet (30 mg) by mouth daily 90 tablet 3     omeprazole (PRILOSEC) 40 MG capsule Take 1 capsule (40 mg) by mouth daily Take 30-60 minutes before a meal. 90 capsule 3     order for DME Equipment being ordered: TENS 1 Units 0     POTASSIUM CARBONATE        TYLENOL 325 MG OR TABS 2 TABLETS BY MOUTH EVERY 6 HOURS AS NEEDED       zolpidem (AMBIEN) 5 MG tablet Take 1 tablet (5 mg) by mouth nightly as needed for sleep 90 tablet 0     clobetasol (TEMOVATE) 0.05 % ointment as needed. Only as needed occasionally needs after working in garden  Profile Rx: patient will contact pharmacy when needed (Patient not taking: Reported on 11/20/2019) 45 g  "5     nitroGLYcerin (NITRO-BID) 2 % OINT ointment Place 1 inch (15 mg) onto the skin daily (Patient not taking: Reported on 11/20/2019) 30 packet 12     Allergies   Allergen Reactions     Desyrel [Trazodone Hcl]      Sig mood disturbance       Lamictal [Lamotrigine]      Recent Labs   Lab Test 09/17/19  1419 06/06/19  1419 09/12/14 03/05/14 11/01/13  1024 08/23/13   A1C  --   --  5.5 6.0 5.7  --    LDL 99  --  81  --   --  100  100   HDL 84  --  73  --   --  66  66   TRIG 41  --  70  --   --  94  94   ALT  --  24 28  --   --  18   CR 0.80 0.73 0.79  --   --  0.75   GFRESTIMATED 83 >90 87  --   --   --    GFRESTBLACK >90 >90 100  --   --   --    POTASSIUM 3.3* 3.6 3.9  --   --  3.6   TSH  --   --  1.750  --   --  1.550  4.550      BP Readings from Last 3 Encounters:   11/26/19 128/82   11/20/19 138/84   11/13/19 128/64    Wt Readings from Last 3 Encounters:   11/26/19 69.2 kg (152 lb 9.6 oz)   11/20/19 69.4 kg (153 lb)   11/13/19 70.3 kg (155 lb)                  Reviewed and updated as needed this visit by Provider         Review of Systems   ROS COMP: Constitutional, HEENT, cardiovascular, pulmonary, GI, , musculoskeletal, neuro, skin, endocrine and psych systems are negative, except as otherwise noted.      Objective    /82   Pulse 107   Temp 98  F (36.7  C) (Tympanic)   Ht 1.715 m (5' 7.5\")   Wt 69.2 kg (152 lb 9.6 oz)   LMP 06/26/2014   SpO2 100%   BMI 23.55 kg/m    Body mass index is 23.55 kg/m .  Physical Exam   GENERAL: healthy, alert and no distress  EYES: Eyes grossly normal to inspection, PERRL and conjunctivae and sclerae normal  HENT: ear canals and TM's normal, nose and mouth without ulcers or lesions  NECK: no adenopathy, no asymmetry, masses, or scars and thyroid normal to palpation  RESP: lungs clear to auscultation - no rales, rhonchi or wheezes  CV: regular rate and rhythm, normal S1 S2, no S3 or S4, no murmur, click or rub, no peripheral edema and peripheral pulses " strong  ABDOMEN: soft, nontender, no hepatosplenomegaly, no masses and bowel sounds normal  MS: no gross musculoskeletal defects noted, no edema  SKIN: no suspicious lesions or rashes abrasion to the abdomen area with 1 cm of redness  NEURO: Normal strength and tone, mentation intact and speech normal  PSYCH: mentation appears normal, affect normal/bright         Assessment & Plan     (G47.09) Other insomnia  (primary encounter diagnosis)  Comment: Ambien effective for insomnia renewed today  Plan: zolpidem (AMBIEN) 5 MG tablet      (L03.311) Cellulitis of abdominal wall  Comment: *Patient noted to have a cellulitis of the abdomen will treat   plan: cephALEXin (KEFLEX) 500 MG capsule          (M26.609) TMJ (temporomandibular joint syndrome)  Comment: Controlled with current dose of Flexeril  Plan: cyclobenzaprine (FLEXERIL) 10 MG tablet      (E87.6) Low blood potassium  Comment:Labs pending  Plan: cyclobenzaprine (FLEXERIL) 10 MG tablet             See Patient Instructions    No follow-ups on file.    CONTRERAS Sales [facility]

## 2019-11-27 ENCOUNTER — TRANSFERRED RECORDS (OUTPATIENT)
Dept: HEALTH INFORMATION MANAGEMENT | Facility: CLINIC | Age: 54
End: 2019-11-27

## 2019-11-29 ENCOUNTER — TELEPHONE (OUTPATIENT)
Dept: FAMILY MEDICINE | Facility: CLINIC | Age: 54
End: 2019-11-29

## 2019-11-29 DIAGNOSIS — M50.30 DDD (DEGENERATIVE DISC DISEASE), CERVICAL: Primary | ICD-10-CM

## 2019-11-29 DIAGNOSIS — M47.896 OTHER OSTEOARTHRITIS OF SPINE, LUMBAR REGION: ICD-10-CM

## 2019-11-29 NOTE — TELEPHONE ENCOUNTER
Pt is requesting the results of her MRI Lumbar Spine.  Pt had the results faxed to us.  Results placed on EJ Vila NP desk for review.  Nehal [place] Sec

## 2019-12-02 ENCOUNTER — TELEPHONE (OUTPATIENT)
Dept: FAMILY MEDICINE | Facility: CLINIC | Age: 54
End: 2019-12-02

## 2019-12-02 DIAGNOSIS — M54.50 LUMBAR PAIN: ICD-10-CM

## 2019-12-02 DIAGNOSIS — M54.16 LUMBAR RADICULOPATHY: ICD-10-CM

## 2019-12-02 DIAGNOSIS — G89.4 CHRONIC PAIN SYNDROME: ICD-10-CM

## 2019-12-02 DIAGNOSIS — M47.896 OTHER OSTEOARTHRITIS OF SPINE, LUMBAR REGION: Primary | ICD-10-CM

## 2019-12-02 DIAGNOSIS — M53.3 SI (SACROILIAC) JOINT DYSFUNCTION: ICD-10-CM

## 2019-12-02 NOTE — TELEPHONE ENCOUNTER
Reason for Call:  Other Pt is requesting a letter from EJ Vila to go to Kenneth- Attn: Altagracia Christian Fax - [phone] [facility] [phone]  Employer - [employer]     Detailed comments: This is to help work comp Pay for the US of Left Elbow that has Bump the size of quarter and half dollar. It is sticking up 2-3 inches and bruise around it. This happened when pt fell and product landed on her at work.    Please verify that the Order has been placed for the US.     Phone Number Patient can be reached at: Home number on file [phone] (home)    Best Time: Any Time      Can we leave a detailed message on this number? YES    Call taken on 12/2/2019 at 1:28 PM by Nehal Campoverde

## 2019-12-02 NOTE — TELEPHONE ENCOUNTER
Reason for Call:  Other     Detailed comments: Hermila - [facility] they need an order for physical therapy on her back - Work Comp - - [phone] fax- [phone]    Phone Number Patient can be reached at:   Best Time:     Can we leave a detailed message on this number?     Call taken on 12/2/2019 at 11:37 AM by Jessica Bro

## 2019-12-02 NOTE — LETTER
[facility]  [address] [place] [address]  Phone: [phone]  Fax: [phone]    December 3, 2019        Maryann Riggs  [address] [facility] [address]          To whom it may concern:    RE: Maryann Riggs    Patient was seen and treated today clinic.     Patient has mass to left elbow that requires an ultrasound due to her recent fall could represent an underlying hematoma.  Recommend ultrasound of left elbow due to recent fall and injury patient sustained.    Please contact me for questions or concerns.      Sincerely,        CONTRERAS Sales CNP

## 2019-12-03 ENCOUNTER — ALLIED HEALTH/NURSE VISIT (OUTPATIENT)
Dept: FAMILY MEDICINE | Facility: CLINIC | Age: 54
End: 2019-12-03
Payer: COMMERCIAL

## 2019-12-03 DIAGNOSIS — Z53.9 ERRONEOUS ENCOUNTER--DISREGARD: ICD-10-CM

## 2019-12-03 PROCEDURE — 99207 ZZC NO CHARGE NURSE ONLY: CPT

## 2019-12-03 NOTE — TELEPHONE ENCOUNTER
Reviewed MRI results with patient.  Patient will follow-up with spinal specialist.    Debbie Vila CNP

## 2019-12-04 ENCOUNTER — TELEPHONE (OUTPATIENT)
Dept: FAMILY MEDICINE | Facility: CLINIC | Age: 54
End: 2019-12-04

## 2019-12-04 NOTE — TELEPHONE ENCOUNTER
Reason for Call:  Other     Detailed comments: Karin - [facility] work comp - [phone] - [id number] - Please place order for US as stated in letter sent to work comp fax order to [phone]. They will schedule appt    RE: Maryann Riggs     Patient was seen and treated today clinic.      Patient has mass to left elbow that requires an ultrasound due to her recent fall could represent an underlying hematoma.  Recommend ultrasound of left elbow due to recent fall and injury patient sustained.     Please contact me for questions or concerns.        Sincerely,           CONTRERAS Sales CNP    Phone Number Patient can be reached at:     Best Time:     Can we leave a detailed message on this number? YES    Call taken on 12/4/2019 at 3:02 PM by Jessica Bro

## 2019-12-10 ENCOUNTER — HOSPITAL ENCOUNTER (OUTPATIENT)
Dept: PHYSICAL THERAPY | Facility: CLINIC | Age: 54
Setting detail: THERAPIES SERIES
End: 2019-12-10
Attending: NURSE PRACTITIONER
Payer: OTHER MISCELLANEOUS

## 2019-12-10 ENCOUNTER — OFFICE VISIT (OUTPATIENT)
Dept: FAMILY MEDICINE | Facility: CLINIC | Age: 54
End: 2019-12-10
Payer: OTHER MISCELLANEOUS

## 2019-12-10 VITALS
TEMPERATURE: 97.7 F | WEIGHT: 155 LBS | HEART RATE: 92 BPM | BODY MASS INDEX: 23.49 KG/M2 | HEIGHT: 68 IN | RESPIRATION RATE: 16 BRPM | SYSTOLIC BLOOD PRESSURE: 112 MMHG | DIASTOLIC BLOOD PRESSURE: 62 MMHG

## 2019-12-10 DIAGNOSIS — H81.13 BENIGN PAROXYSMAL POSITIONAL VERTIGO DUE TO BILATERAL VESTIBULAR DISORDER: Primary | ICD-10-CM

## 2019-12-10 DIAGNOSIS — M54.16 LUMBAR RADICULOPATHY: ICD-10-CM

## 2019-12-10 PROCEDURE — 97110 THERAPEUTIC EXERCISES: CPT | Mod: GP | Performed by: PHYSICAL THERAPIST

## 2019-12-10 PROCEDURE — 99214 OFFICE O/P EST MOD 30 MIN: CPT | Performed by: NURSE PRACTITIONER

## 2019-12-10 PROCEDURE — 97140 MANUAL THERAPY 1/> REGIONS: CPT | Mod: GP | Performed by: PHYSICAL THERAPIST

## 2019-12-10 PROCEDURE — 97162 PT EVAL MOD COMPLEX 30 MIN: CPT | Mod: GP | Performed by: PHYSICAL THERAPIST

## 2019-12-10 RX ORDER — MECLIZINE HYDROCHLORIDE 25 MG/1
25 TABLET ORAL 3 TIMES DAILY PRN
Qty: 20 TABLET | Refills: 0 | Status: SHIPPED | OUTPATIENT
Start: 2019-12-10 | End: 2019-12-18

## 2019-12-10 ASSESSMENT — MIFFLIN-ST. JEOR: SCORE: 1333.64

## 2019-12-10 NOTE — LETTER
[facility]  [address] [place] [address]  Phone: [phone]  Fax: [phone]    December 10, 2019        Maryann Riggs  [address] [facility] [address]          To whom it may concern:    RE: Maryann Riggs    Patient was seen and treated today at our clinic and missed work.    Patient will be off of work until 12/18/2019.  Will have a re-evaluation on 12/18/2019 for return to work on 12/19/2019    Please contact me for questions or concerns.      Sincerely,        CONTRERAS Sales CNP

## 2019-12-10 NOTE — PROGRESS NOTES
Aaron Riggs is a 54 year old female who presents to clinic today for the following health issues:    HPI   Recurring Back Pain Follow Up  - work comp       Where is your back pain located? (Select all that apply) low back/ shoulder     How would you describe your back pain?  dull ache     Where does your back pain spread? nowhere    Since your last clinic visit for back pain, how has your pain changed? gradually improving    Does your back pain interfere with your job? YES    Since your last visit, have you tried any new treatment? No - MRI 11/27/2019  States has knot in right shoulder and would like massage therapy         Patient Active Problem List   Diagnosis     Raynaud's disease     TMJ (temporomandibular joint syndrome)     CARDIOVASCULAR SCREENING; LDL GOAL LESS THAN 160     PTSD (post-traumatic stress disorder)     Muscle tension headache     Esophageal abnormality     Episodic mood disorder (H)     Esophageal stricture     Sprain of MCP joint of hand     Rotator cuff injury; bilateral repairs 2005     Primary generalized (osteo)arthritis     SI (sacroiliac) joint dysfunction; see details, pain management contract     Chronic bilateral low back pain without sciatica     ERRONEOUS ENCOUNTER--DISREGARD     Past Surgical History:   Procedure Laterality Date     MAMMOPLASTY REDUCTION       OTHER SURGICAL HISTORY      s/p left knee arthroscopinc surgery     ROTATOR CUFF REPAIR RT/LT  2005     SMALL BOWEL RESECTION       TUBAL LIGATION         Social History     Tobacco Use     Smoking status: Never Smoker     Smokeless tobacco: Never Used   Substance Use Topics     Alcohol use: Yes     Comment: scoial     Family History   Problem Relation Age of Onset     Unknown/Adopted Mother      Unknown/Adopted Father      Unknown/Adopted Maternal Grandmother      Unknown/Adopted Maternal Grandfather      Unknown/Adopted Paternal Grandmother      Unknown/Adopted Paternal Grandfather          Current  Outpatient Medications   Medication Sig Dispense Refill     Ascorbic Acid (VITAMIN C CR PO) Take  by mouth.       Calcium Carbonate-Vitamin D (CALTRATE 600+D) 600-400 MG-UNIT CHEW Take  by mouth.       clobetasol (TEMOVATE) 0.05 % ointment as needed. Only as needed occasionally needs after working in garden  Profile Rx: patient will contact pharmacy when needed 45 g 5     Cyanocobalamin (VITAMIN B 12 PO) Take  by mouth.       cyclobenzaprine (FLEXERIL) 10 MG tablet TAKE ONE TABLET BY MOUTH DAILY AS NEEDED FOR MUSCLE SPASMS 90 tablet 4     ferrous sulfate (FEROSUL) 325 (65 Fe) MG tablet Take 1 tablet (325 mg) by mouth daily (with breakfast) 90 tablet 1     GLUCOSAMINE CHONDROITIN COMPLX PO Take  by mouth.       [START ON 12/11/2019] HYDROcodone-acetaminophen (NORCO) 5-325 MG tablet Take 1 tablet by mouth daily 30 tablet 0     lidocaine (XYLOCAINE) 5 % external ointment Apply dime size amount topically to painful areas (choose 2-3 per application) 3 times daily as needed. 70.88 g 2     meclizine (ANTIVERT) 25 MG tablet Take 1 tablet (25 mg) by mouth 3 times daily as needed for dizziness 20 tablet 0     melatonin 3 MG tablet Take 1 tablet by mouth nightly as needed for sleep. 30 tablet 0     NIFEdipine ER OSMOTIC (PROCARDIA XL) 30 MG 24 hr tablet Take 1 tablet (30 mg) by mouth daily 90 tablet 3     nitroGLYcerin (NITRO-BID) 2 % OINT ointment Place 1 inch (15 mg) onto the skin daily 30 packet 12     omeprazole (PRILOSEC) 40 MG capsule Take 1 capsule (40 mg) by mouth daily Take 30-60 minutes before a meal. 90 capsule 3     order for DME Equipment being ordered: TENS 1 Units 0     POTASSIUM CARBONATE        TYLENOL 325 MG OR TABS 2 TABLETS BY MOUTH EVERY 6 HOURS AS NEEDED       zolpidem (AMBIEN) 5 MG tablet Take 1 tablet (5 mg) by mouth nightly as needed for sleep 90 tablet 0     Allergies   Allergen Reactions     Desyrel [Trazodone Hcl]      Sig mood disturbance       Lamictal [Lamotrigine]      Recent Labs   Lab Test  11/26/19  1056 09/17/19  1419 06/06/19  1419 09/12/14 03/05/14 11/01/13  1024 08/23/13   A1C  --   --   --  5.5 6.0 5.7  --    LDL  --  99  --  81  --   --  100  100   HDL  --  84  --  73  --   --  66  66   TRIG  --  41  --  70  --   --  94  94   ALT  --   --  24 28  --   --  18   CR 0.70 0.80 0.73 0.79  --   --  0.75   GFRESTIMATED >90 83 >90 87  --   --   --    GFRESTBLACK >90 >90 >90 100  --   --   --    POTASSIUM 3.6 3.3* 3.6 3.9  --   --  3.6   TSH  --   --   --  1.750  --   --  1.550  4.550      BP Readings from Last 3 Encounters:   12/10/19 112/62   11/26/19 128/82   11/20/19 138/84    Wt Readings from Last 3 Encounters:   12/10/19 70.3 kg (155 lb)   11/26/19 69.2 kg (152 lb 9.6 oz)   11/20/19 69.4 kg (153 lb)                      Reviewed and updated as needed this visit by Provider         Review of Systems   ROS COMP: Constitutional, HEENT, cardiovascular, pulmonary, gi and gu systems are negative, except as otherwise noted.      Objective    LMP 06/26/2014   There is no height or weight on file to calculate BMI.  Physical Exam   GENERAL: healthy, alert and no distress  EYES: Eyes grossly normal to inspection, PERRL and conjunctivae and sclerae normal  HENT: ear canals and TM's normal, nose and mouth without ulcers or lesions  NECK: no adenopathy, no asymmetry, masses, or scars and thyroid normal to palpation  RESP: lungs clear to auscultation - no rales, rhonchi or wheezes  CV: regular rate and rhythm, normal S1 S2, no S3 or S4, no murmur, click or rub, no peripheral edema and peripheral pulses strong  ABDOMEN: soft, nontender, no hepatosplenomegaly, no masses and bowel sounds normal  MS: no gross musculoskeletal defects noted, no edema  SKIN: no suspicious lesions or rashes  NEURO: Normal strength and tone, mentation intact and speech normal  PSYCH: mentation appears normal, affect normal/bright      Tender:  lumbar spinous processes, lumbar facet joints, left para lumbar muscles, right para lumbar  muscles, coccyx area   Non-tender:  thoracic spinous processes, thoracic facet joints, left parathoracic muscles, right parathoracic muscles, left SI joint, right SI joint, left sciatic notch, right sciatic notch  Range of Motion:  left lateral thoracic bending   full, right lateral thoracic bending  full, left thoracic rotation  full, right thoracic rotation  full, lumbar flexion  painful, lumbar extension  painful, left lateral lumbar bending  painful, right lateral lumbar bending  painful, left lateral lumbar rotation  painful, right lateral lumbar rotation  painful  Strength:  able to heel walk  Special tests:  negative straight leg raises    Assessment & Plan     (H81.13) Benign paroxysmal positional vertigo due to bilateral vestibular disorder  (primary encounter diagnosis)  Comment: Patient's vertigo remains constant since accident.  Patient has started physical therapy.  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    CONTRERAS Sales [facility]

## 2019-12-10 NOTE — PROGRESS NOTES
12/10/19 1500   General Information   Type of Visit Initial OP Ortho PT Evaluation   Start of Care Date 12/10/19   Referring Physician Debbie Vila, APRN CNP   Patient/Family Goals Statement Go back to work w/o restrictions   Orders Evaluate and Treat   Date of Order 12/02/19   Medical Diagnosis LBP, SI, radiculopathy   Body Part(s)   Body Part(s) Lumbar Spine/SI   Presentation and Etiology   Pertinent history of current problem (include personal factors and/or comorbidities that impact the POC) Pt was unloading a truck when a pallet collapsed on her --knocking her down to ground ( falling approx 5 feet and product landing on her).   Pt iniitally had significant bruising in R hip/ LB but that is improving.  Currently intermittent pain primarily lower lumbar / SI region 8/10.  No radicular pain/ numbness/ tingling.  Pt has had some ongoing vertigo since the fall--is taking meclozine.  Pt also notes a stinger in L scapula.  MRI in chart: deg changes.   Meds:  hydrocodone prn when she does increased activity,  flexeril.   PMHX:  B RCR,  L knee scope for patellar dislocations.  Mod: job tasks, hiking    Impairments A. Pain;E. Decreased flexibility   Onset date of current episode/exacerbation 11/06/19   Pain quality B. Dull;C. Aching   Pain exacerbation comment shoveling, vacuuming,  Carrying items upstairs (when unable to use railing).    Propping self in bed/ couch, waking 4-5x/night.     Pain/symptoms eased by A. Sitting;B. Walking;C. Rest;E. Changing positions;F. Certain positions;G. Heat;H. Cold;I. OTC medication(s)   Progression of symptoms since onset: Improved   Prior Level of Function   Functional Level Prior Comment Pt currently doing HS , quad and piriformis stretches.  Pt goes hiking--went to [employer] overweekend and will be going to [place] this week.  Rides motorcycle.    Current Level of Function   Patient role/employment history A. 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  Off work currently.     Fall Risk Screen   Fall screen completed by PT   Have you fallen 2 or more times in the past year? No   Have you fallen and had an injury in the past year? No   Is patient a fall risk? No   Abuse Screen (yes response referral indicated)   Feels Unsafe at Home or Work/School no   Feels Threatened by Someone no   Does Anyone Try to Keep You From Having Contact with Others or Doing Things Outside Your Home? no   Lumbar Spine/SI Objective Findings   Observation sit to stand guarded --using UE's   Posture Decreased lumbar lordosis.  R PSIS/ crest lower.    Gait/Locomotion mild limp--slow and guarded, hips shifted to L    Flexion ROM WNL, notes difficulty w/ return upright final 25%    Extension ROM 40% notes head spinning    Right Side Bending ROM WFL notes stiffness   Left Side Bending ROM WFL notes stiffness   Pelvic Screen + R FB test .  L LE slightly longer supine   Hip Flexion (L2) Strength B 4+/5   Hip Abduction Strength R 4+/5,  L 5/5   Knee Flexion Strength B 5/5   Knee Extension (L3) Strength B 5/5   Ankle Dorsiflexion (L4) Strength B 5/5   Great Toe Extension (L5) Strength B 5/5   Hamstring Flexibility WNL   SLR neg B    Crossover SLR neg B    Slump Test neg, only notes stiffness   Palpation Mild to mod tenderness L piriformis and R ITB just below > trochanter.    Planned Therapy Interventions   Planned Therapy Interventions manual therapy;neuromuscular re-education;ROM;strengthening;stretching  (body mechanics/ function lifting)   Planned Modality Interventions   Planned Modality Interventions TENS  (if pain does not decrease)   Clinical Impression   Criteria for Skilled Therapeutic Interventions Met yes, treatment indicated   PT Diagnosis LBP/ SI pain   Influenced by the following impairments pain, stiffness, decreased strength   Functional limitations due to impairments sit <> stand, walking, lifting, carrying, sleeping, working   Clinical Presentation Evolving/Changing    Clinical Presentation Rationale Pt is improving however has been less active and notes increased pain w/ increased activity   Clinical Decision Making (Complexity) Moderate complexity   Therapy Frequency 2 times/Week   Predicted Duration of Therapy Intervention (days/wks) 4 weeks   Risk & Benefits of therapy have been explained Yes   Patient, Family & other staff in agreement with plan of care Yes   Education Assessment   Barriers to Learning No barriers   Ortho Goal 1   Goal Identifier 1.  STG   Goal Description Pt will be able to transition sit to stand w/ increased ease and decreased reliance on UE's   Target Date 12/24/19   Ortho Goal 2   Goal Identifier 2.  STG   Goal Description Pt will walk w/ improved pace, decreased stiffness   Target Date 12/31/19   Ortho Goal 3   Goal Identifier 3. LTG   Goal Description Pt will be able to lift/ carry 20# w/ pain no > 3/10   Target Date 01/19/20   Ortho Goal 4   Goal Identifier 4.  LTG   Goal Description Pt will wake no > 1x/night due to LBP   Target Date 01/19/20   Ortho Goal 5   Goal Identifier 5. LTG   Goal Description Pt will be independent and consistent w/ HEP   Target Date 01/19/20   Total Evaluation Time   PT Eval, Moderate Complexity Minutes (90347) 30     Thank you for this referral,    Leslie Freedman, PT,  CEAS   #[id number]  [facility]  [phone]

## 2019-12-11 ENCOUNTER — TRANSFERRED RECORDS (OUTPATIENT)
Dept: HEALTH INFORMATION MANAGEMENT | Facility: CLINIC | Age: 54
End: 2019-12-11

## 2019-12-18 ENCOUNTER — OFFICE VISIT (OUTPATIENT)
Dept: FAMILY MEDICINE | Facility: CLINIC | Age: 54
End: 2019-12-18
Payer: OTHER MISCELLANEOUS

## 2019-12-18 ENCOUNTER — HOSPITAL ENCOUNTER (OUTPATIENT)
Dept: PHYSICAL THERAPY | Facility: CLINIC | Age: 54
Setting detail: THERAPIES SERIES
End: 2019-12-18
Attending: NURSE PRACTITIONER
Payer: OTHER MISCELLANEOUS

## 2019-12-18 VITALS
OXYGEN SATURATION: 99 % | RESPIRATION RATE: 16 BRPM | SYSTOLIC BLOOD PRESSURE: 124 MMHG | HEIGHT: 68 IN | BODY MASS INDEX: 23.95 KG/M2 | TEMPERATURE: 98 F | WEIGHT: 158 LBS | DIASTOLIC BLOOD PRESSURE: 72 MMHG | HEART RATE: 108 BPM

## 2019-12-18 DIAGNOSIS — M19.90 ARTHRITIS: ICD-10-CM

## 2019-12-18 DIAGNOSIS — T14.8XXA HEMATOMA OF SKIN: Primary | ICD-10-CM

## 2019-12-18 DIAGNOSIS — H81.13 BENIGN PAROXYSMAL POSITIONAL VERTIGO DUE TO BILATERAL VESTIBULAR DISORDER: ICD-10-CM

## 2019-12-18 DIAGNOSIS — M54.2 NECK PAIN: ICD-10-CM

## 2019-12-18 DIAGNOSIS — M54.16 LUMBAR RADICULOPATHY: ICD-10-CM

## 2019-12-18 PROCEDURE — 97110 THERAPEUTIC EXERCISES: CPT | Mod: GP | Performed by: PHYSICAL THERAPIST

## 2019-12-18 PROCEDURE — 99214 OFFICE O/P EST MOD 30 MIN: CPT | Performed by: NURSE PRACTITIONER

## 2019-12-18 RX ORDER — HYDROCODONE BITARTRATE AND ACETAMINOPHEN 5; 325 MG/1; MG/1
1 TABLET ORAL DAILY
Qty: 30 TABLET | Refills: 0 | Status: SHIPPED | OUTPATIENT
Start: 2019-12-18 | End: 2020-02-07

## 2019-12-18 RX ORDER — MECLIZINE HYDROCHLORIDE 25 MG/1
25 TABLET ORAL 3 TIMES DAILY PRN
Qty: 20 TABLET | Refills: 0 | Status: SHIPPED | OUTPATIENT
Start: 2019-12-18 | End: 2020-02-07

## 2019-12-18 ASSESSMENT — MIFFLIN-ST. JEOR: SCORE: 1347.24

## 2019-12-18 NOTE — PROGRESS NOTES
Subjective     Maryann Riggs is a 54 year old female who presents to clinic today for the following health issues:    HPI   Chief Complaint   Patient presents with     Work Comp     11/6/19 work comp follow up     [place] [phone]      Patient Active Problem List   Diagnosis     Raynaud's disease     TMJ (temporomandibular joint syndrome)     CARDIOVASCULAR SCREENING; LDL GOAL LESS THAN 160     PTSD (post-traumatic stress disorder)     Muscle tension headache     Esophageal abnormality     Episodic mood disorder (H)     Esophageal stricture     Sprain of MCP joint of hand     Rotator cuff injury; bilateral repairs 2005     Primary generalized (osteo)arthritis     SI (sacroiliac) joint dysfunction; see details, pain management contract     Chronic bilateral low back pain without sciatica     ERRONEOUS ENCOUNTER--DISREGARD     Past Surgical History:   Procedure Laterality Date     MAMMOPLASTY REDUCTION       OTHER SURGICAL HISTORY      s/p left knee arthroscopinc surgery     ROTATOR CUFF REPAIR RT/LT  2005     SMALL BOWEL RESECTION       TUBAL LIGATION         Social History     Tobacco Use     Smoking status: Never Smoker     Smokeless tobacco: Never Used   Substance Use Topics     Alcohol use: Yes     Comment: scoial     Family History   Problem Relation Age of Onset     Unknown/Adopted Mother      Unknown/Adopted Father      Unknown/Adopted Maternal Grandmother      Unknown/Adopted Maternal Grandfather      Unknown/Adopted Paternal Grandmother      Unknown/Adopted Paternal Grandfather          Current Outpatient Medications   Medication Sig Dispense Refill     Ascorbic Acid (VITAMIN C CR PO) Take  by mouth.       Calcium Carbonate-Vitamin D (CALTRATE 600+D) 600-400 MG-UNIT CHEW Take  by mouth.       clobetasol (TEMOVATE) 0.05 % ointment as needed. Only as needed occasionally needs after working in garden  Profile Rx: patient will contact pharmacy when needed 45 g 5     Cyanocobalamin (VITAMIN B 12 PO) Take   by mouth.       cyclobenzaprine (FLEXERIL) 10 MG tablet TAKE ONE TABLET BY MOUTH DAILY AS NEEDED FOR MUSCLE SPASMS 90 tablet 4     ferrous sulfate (FEROSUL) 325 (65 Fe) MG tablet Take 1 tablet (325 mg) by mouth daily (with breakfast) 90 tablet 1     GLUCOSAMINE CHONDROITIN COMPLX PO Take  by mouth.       HYDROcodone-acetaminophen (NORCO) 5-325 MG tablet Take 1 tablet by mouth daily 30 tablet 0     lidocaine (XYLOCAINE) 5 % external ointment Apply dime size amount topically to painful areas (choose 2-3 per application) 3 times daily as needed. 70.88 g 2     meclizine (ANTIVERT) 25 MG tablet Take 1 tablet (25 mg) by mouth 3 times daily as needed for dizziness 20 tablet 0     melatonin 3 MG tablet Take 1 tablet by mouth nightly as needed for sleep. 30 tablet 0     NIFEdipine ER OSMOTIC (PROCARDIA XL) 30 MG 24 hr tablet Take 1 tablet (30 mg) by mouth daily 90 tablet 3     nitroGLYcerin (NITRO-BID) 2 % OINT ointment Place 1 inch (15 mg) onto the skin daily 30 packet 12     omeprazole (PRILOSEC) 40 MG capsule Take 1 capsule (40 mg) by mouth daily Take 30-60 minutes before a meal. 90 capsule 3     order for DME Equipment being ordered: TENS 1 Units 0     POTASSIUM CARBONATE        TYLENOL 325 MG OR TABS 2 TABLETS BY MOUTH EVERY 6 HOURS AS NEEDED       zolpidem (AMBIEN) 5 MG tablet Take 1 tablet (5 mg) by mouth nightly as needed for sleep 90 tablet 0     Allergies   Allergen Reactions     Desyrel [Trazodone Hcl]      Sig mood disturbance       Lamictal [Lamotrigine]      Recent Labs   Lab Test 11/26/19  1056 09/17/19  1419 06/06/19  1419 09/12/14 03/05/14 11/01/13  1024 08/23/13   A1C  --   --   --  5.5 6.0 5.7  --    LDL  --  99  --  81  --   --  100  100   HDL  --  84  --  73  --   --  66  66   TRIG  --  41  --  70  --   --  94  94   ALT  --   --  24 28  --   --  18   CR 0.70 0.80 0.73 0.79  --   --  0.75   GFRESTIMATED >90 83 >90 87  --   --   --    GFRESTBLACK >90 >90 >90 100  --   --   --    POTASSIUM 3.6 3.3* 3.6  "3.9  --   --  3.6   TSH  --   --   --  1.750  --   --  1.550  4.550      BP Readings from Last 3 Encounters:   12/18/19 124/72   12/10/19 112/62   11/26/19 128/82    Wt Readings from Last 3 Encounters:   12/18/19 71.7 kg (158 lb)   12/10/19 70.3 kg (155 lb)   11/26/19 69.2 kg (152 lb 9.6 oz)                    Reviewed and updated as needed this visit by Provider         Review of Systems   ROS COMP: Constitutional, HEENT, cardiovascular, pulmonary, GI, , musculoskeletal, neuro, skin, endocrine and psych systems are negative, except as otherwise noted.      Objective    /72 (BP Location: Right arm, Patient Position: Sitting, Cuff Size: Adult Regular)   Pulse 108   Temp 98  F (36.7  C) (Tympanic)   Resp 16   Ht 1.715 m (5' 7.5\")   Wt 71.7 kg (158 lb)   LMP 06/26/2014   SpO2 99%   Breastfeeding No   BMI 24.38 kg/m    Body mass index is 24.38 kg/m .  Physical Exam   GENERAL: healthy, alert and no distress  EYES: Eyes grossly normal to inspection, PERRL and conjunctivae and sclerae normal  HENT: ear canals and TM's normal, nose and mouth without ulcers or lesions  NECK: no adenopathy, no asymmetry, masses, or scars and thyroid normal to palpation  RESP: lungs clear to auscultation - no rales, rhonchi or wheezes  CV: regular rate and rhythm, normal S1 S2, no S3 or S4, no murmur, click or rub, no peripheral edema and peripheral pulses strong  MS: no gross musculoskeletal defects noted, no edema  SKIN: no suspicious lesions or rashes  NEURO: Normal strength and tone, mentation intact and speech normal  PSYCH: mentation appears normal, affect normal/bright    Tender:  lumbar spinous processes, lumbar facet joints, left para lumbar muscles, right para lumbar muscles, coccyx area   Non-tender:  thoracic spinous processes, thoracic facet joints, left parathoracic muscles, right parathoracic muscles, left SI joint, right SI joint, left sciatic notch, right sciatic notch  Range of Motion:  left lateral thoracic " bending   full, right lateral thoracic bending  full, left thoracic rotation  full, right thoracic rotation  full, lumbar flexion  painful, lumbar extension  painful, left lateral lumbar bending  painful, right lateral lumbar bending  painful, left lateral lumbar rotation  painful, right lateral lumbar rotation  painful  Strength:  able to heel walk  Special tests:  negative straight leg raises        Assessment & Plan     (T14.8XXA) Hematoma of skin  (primary encounter diagnosis)  Comment: Patient is ultrasound did show hematoma to the elbow most likely related to the recent fall.  Will have patient follow-up with general surgery to see about removal of the hematoma as it has not decreased   Plan: GENERAL SURG ADULT REFERRAL       (H81.13) Benign paroxysmal positional vertigo due to bilateral vestibular disorder  Comment: patient is presently in physical therapy for her vertigo unable to drive due to the vertigo will be off of work until vertigo resolves  Plan: meclizine (ANTIVERT) 25 MG tablet continue with physical therapy and follow-up in 3 to 4 weeks for reevaluation sooner if vertigo resolves      (M54.2) Neck pain  Comment: *Neck pain is mildly improving with physical therapy patient will continue to have physical therapy  Plan: naloxone (NARCAN) 4 MG/0.1ML nasal spray,         HYDROcodone-acetaminophen (NORCO) 5-325 MG         tablet      (M54.16) Lumbar radiculopathy  Comment: Lumbar strain improving with physical therapy    Plan: patient will continue to have physical therapy and reevaluation work restrictions in 3 to 4 weeks       Return in about 4 weeks (around 1/15/2020) for Vertigo work restrictions.    CONTRERAS Sales [facility]

## 2019-12-18 NOTE — LETTER
[facility]  [address] [place] [id number]  Phone: [phone]  Fax: [phone]    December 18, 2019        Maryann Riggs  [address] [facility] [address]        To whom it may concern:    RE: Maryann Riggs    Patient was seen and treated today at our clinic and missed work.    Patient will be off of work for the next 4 weeks.  Will have a reevaluation at that time for further work restrictions      Please contact me for questions or concerns.      Sincerely,        CONTRERAS Sales CNP

## 2019-12-18 NOTE — LETTER
[facility]  [address] [place] [id number]  Phone: [phone]  Fax: [phone]    December 18, 2019        Maryann Riggs  [address] [facility] [address]          To whom it may concern:    RE: Maryann Riggs    Patient was seen and treated today at our clinic and missed work.    Patient will be off of work for the next 3 weeks.  Will have a reevaluation at that time for further work restrictions      Please contact me for questions or concerns.      Sincerely,        CONTRERAS Sales CNP

## 2019-12-19 ENCOUNTER — TELEPHONE (OUTPATIENT)
Dept: FAMILY MEDICINE | Facility: CLINIC | Age: 54
End: 2019-12-19

## 2019-12-19 ENCOUNTER — TRANSFERRED RECORDS (OUTPATIENT)
Dept: HEALTH INFORMATION MANAGEMENT | Facility: CLINIC | Age: 54
End: 2019-12-19

## 2019-12-19 NOTE — TELEPHONE ENCOUNTER
Pt had called asking if we received her CT from [facility] .  Pt had them send it to us. Given to RN to review.   Per Pt EJ Vila placed Orders for when we do receive the results regarding Next step.     Please Advise  Nehal [facility]

## 2019-12-19 NOTE — TELEPHONE ENCOUNTER
Report given to Debbie Vila NP. Pt to advised per Debbie Vila to proceed with surgical consult for lipoma vs hematoma (both benign). LM to [facility]. Please give her the message to contact general surgery for a consultKimberlyn Belcher RN

## 2019-12-24 ENCOUNTER — TELEPHONE (OUTPATIENT)
Dept: FAMILY MEDICINE | Facility: CLINIC | Age: 54
End: 2019-12-24

## 2019-12-24 NOTE — TELEPHONE ENCOUNTER
Work comp company is calling stating surgery has been approved to get this hematoma taken care of. Carlie Joseph her Nurse  called from [facility].    Angela [facility] [facility]idrick

## 2019-12-27 ENCOUNTER — TELEPHONE (OUTPATIENT)
Dept: FAMILY MEDICINE | Facility: CLINIC | Age: 54
End: 2019-12-27

## 2019-12-31 ENCOUNTER — HOSPITAL ENCOUNTER (OUTPATIENT)
Dept: PHYSICAL THERAPY | Facility: CLINIC | Age: 54
Setting detail: THERAPIES SERIES
End: 2019-12-31
Attending: NURSE PRACTITIONER
Payer: OTHER MISCELLANEOUS

## 2019-12-31 PROCEDURE — 97140 MANUAL THERAPY 1/> REGIONS: CPT | Mod: GP | Performed by: PHYSICAL THERAPIST

## 2019-12-31 PROCEDURE — 97110 THERAPEUTIC EXERCISES: CPT | Mod: GP | Performed by: PHYSICAL THERAPIST

## 2020-01-02 ENCOUNTER — OFFICE VISIT (OUTPATIENT)
Dept: SURGERY | Facility: CLINIC | Age: 55
End: 2020-01-02
Payer: OTHER MISCELLANEOUS

## 2020-01-02 VITALS
WEIGHT: 160 LBS | DIASTOLIC BLOOD PRESSURE: 98 MMHG | HEART RATE: 97 BPM | HEIGHT: 68 IN | SYSTOLIC BLOOD PRESSURE: 138 MMHG | BODY MASS INDEX: 24.25 KG/M2 | TEMPERATURE: 97.7 F

## 2020-01-02 DIAGNOSIS — S50.00XA: Primary | ICD-10-CM

## 2020-01-02 PROCEDURE — 99203 OFFICE O/P NEW LOW 30 MIN: CPT | Performed by: SURGERY

## 2020-01-02 ASSESSMENT — MIFFLIN-ST. JEOR: SCORE: 1356.32

## 2020-01-02 NOTE — NURSING NOTE
"Initial BP (!) 138/98 (BP Location: Right arm, Patient Position: Sitting, Cuff Size: Adult Regular)   Pulse 97   Temp 97.7  F (36.5  C) (Oral)   Ht 1.715 m (5' 7.5\")   Wt 72.6 kg (160 lb)   LMP 06/26/2014   BMI 24.69 kg/m   Estimated body mass index is 24.69 kg/m  as calculated from the following:    Height as of this encounter: 1.715 m (5' 7.5\").    Weight as of this encounter: 72.6 kg (160 lb). .    Damaris Sanz CMA    "

## 2020-01-02 NOTE — LETTER
1/2/2020         RE: Maryann Riggs  [address]   [place] [address]        Dear Colleague,    Thank you for referring your patient, Maryann Riggs, to the [facility]. Please see a copy of my visit note below.    Surgical Consultation/History and Physical  [facility]    Maryann is seen in consultation for left arm mass, at the request of Physician No Ref-Primary.    Chief Complaint:  Left arm mass    History of Present Illness: Maryann Riggs is a 54 year old female presents with left arm mass. Patient had a work related injury to her left arm.  She noted an area of swelling over her left elbow.  It occasionally causes pain, particularly if she uses her arm more with activity.  It has slowly decreased in size.  Minimal bruising.  No redness or tracking erythema.  No fevers or chills.  Denies any issue with her hand or elbow motion.      Patient Active Problem List   Diagnosis     Raynaud's disease     TMJ (temporomandibular joint syndrome)     CARDIOVASCULAR SCREENING; LDL GOAL LESS THAN 160     PTSD (post-traumatic stress disorder)     Muscle tension headache     Esophageal abnormality     Episodic mood disorder (H)     Esophageal stricture     Sprain of MCP joint of hand     Rotator cuff injury; bilateral repairs 2005     Primary generalized (osteo)arthritis     SI (sacroiliac) joint dysfunction; see details, pain management contract     Chronic bilateral low back pain without sciatica     ERRONEOUS ENCOUNTER--DISREGARD     Past Medical History:   Diagnosis Date     Anger reaction     buspar helping     Cholelithiasis      Esophageal stricture     dilatation every 3 months     Generalized osteoarthrosis, unspecified site (aka ARTHRITIS)     small joints     PTSD (post-traumatic stress disorder)      Raynaud's disease      Past Surgical History:   Procedure Laterality Date     MAMMOPLASTY REDUCTION       OTHER SURGICAL HISTORY      s/p left knee arthroscopinc  surgery     ROTATOR CUFF REPAIR RT/LT  2005     SMALL BOWEL RESECTION       TUBAL LIGATION       Family History   Problem Relation Age of Onset     Unknown/Adopted Mother      Unknown/Adopted Father      Unknown/Adopted Maternal Grandmother      Unknown/Adopted Maternal Grandfather      Unknown/Adopted Paternal Grandmother      Unknown/Adopted Paternal Grandfather      Social History     Tobacco Use     Smoking status: Never Smoker     Smokeless tobacco: Never Used   Substance Use Topics     Alcohol use: Yes     Comment: scoial        History   Drug Use No     Current Outpatient Medications   Medication Sig Dispense Refill     Ascorbic Acid (VITAMIN C CR PO) Take  by mouth.       Calcium Carbonate-Vitamin D (CALTRATE 600+D) 600-400 MG-UNIT CHEW Take  by mouth.       clobetasol (TEMOVATE) 0.05 % ointment as needed. Only as needed occasionally needs after working in garden  Profile Rx: patient will contact pharmacy when needed 45 g 5     Cyanocobalamin (VITAMIN B 12 PO) Take  by mouth.       cyclobenzaprine (FLEXERIL) 10 MG tablet TAKE ONE TABLET BY MOUTH DAILY AS NEEDED FOR MUSCLE SPASMS 90 tablet 4     ferrous sulfate (FEROSUL) 325 (65 Fe) MG tablet Take 1 tablet (325 mg) by mouth daily (with breakfast) 90 tablet 1     GLUCOSAMINE CHONDROITIN COMPLX PO Take  by mouth.       HYDROcodone-acetaminophen (NORCO) 5-325 MG tablet Take 1 tablet by mouth daily 30 tablet 0     lidocaine (XYLOCAINE) 5 % external ointment Apply dime size amount topically to painful areas (choose 2-3 per application) 3 times daily as needed. 70.88 g 2     meclizine (ANTIVERT) 25 MG tablet Take 1 tablet (25 mg) by mouth 3 times daily as needed for dizziness 20 tablet 0     melatonin 3 MG tablet Take 1 tablet by mouth nightly as needed for sleep. 30 tablet 0     naloxone (NARCAN) 4 MG/0.1ML nasal spray Spray 1 spray (4 mg) into one nostril alternating nostrils once as needed for opioid reversal every 2-3 minutes until assistance arrives 0.2 mL 0  "    NIFEdipine ER OSMOTIC (PROCARDIA XL) 30 MG 24 hr tablet Take 1 tablet (30 mg) by mouth daily 90 tablet 3     nitroGLYcerin (NITRO-BID) 2 % OINT ointment Place 1 inch (15 mg) onto the skin daily 30 packet 12     omeprazole (PRILOSEC) 40 MG capsule Take 1 capsule (40 mg) by mouth daily Take 30-60 minutes before a meal. 90 capsule 3     order for DME Equipment being ordered: TENS 1 Units 0     POTASSIUM CARBONATE        TYLENOL 325 MG OR TABS 2 TABLETS BY MOUTH EVERY 6 HOURS AS NEEDED       zolpidem (AMBIEN) 5 MG tablet Take 1 tablet (5 mg) by mouth nightly as needed for sleep 90 tablet 0     Allergies   Allergen Reactions     Desyrel [Trazodone Hcl]      Sig mood disturbance       Lamictal [Lamotrigine]      Review of Systems:   10 point ROS negative except aforementioned positives    Physical Exam:  BP (!) 138/98 (BP Location: Right arm, Patient Position: Sitting, Cuff Size: Adult Regular)   Pulse 97   Temp 97.7  F (36.5  C) (Oral)   Ht 1.715 m (5' 7.5\")   Wt 72.6 kg (160 lb)   LMP 06/26/2014   BMI 24.69 kg/m       Constitutional- No acute distress, well nourished, non-toxic  Eyes: Anicteric, no injection.  PERRL  ENT:  Normocephalic, atraumatic, Nose midline, moist mucus membranes  Neck - supple, no LAD  Respiratory- Clear to auscultation bilaterally, good inspiratory effort  Cardiovascular - Heart RRR, no lift's, thrills, murmurs, rubs, or gallop.  No peripheral edema.  No clubbing.  Neuro - No focal neuro deficits, Alert and oriented x 3  Psych: Appropriate mood and affect  Musculoskeletal: Normal gait, symmetric strength.  FROM upper and lower extremities.   strength equal bilateral upper extremities.  Sensation intact.  Skin: Warm, Dry; 2.5 cm mass left medial elbow over medial epicondyle.  Superficial and mobile with ecchymotic tinge.  No surrounding erythema    Assessment:  1. Traumatic hematoma of elbow, initial encounter      Plan:   Ms. Riggs presents for evaluation of left elbow hematoma.  " She was injured while at work as a  11/2019.  She noted the area was swollen shortly after which has gradually decreased in size.  She had outside ultrasound showing hematoma.  I discussed the nature of hematomas with the patient.  This appears to be relatively asymptomatic at this time, and I expect this to resolve with conservative measures of observation and gentle self massage.  I counseled her if it persists, we can certainly excise this in office.  She is agreeable to a further period of observation with repeat exam and potential excision at later date.    Jerson Orozco, DO on 1/2/2020 at 1:50 PM      Again, thank you for allowing me to participate in the care of your patient.        Sincerely,        Jerson Orozco, DO

## 2020-01-02 NOTE — PROGRESS NOTES
Surgical Consultation/History and Physical  [facility]    Maryann is seen in consultation for left arm mass, at the request of Physician No Ref-Primary.    Chief Complaint:  Left arm mass    History of Present Illness: Maryann Riggs is a 54 year old female presents with left arm mass. Patient had a work related injury to her left arm.  She noted an area of swelling over her left elbow.  It occasionally causes pain, particularly if she uses her arm more with activity.  It has slowly decreased in size.  Minimal bruising.  No redness or tracking erythema.  No fevers or chills.  Denies any issue with her hand or elbow motion.      Patient Active Problem List   Diagnosis     Raynaud's disease     TMJ (temporomandibular joint syndrome)     CARDIOVASCULAR SCREENING; LDL GOAL LESS THAN 160     PTSD (post-traumatic stress disorder)     Muscle tension headache     Esophageal abnormality     Episodic mood disorder (H)     Esophageal stricture     Sprain of MCP joint of hand     Rotator cuff injury; bilateral repairs 2005     Primary generalized (osteo)arthritis     SI (sacroiliac) joint dysfunction; see details, pain management contract     Chronic bilateral low back pain without sciatica     ERRONEOUS ENCOUNTER--DISREGARD     Past Medical History:   Diagnosis Date     Anger reaction     buspar helping     Cholelithiasis      Esophageal stricture     dilatation every 3 months     Generalized osteoarthrosis, unspecified site (aka ARTHRITIS)     small joints     PTSD (post-traumatic stress disorder)      Raynaud's disease      Past Surgical History:   Procedure Laterality Date     MAMMOPLASTY REDUCTION       OTHER SURGICAL HISTORY      s/p left knee arthroscopinc surgery     ROTATOR CUFF REPAIR RT/LT  2005     SMALL BOWEL RESECTION       TUBAL LIGATION       Family History   Problem Relation Age of Onset     Unknown/Adopted Mother      Unknown/Adopted Father      Unknown/Adopted Maternal Grandmother       Unknown/Adopted Maternal Grandfather      Unknown/Adopted Paternal Grandmother      Unknown/Adopted Paternal Grandfather      Social History     Tobacco Use     Smoking status: Never Smoker     Smokeless tobacco: Never Used   Substance Use Topics     Alcohol use: Yes     Comment: scoial        History   Drug Use No     Current Outpatient Medications   Medication Sig Dispense Refill     Ascorbic Acid (VITAMIN C CR PO) Take  by mouth.       Calcium Carbonate-Vitamin D (CALTRATE 600+D) 600-400 MG-UNIT CHEW Take  by mouth.       clobetasol (TEMOVATE) 0.05 % ointment as needed. Only as needed occasionally needs after working in garden  Profile Rx: patient will contact pharmacy when needed 45 g 5     Cyanocobalamin (VITAMIN B 12 PO) Take  by mouth.       cyclobenzaprine (FLEXERIL) 10 MG tablet TAKE ONE TABLET BY MOUTH DAILY AS NEEDED FOR MUSCLE SPASMS 90 tablet 4     ferrous sulfate (FEROSUL) 325 (65 Fe) MG tablet Take 1 tablet (325 mg) by mouth daily (with breakfast) 90 tablet 1     GLUCOSAMINE CHONDROITIN COMPLX PO Take  by mouth.       HYDROcodone-acetaminophen (NORCO) 5-325 MG tablet Take 1 tablet by mouth daily 30 tablet 0     lidocaine (XYLOCAINE) 5 % external ointment Apply dime size amount topically to painful areas (choose 2-3 per application) 3 times daily as needed. 70.88 g 2     meclizine (ANTIVERT) 25 MG tablet Take 1 tablet (25 mg) by mouth 3 times daily as needed for dizziness 20 tablet 0     melatonin 3 MG tablet Take 1 tablet by mouth nightly as needed for sleep. 30 tablet 0     naloxone (NARCAN) 4 MG/0.1ML nasal spray Spray 1 spray (4 mg) into one nostril alternating nostrils once as needed for opioid reversal every 2-3 minutes until assistance arrives 0.2 mL 0     NIFEdipine ER OSMOTIC (PROCARDIA XL) 30 MG 24 hr tablet Take 1 tablet (30 mg) by mouth daily 90 tablet 3     nitroGLYcerin (NITRO-BID) 2 % OINT ointment Place 1 inch (15 mg) onto the skin daily 30 packet 12     omeprazole (PRILOSEC) 40 MG  "capsule Take 1 capsule (40 mg) by mouth daily Take 30-60 minutes before a meal. 90 capsule 3     order for DME Equipment being ordered: TENS 1 Units 0     POTASSIUM CARBONATE        TYLENOL 325 MG OR TABS 2 TABLETS BY MOUTH EVERY 6 HOURS AS NEEDED       zolpidem (AMBIEN) 5 MG tablet Take 1 tablet (5 mg) by mouth nightly as needed for sleep 90 tablet 0     Allergies   Allergen Reactions     Desyrel [Trazodone Hcl]      Sig mood disturbance       Lamictal [Lamotrigine]      Review of Systems:   10 point ROS negative except aforementioned positives    Physical Exam:  BP (!) 138/98 (BP Location: Right arm, Patient Position: Sitting, Cuff Size: Adult Regular)   Pulse 97   Temp 97.7  F (36.5  C) (Oral)   Ht 1.715 m (5' 7.5\")   Wt 72.6 kg (160 lb)   LMP 06/26/2014   BMI 24.69 kg/m      Constitutional- No acute distress, well nourished, non-toxic  Eyes: Anicteric, no injection.  PERRL  ENT:  Normocephalic, atraumatic, Nose midline, moist mucus membranes  Neck - supple, no LAD  Respiratory- Clear to auscultation bilaterally, good inspiratory effort  Cardiovascular - Heart RRR, no lift's, thrills, murmurs, rubs, or gallop.  No peripheral edema.  No clubbing.  Neuro - No focal neuro deficits, Alert and oriented x 3  Psych: Appropriate mood and affect  Musculoskeletal: Normal gait, symmetric strength.  FROM upper and lower extremities.   strength equal bilateral upper extremities.  Sensation intact.  Skin: Warm, Dry; 2.5 cm mass left medial elbow over medial epicondyle.  Superficial and mobile with ecchymotic tinge.  No surrounding erythema    Assessment:  1. Traumatic hematoma of elbow, initial encounter      Plan:   Ms. Riggs presents for evaluation of left elbow hematoma.  She was injured while at work as a  11/2019.  She noted the area was swollen shortly after which has gradually decreased in size.  She had outside ultrasound showing hematoma.  I discussed the nature of hematomas with the patient.  " This appears to be relatively asymptomatic at this time, and I expect this to resolve with conservative measures of observation and gentle self massage.  I counseled her if it persists, we can certainly excise this in office.  She is agreeable to a further period of observation with repeat exam and potential excision at later date.    Jerson Orozco, DO on 1/2/2020 at 1:50 PM

## 2020-01-02 NOTE — PATIENT INSTRUCTIONS
Per physician instructions.    If you have questions or concerns on any instructions given to you by your provider today or if you need to schedule an appointment, you can reach us at [phone].    Thank you!

## 2020-01-08 ENCOUNTER — TRANSFERRED RECORDS (OUTPATIENT)
Dept: HEALTH INFORMATION MANAGEMENT | Facility: CLINIC | Age: 55
End: 2020-01-08

## 2020-01-10 ENCOUNTER — VIRTUAL VISIT (OUTPATIENT)
Dept: FAMILY MEDICINE | Facility: CLINIC | Age: 55
End: 2020-01-10
Payer: OTHER MISCELLANEOUS

## 2020-01-10 DIAGNOSIS — H53.143 PHOTOPHOBIA OF BOTH EYES: ICD-10-CM

## 2020-01-10 DIAGNOSIS — R42 VERTIGO: Primary | ICD-10-CM

## 2020-01-10 PROCEDURE — 99442 ZZC PHYSICIAN TELEPHONE EVALUATION 11-20 MIN: CPT | Performed by: NURSE PRACTITIONER

## 2020-01-10 NOTE — PROGRESS NOTES
"Maryann Riggs is a 54 year old female who is being evaluated via a billable telephone visit.      The patient has been notified of following:     \"This telephone visit will be conducted via a call between you and your physician/provider. We have found that certain health care needs can be provided without the need for a physical exam.  This service lets us provide the care you need with a short phone conversation.  If a prescription is necessary we can send it directly to your pharmacy.  If lab work is needed we can place an order for that and you can then stop by our lab to have the test done at a later time.    If during the course of the call the physician/provider feels a telephone visit is not appropriate, you will not be charged for this service.\"     Consent has been obtained for this service by 1 care team member: yes. See the scanned image in the medical record.    Maryann Riggs complains of    Chief Complaint   Patient presents with     Fall     No results found for any visits on 01/10/20.  I have reviewed and updated the patient's Past Medical History, Social History, Family History and Medication List.    ALLERGIES  Desyrel [trazodone hcl] and Lamictal [lamotrigine]    [facility] MA  (MA signature)    Additional provider notes:       Assessment/Plan:  (R42) Vertigo  (primary encounter diagnosis)  Comment: *Patient continues to have vertigo unable to perform duties of her job she will be off of the next 4 weeks until we can see if vertigo resolves she will also have appointment with neuro-ophthalmology due to the photophobia.  Patient is to continue with her physical therapy  Plan: NEUROLOGY ADULT REFERRAL      (H53.143) Photophobia of both eyes  Comment: Patient continues to have vertigo unable to perform duties of her job she will be off of the next 4 weeks until we can see if vertigo resolves she will also have appointment with neuro-ophthalmology due to the photophobia.  Patient is to " continue with her physical therapy  Plan: NEUROLOGY ADULT REFERRAL          I have reviewed the note as documented above.  This accurately captures the substance of my conversation with the patient.      Total time of call between patient and provider was 20 minutes     Debbie Vila CNP

## 2020-01-10 NOTE — LETTER
[facility]  [address] [place] [address]  Phone: [phone]  Fax: [phone]    January 10, 2020        Maryann Riggs  [address] [facility] [address]           To whom it may concern:     RE: Maryann Riggs     Patient was seen and treated today at our clinic and missed work.     Patient will be off of work for the next 4 weeks.  Will have a reevaluation at that time for further work restrictions        Please contact me for questions or concerns.        Sincerely,           CONTRERAS Sales CNP

## 2020-01-10 NOTE — LETTER
[facility]  [address] [place] [address]  Phone: [phone]  Fax: [phone]    January 10, 2020        Maryann GRAFF Oneil  [address] [facility] [place] [address]          To whom it may concern:    RE: Maryann S Oneil    Was unable to contact the neuro ophthalmologist that Tessy would like you.  We will need to contact her on Monday and I will obtain the name and forward on a referral so that he can be seen in the [place] location.    Please contact me for questions or concerns.      Sincerely,        CONTRERAS Sales CNP

## 2020-01-20 ENCOUNTER — TELEPHONE (OUTPATIENT)
Dept: FAMILY MEDICINE | Facility: CLINIC | Age: 55
End: 2020-01-20

## 2020-01-20 DIAGNOSIS — H53.143 PHOTOPHOBIA OF BOTH EYES: Primary | ICD-10-CM

## 2020-01-20 NOTE — TELEPHONE ENCOUNTER
GHULAM Still from [facility] in [facility] [phone]. Recommended pt to have a Neuro Opthamolgy appt due to concussion.  [facility] [phone]  [facility]

## 2020-01-21 ENCOUNTER — TRANSFERRED RECORDS (OUTPATIENT)
Dept: HEALTH INFORMATION MANAGEMENT | Facility: CLINIC | Age: 55
End: 2020-01-21

## 2020-01-21 ENCOUNTER — TELEPHONE (OUTPATIENT)
Dept: FAMILY MEDICINE | Facility: CLINIC | Age: 55
End: 2020-01-21

## 2020-01-21 ENCOUNTER — OFFICE VISIT (OUTPATIENT)
Dept: SURGERY | Facility: CLINIC | Age: 55
End: 2020-01-21
Payer: OTHER MISCELLANEOUS

## 2020-01-21 ENCOUNTER — HOSPITAL ENCOUNTER (OUTPATIENT)
Dept: PHYSICAL THERAPY | Facility: CLINIC | Age: 55
Setting detail: THERAPIES SERIES
End: 2020-01-21
Attending: NURSE PRACTITIONER
Payer: OTHER MISCELLANEOUS

## 2020-01-21 VITALS
SYSTOLIC BLOOD PRESSURE: 129 MMHG | HEART RATE: 76 BPM | DIASTOLIC BLOOD PRESSURE: 77 MMHG | BODY MASS INDEX: 24.25 KG/M2 | WEIGHT: 160 LBS | TEMPERATURE: 97.9 F | HEIGHT: 68 IN | RESPIRATION RATE: 16 BRPM

## 2020-01-21 DIAGNOSIS — R22.32 ARM MASS, LEFT: Primary | ICD-10-CM

## 2020-01-21 PROCEDURE — 88304 TISSUE EXAM BY PATHOLOGIST: CPT | Performed by: SURGERY

## 2020-01-21 PROCEDURE — 97110 THERAPEUTIC EXERCISES: CPT | Mod: GP | Performed by: PHYSICAL THERAPIST

## 2020-01-21 PROCEDURE — 24075 EXC ARM/ELBOW LES SC < 3 CM: CPT | Mod: LT | Performed by: SURGERY

## 2020-01-21 PROCEDURE — 97140 MANUAL THERAPY 1/> REGIONS: CPT | Mod: GP | Performed by: PHYSICAL THERAPIST

## 2020-01-21 ASSESSMENT — MIFFLIN-ST. JEOR: SCORE: 1356.32

## 2020-01-21 NOTE — TELEPHONE ENCOUNTER
Notify patient that I have placed that referral for her based on the recommendations of her physical  Therapist  To [facility] [phone].    Patient can contact them to set up an appointment.\    Debbie Vila CNP

## 2020-01-21 NOTE — NURSING NOTE
"Chief Complaint   Patient presents with     RECHECK     check hematoma of the elbow       Initial /77 (BP Location: Right arm, Patient Position: Chair, Cuff Size: Adult Regular)   Pulse 76   Temp 97.9  F (36.6  C) (Tympanic)   Resp 16   Ht 1.715 m (5' 7.5\")   Wt 72.6 kg (160 lb)   LMP 06/26/2014   BMI 24.69 kg/m   Estimated body mass index is 24.69 kg/m  as calculated from the following:    Height as of this encounter: 1.715 m (5' 7.5\").    Weight as of this encounter: 72.6 kg (160 lb).  Medications and allergies reviewed.    Herman RAMOS CMA (AAMA)    "

## 2020-01-21 NOTE — TELEPHONE ENCOUNTER
Left message on identified voice mail that referral has been placed as recommended by he physical therapist. She can call to schedule apt.   \  Referral faxed to [phone]  [facility].

## 2020-01-21 NOTE — PATIENT INSTRUCTIONS
Procedural Follow-up care   Most wounds heal within 10 days. But an infection may sometimes occur despite proper treatment. Be sure to check the wound daily for the signs of infection listed below. Stitches should be taken out within 7 to14 days (please schedule at the  before you leave.)  You may have surgical tape closures. If these have not fallen off after 7 days, you can remove them yourself unless you were told otherwise.  When to seek medical advice  Call your healthcare provider right away if any of these occur:    Pain in the wound gets worse    Redness, swelling, or pus coming from the wound    Fever of 100.4 F (38 C) or higher, or as directed by your provider    Stitches come apart or fall out, or surgical tape falls off before 5 days    The wound edges reopen    Numb feeling that doesn t go away by the time stitches are removed      Please call our office at [phone] with any questions or concerns.           If you have questions or concerns on any instructions given to you by your provider today or if you need to schedule an appointment, you can reach us at [phone].    Thank you!

## 2020-01-21 NOTE — LETTER
1/21/2020         RE: Maryann Riggs  [address] [place] [address]        Dear Colleague,    Thank you for referring your patient, Maryann Riggs, to the [facility]. Please see a copy of my visit note below.    Procedure Date: 01/21/20     PREOPERATIVE DIAGNOSIS: Left arm mass  POSTOPERATIVE DIAGNOSIS: Same     PROCEDURE: Excision of left arm     ATTENDING SURGEON: Jerson Orozco DO    ESTIMATED BLOOD LOSS: minimal    ANESTHESIA: Local     INDICATIONS FOR PROCEDURE: : Maryann Riggs presents with a history of an enlarging, tender left arm mass on the medial aspect, superficial and quite mobile.  Patient stated she feels it is causing numbness in her hand after driving for prolonged periods of time in ulnar distribution of her fingers.  Pre-procedure exam shows no neurologic deficit with intact radial, ulnar, median sensory and motor distributions.  I advised her this is likely not contributing to her symptoms however she feels it is related to her injury and would like this excised regardless and will  Likely have no impact on her symptoms. After discussion was held with the patient regarding indications, risks, benefits and alternatives, benefits, and risks, her questions were addressed and she understood and wished to proceed. Specific risks discussed included bleeding, infection, seroma, need for additional treatment, injury to adjacent structures. nontherapeutic intervention, wound complication (such as dehiscence), recurrence, and rare complications related to surgery and/or anesthesia such as venous thromboembolism and cardiorespiratory complications.     PROCEDURE: After informed consent was obtained, the patient was brought to the procedure room and placed in the supine position on the procedure Room table. Anesthesia was then administered. The surgical site was then prepped and draped in the typical sterile fashion. A time-out was performed to verify patient and  procedure.      Local anesthetic was delivered, and an incision was carried out over the mass on the medial aspect of her arm just distal to the antecubital fossa.  There was a mobile lipomatous mass just deep to the dermis which readily produced into the field with gentle pressure of the surrounding tissue.  After this was delivered, the base was sharply excised. Total size excised measured 2.0 cm x 1.5 cm x 1.0 cm (L x W X D)  Hemostasis was assured.      The subcutaneous tissue was closed in layers using interrupted 3-0 Vicryl suture, and skin was closed using a subcuticular suture of 4-0 Monocryl. The skin was cleansed and dried, before administration of sterile glue.      The patient tolerated the procedure well.    Jerson Orozco DO on 1/21/2020 at 11:24 AM      Again, thank you for allowing me to participate in the care of your patient.        Sincerely,        Jerson Orozco DO

## 2020-01-21 NOTE — PROGRESS NOTES
Procedure Date: 01/21/20     PREOPERATIVE DIAGNOSIS: Left arm mass  POSTOPERATIVE DIAGNOSIS: Same     PROCEDURE: Excision of left arm     ATTENDING SURGEON: Jerson Orozco DO    ESTIMATED BLOOD LOSS: minimal    ANESTHESIA: Local     INDICATIONS FOR PROCEDURE: : Maryann Riggs presents with a history of an enlarging, tender left arm mass on the medial aspect, superficial and quite mobile.  Patient stated she feels it is causing numbness in her hand after driving for prolonged periods of time in ulnar distribution of her fingers.  Pre-procedure exam shows no neurologic deficit with intact radial, ulnar, median sensory and motor distributions.  I advised her this is likely not contributing to her symptoms however she feels it is related to her injury and would like this excised regardless and will  Likely have no impact on her symptoms. After discussion was held with the patient regarding indications, risks, benefits and alternatives, benefits, and risks, her questions were addressed and she understood and wished to proceed. Specific risks discussed included bleeding, infection, seroma, need for additional treatment, injury to adjacent structures. nontherapeutic intervention, wound complication (such as dehiscence), recurrence, and rare complications related to surgery and/or anesthesia such as venous thromboembolism and cardiorespiratory complications.     PROCEDURE: After informed consent was obtained, the patient was brought to the procedure room and placed in the supine position on the procedure Room table. Anesthesia was then administered. The surgical site was then prepped and draped in the typical sterile fashion. A time-out was performed to verify patient and procedure.      Local anesthetic was delivered, and an incision was carried out over the mass on the medial aspect of her arm just distal to the antecubital fossa.  There was a mobile lipomatous mass just deep to the dermis which readily produced  into the field with gentle pressure of the surrounding tissue.  After this was delivered, the base was sharply excised. Total size excised measured 2.0 cm x 1.5 cm x 1.0 cm (L x W X D)  Hemostasis was assured.      The subcutaneous tissue was closed in layers using interrupted 3-0 Vicryl suture, and skin was closed using a subcuticular suture of 4-0 Monocryl. The skin was cleansed and dried, before administration of sterile glue.      The patient tolerated the procedure well.    Jerson Orozco DO on 1/21/2020 at 11:24 AM

## 2020-01-21 NOTE — TELEPHONE ENCOUNTER
[facility] Rehab Progress note Signed by Debbie Vila and faxed back . Sent to scan

## 2020-01-24 LAB — COPATH REPORT: NORMAL

## 2020-02-04 ENCOUNTER — TRANSFERRED RECORDS (OUTPATIENT)
Dept: HEALTH INFORMATION MANAGEMENT | Facility: CLINIC | Age: 55
End: 2020-02-04

## 2020-02-05 ENCOUNTER — TRANSFERRED RECORDS (OUTPATIENT)
Dept: HEALTH INFORMATION MANAGEMENT | Facility: CLINIC | Age: 55
End: 2020-02-05

## 2020-02-06 ENCOUNTER — TRANSFERRED RECORDS (OUTPATIENT)
Dept: HEALTH INFORMATION MANAGEMENT | Facility: CLINIC | Age: 55
End: 2020-02-06

## 2020-02-07 ENCOUNTER — TELEPHONE (OUTPATIENT)
Dept: FAMILY MEDICINE | Facility: CLINIC | Age: 55
End: 2020-02-07

## 2020-02-07 ENCOUNTER — TRANSFERRED RECORDS (OUTPATIENT)
Dept: HEALTH INFORMATION MANAGEMENT | Facility: CLINIC | Age: 55
End: 2020-02-07

## 2020-02-07 ENCOUNTER — OFFICE VISIT (OUTPATIENT)
Dept: FAMILY MEDICINE | Facility: CLINIC | Age: 55
End: 2020-02-07
Payer: OTHER MISCELLANEOUS

## 2020-02-07 VITALS
BODY MASS INDEX: 24.4 KG/M2 | RESPIRATION RATE: 18 BRPM | TEMPERATURE: 97.9 F | HEIGHT: 68 IN | SYSTOLIC BLOOD PRESSURE: 128 MMHG | HEART RATE: 84 BPM | DIASTOLIC BLOOD PRESSURE: 80 MMHG | WEIGHT: 161 LBS

## 2020-02-07 DIAGNOSIS — M54.2 NECK PAIN: ICD-10-CM

## 2020-02-07 DIAGNOSIS — H81.13 BENIGN PAROXYSMAL POSITIONAL VERTIGO DUE TO BILATERAL VESTIBULAR DISORDER: ICD-10-CM

## 2020-02-07 DIAGNOSIS — G47.09 OTHER INSOMNIA: ICD-10-CM

## 2020-02-07 DIAGNOSIS — M54.12 CERVICAL RADICULOPATHY: ICD-10-CM

## 2020-02-07 DIAGNOSIS — M26.609 TMJ (TEMPOROMANDIBULAR JOINT SYNDROME): ICD-10-CM

## 2020-02-07 DIAGNOSIS — R42 VERTIGO: Primary | ICD-10-CM

## 2020-02-07 PROCEDURE — 99214 OFFICE O/P EST MOD 30 MIN: CPT | Performed by: NURSE PRACTITIONER

## 2020-02-07 RX ORDER — CYCLOBENZAPRINE HCL 10 MG
TABLET ORAL
Qty: 90 TABLET | Refills: 4 | Status: SHIPPED | OUTPATIENT
Start: 2020-02-07 | End: 2020-07-27 | Stop reason: SINTOL

## 2020-02-07 RX ORDER — MECLIZINE HYDROCHLORIDE 25 MG/1
25 TABLET ORAL 3 TIMES DAILY PRN
Qty: 20 TABLET | Refills: 0 | Status: SHIPPED | OUTPATIENT
Start: 2020-02-07 | End: 2020-03-31

## 2020-02-07 RX ORDER — HYDROCODONE BITARTRATE AND ACETAMINOPHEN 5; 325 MG/1; MG/1
1 TABLET ORAL DAILY
Qty: 30 TABLET | Refills: 0 | Status: SHIPPED | OUTPATIENT
Start: 2020-02-07 | End: 2020-03-31

## 2020-02-07 RX ORDER — ZOLPIDEM TARTRATE 5 MG/1
5 TABLET ORAL
Qty: 90 TABLET | Refills: 0 | Status: SHIPPED | OUTPATIENT
Start: 2020-02-07 | End: 2020-07-01

## 2020-02-07 ASSESSMENT — MIFFLIN-ST. JEOR: SCORE: 1360.85

## 2020-02-07 NOTE — PROGRESS NOTES
Aaron Riggs is a 54 year old female who presents to clinic today for the following health issues:    HPI   Patient is here to discuss returning to work.    Patient Active Problem List   Diagnosis     Raynaud's disease     TMJ (temporomandibular joint syndrome)     CARDIOVASCULAR SCREENING; LDL GOAL LESS THAN 160     PTSD (post-traumatic stress disorder)     Muscle tension headache     Esophageal abnormality     Episodic mood disorder (H)     Esophageal stricture     Sprain of MCP joint of hand     Rotator cuff injury; bilateral repairs 2005     Primary generalized (osteo)arthritis     SI (sacroiliac) joint dysfunction; see details, pain management contract     Chronic bilateral low back pain without sciatica     ERRONEOUS ENCOUNTER--DISREGARD     Past Surgical History:   Procedure Laterality Date     MAMMOPLASTY REDUCTION       OTHER SURGICAL HISTORY      s/p left knee arthroscopinc surgery     ROTATOR CUFF REPAIR RT/LT  2005     SMALL BOWEL RESECTION       TUBAL LIGATION         Social History     Tobacco Use     Smoking status: Never Smoker     Smokeless tobacco: Never Used   Substance Use Topics     Alcohol use: Yes     Comment: scoial     Family History   Problem Relation Age of Onset     Unknown/Adopted Mother      Unknown/Adopted Father      Unknown/Adopted Maternal Grandmother      Unknown/Adopted Maternal Grandfather      Unknown/Adopted Paternal Grandmother      Unknown/Adopted Paternal Grandfather          Current Outpatient Medications   Medication Sig Dispense Refill     Ascorbic Acid (VITAMIN C CR PO) Take  by mouth.       Calcium Carbonate-Vitamin D (CALTRATE 600+D) 600-400 MG-UNIT CHEW Take  by mouth.       clobetasol (TEMOVATE) 0.05 % ointment as needed. Only as needed occasionally needs after working in garden  Profile Rx: patient will contact pharmacy when needed 45 g 5     Cyanocobalamin (VITAMIN B 12 PO) Take  by mouth.       cyclobenzaprine (FLEXERIL) 10 MG tablet TAKE ONE  TABLET BY MOUTH DAILY AS NEEDED FOR MUSCLE SPASMS 90 tablet 4     ferrous sulfate (FEROSUL) 325 (65 Fe) MG tablet Take 1 tablet (325 mg) by mouth daily (with breakfast) 90 tablet 1     GLUCOSAMINE CHONDROITIN COMPLX PO Take  by mouth.       HYDROcodone-acetaminophen (NORCO) 5-325 MG tablet Take 1 tablet by mouth daily 30 tablet 0     lidocaine (XYLOCAINE) 5 % external ointment Apply dime size amount topically to painful areas (choose 2-3 per application) 3 times daily as needed. 70.88 g 2     meclizine (ANTIVERT) 25 MG tablet Take 1 tablet (25 mg) by mouth 3 times daily as needed for dizziness 20 tablet 0     melatonin 3 MG tablet Take 1 tablet by mouth nightly as needed for sleep. 30 tablet 0     naloxone (NARCAN) 4 MG/0.1ML nasal spray Spray 1 spray (4 mg) into one nostril alternating nostrils once as needed for opioid reversal every 2-3 minutes until assistance arrives 0.2 mL 0     NIFEdipine ER OSMOTIC (PROCARDIA XL) 30 MG 24 hr tablet Take 1 tablet (30 mg) by mouth daily 90 tablet 3     nitroGLYcerin (NITRO-BID) 2 % OINT ointment Place 1 inch (15 mg) onto the skin daily 30 packet 12     omeprazole (PRILOSEC) 40 MG capsule Take 1 capsule (40 mg) by mouth daily Take 30-60 minutes before a meal. 90 capsule 3     order for DME Equipment being ordered: TENS 1 Units 0     POTASSIUM CARBONATE        TYLENOL 325 MG OR TABS 2 TABLETS BY MOUTH EVERY 6 HOURS AS NEEDED       zolpidem (AMBIEN) 5 MG tablet Take 1 tablet (5 mg) by mouth nightly as needed for sleep 90 tablet 0     Allergies   Allergen Reactions     Desyrel [Trazodone Hcl]      Sig mood disturbance       Lamictal [Lamotrigine]      Recent Labs   Lab Test 11/26/19  1056 09/17/19  1419 06/06/19  1419 09/12/14 03/05/14 11/01/13  1024 08/23/13   A1C  --   --   --  5.5 6.0 5.7  --    LDL  --  99  --  81  --   --  100  100   HDL  --  84  --  73  --   --  66  66   TRIG  --  41  --  70  --   --  94  94   ALT  --   --  24 28  --   --  18   CR 0.70 0.80 0.73 0.79  --   " --  0.75   GFRESTIMATED >90 83 >90 87  --   --   --    GFRESTBLACK >90 >90 >90 100  --   --   --    POTASSIUM 3.6 3.3* 3.6 3.9  --   --  3.6   TSH  --   --   --  1.750  --   --  1.550  4.550      BP Readings from Last 3 Encounters:   02/07/20 128/80   01/21/20 129/77   01/02/20 (!) 138/98    Wt Readings from Last 3 Encounters:   02/07/20 73 kg (161 lb)   01/21/20 72.6 kg (160 lb)   01/02/20 72.6 kg (160 lb)               Reviewed and updated as needed this visit by Provider         Review of Systems   ROS COMP: Constitutional, HEENT, cardiovascular, pulmonary, GI, , musculoskeletal, neuro, skin, endocrine and psych systems are negative, except as otherwise noted.      Objective    LMP 06/26/2014   Body mass index is 24.84 kg/m .   /80 (BP Location: Right arm, Cuff Size: Adult Regular)   Pulse 84   Temp 97.9  F (36.6  C) (Tympanic)   Resp 18   Ht 1.715 m (5' 7.5\")   Wt 73 kg (161 lb)   LMP 06/26/2014   BMI 24.84 kg/m      Physical Exam   GENERAL: healthy, alert and no distress  EYES: Eyes grossly normal to inspection, PERRL and conjunctivae and sclerae normal  HENT: ear canals and TM's normal, nose and mouth without ulcers or lesions  NECK: no adenopathy, no asymmetry, masses, or scars and thyroid normal to palpation  RESP: lungs clear to auscultation - no rales, rhonchi or wheezes  CV: regular rate and rhythm, normal S1 S2, no S3 or S4, no murmur, click or rub, no peripheral edema and peripheral pulses strong  MS: no gross musculoskeletal defects noted, no edema  SKIN: no suspicious lesions or rashes  NEURO: Normal strength and tone, mentation intact and speech normal  PSYCH: mentation appears normal, affect normal/bright    Inspection: normal cervical lordosis  Tender:  left paracervical muscles, right paracervical muscles  Non-tender:  spinous processes, scapula, medial border of scapula  Range of Motion:  Full ROM of cervical spine  Strength: Full strength of all neck muscles  Special tests:  " Reproduction of radicular pattern    Tender:  lumbar spinous processes, lumbar facet joints, left para lumbar muscles, right para lumbar muscles, coccyx area   Non-tender:  thoracic spinous processes, thoracic facet joints, left parathoracic muscles, right parathoracic muscles, left SI joint, right SI joint, left sciatic notch, right sciatic notch  Range of Motion:  left lateral thoracic bending   full, right lateral thoracic bending  full, left thoracic rotation  full, right thoracic rotation  full, lumbar flexion  painful, lumbar extension  painful, left lateral lumbar bending  painful, right lateral lumbar bending  painful, left lateral lumbar rotation  painful, right lateral lumbar rotation  painful  Strength:  able to heel walk  Special tests:  negative straight leg raises    No results found for any visits on 02/07/20.          Assessment & Plan     (R42) Vertigo  (primary encounter diagnosis)  Comment: Patient continues to have unresolved vertigo unable to perform the test of her job as a result.  She does have a appointment with ophthalmology specialist for further evaluation and will continue physical therapy  Plan: Continue with physical therapy and have follow-up with specialist    (M26.609) TMJ (temporomandibular joint syndrome)  Comment: Controlled with current dose of Flexeril renewed today  Plan: cyclobenzaprine (FLEXERIL) 10 MG tablet      (G47.09) Other insomnia  Comment: Controlled with current dose of Ambien renewed today  Plan: zolpidem (AMBIEN) 5 MG tablet      (H81.13) Benign paroxysmal positional vertigo due to bilateral vestibular disorder  Comment: uncontrolled with Antivert and physical therapy will have follow-up with specialist  Plan: meclizine (ANTIVERT) 25 MG tablet      (M54.2) Neck pain  Comment: Renewed today  Plan: HYDROcodone-acetaminophen (NORCO) 5-325 MG         tablet      (M54.12) Cervical radiculopathy  Comment: Patient pain is not improving with physical therapy will  recommend follow-up with pain management specialist for further evaluation and intervention  Plan: PAIN MANAGEMENT REFERRAL       See Patient Instructions    Return in about 1 month (around 3/7/2020).    CONTRERAS Sales [facility]

## 2020-02-07 NOTE — LETTER
[facility]  [address] [place] [address]  Phone: [phone]  Fax: [phone]    February 7, 2020        Maryann Riggs  [address] [address]          To whom it may concern:    RE: Maryann Riggs    Patient was seen and treated today at our clinic and missed work.     Patient will be off of work for the next 4 weeks.  Will have a reevaluation at that time for further work restrictions        Please contact me for questions or concerns.        Sincerely,           CONTRERAS Sales CNP  Sincerely,        CONTRERAS Sales CNP

## 2020-02-10 ENCOUNTER — TELEPHONE (OUTPATIENT)
Dept: FAMILY MEDICINE | Facility: CLINIC | Age: 55
End: 2020-02-10

## 2020-02-10 DIAGNOSIS — M47.896 OTHER OSTEOARTHRITIS OF SPINE, LUMBAR REGION: ICD-10-CM

## 2020-02-10 DIAGNOSIS — M53.3 SI (SACROILIAC) JOINT DYSFUNCTION: Primary | ICD-10-CM

## 2020-02-10 DIAGNOSIS — M54.16 LUMBAR RADICULOPATHY: ICD-10-CM

## 2020-02-10 NOTE — TELEPHONE ENCOUNTER
Reason for Call: Request for an order or referral:    Order or referral being requested: PT, Gym membership and 6 acupuncture visits.      Date needed: as soon as possible    Has the patient been seen by the PCP for this problem? YES    Additional comments: Patient was seen with EJ Vila on 02/7 for these issues.  Jessica is patients  with work comp and is calling looking for the above orders for the patient.    Phone number Patient can be reached at:  Other phone number:  Phone - [phone] Fax - [phone]    Best Time:  Any    Can we leave a detailed message on this number?  YES    Call taken on 2/10/2020 at 10:17 AM by Rachel Duran      Fax [phone]

## 2020-02-10 NOTE — TELEPHONE ENCOUNTER
Pat called also. She asked that the Pain Management Referral (for Acupuncture) from 2/7/20 be faxed to Jessica Cortez and I will do that. (I also faxed the Acupuncture referral that was in Pain Management to Jessica Steve)    She also says she needs 4 more weeks of Physical therapy at [facility] in [place], [place]. This is under Work Comp.

## 2020-02-12 ENCOUNTER — TELEPHONE (OUTPATIENT)
Dept: NEUROLOGY | Facility: CLINIC | Age: 55
End: 2020-02-12

## 2020-02-12 NOTE — TELEPHONE ENCOUNTER
[facility] Call Center    Phone Message    May a detailed message be left on voicemail: yes     Reason for Call: Symptoms or Concerns     Patient home care nurse Jessica called and states the patient fell in November and had a concussion, and is having post concussion symptoms. Nurse says patient has blurry vision and vertigo, and is currently seeing a ophthalmologist for the vision and has an appt with a Neurologist for the Vertigo. Nurse would like a call back to discuss getting patient in out clinic sooner with Neurologist. Writer informed nurse of the Concussion clinic and gave the number, but Jessica would like a call back to discuss sooner appt here. Please call [phone]      Action Taken: Message routed to:  Adult Clinics: Neurology p [id number]

## 2020-02-12 NOTE — TELEPHONE ENCOUNTER
Patient scheduled at the YURI Nunes RN   [facility] Care Coordinator  [facility]

## 2020-02-13 ENCOUNTER — TELEPHONE (OUTPATIENT)
Dept: FAMILY MEDICINE | Facility: CLINIC | Age: 55
End: 2020-02-13

## 2020-02-13 DIAGNOSIS — F39 EPISODIC MOOD DISORDER (H): ICD-10-CM

## 2020-02-13 DIAGNOSIS — M54.50 CHRONIC BILATERAL LOW BACK PAIN WITHOUT SCIATICA: ICD-10-CM

## 2020-02-13 DIAGNOSIS — G89.29 CHRONIC BILATERAL LOW BACK PAIN WITHOUT SCIATICA: ICD-10-CM

## 2020-02-13 DIAGNOSIS — M54.50 LUMBAR PAIN: ICD-10-CM

## 2020-02-13 DIAGNOSIS — M53.3 SI (SACROILIAC) JOINT DYSFUNCTION: Primary | ICD-10-CM

## 2020-02-13 DIAGNOSIS — S46.009D ROTATOR CUFF INJURY, UNSPECIFIED LATERALITY, SUBSEQUENT ENCOUNTER: ICD-10-CM

## 2020-02-13 DIAGNOSIS — G44.209 MUSCLE TENSION HEADACHE: ICD-10-CM

## 2020-02-13 NOTE — TELEPHONE ENCOUNTER
Patient is calling to see if Debbie put in an order for a gym membership through her work comp. They had discussed it at her 2/7/20 visit but she knows Debbie was very busy with patient's with the flu. She said she knows her work will pay for it if it is sent in from Debbie. Please advise.    Angela [employer] [place]

## 2020-02-13 NOTE — TELEPHONE ENCOUNTER
Hermila from [facility] says Debbie signed off on a work conditioning program for Pat. They are asking if she could send an actual order to them for this. It would be under Physidal Therapy and should say Work Conditioning  This is Work Comp    Fax to [phone]

## 2020-02-17 ENCOUNTER — DOCUMENTATION ONLY (OUTPATIENT)
Dept: CARE COORDINATION | Facility: CLINIC | Age: 55
End: 2020-02-17

## 2020-02-21 NOTE — PROGRESS NOTES
OUTPATIENT PHYSICAL THERAPY DISCHARGE SUMMARY   Debbie Vila, APRN CNP 12/10/19 to 01/21/20 1000   Signing Clinician's Name / Credentials   Signing clinician's name / credentials Leslie Jasmina, PT [id number]   Session Number   Session Number 4 WC   Ortho Goal 1   Goal Identifier 1.  STG   Goal Description Pt will be able to transition sit to stand w/ increased ease and decreased reliance on UE's.  1/21/20 MET   Target Date 12/24/19   Ortho Goal 2   Goal Identifier 2.  STG   Goal Description Pt will walk w/ improved pace, decreased stiffness   Target Date 12/31/19   Ortho Goal 3   Goal Identifier 3. LTG   Goal Description Pt will be able to lift/ carry 20# w/ pain no > 3/10   Target Date 01/19/20   Ortho Goal 4   Goal Identifier 4.  LTG   Goal Description Pt will wake no > 1x/night due to LBP   Target Date 01/19/20   Ortho Goal 5   Goal Identifier 5. LTG   Goal Description Pt will be independent and consistent w/ HEP.  1/21/20 MET   Target Date 01/19/20   Subjective Report   Subjective Report Pt has been out of town.  Pt states back is sore doing work readiness--lifting some wts at [facility] when she seen for vertigo and w/ shoveling.   Pt has been hiking a lot.   LBP  8/10 since shoveling.   Pt has been doing her ex but is doing them on the floor which makes it difficult to get up due to the vertigo.  Pt looking into neuro opthamologist for the vertigo.    Objective Measures   Objective Measure R PSIS/ crest  lower,  L + FB test,  L LE  longer.    Details LROM  flex 90%   Therapeutic Procedure/exercise   Treatment Detail   Seated hip add pardeep w/ toe in/out.  Pt instructed in self mob for R post rot.   Bridges w/ YTB and arms X chest  X 15. Clam w/ YTB X 15 B.   Wall squats w/ YTB X 10 (TA sets /piriformis stretch on own)   Manual Therapy   Treatment Detail shotgun.  MET for R post rot.  MET for ERSL L5   Plan   Home program ex as above.     Plan  Discharge from physical therapy.  Pt will transition to work  conditioning   Comments   Comments Progress towards goals as noted above

## 2020-02-28 ENCOUNTER — OFFICE VISIT (OUTPATIENT)
Dept: FAMILY MEDICINE | Facility: CLINIC | Age: 55
End: 2020-02-28
Payer: OTHER MISCELLANEOUS

## 2020-02-28 VITALS
BODY MASS INDEX: 25.01 KG/M2 | DIASTOLIC BLOOD PRESSURE: 70 MMHG | RESPIRATION RATE: 18 BRPM | WEIGHT: 165 LBS | SYSTOLIC BLOOD PRESSURE: 134 MMHG | HEIGHT: 68 IN | HEART RATE: 92 BPM | TEMPERATURE: 97.9 F

## 2020-02-28 DIAGNOSIS — M54.12 CERVICAL RADICULOPATHY: ICD-10-CM

## 2020-02-28 DIAGNOSIS — R42 VERTIGO: ICD-10-CM

## 2020-02-28 DIAGNOSIS — S39.012D STRAIN OF LUMBAR REGION, SUBSEQUENT ENCOUNTER: Primary | ICD-10-CM

## 2020-02-28 PROCEDURE — 99214 OFFICE O/P EST MOD 30 MIN: CPT | Performed by: NURSE PRACTITIONER

## 2020-02-28 ASSESSMENT — MIFFLIN-ST. JEOR: SCORE: 1374

## 2020-02-28 NOTE — PROGRESS NOTES
Subjective     Maryann Riggs is a 55 year old female who presents to clinic today for the following health issues:    HPI   Patient is here to discuss returning to work. She thinks that she is getting better but she is still having some back pain.      Patient Active Problem List   Diagnosis     Raynaud's disease     TMJ (temporomandibular joint syndrome)     CARDIOVASCULAR SCREENING; LDL GOAL LESS THAN 160     PTSD (post-traumatic stress disorder)     Muscle tension headache     Esophageal abnormality     Episodic mood disorder (H)     Esophageal stricture     Sprain of MCP joint of hand     Rotator cuff injury; bilateral repairs 2005     Primary generalized (osteo)arthritis     SI (sacroiliac) joint dysfunction; see details, pain management contract     Chronic bilateral low back pain without sciatica     ERRONEOUS ENCOUNTER--DISREGARD     Past Surgical History:   Procedure Laterality Date     MAMMOPLASTY REDUCTION       OTHER SURGICAL HISTORY      s/p left knee arthroscopinc surgery     ROTATOR CUFF REPAIR RT/LT  2005     SMALL BOWEL RESECTION       TUBAL LIGATION         Social History     Tobacco Use     Smoking status: Never Smoker     Smokeless tobacco: Never Used   Substance Use Topics     Alcohol use: Yes     Comment: scoial     Family History   Problem Relation Age of Onset     Unknown/Adopted Mother      Unknown/Adopted Father      Unknown/Adopted Maternal Grandmother      Unknown/Adopted Maternal Grandfather      Unknown/Adopted Paternal Grandmother      Unknown/Adopted Paternal Grandfather          Current Outpatient Medications   Medication Sig Dispense Refill     Ascorbic Acid (VITAMIN C CR PO) Take  by mouth.       Calcium Carbonate-Vitamin D (CALTRATE 600+D) 600-400 MG-UNIT CHEW Take  by mouth.       clobetasol (TEMOVATE) 0.05 % ointment as needed. Only as needed occasionally needs after working in garden  Profile Rx: patient will contact pharmacy when needed 45 g 5     Cyanocobalamin (VITAMIN  B 12 PO) Take  by mouth.       cyclobenzaprine (FLEXERIL) 10 MG tablet TAKE ONE TABLET BY MOUTH DAILY AS NEEDED FOR MUSCLE SPASMS 90 tablet 4     ferrous sulfate (FEROSUL) 325 (65 Fe) MG tablet Take 1 tablet (325 mg) by mouth daily (with breakfast) 90 tablet 1     GLUCOSAMINE CHONDROITIN COMPLX PO Take  by mouth.       HYDROcodone-acetaminophen (NORCO) 5-325 MG tablet Take 1 tablet by mouth daily 30 tablet 0     lidocaine (XYLOCAINE) 5 % external ointment Apply dime size amount topically to painful areas (choose 2-3 per application) 3 times daily as needed. 70.88 g 2     meclizine (ANTIVERT) 25 MG tablet Take 1 tablet (25 mg) by mouth 3 times daily as needed for dizziness 20 tablet 0     melatonin 3 MG tablet Take 1 tablet by mouth nightly as needed for sleep. 30 tablet 0     naloxone (NARCAN) 4 MG/0.1ML nasal spray Spray 1 spray (4 mg) into one nostril alternating nostrils once as needed for opioid reversal every 2-3 minutes until assistance arrives 0.2 mL 0     NIFEdipine ER OSMOTIC (PROCARDIA XL) 30 MG 24 hr tablet Take 1 tablet (30 mg) by mouth daily 90 tablet 3     nitroGLYcerin (NITRO-BID) 2 % OINT ointment Place 1 inch (15 mg) onto the skin daily 30 packet 12     omeprazole (PRILOSEC) 40 MG capsule Take 1 capsule (40 mg) by mouth daily Take 30-60 minutes before a meal. 90 capsule 3     order for DME Gym membership 1 Units 0     order for DME Equipment being ordered: TENS 1 Units 0     POTASSIUM CARBONATE        TYLENOL 325 MG OR TABS 2 TABLETS BY MOUTH EVERY 6 HOURS AS NEEDED       zolpidem (AMBIEN) 5 MG tablet Take 1 tablet (5 mg) by mouth nightly as needed for sleep 90 tablet 0     Allergies   Allergen Reactions     Desyrel [Trazodone Hcl]      Sig mood disturbance       Lamictal [Lamotrigine]      Recent Labs   Lab Test 11/26/19  1056 09/17/19  1419 06/06/19  1419 09/12/14 03/05/14 11/01/13  1024 08/23/13   A1C  --   --   --  5.5 6.0 5.7  --    LDL  --  99  --  81  --   --  100  100   HDL  --  84  --  73   "--   --  66  66   TRIG  --  41  --  70  --   --  94  94   ALT  --   --  24 28  --   --  18   CR 0.70 0.80 0.73 0.79  --   --  0.75   GFRESTIMATED >90 83 >90 87  --   --   --    GFRESTBLACK >90 >90 >90 100  --   --   --    POTASSIUM 3.6 3.3* 3.6 3.9  --   --  3.6   TSH  --   --   --  1.750  --   --  1.550  4.550      BP Readings from Last 3 Encounters:   02/28/20 134/70   02/07/20 128/80   01/21/20 129/77    Wt Readings from Last 3 Encounters:   02/28/20 74.8 kg (165 lb)   02/07/20 73 kg (161 lb)   01/21/20 72.6 kg (160 lb)               Reviewed and updated as needed this visit by Provider         Review of Systems   ROS COMP: Constitutional, HEENT, cardiovascular, pulmonary, gi and gu systems are negative, except as otherwise noted.      Objective    LMP 06/26/2014   Body mass index is 25.46 kg/m .   /70 (BP Location: Right arm, Cuff Size: Adult Regular)   Pulse 92   Temp 97.9  F (36.6  C) (Tympanic)   Resp 18   Ht 1.715 m (5' 7.5\")   Wt 74.8 kg (165 lb)   LMP 06/26/2014   BMI 25.46 kg/m      Physical Exam   GENERAL: healthy, alert and no distress  EYES: Eyes grossly normal to inspection, PERRL and conjunctivae and sclerae normal  HENT: ear canals and TM's normal, nose and mouth without ulcers or lesions  NECK: no adenopathy, no asymmetry, masses, or scars and thyroid normal to palpation  RESP: lungs clear to auscultation - no rales, rhonchi or wheezes  BREAST: normal without masses, tenderness or nipple discharge and no palpable axillary masses or adenopathy  CV: regular rate and rhythm, normal S1 S2, no S3 or S4, no murmur, click or rub, no peripheral edema and peripheral pulses strong  ABDOMEN: soft, nontender, no hepatosplenomegaly, no masses and bowel sounds normal  MS: no gross musculoskeletal defects noted, no edema  SKIN: no suspicious lesions or rashes  NEURO: Normal strength and tone, mentation intact and speech normal  PSYInspection: normal cervical lordosis  Tender:  left paracervical " muscles, right paracervical muscles  Non-tender:  spinous processes, scapula, medial border of scapula  Range of Motion:  Full ROM of cervical spine  Strength: Full strength of all neck muscles  Special tests:  Reproduction of radicular pattern     Tender:  lumbar spinous processes, lumbar facet joints, left para lumbar muscles, right para lumbar muscles, coccyx area   Non-tender:  thoracic spinous processes, thoracic facet joints, left parathoracic muscles, right parathoracic muscles, left SI joint, right SI joint, left sciatic notch, right sciatic notch  Range of Motion:  left lateral thoracic bending   full, right lateral thoracic bending  full, left thoracic rotation  full, right thoracic rotation  full, lumbar flexion  painful, lumbar extension  painful, left lateral lumbar bending  painful, right lateral lumbar bending  painful, left lateral lumbar rotation  painful, right lateral lumbar rotation  painful  Strength:  able to heel walk  Special tests:  negative straight leg raises   CH: mentation appears normal, affect normal/bright    No results found for any visits on 02/28/20.       Assessment & Plan     (S39.012D) Strain of lumbar region, subsequent encounter  (primary encounter diagnosis)  Comment: Patient is progressed through her physical therapy is after lifting greater than 50 pounds.  Patient can go back to work with light duty but no driving  Plan: order for DME      (M54.12) Cervical radiculopathy  Comment: Improving  Plan: order for DME           (R42) Vertigo  Comment: *Patient continues to have vertigo.  Is seeing an ophthalmologist specialist for physical therapy will continue with her treatment.  Patient can go back to light duty work with lifting no greater than 20 pounds to 30 pounds but unable to drive due to vertigo and photosensitivity  Plan: We will continue with her therapy treatments will have follow-up in 4 weeks for further work restrictions      Return in about 4 weeks (around  3/27/2020) for Workmen's Comp. follow-up.    CONTRERAS Sales CNP  [facility]

## 2020-02-28 NOTE — LETTER
[facility]  [address] [place] [id number]  Phone: [phone]  Fax: [phone]    February 28, 2020        Maryann Riggs  [address] [facility] [address]          To whom it may concern:    RE: Maryann Riggs    Patient may return to work 3/2/2020 with the following:  Light duty-unable to lift more than 30 pounds.    She can work 2 - 4 hour days as it accomodates her treatment programs which include Work conditions,  Opthalmology training and Neurological follow-up.    Adequate lighting should be provided to prevent headaches.    Restrictions effective for the next 4 weeks until further evaluation.     Please contact me for questions or concerns.      Sincerely,        CONTRERAS Sales CNP

## 2020-03-04 ENCOUNTER — TELEPHONE (OUTPATIENT)
Dept: FAMILY MEDICINE | Facility: CLINIC | Age: 55
End: 2020-03-04

## 2020-03-17 ENCOUNTER — TRANSFERRED RECORDS (OUTPATIENT)
Dept: HEALTH INFORMATION MANAGEMENT | Facility: CLINIC | Age: 55
End: 2020-03-17

## 2020-03-17 ENCOUNTER — TELEPHONE (OUTPATIENT)
Dept: FAMILY MEDICINE | Facility: CLINIC | Age: 55
End: 2020-03-17

## 2020-03-17 ENCOUNTER — TELEPHONE (OUTPATIENT)
Dept: PHYSICAL MEDICINE AND REHAB | Facility: CLINIC | Age: 55
End: 2020-03-17

## 2020-03-17 DIAGNOSIS — R42 VERTIGO: Primary | ICD-10-CM

## 2020-03-17 NOTE — TELEPHONE ENCOUNTER
Reason for Call:  Other     Detailed comments: Patient needs a neuro psych eval, and her work to be out off work note ends 03/28/20.  She has an appt 03/31/20 - please extent note and order the eval    Phone Number Patient can be reached at: Home number on file [phone] (home) or Work number on file:   Best Time:     Can we leave a detailed message on this number? YES    Call taken on 3/17/2020 at 2:20 PM by Jessica Bro

## 2020-03-17 NOTE — LETTER
[facility]  [address] [place] [id number]  Phone: [phone]  Fax: [phone]    March 20, 2020        Maryann Riggs  [address] [facility] [address]          To whom it may concern:    RE: Maryann Riggs    Patient was seen and treated at our clinic.    Will be off of work until 03/31/2020     Please contact me for questions or concerns.      Sincerely,        CONTRERAS Sales CNP

## 2020-03-17 NOTE — TELEPHONE ENCOUNTER
VML along with contact information to reschedule appointment or schedule a phone visit due to [facility] COVID 19 recommendations.

## 2020-03-18 ENCOUNTER — TELEPHONE (OUTPATIENT)
Dept: FAMILY MEDICINE | Facility: CLINIC | Age: 55
End: 2020-03-18

## 2020-03-20 NOTE — TELEPHONE ENCOUNTER
Notify patient letter will will be mailed to her and I have sent in that referral for angie Vila CNP

## 2020-03-24 ENCOUNTER — TELEPHONE (OUTPATIENT)
Dept: PHYSICAL MEDICINE AND REHAB | Facility: CLINIC | Age: 55
End: 2020-03-24

## 2020-03-24 NOTE — TELEPHONE ENCOUNTER
Patient inquiring about getting in sooner for her appointment as she would like to get back to work. She is a  and her license was pulled from [facility] due to her brain Injury. 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 Encouraged patient to call [facility] on what their requirements would be. Educated patient on [employer] assessment and Training services. She stated she would discuss this with her [employer].

## 2020-03-27 ENCOUNTER — TRANSFERRED RECORDS (OUTPATIENT)
Dept: HEALTH INFORMATION MANAGEMENT | Facility: CLINIC | Age: 55
End: 2020-03-27

## 2020-03-27 ENCOUNTER — TELEPHONE (OUTPATIENT)
Dept: FAMILY MEDICINE | Facility: CLINIC | Age: 55
End: 2020-03-27

## 2020-03-27 NOTE — TELEPHONE ENCOUNTER
Form signed by Gabbi Kramer in Debbie's absence and faxed to both Noemy Rodriguez and Jenifer.  Form sent to scanning.    Carolin Holloway  [facility] [place]

## 2020-03-31 ENCOUNTER — VIRTUAL VISIT (OUTPATIENT)
Dept: FAMILY MEDICINE | Facility: CLINIC | Age: 55
End: 2020-03-31
Payer: OTHER MISCELLANEOUS

## 2020-03-31 DIAGNOSIS — H81.13 BENIGN PAROXYSMAL POSITIONAL VERTIGO DUE TO BILATERAL VESTIBULAR DISORDER: ICD-10-CM

## 2020-03-31 DIAGNOSIS — M54.2 NECK PAIN: ICD-10-CM

## 2020-03-31 PROCEDURE — 99214 OFFICE O/P EST MOD 30 MIN: CPT | Mod: TEL | Performed by: NURSE PRACTITIONER

## 2020-03-31 RX ORDER — HYDROCODONE BITARTRATE AND ACETAMINOPHEN 5; 325 MG/1; MG/1
1 TABLET ORAL DAILY
Qty: 30 TABLET | Refills: 0 | Status: SHIPPED | OUTPATIENT
Start: 2020-03-31 | End: 2020-04-29

## 2020-03-31 RX ORDER — MECLIZINE HYDROCHLORIDE 25 MG/1
25 TABLET ORAL 3 TIMES DAILY PRN
Qty: 20 TABLET | Refills: 0 | Status: SHIPPED | OUTPATIENT
Start: 2020-03-31 | End: 2021-02-02

## 2020-03-31 NOTE — LETTER
[facility]  [address] [place] [address]  Phone: [phone]  Fax: [phone]    March 31, 2020        Maryann Riggs  [address] [facility] [address]          To whom it may concern:     RE: Maryann Riggs     Patient may return to work 3/31/2020 with the following:  Light duty-unable to lift more than 30 pounds.     She can work 2 - 4 hour days as it accomodates her treatment programs which include Work conditions,  Opthalmology training and Neurological follow-up.     Adequate lighting should be provided to prevent headaches.     Restrictions effective for the next 4 weeks until further evaluation.      Please contact me for questions or concerns.        Sincerely,           CONTRERAS Sales CNP

## 2020-03-31 NOTE — NURSING NOTE
Letter faxed to:    Altagracia Christian: [phone]    Tonny Casillas  Attn: [facility]:  [phone]    [facility]: [phone]

## 2020-03-31 NOTE — PROGRESS NOTES
"Subjective     Maryann Riggs is a 55 year old female who is being evaluated via a billable telephone visit.      The patient has been notified of following:     \"This telephone visit will be conducted via a call between you and your physician/provider. We have found that certain health care needs can be provided without the need for a physical exam.  This service lets us provide the care you need with a short phone conversation.  If a prescription is necessary we can send it directly to your pharmacy.  If lab work is needed we can place an order for that and you can then stop by our lab to have the test done at a later time.    If during the course of the call the physician/provider feels a telephone visit is not appropriate, you will not be charged for this service.\"     Patient has given verbal consent for Telephone visit?  Yes    Maryann Riggs complains of No chief complaint on file.      ALLERGIES  Desyrel [trazodone hcl] and Lamictal [lamotrigine]    Patient is following up with her work comp case.          Patient Active Problem List   Diagnosis     Raynaud's disease     TMJ (temporomandibular joint syndrome)     CARDIOVASCULAR SCREENING; LDL GOAL LESS THAN 160     PTSD (post-traumatic stress disorder)     Muscle tension headache     Esophageal abnormality     Episodic mood disorder (H)     Esophageal stricture     Sprain of MCP joint of hand     Rotator cuff injury; bilateral repairs 2005     Primary generalized (osteo)arthritis     SI (sacroiliac) joint dysfunction; see details, pain management contract     Chronic bilateral low back pain without sciatica     ERRONEOUS ENCOUNTER--DISREGARD     Past Surgical History:   Procedure Laterality Date     MAMMOPLASTY REDUCTION       OTHER SURGICAL HISTORY      s/p left knee arthroscopinc surgery     ROTATOR CUFF REPAIR RT/LT  2005     SMALL BOWEL RESECTION       TUBAL LIGATION         Social History     Tobacco Use     Smoking status: Never Smoker     " Smokeless tobacco: Never Used   Substance Use Topics     Alcohol use: Yes     Comment: scoial     Family History   Problem Relation Age of Onset     Unknown/Adopted Mother      Unknown/Adopted Father      Unknown/Adopted Maternal Grandmother      Unknown/Adopted Maternal Grandfather      Unknown/Adopted Paternal Grandmother      Unknown/Adopted Paternal Grandfather          Current Outpatient Medications   Medication Sig Dispense Refill     Ascorbic Acid (VITAMIN C CR PO) Take  by mouth.       Calcium Carbonate-Vitamin D (CALTRATE 600+D) 600-400 MG-UNIT CHEW Take  by mouth.       Cyanocobalamin (VITAMIN B 12 PO) Take  by mouth.       cyclobenzaprine (FLEXERIL) 10 MG tablet TAKE ONE TABLET BY MOUTH DAILY AS NEEDED FOR MUSCLE SPASMS 90 tablet 4     ferrous sulfate (FEROSUL) 325 (65 Fe) MG tablet Take 1 tablet (325 mg) by mouth daily (with breakfast) 90 tablet 1     GLUCOSAMINE CHONDROITIN COMPLX PO Take  by mouth.       HYDROcodone-acetaminophen (NORCO) 5-325 MG tablet Take 1 tablet by mouth daily 30 tablet 0     LANSOPRAZOLE PO        lidocaine (XYLOCAINE) 5 % external ointment Apply dime size amount topically to painful areas (choose 2-3 per application) 3 times daily as needed. 70.88 g 2     meclizine (ANTIVERT) 25 MG tablet Take 1 tablet (25 mg) by mouth 3 times daily as needed for dizziness 20 tablet 0     melatonin 3 MG tablet Take 1 tablet by mouth nightly as needed for sleep. 30 tablet 0     order for DME Equipment being ordered: TENS 1 Units 0     TYLENOL 325 MG OR TABS 2 TABLETS BY MOUTH EVERY 6 HOURS AS NEEDED       zolpidem (AMBIEN) 5 MG tablet Take 1 tablet (5 mg) by mouth nightly as needed for sleep 90 tablet 0     clobetasol (TEMOVATE) 0.05 % ointment as needed. Only as needed occasionally needs after working in garden  Profile Rx: patient will contact pharmacy when needed (Patient not taking: Reported on 3/31/2020) 45 g 5     naloxone (NARCAN) 4 MG/0.1ML nasal spray Spray 1 spray (4 mg) into one  nostril alternating nostrils once as needed for opioid reversal every 2-3 minutes until assistance arrives (Patient not taking: Reported on 3/31/2020) 0.2 mL 0     NIFEdipine ER OSMOTIC (PROCARDIA XL) 30 MG 24 hr tablet Take 1 tablet (30 mg) by mouth daily (Patient not taking: Reported on 3/31/2020) 90 tablet 3     nitroGLYcerin (NITRO-BID) 2 % OINT ointment Place 1 inch (15 mg) onto the skin daily (Patient not taking: Reported on 3/31/2020) 30 packet 12     Allergies   Allergen Reactions     Desyrel [Trazodone Hcl]      Sig mood disturbance       Lamictal [Lamotrigine]      Recent Labs   Lab Test 11/26/19  1056 09/17/19  1419 06/06/19  1419 09/12/14 03/05/14 11/01/13  1024 08/23/13   A1C  --   --   --  5.5 6.0 5.7  --    LDL  --  99  --  81  --   --  100  100   HDL  --  84  --  73  --   --  66  66   TRIG  --  41  --  70  --   --  94  94   ALT  --   --  24 28  --   --  18   CR 0.70 0.80 0.73 0.79  --   --  0.75   GFRESTIMATED >90 83 >90 87  --   --   --    GFRESTBLACK >90 >90 >90 100  --   --   --    POTASSIUM 3.6 3.3* 3.6 3.9  --   --  3.6   TSH  --   --   --  1.750  --   --  1.550  4.550      BP Readings from Last 3 Encounters:   02/28/20 134/70   02/07/20 128/80   01/21/20 129/77    Wt Readings from Last 3 Encounters:   02/28/20 74.8 kg (165 lb)   02/07/20 73 kg (161 lb)   01/21/20 72.6 kg (160 lb)                    Reviewed and updated as needed this visit by Provider         Review of Systems   ROS COMP: Constitutional, HEENT, cardiovascular, pulmonary, GI, , musculoskeletal, neuro, skin, endocrine and psych systems are negative, except as otherwise noted.       Objective   Reported vitals:  LMP 06/26/2014    healthy, alert and no distress  Psych: Alert and oriented times 3; coherent speech, normal   rate and volume, able to articulate logical thoughts, able   to abstract reason, no tangential thoughts, no hallucinations   or delusions  Her affect is stable            Assessment/Plan:  1. Neck  pain  Patient continues to have treatment with ocular medicine is unable to return to work full-time due to inability to drive due to vertigo.  Patient will continue with current physical therapy for the next 4 weeks until she is cleared by ocular medicine and vertigo and treatments have resolved her symptoms and she can drive  - HYDROcodone-acetaminophen (NORCO) 5-325 MG tablet; Take 1 tablet by mouth daily  Dispense: 30 tablet; Refill: 0    2. Benign paroxysmal positional vertigo due to bilateral vestibular disorder  Continues to have symptoms will renew her meclizine and continue with ocular  therapy and physical therapy  - meclizine (ANTIVERT) 25 MG tablet; Take 1 tablet (25 mg) by mouth 3 times daily as needed for dizziness  Dispense: 20 tablet; Refill: 0    No follow-ups on file.      Phone call duration:  21  minutes    CONTRERAS Sales CNP

## 2020-04-06 ENCOUNTER — TELEPHONE (OUTPATIENT)
Dept: PHYSICAL MEDICINE AND REHAB | Facility: CLINIC | Age: 55
End: 2020-04-06

## 2020-04-06 ENCOUNTER — VIRTUAL VISIT (OUTPATIENT)
Dept: PHYSICAL MEDICINE AND REHAB | Facility: CLINIC | Age: 55
End: 2020-04-06
Payer: OTHER MISCELLANEOUS

## 2020-04-06 DIAGNOSIS — G24.3 CERVICAL DYSTONIA: ICD-10-CM

## 2020-04-06 DIAGNOSIS — G44.309 POST-CONCUSSION HEADACHE: ICD-10-CM

## 2020-04-06 DIAGNOSIS — F07.81 POST CONCUSSION SYNDROME: ICD-10-CM

## 2020-04-06 DIAGNOSIS — G47.9 SLEEP DISTURBANCE: ICD-10-CM

## 2020-04-06 DIAGNOSIS — S06.0X0A CONCUSSION WITHOUT LOSS OF CONSCIOUSNESS, INITIAL ENCOUNTER: Primary | ICD-10-CM

## 2020-04-06 NOTE — PROGRESS NOTES
"Maryann Riggs is a 55 year old female who is being evaluated via a billable video visit.      The patient has been notified of following:     \"This video visit will be conducted via a call between you and your physician/provider. We have found that certain health care needs can be provided without the need for an in-person physical exam.  This service lets us provide the care you need with a video conversation.  If a prescription is necessary we can send it directly to your pharmacy.  If lab work is needed we can place an order for that and you can then stop by our lab to have the test done at a later time.    If during the course of the call the physician/provider feels a video visit is not appropriate, you will not be charged for this service.\"          Patient has given verbal consent for Video visit? YES    Patient would like the video invitation sent by:  [email or web address]      Video Start Time: Start: 04/06/2020 03:17 pm       Maryann Riggs complains of    Chief Complaint   Patient presents with     Head Injury     Concussion 11/6/2019 Fall  mechanical failure at work. 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  There was no LOC, but her head went back against the pavement, she felt very whozy and had back pain.  She had laceration to posterior scalp.   She went to ER and CT head showed no acute intracranial process.  The laceration was stapled and patient was discharged home.  She has been dealing with concussion symptoms the last several months and doing therapy for her back.  She has been referred to concussion clinic for aid in returning to work.  Her post concussion symptoms are below:     CONCUSSION SYMPTOMS ASSESSMENT 4/6/2020   Headache or Pressure In Head 1 - mild   Upset Stomach or Throwing Up 0 - none   Problems with " Balance 3 - moderate   Feeling Dizzy 1 - mild   Sensitivity to Light 4 - moderate to severe   Sensitivity to Noise 1 - mild   Mood Changes 1 - mild   Feeling sluggish, hazy, or foggy 2 - mild to moderate   Trouble Concentrating, Lack of Focus 3 - moderate   Motion Sickness 1 - mild   Vision Changes 1 - mild   Memory Problems 4 - moderate to severe   Feeling Confused 3 - moderate   Neck Pain 5 - severe   Trouble Sleeping 4 - moderate to severe   Total Number of Symptoms 14   Symptom Severity Score 34     Current:  Patient states that she is making progress.  She still gets neck soreness and tightness, almost locks up, triggering headaches.  Has improved some, but really is one lingering aspect of her injury.  She is very sensitive to light, her prism glasses has helps as well as sunglasses but overexertion and bright lights still fatigue her.  The therapy sessions she has been doing have been helping as well.      Functional History  RH dominant  Independent with ADL and iADL          I have reviewed and updated the patient's Past Medical History, Social History, Family History and Medication List.  Current Outpatient Medications   Medication     Ascorbic Acid (VITAMIN C CR PO)     Calcium Carbonate-Vitamin D (CALTRATE 600+D) 600-400 MG-UNIT CHEW     Cyanocobalamin (VITAMIN B 12 PO)     cyclobenzaprine (FLEXERIL) 10 MG tablet     ferrous sulfate (FEROSUL) 325 (65 Fe) MG tablet     GLUCOSAMINE CHONDROITIN COMPLX PO     HYDROcodone-acetaminophen (NORCO) 5-325 MG tablet     LANSOPRAZOLE PO     lidocaine (XYLOCAINE) 5 % external ointment     meclizine (ANTIVERT) 25 MG tablet     melatonin 3 MG tablet     order for DME     TYLENOL 325 MG OR TABS     zolpidem (AMBIEN) 5 MG tablet     clobetasol (TEMOVATE) 0.05 % ointment     naloxone (NARCAN) 4 MG/0.1ML nasal spray     NIFEdipine ER OSMOTIC (PROCARDIA XL) 30 MG 24 hr tablet     nitroGLYcerin (NITRO-BID) 2 % OINT ointment     No current facility-administered medications for  this visit.        ALLERGIES  Desyrel [trazodone hcl] and Lamictal [lamotrigine]    EXAM:  Patient is AAOx3.  NAD.    NCAT.  There is head tilt to R.    No apparent Cranial Nerve deficits.  Patient with full AROM.  No skin rashes noted.      IMAGING:  CT HEAD WO IV CONTRAST 11/6/19    INDICATION: fall, head injury;     COMPARISON: None.    FINDINGS: Left posterior scalp laceration. No calvarial fractures. No evidence of acute intracranial hemorrhage, mass, or acute large vascular distribution infarction. The cortical sulci, ventricles, and basilar cisterns are symmetric and appropriate for age. Brain stem and posterior fossa are unremarkable.    IMPRESSION:  1. No acute intracranial findings.  2. Left posterior scalp laceration. No calvarial fractures.        Maryann was seen today for head injury.    Diagnoses and all orders for this visit:    Concussion without loss of consciousness, initial encounter    Cervical dystonia  -     Botulinum Toxin Type A (BOTOX) 200 units injection 200 Units    Post concussion syndrome    Post-concussion headache    Sleep disturbance      1. Patient education: In depth discussion and education was provided about the assessment and implications of each of the below recommendations for management. Patient indicated readiness to learn, all questions were answered and understanding of material presented was confirmed.  2. Work-up: none needed at this time  3. Therapy/equipment/braces:  continue current therapy regimen   4. Medications: no changes a this time  5. Interventions: recommend continued recovery and progressive return to activity.    6. Referral / follow up with other providers: none at this time  7. Follow up: 6-8 weeks, given history and description of headaches, patient would be excellent candidate for Botox injections for cervical neck spasms and headaches.  If these are under better control and endurance improves, then option to restart work around 6/1/2020 with  progressive return to full time would be goal.       Brian Shepherd,         I spent a total of 45 minutes face-to-face with Maryann Riggs during today's Virtual visit. Over 50% of this time was spent counseling the patient and/or coordinating care. See note for details.       Video-Visit Details    Type of service:  Video Visit    Video End Time (time video stopped): Stop: 04/06/2020 03:53 pm    Originating Location (pt. Location): Home    Distant Location (provider location):  [facility] PHYSICAL MEDICINE AND REHABILITATION     Mode of Communication:  Video Conference via [employer]

## 2020-04-06 NOTE — NURSING NOTE
Chief Complaint   Patient presents with     Head Injury     Concussion 11/6/2019 Fall  mechanical failure at work. Deb fell on top of her causing head strike to pavement     .  CONCUSSION SYMPTOMS ASSESSMENT 4/6/2020   Headache or Pressure In Head 1 - mild   Upset Stomach or Throwing Up 0 - none   Problems with Balance 3 - moderate   Feeling Dizzy 1 - mild   Sensitivity to Light 4 - moderate to severe   Sensitivity to Noise 1 - mild   Mood Changes 1 - mild   Feeling sluggish, hazy, or foggy 2 - mild to moderate   Trouble Concentrating, Lack of Focus 3 - moderate   Motion Sickness 1 - mild   Vision Changes 1 - mild   Memory Problems 4 - moderate to severe   Feeling Confused 3 - moderate   Neck Pain 5 - severe   Trouble Sleeping 4 - moderate to severe   Total Number of Symptoms 14   Symptom Severity Score 34                         Home

## 2020-04-10 ENCOUNTER — TELEPHONE (OUTPATIENT)
Dept: FAMILY MEDICINE | Facility: CLINIC | Age: 55
End: 2020-04-10

## 2020-04-10 ENCOUNTER — TELEPHONE (OUTPATIENT)
Dept: PHYSICAL MEDICINE AND REHAB | Facility: CLINIC | Age: 55
End: 2020-04-10

## 2020-04-10 NOTE — TELEPHONE ENCOUNTER
[facility]    Sherice Earl  [facility]

## 2020-04-10 NOTE — TELEPHONE ENCOUNTER
[facility] Prior Authorization Team   Phone: [phone]  Fax: [phone]    [place] Initiation    Medication: Botox  Insurance Company: [employer] - Phone [phone] Fax [phone]  Pharmacy Filling the Rx:    Filling Pharmacy Phone:    Filling Pharmacy Fax:    Start Date: 4/10/2020

## 2020-04-12 NOTE — PATIENT INSTRUCTIONS
Patient Education     Back Care Tips    Caring for your back  These are things you can do to prevent a recurrence of acute back pain and to reduce symptoms from chronic back pain:    Stay at a healthy weight. If you are overweight, losing weight will help most types of back pain.    Exercise is an important part of recovery from most types of back pain. The muscles behind and in front of the spine support the back. This means strengthening both the back muscles and the abdominal muscles will provide better support for your spine.     Swimming and brisk walking are good overall exercises to improve your fitness level.    Practice safe lifting methods (see below).    Practice good posture when sitting, standing, and walking. Don't sit for a long time. This puts more stress on the lower back than standing or walking.    Wear quality shoes with good arch support. Foot and ankle alignment can affect back symptoms. Don't wear high heels.    Therapeutic massage can help relax the back muscles without stretching them.    During the first 24 to 72 hours after an acute injury or flare-up of chronic back pain, put an ice pack on the painful area for 20 minutes and then remove it for 20 minutes. Do thisover a period of 60 to 90 minutes, or several times a day. As a safety precaution, don't use a heating pad at bedtime. Sleeping on a heating pad can lead to skin burns or tissue damage.    You can alternate using ice and heat.  Medicines  Talk with your healthcare provider before using medicines, especially if you have other health problems or are taking other medicines.    You may use over-the-counter medicines, such as acetaminophen, ibuprofen, or naprosyn to control pain, unless your healthcare provider prescribed other pain medicine. Talk with your healthcare provider before taking any medicines if you have a chronic condition such ass diabetes, liver or kidney disease, stomach ulcers, or digestive bleeding, or are taking  blood thinners.    Be careful if you are given prescription pain medicines, opioids, or medicine for muscle spasm. They can cause drowsiness, and affect your coordination, reflexes, and judgment. Don't drive or operate heavy machinery while taking these types of medicines. Take prescription pain medicine only as prescribed by your healthcare provider.  Lumbar stretch  This simple stretch will help relax muscle spasm and keep your back more limber. If exercise makes your back pain worse, don t do it.    Lie on your back with your knees bent and both feet on the ground.    Slowly raise your left knee to your chest as you flatten your lower back against the floor. Hold for 5 seconds.    Relax and repeat the exercise with your right knee.    Do 10 of these exercises for each leg.  Safe lifting method    Don t bend over at the waist to lift an object off the floor.  Instead, bend your knees and hips in a squat.     Keep your back and head upright    Hold the object close to your body, directly in front of you.    Straighten your legs to lift the object.     Lower the object to the floor in the reverse fashion.    If you must slide something across the floor, push it.    Posture tips  Sitting  Sit in chairs with straight backs or low-back support. Keep your knees lower than your hips, with your feet flat on the floor.  When driving, sit up straight. Adjust the seat forward so you are not leaning toward the steering wheel.  A small pillow or rolled towel behind your lower back may help if you are driving long distances.   Standing  When standing for long periods, shift most of your weight to one leg at a time. Switch legs every few minutes.   Sleeping  The best way to sleep is on your side with your knees bent. Put a low pillow under your head to support your neck in a neutral spine position. Don't use thick pillows that bend your neck to one side. Put a pillow between your legs to further relax your lower back. If you  sleep on your back, put pillows under your knees to support your legs in a slightly flexed position. Use a firm mattress. If your mattress sags, replace it, or use a 1/2-inch plywood board under the mattress to add support.  Follow-up care  Follow up with your healthcare provider, or as advised.  If X-rays, a CT scan or an MRI scan were taken, they may be reviewed by a radiologist. You will be told of any new findings that may affect your care.  Call 911  Call 911 if any of the following occur:    Trouble breathing    Confusion    Very drowsy    Fainting or loss of consciousness    Rapid or very slow heart rate    Loss of  bowel or bladder control  When to seek medical advice  Call your healthcare provider right away if any of the following occur:    Pain becomes worse or spreads to your arms or legs    Weakness or numbness in one or both arms or legs    Numbness in the groin area  Date Last Reviewed: 6/1/2016 2000-2019 [employer]. [address] [place] [address], [place] [address]. All rights reserved. This information is not intended as a substitute for professional medical care. Always follow your healthcare professional's instructions.

## 2020-04-13 NOTE — TELEPHONE ENCOUNTER
Prior Authorization Approval    Authorization Effective Date:    Authorization Expiration Date:    Medication: Botox  Approved Dose/Quantity: ud  Reference #: [id number]   Insurance Company: [employer] - Phone [phone] Fax [phone]  Expected CoPay: $420.51     CoPay Card Available:      Foundation Assistance Needed:    Which Pharmacy is filling the prescription (Not needed for infusion/clinic administered): [facility] - [place], [place] - [address]  Pharmacy Notified:    Patient Notified:

## 2020-04-16 ENCOUNTER — TELEPHONE (OUTPATIENT)
Dept: PHYSICAL MEDICINE AND REHAB | Facility: CLINIC | Age: 55
End: 2020-04-16

## 2020-04-16 NOTE — TELEPHONE ENCOUNTER
Requesting records to be faxed to Altagracia Christian  at [facility] for contact information

## 2020-04-23 ENCOUNTER — TELEPHONE (OUTPATIENT)
Dept: FAMILY MEDICINE | Facility: CLINIC | Age: 55
End: 2020-04-23

## 2020-04-23 ENCOUNTER — TRANSFERRED RECORDS (OUTPATIENT)
Dept: HEALTH INFORMATION MANAGEMENT | Facility: CLINIC | Age: 55
End: 2020-04-23

## 2020-04-28 ENCOUNTER — TRANSFERRED RECORDS (OUTPATIENT)
Dept: HEALTH INFORMATION MANAGEMENT | Facility: CLINIC | Age: 55
End: 2020-04-28

## 2020-04-29 ENCOUNTER — OFFICE VISIT (OUTPATIENT)
Dept: FAMILY MEDICINE | Facility: CLINIC | Age: 55
End: 2020-04-29
Payer: OTHER MISCELLANEOUS

## 2020-04-29 VITALS
DIASTOLIC BLOOD PRESSURE: 82 MMHG | RESPIRATION RATE: 18 BRPM | HEIGHT: 68 IN | HEART RATE: 76 BPM | TEMPERATURE: 97.2 F | WEIGHT: 163 LBS | SYSTOLIC BLOOD PRESSURE: 158 MMHG | BODY MASS INDEX: 24.71 KG/M2

## 2020-04-29 DIAGNOSIS — G44.209 TENSION HEADACHE: ICD-10-CM

## 2020-04-29 DIAGNOSIS — M54.2 NECK PAIN: ICD-10-CM

## 2020-04-29 DIAGNOSIS — L30.9 DERMATITIS: ICD-10-CM

## 2020-04-29 DIAGNOSIS — M54.12 CERVICAL RADICULOPATHY: ICD-10-CM

## 2020-04-29 DIAGNOSIS — R42 VERTIGO: ICD-10-CM

## 2020-04-29 DIAGNOSIS — M54.16 LUMBAR RADICULOPATHY: Primary | ICD-10-CM

## 2020-04-29 PROCEDURE — 99214 OFFICE O/P EST MOD 30 MIN: CPT | Performed by: NURSE PRACTITIONER

## 2020-04-29 RX ORDER — CLOBETASOL PROPIONATE 0.5 MG/G
OINTMENT TOPICAL
Qty: 45 G | Refills: 5 | Status: SHIPPED | OUTPATIENT
Start: 2020-04-29 | End: 2022-12-09

## 2020-04-29 RX ORDER — HYDROCODONE BITARTRATE AND ACETAMINOPHEN 5; 325 MG/1; MG/1
1 TABLET ORAL DAILY
Qty: 30 TABLET | Refills: 0 | Status: SHIPPED | OUTPATIENT
Start: 2020-05-01 | End: 2020-07-13

## 2020-04-29 ASSESSMENT — MIFFLIN-ST. JEOR: SCORE: 1364.92

## 2020-04-29 NOTE — PROGRESS NOTES
Aaron Riggs is a 55 year old female who presents to clinic today for the following health issues:    HPI   Patient is here to follow up with her vertigo.      Patient Active Problem List   Diagnosis     Raynaud's disease     TMJ (temporomandibular joint syndrome)     CARDIOVASCULAR SCREENING; LDL GOAL LESS THAN 160     PTSD (post-traumatic stress disorder)     Muscle tension headache     Esophageal abnormality     Episodic mood disorder (H)     Esophageal stricture     Sprain of MCP joint of hand     Rotator cuff injury; bilateral repairs 2005     Primary generalized (osteo)arthritis     SI (sacroiliac) joint dysfunction; see details, pain management contract     Chronic bilateral low back pain without sciatica     ERRONEOUS ENCOUNTER--DISREGARD     Past Surgical History:   Procedure Laterality Date     MAMMOPLASTY REDUCTION       OTHER SURGICAL HISTORY      s/p left knee arthroscopinc surgery     ROTATOR CUFF REPAIR RT/LT  2005     SMALL BOWEL RESECTION       TUBAL LIGATION         Social History     Tobacco Use     Smoking status: Never Smoker     Smokeless tobacco: Never Used   Substance Use Topics     Alcohol use: Yes     Comment: scoial     Family History   Problem Relation Age of Onset     Unknown/Adopted Mother      Unknown/Adopted Father      Unknown/Adopted Maternal Grandmother      Unknown/Adopted Maternal Grandfather      Unknown/Adopted Paternal Grandmother      Unknown/Adopted Paternal Grandfather          Current Outpatient Medications   Medication Sig Dispense Refill     Ascorbic Acid (VITAMIN C CR PO) Take  by mouth.       Calcium Carbonate-Vitamin D (CALTRATE 600+D) 600-400 MG-UNIT CHEW Take  by mouth.       clobetasol (TEMOVATE) 0.05 % ointment as needed. Only as needed occasionally needs after working in garden  Profile Rx: patient will contact pharmacy when needed 45 g 5     Cyanocobalamin (VITAMIN B 12 PO) Take  by mouth.       cyclobenzaprine (FLEXERIL) 10 MG tablet TAKE  ONE TABLET BY MOUTH DAILY AS NEEDED FOR MUSCLE SPASMS 90 tablet 4     ferrous sulfate (FEROSUL) 325 (65 Fe) MG tablet Take 1 tablet (325 mg) by mouth daily (with breakfast) 90 tablet 1     GLUCOSAMINE CHONDROITIN COMPLX PO Take  by mouth.       HYDROcodone-acetaminophen (NORCO) 5-325 MG tablet Take 1 tablet by mouth daily 30 tablet 0     LANSOPRAZOLE PO        lidocaine (XYLOCAINE) 5 % external ointment Apply dime size amount topically to painful areas (choose 2-3 per application) 3 times daily as needed. 70.88 g 2     meclizine (ANTIVERT) 25 MG tablet Take 1 tablet (25 mg) by mouth 3 times daily as needed for dizziness 20 tablet 0     melatonin 3 MG tablet Take 1 tablet by mouth nightly as needed for sleep. 30 tablet 0     naloxone (NARCAN) 4 MG/0.1ML nasal spray Spray 1 spray (4 mg) into one nostril alternating nostrils once as needed for opioid reversal every 2-3 minutes until assistance arrives 0.2 mL 0     NIFEdipine ER OSMOTIC (PROCARDIA XL) 30 MG 24 hr tablet Take 1 tablet (30 mg) by mouth daily 90 tablet 3     nitroGLYcerin (NITRO-BID) 2 % OINT ointment Place 1 inch (15 mg) onto the skin daily 30 packet 12     order for DME Equipment being ordered: TENS 1 Units 0     TYLENOL 325 MG OR TABS 2 TABLETS BY MOUTH EVERY 6 HOURS AS NEEDED       zolpidem (AMBIEN) 5 MG tablet Take 1 tablet (5 mg) by mouth nightly as needed for sleep 90 tablet 0     Allergies   Allergen Reactions     Desyrel [Trazodone Hcl]      Sig mood disturbance       Lamictal [Lamotrigine]      Recent Labs   Lab Test 11/26/19  1056 09/17/19  1419 06/06/19  1419 09/12/14 03/05/14 11/01/13  1024 08/23/13   A1C  --   --   --  5.5 6.0 5.7  --    LDL  --  99  --  81  --   --  100  100   HDL  --  84  --  73  --   --  66  66   TRIG  --  41  --  70  --   --  94  94   ALT  --   --  24 28  --   --  18   CR 0.70 0.80 0.73 0.79  --   --  0.75   GFRESTIMATED >90 83 >90 87  --   --   --    GFRESTBLACK >90 >90 >90 100  --   --   --    POTASSIUM 3.6 3.3* 3.6  3.9  --   --  3.6   TSH  --   --   --  1.750  --   --  1.550  4.550      BP Readings from Last 3 Encounters:   04/29/20 (!) 158/82   02/28/20 134/70   02/07/20 128/80    Wt Readings from Last 3 Encounters:   04/29/20 73.9 kg (163 lb)   02/28/20 74.8 kg (165 lb)   02/07/20 73 kg (161 lb)                Reviewed and updated as needed this visit by Provider         Review of Systems   ROS COMP: Constitutional, HEENT, cardiovascular, pulmonary, GI, , musculoskeletal, neuro, skin, endocrine and psych systems are negative, except as otherwise noted.      Objective    LMP 06/26/2014   There is no height or weight on file to calculate BMI.  Physical Exam   GENERAL: healthy, alert and no distress  EYES: Eyes grossly normal to inspection, PERRL and conjunctivae and sclerae normal  HENT: ear canals and TM's normal, nose and mouth without ulcers or lesions  NECK: no adenopathy, no asymmetry, masses, or scars and thyroid normal to palpation  RESP: lungs clear to auscultation - no rales, rhonchi or wheezes  CV: regular rate and rhythm, normal S1 S2, no S3 or S4, no murmur, click or rub, no peripheral edema and peripheral pulses strong  MS: no gross musculoskeletal defects noted, no edema  SKIN: no suspicious lesions or rashes  NEURO: Normal strength and tone, mentation intact and speech normal  PSYCH: mentation appears normal, affect normal/bright    Inspection: normal cervical lordosis  Tender:  left paracervical muscles, right paracervical muscles  Non-tender:  spinous processes, scapula, medial border of scapula  Range of Motion:  Full ROM of cervical spine  Strength: Full strength of all neck muscles  Special tests:  Reproduction of radicular pattern     Tender:  lumbar spinous processes, lumbar facet joints, left para lumbar muscles, right para lumbar muscles, coccyx area   Non-tender:  thoracic spinous processes, thoracic facet joints, left parathoracic muscles, right parathoracic muscles, left SI joint, right SI joint,  "left sciatic notch, right sciatic notch  Range of Motion:  left lateral thoracic bending   full, right lateral thoracic bending  full, left thoracic rotation  full, right thoracic rotation  full, lumbar flexion  painful, lumbar extension  painful, left lateral lumbar bending  painful, right lateral lumbar bending  painful, left lateral lumbar rotation  painful, right lateral lumbar rotation  painful  Strength:  able to heel walk  Special tests:  negative straight leg raises        Assessment & Plan     (R42) Vertigo  Comment: Patient continues to have vertigo symptoms preventing her from being able to drive.  Patient is receiving ocular therapy will continue with current treatment plan and work restrictions  Plan: Patient will have follow-up in 1 month to review future work restrictions      (M54.16) Lumbar radiculopathy  (primary encounter diagnosis)  Comment: Stable will continue with current medications  Plan: HYDROcodone-acetaminophen (NORCO) 5-325 MG         tablet      (L30.9) Dermatitis  Comment:   Plan: clobetasol (TEMOVATE) 0.05 % external ointment      (M54.12) Cervical radiculopathy  Comment:   Stable will continue with current medications and physical therapy   plan: HYDROcodone-acetaminophen (NORCO) 5-325 MG         tablet      (G44.209) Tension headache  Comment: Referral made for her headaches for pain management   Plan: PAIN MANAGEMENT REFERRAL      (M54.2) Neck pain  Comment: Stable we will continue with pain management and physical therapy  Plan: HYDROcodone-acetaminophen (NORCO) 5-325 MG         tablet             BMI:   Estimated body mass index is 25.15 kg/m  as calculated from the following:    Height as of this encounter: 1.715 m (5' 7.5\").    Weight as of this encounter: 73.9 kg (163 lb).           See Patient Instructions    No follow-ups on file.    CONTRERAS Sales [facility]      "

## 2020-04-29 NOTE — LETTER
[facility]  [address] [place] [address]  Phone: [phone]  Fax: [phone]    April 29, 2020        Maryann Riggs  [address] [facility] [address]          To whom it may concern:     RE: Maryann Riggs     Patient may return to work 4/29/2020 with the following:  Light duty-unable to lift more than 50 pounds. No lifting 30 lbs above shoulder height.     She can work 2 - 4 hour days as it accomodates her treatment programs which include Work conditions,  Opthalmology training and Neurological follow-up.     Adequate lighting should be provided to prevent headaches.     Restrictions effective for the next 4 weeks until further evaluation.      Please contact me for questions or concerns.        Sincerely,           CONTRERAS Sales CNP

## 2020-04-29 NOTE — PATIENT INSTRUCTIONS
Patient Education     Back Care Tips    Caring for your back  These are things you can do to prevent a recurrence of acute back pain and to reduce symptoms from chronic back pain:    Stay at a healthy weight. If you are overweight, losing weight will help most types of back pain.    Exercise is an important part of recovery from most types of back pain. The muscles behind and in front of the spine support the back. This means strengthening both the back muscles and the abdominal muscles will provide better support for your spine.     Swimming and brisk walking are good overall exercises to improve your fitness level.    Practice safe lifting methods (see below).    Practice good posture when sitting, standing, and walking. Don't sit for a long time. This puts more stress on the lower back than standing or walking.    Wear quality shoes with good arch support. Foot and ankle alignment can affect back symptoms. Don't wear high heels.    Therapeutic massage can help relax the back muscles without stretching them.    During the first 24 to 72 hours after an acute injury or flare-up of chronic back pain, put an ice pack on the painful area for 20 minutes and then remove it for 20 minutes. Do thisover a period of 60 to 90 minutes, or several times a day. As a safety precaution, don't use a heating pad at bedtime. Sleeping on a heating pad can lead to skin burns or tissue damage.    You can alternate using ice and heat.  Medicines  Talk with your healthcare provider before using medicines, especially if you have other health problems or are taking other medicines.    You may use over-the-counter medicines, such as acetaminophen, ibuprofen, or naprosyn to control pain, unless your healthcare provider prescribed other pain medicine. Talk with your healthcare provider before taking any medicines if you have a chronic condition such ass diabetes, liver or kidney disease, stomach ulcers, or digestive bleeding, or are taking  blood thinners.    Be careful if you are given prescription pain medicines, opioids, or medicine for muscle spasm. They can cause drowsiness, and affect your coordination, reflexes, and judgment. Don't drive or operate heavy machinery while taking these types of medicines. Take prescription pain medicine only as prescribed by your healthcare provider.  Lumbar stretch  This simple stretch will help relax muscle spasm and keep your back more limber. If exercise makes your back pain worse, don t do it.    Lie on your back with your knees bent and both feet on the ground.    Slowly raise your left knee to your chest as you flatten your lower back against the floor. Hold for 5 seconds.    Relax and repeat the exercise with your right knee.    Do 10 of these exercises for each leg.  Safe lifting method    Don t bend over at the waist to lift an object off the floor.  Instead, bend your knees and hips in a squat.     Keep your back and head upright    Hold the object close to your body, directly in front of you.    Straighten your legs to lift the object.     Lower the object to the floor in the reverse fashion.    If you must slide something across the floor, push it.    Posture tips  Sitting  Sit in chairs with straight backs or low-back support. Keep your knees lower than your hips, with your feet flat on the floor.  When driving, sit up straight. Adjust the seat forward so you are not leaning toward the steering wheel.  A small pillow or rolled towel behind your lower back may help if you are driving long distances.   Standing  When standing for long periods, shift most of your weight to one leg at a time. Switch legs every few minutes.   Sleeping  The best way to sleep is on your side with your knees bent. Put a low pillow under your head to support your neck in a neutral spine position. Don't use thick pillows that bend your neck to one side. Put a pillow between your legs to further relax your lower back. If you  sleep on your back, put pillows under your knees to support your legs in a slightly flexed position. Use a firm mattress. If your mattress sags, replace it, or use a 1/2-inch plywood board under the mattress to add support.  Follow-up care  Follow up with your healthcare provider, or as advised.  If X-rays, a CT scan or an MRI scan were taken, they may be reviewed by a radiologist. You will be told of any new findings that may affect your care.  Call 911  Call 911 if any of the following occur:    Trouble breathing    Confusion    Very drowsy    Fainting or loss of consciousness    Rapid or very slow heart rate    Loss of  bowel or bladder control  When to seek medical advice  Call your healthcare provider right away if any of the following occur:    Pain becomes worse or spreads to your arms or legs    Weakness or numbness in one or both arms or legs    Numbness in the groin area  Date Last Reviewed: 6/1/2016 2000-2019 [employer]. [address] [place] [address]. All rights reserved. This information is not intended as a substitute for professional medical care. Always follow your healthcare professional's instructions.           Patient Education     Understanding Cervical Radiculopathy    Cervical radiculopathy is irritation or inflammation of a nerve root in the neck. It causes neck pain and other symptoms that may spread into the chest or down the arm. To understand this condition, it helps to understand the parts of the spine:    Vertebrae. These are bones that stack to form the spine. The cervical spine contains the 7 vertebrae in the neck.    Disks. These are soft pads of tissue between the vertebrae. They act as shock absorbers for the spine.    The spinal canal. This is a tunnel formed within the stacked vertebrae. The spinal cord runs through this canal.    Nerves. These branch off the spinal cord. As they leave the spinal canal, nerves pass through openings between the  vertebrae. The nerve root is the part of the nerve that is closest to the spinal cord.   With cervical radiculopathy, nerve roots in the neck become irritated. This leads to pain and symptoms that can travel to the nerves that go from the spinal cord down the arms and into the torso.  What causes cervical radiculopathy?  Aging, injury, poor posture, and other issues can lead to problems in the neck. These problems may then irritate nerve roots. These include:    Damage to a disk in the cervical spine. The damaged disk may then press on nearby nerve roots.    Degeneration from wear and tear, and aging. This can lead to narrowing (stenosis) of the openings between the vertebrae. The narrowed openings press on nerve roots as they leave the spinal canal.    An unstable spine. This is when a vertebra slips forward. It can then press on a nerve root.  There are other, less common causes of pressure on nerves in the neck. These include infection, cysts, and tumors.  Symptoms of cervical radiculopathy  These include:    Neck pain    Pain, numbness, tingling, or weakness that travels down the arm    Loss of neck movement    Muscle spasms  Treatment for cervical radiculopathy  In most cases, your healthcare provider will first try treatments that help relieve symptoms. These may include:    Prescription or over-the-counter pain medicines. These help relieve pain and swelling.    Cold packs. These help reduce pain.    Resting. This involves avoiding positions and activities that increase pain.    Neck brace (cervical collar). This can help relieve inflammation and pain.    Physical therapy, including exercises and stretches. This can help decrease pain and increase movement and function.    Shots of medicinesaround the nerve roots. This is done to help relieve symptoms for a time.  In some cases, your healthcare provider may advise surgery to fix the underlying problem. This depends on the cause, the symptoms, and how long the  pain has lasted.  Possible complications  Over time, an irritated and inflamed nerve may become damaged. This may lead to long-lasting (permanent) numbness or weakness. If symptoms change suddenly or get worse, be sure to let your healthcare provider know.     When to call your healthcare provider  Call your healthcare provider right away if you have any of these:    New pain or pain that gets worse    New or increasing weakness, numbness, or tingling in your arm or hand    Bowel or bladder changes   Date Last Reviewed: 3/10/2016    9998-8150 [employer]. [address] [place] [address]. All rights reserved. This information is not intended as a substitute for professional medical care. Always follow your healthcare professional's instructions.           Patient Education     Tension Headaches     Massaging your neck muscles can help relieve tension headache pain.     Tension headaches cause a dull, steady pain on both sides of the head and in the neck and the back of the head. Your eyes may also feel tired. Tension headaches can be triggered by many things. These include muscle tension and clenching, lack of sleep, bad posture, eyestrain, stress, depression, anxiety, arthritis of the neck, and other factors.  To help prevent tension headaches  Take these steps:    Make sure your work area is set up to help you prevent neck strain and eyestrain.    Make sure that your eyeglass prescription is current and is correct for the work you do.    Learn methods for relaxing and reducing emotional stress. These include deep breathing, progressive relaxation, yoga, meditation, and biofeedback.    Keep up a regular exercise program under the guidance of a doctor. This can help keep your neck and back flexible, strong, and relaxed.  To relieve the pain  Take these steps:    Use moist heat to relax the muscles. Soak in a hot bath or wrap a warm, moist towel around your neck.    Brush your scalp lightly  with a soft hairbrush.    Give yourself a massage. Knead the muscles that go from your shoulders up the back of your skull.    Use an ice pack. Apply this directly to the place where you feel pain.    Rest. Sleep often helps relieve headache pain.    Drink plenty of fluids. Dehydration is a trigger for headaches. Don't drink alcohol.This will make dehydration worse.    Use pain medicine when needed for moderate to severe pain.   Date Last Reviewed: 12/1/2017 2000-2019 [employer]. [address] [place] [address], [place] [address]. All rights reserved. This information is not intended as a substitute for professional medical care. Always follow your healthcare professional's instructions.

## 2020-04-30 ENCOUNTER — TELEPHONE (OUTPATIENT)
Dept: PALLIATIVE MEDICINE | Facility: CLINIC | Age: 55
End: 2020-04-30

## 2020-04-30 NOTE — TELEPHONE ENCOUNTER
LM to relay message below. TPI (Non-Essential).        Tex ORTEGA    [facility]

## 2020-04-30 NOTE — TELEPHONE ENCOUNTER
We re taking every precaution to prevent the spread of COVID-19. Our top priority is to protect and care for our patients.     We are reviewing all new patients to determine if their visit is considered essential. We are balancing helping patients with chronic pain with the safety of our patients and staff.    In review of this upcoming new visit, the determination is:  This is a non-essential procedure.    This will be sent to our  staff to adjust schedule as determined by provider.    Suellen Jane MD on 4/30/2020 at 10:26 AM

## 2020-04-30 NOTE — TELEPHONE ENCOUNTER
Order received from Debbie Vila APRN CNP at [facility].      Patient is being referred for Procedure Order Trigger point injection of dry needle.      Patients diagnosis is Tension headaches.      Routing to interventionists pool to determine if this is essential.     Razia YANES    [facility]

## 2020-05-12 ENCOUNTER — TRANSFERRED RECORDS (OUTPATIENT)
Dept: HEALTH INFORMATION MANAGEMENT | Facility: CLINIC | Age: 55
End: 2020-05-12

## 2020-05-12 ENCOUNTER — TELEPHONE (OUTPATIENT)
Dept: FAMILY MEDICINE | Facility: CLINIC | Age: 55
End: 2020-05-12

## 2020-05-12 NOTE — TELEPHONE ENCOUNTER
Reason for Call:  Form, our goal is to have forms completed with 72 hours, however, some forms may require a visit or additional information.    Type of letter, form or note:  Progress Notes    Who [facility] the [facility] (if other please explain)    Where did the form come from: form was faxed in    What clinic location was the form placed at?: [place]    Where the form was placed: Given to physician    What number is listed as a contact on the form?: Fax - [phone]       Additional comments:     Call taken on 5/12/2020 at 11:11 AM by Rachel Duran

## 2020-05-14 ENCOUNTER — TELEPHONE (OUTPATIENT)
Dept: NEUROPSYCHOLOGY | Facility: CLINIC | Age: 55
End: 2020-05-14

## 2020-05-20 ENCOUNTER — TELEPHONE (OUTPATIENT)
Dept: FAMILY MEDICINE | Facility: CLINIC | Age: 55
End: 2020-05-20

## 2020-05-20 NOTE — TELEPHONE ENCOUNTER
[facility] Work strategies form faxed back.  Dr Prieto signed in Debbie's absence

## 2020-05-21 ENCOUNTER — TRANSFERRED RECORDS (OUTPATIENT)
Dept: HEALTH INFORMATION MANAGEMENT | Facility: CLINIC | Age: 55
End: 2020-05-21

## 2020-05-21 NOTE — TELEPHONE ENCOUNTER
MYRIAM to schedule TPI        Danni Gifford    [facility]

## 2020-05-26 ENCOUNTER — TRANSFERRED RECORDS (OUTPATIENT)
Dept: HEALTH INFORMATION MANAGEMENT | Facility: CLINIC | Age: 55
End: 2020-05-26

## 2020-05-27 ENCOUNTER — OFFICE VISIT (OUTPATIENT)
Dept: FAMILY MEDICINE | Facility: CLINIC | Age: 55
End: 2020-05-27
Payer: COMMERCIAL

## 2020-05-27 VITALS
SYSTOLIC BLOOD PRESSURE: 122 MMHG | DIASTOLIC BLOOD PRESSURE: 70 MMHG | HEIGHT: 68 IN | BODY MASS INDEX: 24.71 KG/M2 | WEIGHT: 163 LBS | HEART RATE: 88 BPM | TEMPERATURE: 98.2 F | RESPIRATION RATE: 18 BRPM

## 2020-05-27 DIAGNOSIS — R42 VERTIGO: ICD-10-CM

## 2020-05-27 DIAGNOSIS — M54.16 LUMBAR RADICULOPATHY: Primary | ICD-10-CM

## 2020-05-27 PROCEDURE — 99214 OFFICE O/P EST MOD 30 MIN: CPT | Performed by: NURSE PRACTITIONER

## 2020-05-27 ASSESSMENT — MIFFLIN-ST. JEOR: SCORE: 1364.92

## 2020-05-27 NOTE — PROGRESS NOTES
Aaron Riggs is a 55 year old female who presents to clinic today for the following health issues:    HPI   Patient is here to follow up with her concussion.      Patient Active Problem List   Diagnosis     Raynaud's disease     TMJ (temporomandibular joint syndrome)     CARDIOVASCULAR SCREENING; LDL GOAL LESS THAN 160     PTSD (post-traumatic stress disorder)     Muscle tension headache     Esophageal abnormality     Episodic mood disorder (H)     Esophageal stricture     Sprain of MCP joint of hand     Rotator cuff injury; bilateral repairs 2005     Primary generalized (osteo)arthritis     SI (sacroiliac) joint dysfunction; see details, pain management contract     Chronic bilateral low back pain without sciatica     ERRONEOUS ENCOUNTER--DISREGARD     Past Surgical History:   Procedure Laterality Date     MAMMOPLASTY REDUCTION       OTHER SURGICAL HISTORY      s/p left knee arthroscopinc surgery     ROTATOR CUFF REPAIR RT/LT  2005     SMALL BOWEL RESECTION       TUBAL LIGATION         Social History     Tobacco Use     Smoking status: Never Smoker     Smokeless tobacco: Never Used   Substance Use Topics     Alcohol use: Yes     Comment: scoial     Family History   Problem Relation Age of Onset     Unknown/Adopted Mother      Unknown/Adopted Father      Unknown/Adopted Maternal Grandmother      Unknown/Adopted Maternal Grandfather      Unknown/Adopted Paternal Grandmother      Unknown/Adopted Paternal Grandfather          Current Outpatient Medications   Medication Sig Dispense Refill     Ascorbic Acid (VITAMIN C CR PO) Take  by mouth.       Calcium Carbonate-Vitamin D (CALTRATE 600+D) 600-400 MG-UNIT CHEW Take  by mouth.       clobetasol (TEMOVATE) 0.05 % external ointment as needed. Only as needed occasionally needs after working in garden  Profile Rx: patient will contact pharmacy when needed 45 g 5     Cyanocobalamin (VITAMIN B 12 PO) Take  by mouth.       cyclobenzaprine (FLEXERIL) 10 MG  tablet TAKE ONE TABLET BY MOUTH DAILY AS NEEDED FOR MUSCLE SPASMS 90 tablet 4     ferrous sulfate (FEROSUL) 325 (65 Fe) MG tablet Take 1 tablet (325 mg) by mouth daily (with breakfast) 90 tablet 1     GLUCOSAMINE CHONDROITIN COMPLX PO Take  by mouth.       HYDROcodone-acetaminophen (NORCO) 5-325 MG tablet Take 1 tablet by mouth daily 30 tablet 0     LANSOPRAZOLE PO        lidocaine (XYLOCAINE) 5 % external ointment Apply dime size amount topically to painful areas (choose 2-3 per application) 3 times daily as needed. 70.88 g 2     meclizine (ANTIVERT) 25 MG tablet Take 1 tablet (25 mg) by mouth 3 times daily as needed for dizziness 20 tablet 0     melatonin 3 MG tablet Take 1 tablet by mouth nightly as needed for sleep. 30 tablet 0     naloxone (NARCAN) 4 MG/0.1ML nasal spray Spray 1 spray (4 mg) into one nostril alternating nostrils once as needed for opioid reversal every 2-3 minutes until assistance arrives 0.2 mL 0     NIFEdipine ER OSMOTIC (PROCARDIA XL) 30 MG 24 hr tablet Take 1 tablet (30 mg) by mouth daily 90 tablet 3     nitroGLYcerin (NITRO-BID) 2 % OINT ointment Place 1 inch (15 mg) onto the skin daily 30 packet 12     order for DME Equipment being ordered: TENS 1 Units 0     TYLENOL 325 MG OR TABS 2 TABLETS BY MOUTH EVERY 6 HOURS AS NEEDED       zolpidem (AMBIEN) 5 MG tablet Take 1 tablet (5 mg) by mouth nightly as needed for sleep 90 tablet 0     Allergies   Allergen Reactions     Desyrel [Trazodone Hcl]      Sig mood disturbance       Lamictal [Lamotrigine]      Recent Labs   Lab Test 11/26/19  1056 09/17/19  1419 06/06/19  1419 09/12/14 03/05/14 11/01/13  1024 08/23/13   A1C  --   --   --  5.5 6.0 5.7  --    LDL  --  99  --  81  --   --  100  100   HDL  --  84  --  73  --   --  66  66   TRIG  --  41  --  70  --   --  94  94   ALT  --   --  24 28  --   --  18   CR 0.70 0.80 0.73 0.79  --   --  0.75   GFRESTIMATED >90 83 >90 87  --   --   --    GFRESTBLACK >90 >90 >90 100  --   --   --    POTASSIUM  3.6 3.3* 3.6 3.9  --   --  3.6   TSH  --   --   --  1.750  --   --  1.550  4.550      BP Readings from Last 3 Encounters:   05/27/20 122/70   04/29/20 (!) 158/82   02/28/20 134/70    Wt Readings from Last 3 Encounters:   05/27/20 73.9 kg (163 lb)   04/29/20 73.9 kg (163 lb)   02/28/20 74.8 kg (165 lb)                    Reviewed and updated as needed this visit by Provider         Review of Systems   Constitutional, HEENT, cardiovascular, pulmonary, GI, , musculoskeletal, neuro, skin, endocrine and psych systems are negative, except as otherwise noted.      Objective    LMP 06/26/2014   There is no height or weight on file to calculate BMI.  Physical Exam   GENERAL: healthy, alert and no distress  EYES: Eyes grossly normal to inspection, PERRL and conjunctivae and sclerae normal  HENT: ear canals and TM's normal, nose and mouth without ulcers or lesions  NECK: no adenopathy, no asymmetry, masses, or scars and thyroid normal to palpation  RESP: lungs clear to auscultation - no rales, rhonchi or wheezes  CV: regular rate and rhythm, normal S1 S2, no S3 or S4, no murmur, click or rub, no peripheral edema and peripheral pulses strong  ABDOMEN: soft, nontender, no hepatosplenomegaly, no masses and bowel sounds normal  MS: no gross musculoskeletal defects noted, no edema  SKIN: no suspicious lesions or rashes  NEURO: Normal strength and tone, mentation intact and speech normal  PSYCH: mentation appears normal, affect normal/bright    Inspection: normal cervical lordosis  Tender:  left paracervical muscles, right paracervical muscles  Non-tender:  spinous processes, scapula, medial border of scapula  Range of Motion:  Full ROM of cervical spine  Strength: Full strength of all neck muscles  Special tests:  Reproduction of radicular pattern     Tender:  lumbar spinous processes, lumbar facet joints, left para lumbar muscles, right para lumbar muscles, coccyx area   Non-tender:  thoracic spinous processes, thoracic facet  joints, left parathoracic muscles, right parathoracic muscles, left SI joint, right SI joint, left sciatic notch, right sciatic notch  Range of Motion:  left lateral thoracic bending   full, right lateral thoracic bending  full, left thoracic rotation  full, right thoracic rotation  full, lumbar flexion  painful, lumbar extension  painful, left lateral lumbar bending  painful, right lateral lumbar bending  painful, left lateral lumbar rotation  painful, right lateral lumbar rotation  painful  Strength:  able to heel walk  Special tests:  negative straight leg raises    Assessment & Plan     (M54.16) Lumbar radiculopathy  (primary encounter diagnosis)  Comment: Per physical therapy patient is at full capacity for her weights for low her back no restrictions for lifting at work  Plan: From her back injury standpoint patient cannot return to work without any restrictions    (R42) Vertigo  Comment: Patient continues to have vertigo symptoms is currently in therapy with ophthalmology.  Further work restrictions will have to come from ophthalmology for her driving as they are the experts in this field and can determine when it is safe for her to drive or work  Plan: Letter written  releasing patient for work related will wait lifting restrictions however will need release from ophthalmology for driving restrictions.  Patient will follow-up with ophthalmology       No follow-ups on file.    CONTRERAS Sales [facility]

## 2020-05-27 NOTE — LETTER
[facility]  [address] [place] [address]  Phone: [phone]  Fax: [phone]    May 27, 2020        Maryann Rigsg  [address] [facility] [address]          To whom it may concern:    RE: Maryann Riggs    Patient may return to work 5/29/2020 with the following:  able to lift push and pull more than 50 pounds and no further restrictions from the Lumbar Injury with no further restrictions.  Can use the SI belt as needed.    With regards to the concussion and neurological finding she will continue to have restrictions until released from care by Neuropsychology and NeuroOpthalmology.       Unable to drive commercial vehicle.        Please contact me for questions or concerns.      Sincerely,        CONTRERAS Sales CNP

## 2020-05-28 ENCOUNTER — TELEPHONE (OUTPATIENT)
Dept: FAMILY MEDICINE | Facility: CLINIC | Age: 55
End: 2020-05-28

## 2020-05-28 NOTE — TELEPHONE ENCOUNTER
[facility]  REHAB  Progress Note   Signed by Debbie Vila  .Faxed back and sent to scanning

## 2020-06-01 ENCOUNTER — TRANSFERRED RECORDS (OUTPATIENT)
Dept: HEALTH INFORMATION MANAGEMENT | Facility: CLINIC | Age: 55
End: 2020-06-01

## 2020-06-02 ENCOUNTER — TELEPHONE (OUTPATIENT)
Dept: FAMILY MEDICINE | Facility: CLINIC | Age: 55
End: 2020-06-02

## 2020-06-02 DIAGNOSIS — M54.12 CERVICAL RADICULOPATHY: Primary | ICD-10-CM

## 2020-06-02 NOTE — TELEPHONE ENCOUNTER
Reason for Call: Request for an order or referral:    Order or referral being requested: [facility]  is calling for orders:  Trigger point injection via dry needling.    Fax to: [phone]  Any questions call mati at [phone]    Date needed: as soon as possible    Has the patient been seen by the PCP for this problem? Not Applicable    Call taken on 6/2/2020 at 11:06 AM by Sherice Earl

## 2020-06-05 NOTE — PATIENT INSTRUCTIONS
Patient Education     Back Care Tips    Caring for your back  These are things you can do to prevent a recurrence of acute back pain and to reduce symptoms from chronic back pain:    Stay at a healthy weight. If you are overweight, losing weight will help most types of back pain.    Exercise is an important part of recovery from most types of back pain. The muscles behind and in front of the spine support the back. This means strengthening both the back muscles and the abdominal muscles will provide better support for your spine.     Swimming and brisk walking are good overall exercises to improve your fitness level.    Practice safe lifting methods (see below).    Practice good posture when sitting, standing, and walking. Don't sit for a long time. This puts more stress on the lower back than standing or walking.    Wear quality shoes with good arch support. Foot and ankle alignment can affect back symptoms. Don't wear high heels.    Therapeutic massage can help relax the back muscles without stretching them.    During the first 24 to 72 hours after an acute injury or flare-up of chronic back pain, put an ice pack on the painful area for 20 minutes and then remove it for 20 minutes. Do thisover a period of 60 to 90 minutes, or several times a day. As a safety precaution, don't use a heating pad at bedtime. Sleeping on a heating pad can lead to skin burns or tissue damage.    You can alternate using ice and heat.  Medicines  Talk with your healthcare provider before using medicines, especially if you have other health problems or are taking other medicines.    You may use over-the-counter medicines, such as acetaminophen, ibuprofen, or naprosyn to control pain, unless your healthcare provider prescribed other pain medicine. Talk with your healthcare provider before taking any medicines if you have a chronic condition such ass diabetes, liver or kidney disease, stomach ulcers, or digestive bleeding, or are taking  blood thinners.    Be careful if you are given prescription pain medicines, opioids, or medicine for muscle spasm. They can cause drowsiness, and affect your coordination, reflexes, and judgment. Don't drive or operate heavy machinery while taking these types of medicines. Take prescription pain medicine only as prescribed by your healthcare provider.  Lumbar stretch  This simple stretch will help relax muscle spasm and keep your back more limber. If exercise makes your back pain worse, don t do it.    Lie on your back with your knees bent and both feet on the ground.    Slowly raise your left knee to your chest as you flatten your lower back against the floor. Hold for 5 seconds.    Relax and repeat the exercise with your right knee.    Do 10 of these exercises for each leg.  Safe lifting method    Don t bend over at the waist to lift an object off the floor.  Instead, bend your knees and hips in a squat.     Keep your back and head upright    Hold the object close to your body, directly in front of you.    Straighten your legs to lift the object.     Lower the object to the floor in the reverse fashion.    If you must slide something across the floor, push it.    Posture tips  Sitting  Sit in chairs with straight backs or low-back support. Keep your knees lower than your hips, with your feet flat on the floor.  When driving, sit up straight. Adjust the seat forward so you are not leaning toward the steering wheel.  A small pillow or rolled towel behind your lower back may help if you are driving long distances.   Standing  When standing for long periods, shift most of your weight to one leg at a time. Switch legs every few minutes.   Sleeping  The best way to sleep is on your side with your knees bent. Put a low pillow under your head to support your neck in a neutral spine position. Don't use thick pillows that bend your neck to one side. Put a pillow between your legs to further relax your lower back. If you  sleep on your back, put pillows under your knees to support your legs in a slightly flexed position. Use a firm mattress. If your mattress sags, replace it, or use a 1/2-inch plywood board under the mattress to add support.  Follow-up care  Follow up with your healthcare provider, or as advised.  If X-rays, a CT scan or an MRI scan were taken, they may be reviewed by a radiologist. You will be told of any new findings that may affect your care.  Call 911  Call 911 if any of the following occur:    Trouble breathing    Confusion    Very drowsy    Fainting or loss of consciousness    Rapid or very slow heart rate    Loss of  bowel or bladder control  When to seek medical advice  Call your healthcare provider right away if any of the following occur:    Pain becomes worse or spreads to your arms or legs    Weakness or numbness in one or both arms or legs    Numbness in the groin area  Date Last Reviewed: 6/1/2016 2000-2019 [employer]. [address] [place] [address], [place] [address]. All rights reserved. This information is not intended as a substitute for professional medical care. Always follow your healthcare professional's instructions.

## 2020-06-17 ENCOUNTER — TELEPHONE (OUTPATIENT)
Dept: FAMILY MEDICINE | Facility: CLINIC | Age: 55
End: 2020-06-17

## 2020-06-20 ENCOUNTER — TRANSFERRED RECORDS (OUTPATIENT)
Dept: HEALTH INFORMATION MANAGEMENT | Facility: CLINIC | Age: 55
End: 2020-06-20

## 2020-06-29 ENCOUNTER — OFFICE VISIT (OUTPATIENT)
Dept: FAMILY MEDICINE | Facility: CLINIC | Age: 55
End: 2020-06-29
Payer: OTHER MISCELLANEOUS

## 2020-06-29 VITALS
DIASTOLIC BLOOD PRESSURE: 74 MMHG | HEIGHT: 68 IN | SYSTOLIC BLOOD PRESSURE: 146 MMHG | TEMPERATURE: 97.6 F | BODY MASS INDEX: 24.25 KG/M2 | RESPIRATION RATE: 18 BRPM | WEIGHT: 160 LBS | HEART RATE: 88 BPM

## 2020-06-29 DIAGNOSIS — M54.16 LUMBAR RADICULOPATHY: ICD-10-CM

## 2020-06-29 PROCEDURE — 99214 OFFICE O/P EST MOD 30 MIN: CPT | Performed by: FAMILY MEDICINE

## 2020-06-29 ASSESSMENT — MIFFLIN-ST. JEOR: SCORE: 1351.32

## 2020-06-29 NOTE — LETTER
[facility]  [address] [place] [id number]  Phone: [phone]  Fax: [phone]    June 29, 2020          Maryann Riggs  [address] [facility] [address]          To whom it may concern:    RE: Maryann Riggs    Patient may return to work June 29, 2020  with the following:  able to lift push and pull more than 50 pounds and no further restrictions from the Lumbar Injury with no further restrictions.  Can use the SI belt as needed.    With regards to the concussion and neurological finding she will continue to have restrictions until released from care by Neuropsychology and NeuroOpthalmology.       Unable to drive commercial vehicle.        Please contact me for questions or concerns.      Sincerely,        Mary Ellen Prieto MD

## 2020-06-30 DIAGNOSIS — M54.16 LUMBAR RADICULOPATHY: ICD-10-CM

## 2020-06-30 DIAGNOSIS — M54.12 CERVICAL RADICULOPATHY: ICD-10-CM

## 2020-06-30 DIAGNOSIS — G47.09 OTHER INSOMNIA: ICD-10-CM

## 2020-06-30 DIAGNOSIS — M54.2 NECK PAIN: ICD-10-CM

## 2020-06-30 RX ORDER — HYDROCODONE BITARTRATE AND ACETAMINOPHEN 5; 325 MG/1; MG/1
1 TABLET ORAL DAILY
Qty: 30 TABLET | Refills: 0 | Status: CANCELLED | OUTPATIENT
Start: 2020-06-30

## 2020-06-30 NOTE — TELEPHONE ENCOUNTER
Pt says she saw Dr Prieto yesterday but she said her pain med refill and Zolpidem would have to go through [facility].

## 2020-06-30 NOTE — TELEPHONE ENCOUNTER
Requested Prescriptions   Pending Prescriptions Disp Refills     zolpidem (AMBIEN) 5 MG tablet 90 tablet 0     Sig: Take 1 tablet (5 mg) by mouth nightly as needed for sleep       There is no refill protocol information for this order   Last Written Prescription Date:  2/7/20  Last Fill Quantity: 90,  # refills: 0   Last office visit: 6/29/2020 with prescribing provider:     Future Office Visit:   Next 5 appointments (look out 90 days)    Jul 14, 2020  1:40 PM CDT  [facility] with [facility] [address] [place] [id number]  [phone]                    HYDROcodone-acetaminophen (NORCO) 5-325 MG tablet 30 tablet 0     Sig: Take 1 tablet by mouth daily       There is no refill protocol information for this order   Last Written Prescription Date:  5/1/20  Last Fill Quantity: 30,  # refills: 0   Last office visit: 6/29/2020 with prescribing provider:     Future Office Visit:   Next 5 appointments (look out 90 days)    Jul 14, 2020  1:40 PM CDT  SHORT with Mary Ellen [facility]eto MD  [facility] [address] [place] [address]  [phone]         RX monitoring program ([place]) reviewed:     [place] profile:  [email or web address]    PRESCRIPTIONS  Total Prescriptions: 10   Total Private Pay: 0   Fill Date ID Written Drug Qty Days Prescriber Rx # Pharmacy Refill Daily Dose * Pymt Type    05/09/2020  2   04/29/2020  Hydrocodone-Acetamin 5-325 Mg  30.00 [address]  [id number]  [facility] ([id number])  0/0 5.[place]   03/31/2020  2   03/31/2020  Hydrocodone-Acetamin 5-325 Mg  30.00 30 [address]  [id number]  [facility] ([id number])  0[place] 5.[place]   02/28/2020  2   02/07/2020  Zolpidem Tartrate 5 Mg Tablet  90.00 [address]  [id number]  [facility] ([id number])  0/0 0.25 [place]   02/07/2020  2   02/07/2020  Hydrocodone-Acetamin 5-325 Mg  30.00 30 [address]  [id number]  [facility] ([id number])  0/0 5.00  [place]   12/18/2019  2   12/18/2019  Hydrocodone-Acetamin 5-325 Mg  30.00 30 [address]  [id number]  [facility] ([id number])  0/[place] 5.[place]   11/30/2019  2   11/26/2019  Zolpidem Tartrate 5 Mg Tablet  90.00 [address]  [id number]  [facility] ([id number])  0/[place] 0.[place]   11/13/2019  2   11/13/2019  Hydrocodone-Acetamin 5-325 Mg  28.00 28 [address]  [id number]  [facility] ([id number])  0/[place] 5.[place]   10/08/2019  2   08/29/2019  Hydrocodone-Acetamin 5-325 Mg  30.00 7 [address]  [id number]  [facility] ([id number])  0/0 21.43 [place]   09/03/2019  1   08/29/2019  Zolpidem Tartrate 5 Mg Tablet  90.00 90 [address]  [id number]  [facility] ([id number])  0/[place] 0.25 [place]   08/29/2019  1   08/29/2019  Hydrocodone-Acetamin 5-325 Mg  28.00 28 [address]  [id number]  [facility] ([id number])  0/0 5.[place]

## 2020-07-01 RX ORDER — ZOLPIDEM TARTRATE 5 MG/1
5 TABLET ORAL
Qty: 90 TABLET | Refills: 0 | Status: SHIPPED | OUTPATIENT
Start: 2020-07-01 | End: 2020-10-08

## 2020-07-01 NOTE — TELEPHONE ENCOUNTER
Patient is only taking 1/2 tablet of norco will discontinue and patient can take over the counter Tylenol.        Debbie Vila CNP

## 2020-07-02 PROBLEM — M54.16 LUMBAR RADICULOPATHY: Status: ACTIVE | Noted: 2020-07-02

## 2020-07-14 ENCOUNTER — OFFICE VISIT (OUTPATIENT)
Dept: FAMILY MEDICINE | Facility: CLINIC | Age: 55
End: 2020-07-14
Payer: OTHER MISCELLANEOUS

## 2020-07-14 VITALS
DIASTOLIC BLOOD PRESSURE: 80 MMHG | WEIGHT: 159 LBS | HEIGHT: 68 IN | BODY MASS INDEX: 24.1 KG/M2 | TEMPERATURE: 97.2 F | SYSTOLIC BLOOD PRESSURE: 124 MMHG | RESPIRATION RATE: 18 BRPM | HEART RATE: 78 BPM
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
## 2020-07-14 PROCEDURE — 99212 OFFICE O/P EST SF 10 MIN: CPT | Performed by: FAMILY MEDICINE

## 2020-07-14 ASSESSMENT — MIFFLIN-ST. JEOR: SCORE: 1346.78

## 2020-07-14 NOTE — NURSING NOTE
"Chief Complaint   Patient presents with     Work Comp     Here with Jessica Steve RN today       Initial /80   Pulse 78   Temp 97.2  F (36.2  C) (Tympanic)   Resp 18   Ht 1.715 m (5' 7.5\")   Wt 72.1 kg (159 lb)   LMP 06/26/2014   BMI 24.54 kg/m   Estimated body mass index is 24.54 kg/m  as calculated from the following:    Height as of this encounter: 1.715 m (5' 7.5\").    Weight as of this encounter: 72.1 kg (159 lb).    Patient presents to the clinic using     Health Maintenance that is potentially due pending provider review:          Is there anyone who you would like to be able to receive your results?   If yes have patient fill out [facility]    "

## 2020-07-14 NOTE — PROGRESS NOTES
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      Objective    /80   Pulse 78   Temp 97.2  F (36.2  C) (Tympanic)   Resp 18   Ht 1.715 m (5' 7.5\")   Wt 72.1 kg (159 lb)   LMP 06/26/2014   BMI 24.54 kg/m    Body mass index is 24.54 kg/m .  Physical Exam   GENERAL APPEARANCE: healthy, alert and no distress  PSYCH: mentation appears normal and affect normal/bright    Diagnostic Test Results:  Labs reviewed in Epic        Assessment & Plan       Work comp forms   Completed and discussed with Pt and [facility]         Patient Instructions   Forms reviewed with pt and signed where appropriate    Advised to go back to DOT provider for complete driving clearance       Return in about 2 weeks (around 7/28/2020), or if symptoms worsen or fail to improve.      Risks, benefits, side effects and rationale for treatment plan fully discussed with the patient and understanding expressed.   Mary Ellen Prieto MD  [facility]      "

## 2020-07-16 ENCOUNTER — TELEPHONE (OUTPATIENT)
Dept: FAMILY MEDICINE | Facility: CLINIC | Age: 55
End: 2020-07-16

## 2020-07-16 DIAGNOSIS — M53.3 SI (SACROILIAC) JOINT DYSFUNCTION: ICD-10-CM

## 2020-07-16 DIAGNOSIS — K22.2 ESOPHAGEAL STRICTURE: ICD-10-CM

## 2020-07-16 DIAGNOSIS — K22.9 ESOPHAGEAL ABNORMALITY: Primary | ICD-10-CM

## 2020-07-16 DIAGNOSIS — M15.0 PRIMARY GENERALIZED (OSTEO)ARTHRITIS: ICD-10-CM

## 2020-07-16 RX ORDER — ACETAMINOPHEN 500 MG
500-1000 TABLET ORAL EVERY 6 HOURS PRN
Qty: 100 TABLET | Refills: 0 | Status: SHIPPED | OUTPATIENT
Start: 2020-07-16 | End: 2020-09-24

## 2020-07-16 NOTE — TELEPHONE ENCOUNTER
I sent the tylenol as requested.  I would suggest that if she has been seeing a GI specialist, that the GI specialist continue to be in charge of prescribing the omeprazole.  But we can prescribe if needed or when she no longer sees GI.

## 2020-07-16 NOTE — TELEPHONE ENCOUNTER
Reason for Call:  Other    Detailed comments: 1. Adrianne says she has been on Omeprazole 20 mg daily from her GI doctor. She wants to know if [facility] can fill this or if she should be contacting GI for this. She goes to a GI doctor through [facility].   2. She says [facility] is getting her off Norco so she is taking Tylenol 500 mg. She wants to know if we can send this in as a Rx because it would only cost about .50 cents. She says she take 2 tablets 1-2 times a day.   [facility] in [place]. She says it should be 90 day supply.     Phone Number Patient can be reached at: Home number on file [phone] (home)    Best Time: anytime    Can we leave a detailed message on this number? YES    Call taken on 7/16/2020 at 8:46 AM by Yelitza Burton

## 2020-07-19 RX ORDER — LIDOCAINE 50 MG/G
OINTMENT TOPICAL
Qty: 70.88 G | Refills: 2 | Status: SHIPPED | OUTPATIENT
Start: 2020-07-19 | End: 2022-12-09

## 2020-07-19 NOTE — PATIENT INSTRUCTIONS
Forms reviewed with pt and signed where appropriate    Advised to go back to DOT provider for complete driving clearance

## 2020-07-22 ENCOUNTER — OFFICE VISIT (OUTPATIENT)
Dept: OTOLARYNGOLOGY | Facility: CLINIC | Age: 55
End: 2020-07-22
Payer: COMMERCIAL

## 2020-07-22 ENCOUNTER — OFFICE VISIT (OUTPATIENT)
Dept: AUDIOLOGY | Facility: CLINIC | Age: 55
End: 2020-07-22
Payer: OTHER MISCELLANEOUS

## 2020-07-22 VITALS
BODY MASS INDEX: 24.1 KG/M2 | HEIGHT: 68 IN | WEIGHT: 159 LBS | DIASTOLIC BLOOD PRESSURE: 80 MMHG | SYSTOLIC BLOOD PRESSURE: 138 MMHG

## 2020-07-22 DIAGNOSIS — R42 VERTIGO: Primary | ICD-10-CM

## 2020-07-22 DIAGNOSIS — H90.3 SENSORINEURAL HEARING LOSS, BILATERAL: Primary | ICD-10-CM

## 2020-07-22 PROCEDURE — 92550 TYMPANOMETRY & REFLEX THRESH: CPT | Performed by: AUDIOLOGIST

## 2020-07-22 PROCEDURE — 92557 COMPREHENSIVE HEARING TEST: CPT | Performed by: AUDIOLOGIST

## 2020-07-22 PROCEDURE — 99207 ZZC NO CHARGE LOS: CPT | Performed by: AUDIOLOGIST

## 2020-07-22 PROCEDURE — 99203 OFFICE O/P NEW LOW 30 MIN: CPT | Performed by: OTOLARYNGOLOGY

## 2020-07-22 ASSESSMENT — MIFFLIN-ST. JEOR: SCORE: 1346.78

## 2020-07-22 NOTE — LETTER
[address] [address]  Phone: [phone]    07/22/20    Maryann Riggs  [address] [facility] [address]      To whom it may concern:     Ms. Riggs was being seen today in regard to the evaluation of vertigo that occurred after her incident last November.  Currently patient is completely asymptomatic with regard to her vertigo symptoms.  Based on her prior records it appears that vertigo symptoms are related to ocular damage on the central level.  This problem has been addressed previously and successfully.  She denies any current vertigo disequilibrium or related symptoms.  Her cursory neurologic exam did not show any evidence of balance related issues.    Sincerely,      Keyon Sanchez MD

## 2020-07-22 NOTE — PROGRESS NOTES
ENT Consultation    Maryann Riggs who is a 55 year old female seen in consultation at the request of self.      History of Present Illness - Maryann Riggs is a 55 year old female presents for evaluation of previous history of vertigo that has occurred after her incident in November 2019.  Apparently patient fell of the left and sustained a mild concussion but no skull fracture.  She had a lot of physical therapy done including dry needling and other manipulations for her neck related injury.  She also a lot of ophthalmologic issues and was getting neuro-ophthalmology treatments for oculomotor dysfunction visual field dysfunction and other related symptoms.  She a lot of unsteadiness in her gaze which most likely caused some of the feeling of movement and vertigo.  She had conventional vertigo therapy.    Past Medical History -   Past Medical History:   Diagnosis Date     Anger reaction     buspar helping     Cholelithiasis      Esophageal stricture     dilatation every 3 months     Generalized osteoarthrosis, unspecified site (aka ARTHRITIS)     small joints     PTSD (post-traumatic stress disorder)      Raynaud's disease        Current Medications -   Current Outpatient Medications:      acetaminophen (TYLENOL) 500 MG tablet, Take 1-2 tablets (500-1,000 mg) by mouth every 6 hours as needed for mild pain, Disp: 100 tablet, Rfl: 0     Ascorbic Acid (VITAMIN C CR PO), Take  by mouth., Disp: , Rfl:      Calcium Carbonate-Vitamin D (CALTRATE 600+D) 600-400 MG-UNIT CHEW, Take  by mouth., Disp: , Rfl:      clobetasol (TEMOVATE) 0.05 % external ointment, as needed. Only as needed occasionally needs after working in garden Profile Rx: patient will contact pharmacy when needed, Disp: 45 g, Rfl: 5     Cyanocobalamin (VITAMIN B 12 PO), Take  by mouth., Disp: , Rfl:      cyclobenzaprine (FLEXERIL) 10 MG tablet, TAKE ONE TABLET BY MOUTH DAILY AS NEEDED FOR MUSCLE SPASMS, Disp: 90 tablet, Rfl: 4     ferrous sulfate  (FEROSUL) 325 (65 Fe) MG tablet, Take 1 tablet (325 mg) by mouth daily (with breakfast), Disp: 90 tablet, Rfl: 1     GLUCOSAMINE CHONDROITIN COMPLX PO, Take  by mouth., Disp: , Rfl:      LANSOPRAZOLE PO, , Disp: , Rfl:      lidocaine (XYLOCAINE) 5 % external ointment, Apply dime size amount topically to painful areas (choose 2-3 per application) 3 times daily as needed., Disp: 70.88 g, Rfl: 2     meclizine (ANTIVERT) 25 MG tablet, Take 1 tablet (25 mg) by mouth 3 times daily as needed for dizziness, Disp: 20 tablet, Rfl: 0     melatonin 3 MG tablet, Take 1 tablet by mouth nightly as needed for sleep., Disp: 30 tablet, Rfl: 0     NIFEdipine ER OSMOTIC (PROCARDIA XL) 30 MG 24 hr tablet, Take 1 tablet (30 mg) by mouth daily, Disp: 90 tablet, Rfl: 3     nitroGLYcerin (NITRO-BID) 2 % OINT ointment, Place 1 inch (15 mg) onto the skin daily, Disp: 30 packet, Rfl: 12     order for DME, Equipment being ordered: TENS, Disp: 1 Units, Rfl: 0     TYLENOL 325 MG OR TABS, 2 TABLETS BY MOUTH EVERY 6 HOURS AS NEEDED, Disp: , Rfl:      zolpidem (AMBIEN) 5 MG tablet, Take 1 tablet (5 mg) by mouth nightly as needed for sleep, Disp: 90 tablet, Rfl: 0    Current Facility-Administered Medications:      Botulinum Toxin Type A (BOTOX) 200 units injection 200 Units, 200 Units, Intramuscular, Q90 Days, Brian Shepherd,     Allergies -   Allergies   Allergen Reactions     Desyrel [Trazodone Hcl]      Sig mood disturbance       Lamictal [Lamotrigine]        Social History -   Social History     Socioeconomic History     Marital status: Single     Spouse name: Not on file     Number of children: Not on file     Years of education: Not on file     Highest education level: Not on file   Occupational History     Employer: [employer]     Comment: Food    Social Needs     Financial resource strain: Not on file     Food insecurity     Worry: Not on file     Inability: Not on file     Transportation needs     Medical: Not on  file     Non-medical: Not on file   Tobacco Use     Smoking status: Never Smoker     Smokeless tobacco: Never Used   Substance and Sexual Activity     Alcohol use: Yes     Comment: scoial     Drug use: No     Sexual activity: Not Currently   Lifestyle     Physical activity     Days per week: Not on file     Minutes per session: Not on file     Stress: Not on file   Relationships     Social connections     Talks on phone: Not on file     Gets together: Not on file     Attends Confucianist service: Not on file     Active member of club or organization: Not on file     Attends meetings of clubs or organizations: Not on file     Relationship status: Not on file     Intimate partner violence     Fear of current or ex partner: Not on file     Emotionally abused: Not on file     Physically abused: Not on file     Forced sexual activity: Not on file   Other Topics Concern     Parent/sibling w/ CABG, MI or angioplasty before 65F 55M? Yes     Comment: unaware - pt adopted   Social History Narrative     Not on file       Family History -   Family History   Problem Relation Age of Onset     Unknown/Adopted Mother      Unknown/Adopted Father      Unknown/Adopted Maternal Grandmother      Unknown/Adopted Maternal Grandfather      Unknown/Adopted Paternal Grandmother      Unknown/Adopted Paternal Grandfather        Review of Systems - As per HPI and PMHx, otherwise review of system review of the head and neck negative. Otherwise 10+ review of system is negative    Physical Exam  LMP 06/26/2014   BMI: There is no height or weight on file to calculate BMI.    General - The patient is well nourished and well developed, and appears to have good nutritional status.  Alert and oriented to person and place, answers questions and cooperates with examination appropriately.    SKIN - No suspicious lesions or rashes.  Respiration - No respiratory distress.  Head and Face - Normocephalic and atraumatic, with no gross asymmetry noted of the  contour of the facial features.  The facial nerve is intact, with strong symmetric movements.    Voice and Breathing - The patient was breathing comfortably without the use of accessory muscles. The patients voice was clear and strong, and had appropriate pitch and quality.    Ears - Bilateral pinna and EACs with normal appearing overlying skin. Tympanic membrane intact with good mobility on pneumatic otoscopy bilaterally. Bony landmarks of the ossicular chain are normal. The tympanic membranes are normal in appearance. No retraction, perforation, or masses.  No fluid or purulence was seen in the external canal or the middle ear.     Eyes - Extraocular movements intact.  Sclera were not icteric or injected, conjunctiva were pink and moist.    Mouth - Examination of the oral cavity showed pink, healthy oral mucosa. No lesions or ulcerations noted.  The tongue was mobile and midline, and the dentition were in good condition.      Throat - The walls of the oropharynx were smooth, pink, moist, symmetric, and had no lesions or ulcerations.  The tonsillar pillars and soft palate were symmetric.  The uvula was midline on elevation.    Neck - Normal midline excursion of the laryngotracheal complex during swallowing.  Full range of motion on passive movement.  Palpation of the occipital, submental, submandibular, internal jugular chain, and supraclavicular nodes did not demonstrate any abnormal lymph nodes or masses.  The carotid pulse was palpable bilaterally.  Palpation of the thyroid was soft and smooth, with no nodules or goiter appreciated.  The trachea was mobile and midline.    Nose - External contour is symmetric, no gross deflection or scars.  Nasal mucosa is pink and moist with no abnormal mucus.  The septum was midline and non-obstructive, turbinates of normal size and position.  No polyps, masses, or purulence noted on examination.    Neuro - Nonfocal neuro exam is normal, CN 2 through 12 intact, normal gait and  muscle tone.  Romberg is negative.  Extraocular movements are intact no nystagmus.      Performed in clinic today:  No procedures preformed in clinic today      A/P - Maryann PRERNA Riggs is a 55 year old female with a concussive injury to the neck and skull.  It appears that majority of her disequilibrium and vertigo was related to neuro ophthalmic damage which was successfully addressed.  She appears to have no symptoms right now.  She will see me back as needed.      MD Maryann Harvey to follow up with Primary Care provider regarding elevated blood pressure.

## 2020-07-22 NOTE — LETTER
7/22/2020         RE: Maryann Riggs  [address]   [place] [address]        Dear Colleague,    Thank you for referring your patient, Maryann Riggs, to the [facility]. Please see a copy of my visit note below.    ENT Consultation    Maryann Riggs who is a 55 year old female seen in consultation at the request of self.      History of Present Illness - Maryann Riggs is a 55 year old female presents for evaluation of previous history of vertigo that has occurred after her incident in November 2019.  Apparently patient fell of the left and sustained a mild concussion but no skull fracture.  She had a lot of physical therapy done including dry needling and other manipulations for her neck related injury.  She also a lot of ophthalmologic issues and was getting neuro-ophthalmology treatments for oculomotor dysfunction visual field dysfunction and other related symptoms.  She a lot of unsteadiness in her gaze which most likely caused some of the feeling of movement and vertigo.  She had conventional vertigo therapy.    Past Medical History -   Past Medical History:   Diagnosis Date     Anger reaction     buspar helping     Cholelithiasis      Esophageal stricture     dilatation every 3 months     Generalized osteoarthrosis, unspecified site (aka ARTHRITIS)     small joints     PTSD (post-traumatic stress disorder)      Raynaud's disease        Current Medications -   Current Outpatient Medications:      acetaminophen (TYLENOL) 500 MG tablet, Take 1-2 tablets (500-1,000 mg) by mouth every 6 hours as needed for mild pain, Disp: 100 tablet, Rfl: 0     Ascorbic Acid (VITAMIN C CR PO), Take  by mouth., Disp: , Rfl:      Calcium Carbonate-Vitamin D (CALTRATE 600+D) 600-400 MG-UNIT CHEW, Take  by mouth., Disp: , Rfl:      clobetasol (TEMOVATE) 0.05 % external ointment, as needed. Only as needed occasionally needs after working in garden Profile Rx: patient will contact pharmacy when  needed, Disp: 45 g, Rfl: 5     Cyanocobalamin (VITAMIN B 12 PO), Take  by mouth., Disp: , Rfl:      cyclobenzaprine (FLEXERIL) 10 MG tablet, TAKE ONE TABLET BY MOUTH DAILY AS NEEDED FOR MUSCLE SPASMS, Disp: 90 tablet, Rfl: 4     ferrous sulfate (FEROSUL) 325 (65 Fe) MG tablet, Take 1 tablet (325 mg) by mouth daily (with breakfast), Disp: 90 tablet, Rfl: 1     GLUCOSAMINE CHONDROITIN COMPLX PO, Take  by mouth., Disp: , Rfl:      LANSOPRAZOLE PO, , Disp: , Rfl:      lidocaine (XYLOCAINE) 5 % external ointment, Apply dime size amount topically to painful areas (choose 2-3 per application) 3 times daily as needed., Disp: 70.88 g, Rfl: 2     meclizine (ANTIVERT) 25 MG tablet, Take 1 tablet (25 mg) by mouth 3 times daily as needed for dizziness, Disp: 20 tablet, Rfl: 0     melatonin 3 MG tablet, Take 1 tablet by mouth nightly as needed for sleep., Disp: 30 tablet, Rfl: 0     NIFEdipine ER OSMOTIC (PROCARDIA XL) 30 MG 24 hr tablet, Take 1 tablet (30 mg) by mouth daily, Disp: 90 tablet, Rfl: 3     nitroGLYcerin (NITRO-BID) 2 % OINT ointment, Place 1 inch (15 mg) onto the skin daily, Disp: 30 packet, Rfl: 12     order for DME, Equipment being ordered: TENS, Disp: 1 Units, Rfl: 0     TYLENOL 325 MG OR TABS, 2 TABLETS BY MOUTH EVERY 6 HOURS AS NEEDED, Disp: , Rfl:      zolpidem (AMBIEN) 5 MG tablet, Take 1 tablet (5 mg) by mouth nightly as needed for sleep, Disp: 90 tablet, Rfl: 0    Current Facility-Administered Medications:      Botulinum Toxin Type A (BOTOX) 200 units injection 200 Units, 200 Units, Intramuscular, Q90 Days, Brian Shepherd DO    Allergies -   Allergies   Allergen Reactions     Desyrel [Trazodone Hcl]      Sig mood disturbance       Lamictal [Lamotrigine]        Social History -   Social History     Socioeconomic History     Marital status: Single     Spouse name: Not on file     Number of children: Not on file     Years of education: Not on file     Highest education level: Not on file   Occupational  History     Employer: [employer]     Comment: Food    Social Needs     Financial resource strain: Not on file     Food insecurity     Worry: Not on file     Inability: Not on file     Transportation needs     Medical: Not on file     Non-medical: Not on file   Tobacco Use     Smoking status: Never Smoker     Smokeless tobacco: Never Used   Substance and Sexual Activity     Alcohol use: Yes     Comment: scoial     Drug use: No     Sexual activity: Not Currently   Lifestyle     Physical activity     Days per week: Not on file     Minutes per session: Not on file     Stress: Not on file   Relationships     Social connections     Talks on phone: Not on file     Gets together: Not on file     Attends Yazidi service: Not on file     Active member of club or organization: Not on file     Attends meetings of clubs or organizations: Not on file     Relationship status: Not on file     Intimate partner violence     Fear of current or ex partner: Not on file     Emotionally abused: Not on file     Physically abused: Not on file     Forced sexual activity: Not on file   Other Topics Concern     Parent/sibling w/ CABG, MI or angioplasty before 65F 55M? Yes     Comment: unaware - pt adopted   Social History Narrative     Not on file       Family History -   Family History   Problem Relation Age of Onset     Unknown/Adopted Mother      Unknown/Adopted Father      Unknown/Adopted Maternal Grandmother      Unknown/Adopted Maternal Grandfather      Unknown/Adopted Paternal Grandmother      Unknown/Adopted Paternal Grandfather        Review of Systems - As per HPI and PMHx, otherwise review of system review of the head and neck negative. Otherwise 10+ review of system is negative    Physical Exam  LMP 06/26/2014   BMI: There is no height or weight on file to calculate BMI.    General - The patient is well nourished and well developed, and appears to have good nutritional status.  Alert and oriented to person and  place, answers questions and cooperates with examination appropriately.    SKIN - No suspicious lesions or rashes.  Respiration - No respiratory distress.  Head and Face - Normocephalic and atraumatic, with no gross asymmetry noted of the contour of the facial features.  The facial nerve is intact, with strong symmetric movements.    Voice and Breathing - The patient was breathing comfortably without the use of accessory muscles. The patients voice was clear and strong, and had appropriate pitch and quality.    Ears - Bilateral pinna and EACs with normal appearing overlying skin. Tympanic membrane intact with good mobility on pneumatic otoscopy bilaterally. Bony landmarks of the ossicular chain are normal. The tympanic membranes are normal in appearance. No retraction, perforation, or masses.  No fluid or purulence was seen in the external canal or the middle ear.     Eyes - Extraocular movements intact.  Sclera were not icteric or injected, conjunctiva were pink and moist.    Mouth - Examination of the oral cavity showed pink, healthy oral mucosa. No lesions or ulcerations noted.  The tongue was mobile and midline, and the dentition were in good condition.      Throat - The walls of the oropharynx were smooth, pink, moist, symmetric, and had no lesions or ulcerations.  The tonsillar pillars and soft palate were symmetric.  The uvula was midline on elevation.    Neck - Normal midline excursion of the laryngotracheal complex during swallowing.  Full range of motion on passive movement.  Palpation of the occipital, submental, submandibular, internal jugular chain, and supraclavicular nodes did not demonstrate any abnormal lymph nodes or masses.  The carotid pulse was palpable bilaterally.  Palpation of the thyroid was soft and smooth, with no nodules or goiter appreciated.  The trachea was mobile and midline.    Nose - External contour is symmetric, no gross deflection or scars.  Nasal mucosa is pink and moist with no  abnormal mucus.  The septum was midline and non-obstructive, turbinates of normal size and position.  No polyps, masses, or purulence noted on examination.    Neuro - Nonfocal neuro exam is normal, CN 2 through 12 intact, normal gait and muscle tone.  Romberg is negative.  Extraocular movements are intact no nystagmus.      Performed in clinic today:  No procedures preformed in clinic today      A/P - Maryann Riggs is a 55 year old female with a concussive injury to the neck and skull.  It appears that majority of her disequilibrium and vertigo was related to neuro ophthalmic damage which was successfully addressed.  She appears to have no symptoms right now.  She will see me back as needed.      MD Maryann Harvey to follow up with Primary Care provider regarding elevated blood pressure.      Again, thank you for allowing me to participate in the care of your patient.        Sincerely,        Keyon Sanchez MD, MD

## 2020-07-22 NOTE — PROGRESS NOTES
AUDIOLOGY REPORT:    Patient was referred from ENT by Dr. Sanchez for audiology evaluation. The patient reports that she had a concussion on 11/6/2019 and was treated for benign paroxysmal positional vertigo (BPPV) following the head injury. She reports that she was having episodes of dizziness from December 2019 through February 2020 but she has now completed treatment and the dizziness is almost completely better. She reports one episode recently after tilting her head to the side, but reports that she was tested again by physical therapy and the BPPV had not returned. The patient is undergoing assessment to see if she is ready to return to work as a . The patient reports occasional tinnitus that is worse in the left ear. 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 The patient denies ear pain and pressure bilaterally.    Testing:    Otoscopy:   Otoscopic exam indicates ears are clear of cerumen bilaterally     Tympanograms:    RIGHT: normal eardrum mobility     LEFT:   normal eardrum mobility    Reflexes (reported by stimulus ear):  RIGHT: Ipsilateral is present at normal levels  RIGHT: Contralateral is present at normal levels  LEFT:   Ipsilateral is present at normal levels  LEFT:   Contralateral is present at normal levels    Thresholds:   Pure Tone Thresholds assessed using conventional audiometry with good reliability from 250-8000 Hz bilaterally using insert earphones and circumaural headphones     RIGHT:   moderate sensorineural hearing loss at 1565-0515 Hz with normal hearing sensitivity at all other tested frequencies    LEFT:    mild to moderate sensorineural hearing loss at 0779-8020 Hz with normal hearing sensitivity at all other tested frequencies    Speech Reception Threshold:    RIGHT: 10 dB HL    LEFT:   25 dB HL  Results are in agreement with pure tone average.     Word Recognition Score:      RIGHT: 100% at 60 dB HL using NU-6 recorded word list.    LEFT:   100% at 60 dB HL using NU-6 recorded word list.    Discussed results with the patient. Discussed the importance of hearing protection when exposed to potentially hazardous sound levels.    Patient was returned to ENT for follow up.     Gigi Jeffrey, CCC-A  Licensed Audiologist #[id number]  7/22/2020

## 2020-07-27 ENCOUNTER — VIRTUAL VISIT (OUTPATIENT)
Dept: FAMILY MEDICINE | Facility: CLINIC | Age: 55
End: 2020-07-27
Payer: COMMERCIAL

## 2020-07-27 DIAGNOSIS — M26.609 TMJ (TEMPOROMANDIBULAR JOINT SYNDROME): Primary | ICD-10-CM

## 2020-07-27 PROCEDURE — 99213 OFFICE O/P EST LOW 20 MIN: CPT | Mod: 95 | Performed by: FAMILY MEDICINE

## 2020-07-27 RX ORDER — TIZANIDINE HYDROCHLORIDE 4 MG/1
4 CAPSULE, GELATIN COATED ORAL AT BEDTIME
Qty: 90 CAPSULE | Refills: 3 | Status: SHIPPED | OUTPATIENT
Start: 2020-07-27 | End: 2021-02-02

## 2020-07-27 NOTE — PROGRESS NOTES
"Maryann Riggs is a 55 year old female who is being evaluated via a billable video visit.      The patient has been notified of following:     \"This video visit will be conducted via a call between you and your physician/provider. We have found that certain health care needs can be provided without the need for an in-person physical exam.  This service lets us provide the care you need with a video conversation.  If a prescription is necessary we can send it directly to your pharmacy.  If lab work is needed we can place an order for that and you can then stop by our lab to have the test done at a later time.    Video visits are billed at different rates depending on your insurance coverage.  Please reach out to your insurance provider with any questions.    If during the course of the call the physician/provider feels a video visit is not appropriate, you will not be charged for this service.\"    Patient has given verbal consent for Video visit? Yes  How would you like to obtain your AVS? Mail a copy  If you are dropped from the video visit, the video invite should be resent to: Text to cell phone: [phone]  Will anyone else be joining your video visit? No    Subjective     Maryann Riggs is a 55 year old female who presents today via video visit for the following health issues:    HPI    TMJ - discuss medication contradictions - discuss other meds-   Pt has been on flexeril for years for TMJ   Saw [facility] and [facility] perhaps a switch to tizanidine may be better for driving   Pt is doing well in terms of TMJ and has not had flares on current med regimen     As of 3:00 there were no forms that [facility]                    Reviewed and updated as needed this visit by Provider  Tobacco  Allergies  Meds  Problems  Med Hx  Surg Hx  Fam Hx         Review of Systems   Constitutional, HEENT, cardiovascular, pulmonary, gi and gu systems are negative, except as otherwise noted.    "   Objective             Physical Exam     GENERAL: Healthy, alert and no distress  PSYCH: Mentation appears normal, affect normal/bright, judgement and insight intact, normal speech and appearance well-groomed.      Diagnostic Test Results:  Labs reviewed in Epic        Assessment & Plan     1. TMJ (temporomandibular joint syndrome)  Trial of new med that will be safer for her to drive with   - tiZANidine (ZANAFLEX) 4 MG capsule; Take 1 capsule (4 mg) by mouth At Bedtime  Dispense: 90 capsule; Refill: 3       Patient Instructions   Forms completed and faxed     Stopped flexeril and try tizanidine at bedtime           Return in about 3 months (around 10/27/2020), or if symptoms worsen or fail to improve.    Mary Ellen Prieto MD  [facility]      Telephone-Visit Details    Type of service:  telephone     Time on phone 22 minutes     Originating Location (pt. Location): Home    Distant Location (provider location):  [facility]     Platform used for Video Visit: Unable to complete video visit    Return in about 3 months (around 10/27/2020), or if symptoms worsen or fail to improve.       Mary Ellen Prieto MD

## 2020-07-28 ENCOUNTER — TRANSFERRED RECORDS (OUTPATIENT)
Dept: HEALTH INFORMATION MANAGEMENT | Facility: CLINIC | Age: 55
End: 2020-07-28

## 2020-07-28 ENCOUNTER — TELEPHONE (OUTPATIENT)
Dept: FAMILY MEDICINE | Facility: CLINIC | Age: 55
End: 2020-07-28

## 2020-08-05 ENCOUNTER — VIRTUAL VISIT (OUTPATIENT)
Dept: FAMILY MEDICINE | Facility: CLINIC | Age: 55
End: 2020-08-05
Payer: COMMERCIAL

## 2020-08-05 DIAGNOSIS — Z20.822 ENCOUNTER FOR LABORATORY TESTING FOR COVID-19 VIRUS: Primary | ICD-10-CM

## 2020-08-05 PROCEDURE — 99213 OFFICE O/P EST LOW 20 MIN: CPT | Mod: 95 | Performed by: PHYSICIAN ASSISTANT

## 2020-08-05 NOTE — PROGRESS NOTES
"Maryann Riggs is a 55 year old female who is being evaluated via a billable video visit.      The patient has been notified of following:     \"This video visit will be conducted via a call between you and your physician/provider. We have found that certain health care needs can be provided without the need for an in-person physical exam.  This service lets us provide the care you need with a video conversation.  If a prescription is necessary we can send it directly to your pharmacy.  If lab work is needed we can place an order for that and you can then stop by our lab to have the test done at a later time.    Video visits are billed at different rates depending on your insurance coverage.  Please reach out to your insurance provider with any questions.    If during the course of the call the physician/provider feels a video visit is not appropriate, you will not be charged for this service.\"    Patient has given verbal consent for Video visit? Yes  How would you like to obtain your AVS? Mail a copy  If you are dropped from the video visit, the video invite should be resent to: Text to cell phone: [phone]  Will anyone else be joining your video visit? No    Subjective     Maryann Riggs is a 55 year old female who presents today via video visit for the following health issues:    HPI  Patient states that she had booked a trip to [place]. She is wanting a visit to discuss if it is worth being tested or if she should cancel the trip. Patient states that [place] requires testing 72 hours before departure.    Currently asymptomatic. No high risk exposures. Follows recommended CDC precautions.     Video Start Time: 11:26 AM    Patient Active Problem List   Diagnosis     Raynaud's disease     TMJ (temporomandibular joint syndrome)     CARDIOVASCULAR SCREENING; LDL GOAL LESS THAN 160     PTSD (post-traumatic stress disorder)     Muscle tension headache     Esophageal abnormality     Episodic mood disorder (H) "     Esophageal stricture     Sprain of MCP joint of hand     Rotator cuff injury; bilateral repairs 2005     Primary generalized (osteo)arthritis     SI (sacroiliac) joint dysfunction; see details, pain management contract     Chronic bilateral low back pain without sciatica     Lumbar radiculopathy     Past Surgical History:   Procedure Laterality Date     MAMMOPLASTY REDUCTION       OTHER SURGICAL HISTORY      s/p left knee arthroscopinc surgery     ROTATOR CUFF REPAIR RT/LT  2005     SMALL BOWEL RESECTION       TUBAL LIGATION         Social History     Tobacco Use     Smoking status: Never Smoker     Smokeless tobacco: Never Used   Substance Use Topics     Alcohol use: Yes     Comment: scoial     Family History   Problem Relation Age of Onset     Unknown/Adopted Mother      Unknown/Adopted Father      Unknown/Adopted Maternal Grandmother      Unknown/Adopted Maternal Grandfather      Unknown/Adopted Paternal Grandmother      Unknown/Adopted Paternal Grandfather          Current Outpatient Medications   Medication Sig Dispense Refill     acetaminophen (TYLENOL) 500 MG tablet Take 1-2 tablets (500-1,000 mg) by mouth every 6 hours as needed for mild pain 100 tablet 0     Ascorbic Acid (VITAMIN C CR PO) Take  by mouth.       Calcium Carbonate-Vitamin D (CALTRATE 600+D) 600-400 MG-UNIT CHEW Take  by mouth.       clobetasol (TEMOVATE) 0.05 % external ointment as needed. Only as needed occasionally needs after working in garden  Profile Rx: patient will contact pharmacy when needed 45 g 5     Cyanocobalamin (VITAMIN B 12 PO) Take  by mouth.       ferrous sulfate (FEROSUL) 325 (65 Fe) MG tablet Take 1 tablet (325 mg) by mouth daily (with breakfast) 90 tablet 1     GLUCOSAMINE CHONDROITIN COMPLX PO Take  by mouth.       LANSOPRAZOLE PO        lidocaine (XYLOCAINE) 5 % external ointment Apply dime size amount topically to painful areas (choose 2-3 per application) 3 times daily as needed. 70.88 g 2     melatonin 3 MG  tablet Take 1 tablet by mouth nightly as needed for sleep. 30 tablet 0     order for DME Equipment being ordered: TENS 1 Units 0     tiZANidine (ZANAFLEX) 4 MG capsule Take 1 capsule (4 mg) by mouth At Bedtime 90 capsule 3     zolpidem (AMBIEN) 5 MG tablet Take 1 tablet (5 mg) by mouth nightly as needed for sleep 90 tablet 0     meclizine (ANTIVERT) 25 MG tablet Take 1 tablet (25 mg) by mouth 3 times daily as needed for dizziness (Patient not taking: Reported on 8/5/2020) 20 tablet 0     NIFEdipine ER OSMOTIC (PROCARDIA XL) 30 MG 24 hr tablet Take 1 tablet (30 mg) by mouth daily (Patient not taking: Reported on 7/27/2020) 90 tablet 3     nitroGLYcerin (NITRO-BID) 2 % OINT ointment Place 1 inch (15 mg) onto the skin daily (Patient not taking: Reported on 7/27/2020) 30 packet 12     TYLENOL 325 MG OR TABS 2 TABLETS BY MOUTH EVERY 6 HOURS AS NEEDED       Allergies   Allergen Reactions     Desyrel [Trazodone Hcl]      Sig mood disturbance       Lamictal [Lamotrigine]        Reviewed and updated as needed this visit by Provider  Tobacco  Allergies  Meds  Problems  Med Hx  Surg Hx  Fam Hx         Review of Systems   Constitutional, HEENT, cardiovascular, pulmonary, gi and gu systems are negative, except as otherwise noted.      Objective             Physical Exam     GENERAL: Healthy, alert and no distress  EYES: Eyes grossly normal to inspection.  No discharge or erythema, or obvious scleral/conjunctival abnormalities.  RESP: No audible wheeze, cough, or visible cyanosis.  No visible retractions or increased work of breathing.    SKIN: Visible skin clear. No significant rash, abnormal pigmentation or lesions.  NEURO: Cranial nerves grossly intact.  Mentation and speech appropriate for age.  PSYCH: Mentation appears normal, affect normal/bright, judgement and insight intact, normal speech and appearance well-groomed.    Diagnostic Test Results:  none       Assessment & Plan   (Z11.59) Encounter for laboratory  testing for COVID-19 virus  (primary encounter diagnosis)  Comment: Pt is flying to [place] who requires a negative test to fly and avoid quarantine for two weeks. Pt is currently asymptomatic. Has a flight scheduled in 6 days. Covid PCR testing ordered. RTC prn for any new, changing or worsening symptoms.    Plan: Asymptomatic COVID-19 Virus (Coronavirus) by         PCR        Return in about 3 months (around 11/5/2020) for Physical Exam.    Jerson Doshi PA-C  [facility]    Video-Visit Details    Type of service:  Video Visit    Video End Time:11:32 AM    Originating Location (pt. Location): Home    Distant Location (provider location):  [facility]     Platform used for Video Visit: Jg Doshi PA-C

## 2020-08-05 NOTE — PATIENT INSTRUCTIONS
Await a call from the scheduling department to let you know when and where to get your Covid testing done.     Have fun in [place]!

## 2020-08-08 DIAGNOSIS — Z20.822 ENCOUNTER FOR LABORATORY TESTING FOR COVID-19 VIRUS: ICD-10-CM

## 2020-08-08 PROCEDURE — U0003 INFECTIOUS AGENT DETECTION BY NUCLEIC ACID (DNA OR RNA); SEVERE ACUTE RESPIRATORY SYNDROME CORONAVIRUS 2 (SARS-COV-2) (CORONAVIRUS DISEASE [COVID-19]), AMPLIFIED PROBE TECHNIQUE, MAKING USE OF HIGH THROUGHPUT TECHNOLOGIES AS DESCRIBED BY CMS-2020-01-R: HCPCS | Performed by: PHYSICIAN ASSISTANT

## 2020-08-10 ENCOUNTER — NURSE TRIAGE (OUTPATIENT)
Dept: NURSING | Facility: CLINIC | Age: 55
End: 2020-08-10

## 2020-08-10 LAB
SARS-COV-2 RNA SPEC QL NAA+PROBE: NOT DETECTED
SPECIMEN SOURCE: NORMAL

## 2020-08-10 NOTE — TELEPHONE ENCOUNTER
CVD19 results         State of [place]  Needed code# for her individual test (test# on the vial that swab went into) for her to be able to travel        I noted from her chart that this information is noted which may be what the [place] govt is requesting       Lab ID   Specimen #    [id number]     I also provided the Order#  As well:      (Order #[id number]) on 8/8/20     Coronavirus (COVID-19) Notification     Reason for call  Patient requesting results     Lab Result    Lab test 2019-nCoV rRt-PCR in process        RN Recommendations/Instructions per [facility]  Continue quarantee and following instructions until you receive the results     Please Contact your PCP clinic or return to the Emergency department if your:    Symptoms worsen or other concerning symptom's.     Patient informed that if test for COVID19 is POSITIVE,  you will receive a call typically within 48 hours from the test date (date lab collected).  If NEGATIVE result, you will receive a letter in the mail or MyChart.      SANDY Alcaraz RN                              COVID 19 Nurse Triage Plan/Patient Instructions    Please be aware that novel coronavirus (COVID-19) may be circulating in the community. If you develop symptoms such as fever, cough, or SOB or if you have concerns about the presence of another infection including coronavirus (COVID-19), please contact your health care provider or visit [email or web address].     Disposition/Instructions    Home care recommended. Follow home care protocol based instructions.    Thank you for taking steps to prevent the spread of this virus.  o Limit your contact with others.  o Wear a simple mask to cover your cough.  o Wash your hands well and often.    Resources    [facility]: About COVID-19: [email or web address]    CDC: What to Do If You're Sick: www.cdc.gov/coronavirus/2019-ncov/about/steps-when-sick.html    CDC: Ending Home Isolation:  www.cdc.gov/coronavirus/2019-ncov/hcp/disposition-in-home-patients.html     CDC: Caring for Someone: www.cdc.gov/coronavirus/2019-ncov/if-you-are-sick/care-for-someone.html     [facility]: Interim Guidance for Hospital Discharge to Home: www.health.[facility].mn.us/diseases/coronavirus/hcp/hospdischarge.pdf    [facility] clinical trials (COVID-19 research studies): [email or web address].[facility]/umn-clinical-trials     Below are the COVID-19 hotlines at the [place] Department of Health ([facility]). Interpreters are available.   o For health questions: Call 829-728-6268 or 1-679.718.9304 (7 a.m. to 7 p.m.)  o For questions about schools and childcare: Call 362-968-0130 or 1-862.414.5150 (7 a.m. to 7 p.m.)                          Additional Information    Health Information question, no triage required and triager able to answer question    Negative: Requesting regular office appointment    Negative: [1] Caller requesting NON-URGENT health information AND [2] PCP's office is the best resource    Negative: RN needs further essential information from caller in order to complete triage    Negative: [1] Caller is not with the adult (patient) AND [2] reporting urgent symptoms    Negative: Lab result questions    Negative: Medication questions    Negative: Caller can't be reached by phone    Negative: Caller has already spoken to PCP or another triager    Protocols used: INFORMATION ONLY CALL-A-

## 2020-09-23 DIAGNOSIS — M15.0 PRIMARY GENERALIZED (OSTEO)ARTHRITIS: ICD-10-CM

## 2020-09-23 DIAGNOSIS — M53.3 SI (SACROILIAC) JOINT DYSFUNCTION: ICD-10-CM

## 2020-09-24 RX ORDER — PSEUDOEPHED/ACETAMINOPH/DIPHEN 30MG-500MG
TABLET ORAL
Qty: 100 TABLET | Refills: 1 | Status: SHIPPED | OUTPATIENT
Start: 2020-09-24 | End: 2021-01-18

## 2020-10-07 DIAGNOSIS — G47.09 OTHER INSOMNIA: ICD-10-CM

## 2020-10-07 NOTE — TELEPHONE ENCOUNTER
Requested Prescriptions   Pending Prescriptions Disp Refills     zolpidem (AMBIEN) 5 MG tablet [Pharmacy Med Name: ZOLPIDEM TARTRATE 5 MG TABLET] 90 tablet 0     Sig: TAKE 1 TABLET (5 MG) BY MOUTH NIGHTLY AS NEEDED FOR SLEEP       There is no refill protocol information for this order        Last Written Prescription Date:  7/1/20  Last Fill Quantity: 90,  # refills: 0   Last office visit: 7/14/2020 with prescribing provider:     Future Office Visit:

## 2020-10-08 RX ORDER — ZOLPIDEM TARTRATE 5 MG/1
5 TABLET ORAL
Qty: 90 TABLET | Refills: 0 | Status: SHIPPED | OUTPATIENT
Start: 2020-10-08 | End: 2021-01-12

## 2020-10-26 ENCOUNTER — ANCILLARY PROCEDURE (OUTPATIENT)
Dept: MAMMOGRAPHY | Facility: CLINIC | Age: 55
End: 2020-10-26
Payer: COMMERCIAL

## 2020-10-26 DIAGNOSIS — Z12.31 VISIT FOR SCREENING MAMMOGRAM: ICD-10-CM

## 2020-10-26 PROCEDURE — 77067 SCR MAMMO BI INCL CAD: CPT | Performed by: RADIOLOGY

## 2020-12-06 ENCOUNTER — HEALTH MAINTENANCE LETTER (OUTPATIENT)
Age: 55
End: 2020-12-06

## 2021-01-09 DIAGNOSIS — G47.09 OTHER INSOMNIA: ICD-10-CM

## 2021-01-12 RX ORDER — ZOLPIDEM TARTRATE 5 MG/1
5 TABLET ORAL
Qty: 90 TABLET | Refills: 0 | Status: SHIPPED | OUTPATIENT
Start: 2021-01-12 | End: 2021-04-14

## 2021-01-16 DIAGNOSIS — M15.0 PRIMARY GENERALIZED (OSTEO)ARTHRITIS: ICD-10-CM

## 2021-01-16 DIAGNOSIS — M53.3 SI (SACROILIAC) JOINT DYSFUNCTION: ICD-10-CM

## 2021-01-18 RX ORDER — PSEUDOEPHED/ACETAMINOPH/DIPHEN 30MG-500MG
TABLET ORAL
Qty: 100 TABLET | Refills: 1 | Status: SHIPPED | OUTPATIENT
Start: 2021-01-18 | End: 2021-02-02

## 2021-02-02 ENCOUNTER — VIRTUAL VISIT (OUTPATIENT)
Dept: FAMILY MEDICINE | Facility: CLINIC | Age: 56
End: 2021-02-02
Payer: COMMERCIAL

## 2021-02-02 DIAGNOSIS — D50.9 IRON DEFICIENCY ANEMIA, UNSPECIFIED IRON DEFICIENCY ANEMIA TYPE: Primary | ICD-10-CM

## 2021-02-02 DIAGNOSIS — M15.0 PRIMARY GENERALIZED (OSTEO)ARTHRITIS: ICD-10-CM

## 2021-02-02 DIAGNOSIS — M53.3 SI (SACROILIAC) JOINT DYSFUNCTION: ICD-10-CM

## 2021-02-02 DIAGNOSIS — I73.00 RAYNAUD'S DISEASE WITHOUT GANGRENE: ICD-10-CM

## 2021-02-02 PROCEDURE — 99214 OFFICE O/P EST MOD 30 MIN: CPT | Mod: 95 | Performed by: NURSE PRACTITIONER

## 2021-02-02 RX ORDER — ACETAMINOPHEN 500 MG
TABLET ORAL
Qty: 100 TABLET | Refills: 3 | Status: SHIPPED | OUTPATIENT
Start: 2021-02-02 | End: 2021-07-06

## 2021-02-02 RX ORDER — NIFEDIPINE 30 MG/1
30 TABLET, EXTENDED RELEASE ORAL DAILY
Qty: 90 TABLET | Refills: 3 | Status: SHIPPED | OUTPATIENT
Start: 2021-02-02 | End: 2022-02-01

## 2021-02-02 NOTE — PROGRESS NOTES
Maryann is a 55 year old who is being evaluated via a billable video visit.      How would you like to obtain your AVS? MyChart  If the video visit is dropped, the invitation should be resent by: Text to cell phone: [phone]  Will anyone else be joining your video visit? No    Video Start Time: 7:19 AM  Assessment & Plan     Raynaud's disease without gangrene   Controlled no change in treatment plan  - nitroGLYcerin (NITRO-BID) 2 % OINT ointment; Place 1 inch (15 mg) onto the skin daily  - NIFEdipine ER OSMOTIC (PROCARDIA XL) 30 MG 24 hr tablet; Take 1 tablet (30 mg) by mouth daily    Iron deficiency anemia, unspecified iron deficiency anemia type   Controlled no change in treatment plan  - **CBC with platelets FUTURE anytime; Future  - **Basic metabolic panel FUTURE anytime; Future    Primary generalized (osteo)arthritis   Controlled no change in treatment plan  - acetaminophen (ACETAMINOPHEN EXTRA STRENGTH) 500 MG tablet; TAKE 1-2 TABLETS (500-1,000 MG) BY MOUTH EVERY 6 HOURS AS NEEDED FOR MILD PAIN - NOT COVERED    SI (sacroiliac) joint dysfunction; see details, pain management contract   Controlled no change in treatment plan  - acetaminophen (ACETAMINOPHEN EXTRA STRENGTH) 500 MG tablet; TAKE 1-2 TABLETS (500-1,000 MG) BY MOUTH EVERY 6 HOURS AS NEEDED FOR MILD PAIN - NOT COVERED    CONTRERAS Sales CNP  [facility]     Maryann is a 55 year old who presents to clinic today for the following health issues     HPI       Medication Followup of nifedipine and     Taking Medication as prescribed: yes    Side Effects:  None    Medication Helping Symptoms:  yes         Review of Systems   Constitutional, HEENT, cardiovascular, pulmonary, gi and gu systems are negative, except as otherwise noted.      Objective           Vitals:  No vitals were obtained today due to virtual visit.    Physical Exam   GENERAL: Healthy, alert and no distress  EYES: Eyes grossly normal to  inspection.  No discharge or erythema, or obvious scleral/conjunctival abnormalities.  RESP: No audible wheeze, cough, or visible cyanosis.  No visible retractions or increased work of breathing.    SKIN: Visible skin clear. No significant rash, abnormal pigmentation or lesions.  NEURO: Cranial nerves grossly intact.  Mentation and speech appropriate for age.  PSYCH: Mentation appears normal, affect normal/bright, judgement and insight intact, normal speech and appearance well-groomed.         Video-Visit Details    Type of service:  Video Visit    Video End Time:7:34 AM    Originating Location (pt. Location): Home    Distant Location (provider location):  [facility]     Platform used for Video Visit: Jg

## 2021-04-09 DIAGNOSIS — M26.609 TMJ (TEMPOROMANDIBULAR JOINT SYNDROME): ICD-10-CM

## 2021-04-09 DIAGNOSIS — G47.09 OTHER INSOMNIA: ICD-10-CM

## 2021-04-13 NOTE — TELEPHONE ENCOUNTER
Requested Prescriptions   Pending Prescriptions Disp Refills     cyclobenzaprine (FLEXERIL) 10 MG tablet [Pharmacy Med Name: CYCLOBENZAPRINE 10 MG TABLET] 90 tablet 4     Sig: TAKE ONE TABLET BY MOUTH DAILY AS NEEDED FOR MUSCLE SPASMS   Routing refill request to provider for review/approval because:  Drug not active on patient's medication list              zolpidem (AMBIEN) 5 MG tablet [Pharmacy Med Name: ZOLPIDEM TARTRATE 5 MG TABLET] 90 tablet 0     Sig: TAKE 1 TABLET (5 MG) BY MOUTH NIGHTLY AS NEEDED FOR SLEEP       Last Written Prescription Date:  1/12/21  Last Fill Quantity: 90,  # refills: 0   Last office visit: 7/14/2020 with prescribing provider:     Future Office Visit:

## 2021-04-14 ENCOUNTER — TELEPHONE (OUTPATIENT)
Dept: FAMILY MEDICINE | Facility: CLINIC | Age: 56
End: 2021-04-14

## 2021-04-14 DIAGNOSIS — G47.09 OTHER INSOMNIA: ICD-10-CM

## 2021-04-14 RX ORDER — CYCLOBENZAPRINE HCL 10 MG
TABLET ORAL
Qty: 90 TABLET | Refills: 4 | OUTPATIENT
Start: 2021-04-14

## 2021-04-14 RX ORDER — ZOLPIDEM TARTRATE 5 MG/1
5 TABLET ORAL
Qty: 90 TABLET | Refills: 0 | Status: SHIPPED | OUTPATIENT
Start: 2021-04-14 | End: 2021-07-06

## 2021-04-14 RX ORDER — ZOLPIDEM TARTRATE 5 MG/1
5 TABLET ORAL
Qty: 90 TABLET | Refills: 0 | Status: CANCELLED | OUTPATIENT
Start: 2021-04-14

## 2021-04-14 NOTE — TELEPHONE ENCOUNTER
Pt is calling because she needs flexeril and zolpidem refilled. She said she already had a virtual with Debbie on 2/2/21. Wondering why they are not getting filled. And the flexeril for yrs.    Nehal [facility] Sec

## 2021-04-14 NOTE — TELEPHONE ENCOUNTER
Pt is calling because she needs flexeril and zolpidem refilled. She said she already had a virtual with Debbie on 2/2/21. Wondering why they are not getting filled. And the flexeril for yrs.     Nehal [facility] Sec

## 2021-04-16 ENCOUNTER — VIRTUAL VISIT (OUTPATIENT)
Dept: FAMILY MEDICINE | Facility: CLINIC | Age: 56
End: 2021-04-16
Payer: COMMERCIAL

## 2021-04-16 DIAGNOSIS — M62.830 BACK MUSCLE SPASM: Primary | ICD-10-CM

## 2021-04-16 PROCEDURE — 99213 OFFICE O/P EST LOW 20 MIN: CPT | Mod: TEL | Performed by: NURSE PRACTITIONER

## 2021-04-16 RX ORDER — CYCLOBENZAPRINE HCL 10 MG
10 TABLET ORAL DAILY
Qty: 90 TABLET | Refills: 1 | Status: SHIPPED | OUTPATIENT
Start: 2021-04-16 | End: 2021-08-19

## 2021-04-16 ASSESSMENT — PATIENT HEALTH QUESTIONNAIRE - PHQ9
5. POOR APPETITE OR OVEREATING: NOT AT ALL
SUM OF ALL RESPONSES TO PHQ QUESTIONS 1-9: 0

## 2021-04-16 ASSESSMENT — ANXIETY QUESTIONNAIRES
3. WORRYING TOO MUCH ABOUT DIFFERENT THINGS: NOT AT ALL
6. BECOMING EASILY ANNOYED OR IRRITABLE: NOT AT ALL
7. FEELING AFRAID AS IF SOMETHING AWFUL MIGHT HAPPEN: NOT AT ALL
5. BEING SO RESTLESS THAT IT IS HARD TO SIT STILL: NOT AT ALL
2. NOT BEING ABLE TO STOP OR CONTROL WORRYING: NOT AT ALL
GAD7 TOTAL SCORE: 0
1. FEELING NERVOUS, ANXIOUS, OR ON EDGE: NOT AT ALL

## 2021-04-16 NOTE — PROGRESS NOTES
Maryann is a 56 year old who is being evaluated via a billable telephone visit.      What phone number would you like to be contacted at? [phone]  How would you like to obtain your AVS? Tiny    Assessment & Plan     (M62.830) Back muscle spasm  (primary encounter diagnosis)  Comment:   Plan: cyclobenzaprine (FLEXERIL) 10 MG tablet      See Patient Instructions    No follow-ups on file.    CONTRERAS Sales CNP  [facility]    Subjective   Maryann is a 56 year old who presents for the following health issues     HPI     Medication Followup of flexeril    Taking Medication as prescribed: yes    Side Effects:  None    Medication Helping Symptoms:  yes       Review of Systems   Constitutional, HEENT, cardiovascular, pulmonary, gi and gu systems are negative, except as otherwise noted.      Objective           Vitals:  No vitals were obtained today due to virtual visit.    Physical Exam   healthy, alert and no distress  PSYCH: Alert and oriented times 3; coherent speech, normal   rate and volume, able to articulate logical thoughts, able   to abstract reason, no tangential thoughts, no hallucinations   or delusions  Her affect is normal  RESP: No cough, no audible wheezing, able to talk in full sentences  Remainder of exam unable to be completed due to telephone visits                Phone call duration: 15  minutes

## 2021-04-17 ASSESSMENT — ANXIETY QUESTIONNAIRES: GAD7 TOTAL SCORE: 0

## 2021-07-06 ENCOUNTER — VIRTUAL VISIT (OUTPATIENT)
Dept: FAMILY MEDICINE | Facility: CLINIC | Age: 56
End: 2021-07-06
Payer: COMMERCIAL

## 2021-07-06 DIAGNOSIS — M53.3 SI (SACROILIAC) JOINT DYSFUNCTION: ICD-10-CM

## 2021-07-06 DIAGNOSIS — G47.09 OTHER INSOMNIA: ICD-10-CM

## 2021-07-06 DIAGNOSIS — M15.0 PRIMARY GENERALIZED (OSTEO)ARTHRITIS: ICD-10-CM

## 2021-07-06 DIAGNOSIS — L20.82 FLEXURAL ECZEMA: Primary | ICD-10-CM

## 2021-07-06 PROCEDURE — 99214 OFFICE O/P EST MOD 30 MIN: CPT | Mod: GT | Performed by: NURSE PRACTITIONER

## 2021-07-06 RX ORDER — ACETAMINOPHEN 500 MG
TABLET ORAL
Qty: 100 TABLET | Refills: 3 | Status: SHIPPED | OUTPATIENT
Start: 2021-07-06 | End: 2022-01-17

## 2021-07-06 RX ORDER — ZOLPIDEM TARTRATE 5 MG/1
5 TABLET ORAL
Qty: 90 TABLET | Refills: 0 | Status: SHIPPED | OUTPATIENT
Start: 2021-07-06 | End: 2021-10-05

## 2021-07-06 NOTE — PROGRESS NOTES
Maryann is a 56 year old who is being evaluated via a billable video visit.      How would you like to obtain your AVS? MyChart  If the video visit is dropped, the invitation should be resent by: Text to cell phone: [phone]  Will anyone else be joining your video visit? No    Video Start Time: 4:16 PM    Assessment & Plan     (L20.82) Flexural eczema  (primary encounter diagnosis)  Comment: Patient has eczema to the hands has been using low-dose steroid cream patient states in the past form has worked the best for her.  Did state that form sometimes is not covered by insurance or can be high pain patient states she would like to have the form back as it works best when she will pay out-of-pocket.  Order placed for steroid phone  Plan: desonide (VERDESO) 0.05 % external foam       (G47.09) Other insomnia  Comment: Ambien renewed  Plan: zolpidem (AMBIEN) 5 MG tablet      (M15.0) Primary generalized (osteo)arthritis  Comment: Pain controlled reviewed with patient due to Ambien use would not recommend any narcotics patient can try control her pain with Tylenol if not working patient is to follow-up  Plan: acetaminophen (ACETAMINOPHEN EXTRA STRENGTH)         500 MG tablet      (M53.3) SI (sacroiliac) joint dysfunction; see details, pain management contract  Comment:   Plan: acetaminophen (ACETAMINOPHEN EXTRA STRENGTH)         500 MG tablet            No follow-ups on file.    CONTRERAS Sales CNP  [facility]    Aaron Wolf is a 56 year old who presents for the following health issues     HPI     Medication Followup of Ambien    Taking Medication as prescribed: yes, does not take every day    Side Effects:  None    Medication Helping Symptoms:  yes     pain      Duration: worse since March 2021    Description (location/character/radiation): body aches all over    Intensity:  moderate    Accompanying signs and symptoms: 0    History (similar episodes/previous evaluation):  yes, working over time in a  of semi-    Precipitating or alleviating factors: None    Therapies tried and outcome: Tylenol 2000 mg is not helpful, patient in past was on Hydrocodone but was 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 Patient would like to get pain medication again.   dermatitis      Duration: summers only, years every summer    Description (location/character/radiation): right hand    Intensity:  mild    Accompanying signs and symptoms: inflamed skin    History (similar episodes/previous evaluation): yes    Precipitating or alleviating factors: gets from digging in the dirt-every summer    Therapies tried and outcome: clobetasol is not helping this year. Would like to get a foam-she had a foam years ago and it worked well                Review of Systems   Constitutional, HEENT, cardiovascular, pulmonary, gi and gu systems are negative, except as otherwise noted.      Objective           Vitals:  No vitals were obtained today due to virtual visit.    Physical Exam   GENERAL: Healthy, alert and no distress  EYES: Eyes grossly normal to inspection.  No discharge or erythema, or obvious scleral/conjunctival abnormalities.  RESP: No audible wheeze, cough, or visible cyanosis.  No visible retractions or increased work of breathing.    SKIN: Visible skin clear. No significant rash, abnormal pigmentation or lesions.  NEURO: Cranial nerves grossly intact.  Mentation and speech appropriate for age.  PSYCH: Mentation appears normal, affect normal/bright, judgement and insight intact, normal speech and appearance well-groomed.    No results found for any visits on 07/06/21.            Video-Visit Details    Type of service:  Video Visit    Video End Time:4:28    Originating Location (pt. Location): Home    Distant Location (provider location):  [facility]     Platform used for Video Visit: Jg

## 2021-07-09 ENCOUNTER — TELEPHONE (OUTPATIENT)
Dept: FAMILY MEDICINE | Facility: CLINIC | Age: 56
End: 2021-07-09

## 2021-07-09 DIAGNOSIS — D50.9 IRON DEFICIENCY ANEMIA, UNSPECIFIED IRON DEFICIENCY ANEMIA TYPE: ICD-10-CM

## 2021-07-09 DIAGNOSIS — L20.82 FLEXURAL ECZEMA: Primary | ICD-10-CM

## 2021-07-09 LAB
ANION GAP SERPL CALCULATED.3IONS-SCNC: 7 MMOL/L (ref 3–14)
BUN SERPL-MCNC: 17 MG/DL (ref 7–30)
CALCIUM SERPL-MCNC: 9.5 MG/DL (ref 8.5–10.1)
CHLORIDE SERPL-SCNC: 106 MMOL/L (ref 94–109)
CO2 SERPL-SCNC: 27 MMOL/L (ref 20–32)
CREAT SERPL-MCNC: 0.69 MG/DL (ref 0.52–1.04)
ERYTHROCYTE [DISTWIDTH] IN BLOOD BY AUTOMATED COUNT: 12.6 % (ref 10–15)
GFR SERPL CREATININE-BSD FRML MDRD: >90 ML/MIN/{1.73_M2}
GLUCOSE SERPL-MCNC: 102 MG/DL (ref 70–99)
HCT VFR BLD AUTO: 41.1 % (ref 35–47)
HGB BLD-MCNC: 13.8 G/DL (ref 11.7–15.7)
MCH RBC QN AUTO: 29.7 PG (ref 26.5–33)
MCHC RBC AUTO-ENTMCNC: 33.6 G/DL (ref 31.5–36.5)
MCV RBC AUTO: 88 FL (ref 78–100)
PLATELET # BLD AUTO: 266 10E9/L (ref 150–450)
POTASSIUM SERPL-SCNC: 3.5 MMOL/L (ref 3.4–5.3)
RBC # BLD AUTO: 4.65 10E12/L (ref 3.8–5.2)
SODIUM SERPL-SCNC: 140 MMOL/L (ref 133–144)
WBC # BLD AUTO: 7.9 10E9/L (ref 4–11)

## 2021-07-09 PROCEDURE — 85027 COMPLETE CBC AUTOMATED: CPT | Performed by: NURSE PRACTITIONER

## 2021-07-09 PROCEDURE — 80048 BASIC METABOLIC PNL TOTAL CA: CPT | Performed by: NURSE PRACTITIONER

## 2021-07-09 PROCEDURE — 36415 COLL VENOUS BLD VENIPUNCTURE: CPT | Performed by: NURSE PRACTITIONER

## 2021-07-09 NOTE — TELEPHONE ENCOUNTER
Patient stopped at clinic to report the Verdeso  foam for her hand is $897. Is there an alternative? She does not need any for the weekend.  Jyoti HOPSON RN

## 2021-07-12 NOTE — TELEPHONE ENCOUNTER
Notify patent unfortunately that is the only steroid that come is the foam formate.     Debbie Vila CNP

## 2021-07-13 RX ORDER — TRIAMCINOLONE ACETONIDE 1 MG/G
CREAM TOPICAL 2 TIMES DAILY
Qty: 45 G | Refills: 0 | Status: SHIPPED | OUTPATIENT
Start: 2021-07-13 | End: 2022-12-09

## 2021-07-13 NOTE — TELEPHONE ENCOUNTER
Pt is asking if there is something similar that is not foam to use for her Rt hand peeling.  Nehal [place] Sec

## 2021-07-13 NOTE — TELEPHONE ENCOUNTER
Patient notified.  Jyoti HOPSON RN     Problem: Goal Outcome Summary  Goal: Goal Outcome Summary  Outcome: No Change  VSS. Patient slept all night. 17 yo girlfriend and Pt mom spent night. Tolerated Abx with benadryl.

## 2021-07-21 ENCOUNTER — TELEPHONE (OUTPATIENT)
Dept: FAMILY MEDICINE | Facility: CLINIC | Age: 56
End: 2021-07-21

## 2021-07-21 DIAGNOSIS — M62.830 BACK MUSCLE SPASM: Primary | ICD-10-CM

## 2021-07-21 NOTE — TELEPHONE ENCOUNTER
Reason for call:  Patient reporting a symptom    Symptom or request: Pt says Debbie was going to put her on a different pain medication after her lab results came back. She got a letter that the labs were OK but no Rx has been sent to the pharmacy. She says she takes Acetaminophen during the day but she was going to give her something for pain she could take at night that would help her sleep. She is aware that Debbie is not in the office today.     Additional comments: [facility] in [place]    Phone Number patient can be reached at:  Home number on file [phone] (home)    Best Time:       Can we leave a detailed message on this number:  YES    Call taken on 7/21/2021 at 12:38 PM by Yelitza Burton

## 2021-07-22 RX ORDER — MELOXICAM 7.5 MG/1
7.5 TABLET ORAL DAILY
Qty: 30 TABLET | Refills: 1 | Status: SHIPPED | OUTPATIENT
Start: 2021-07-22 | End: 2021-09-17

## 2021-07-22 NOTE — TELEPHONE ENCOUNTER
Notify patient I sent in a prescription for Mobic.  Is a once a day medication she should not take ibuprofen Aleve or Advil while taking this medication.    Debbie Vila CNP      I have personally performed a face to face diagnostic evaluation on this patient. I have reviewed the ACP note and agree with the history, exam and plan of care, except as noted.

## 2021-08-17 DIAGNOSIS — M62.830 BACK MUSCLE SPASM: ICD-10-CM

## 2021-08-19 RX ORDER — CYCLOBENZAPRINE HCL 10 MG
TABLET ORAL
Qty: 90 TABLET | Refills: 1 | Status: SHIPPED | OUTPATIENT
Start: 2021-08-19 | End: 2022-01-17

## 2021-09-16 DIAGNOSIS — M62.830 BACK MUSCLE SPASM: ICD-10-CM

## 2021-09-17 RX ORDER — MELOXICAM 7.5 MG/1
7.5 TABLET ORAL DAILY
Qty: 30 TABLET | Refills: 1 | Status: SHIPPED | OUTPATIENT
Start: 2021-09-17 | End: 2021-11-12

## 2021-09-17 NOTE — TELEPHONE ENCOUNTER
Routing refill request to provider for review/approval because:  Labs not current:  ALT/AST  BP not current.  JENNIFER Hernandez RN

## 2021-09-25 ENCOUNTER — HEALTH MAINTENANCE LETTER (OUTPATIENT)
Age: 56
End: 2021-09-25

## 2021-10-05 DIAGNOSIS — G47.09 OTHER INSOMNIA: ICD-10-CM

## 2021-10-05 RX ORDER — ZOLPIDEM TARTRATE 5 MG/1
5 TABLET ORAL
Qty: 90 TABLET | Refills: 0 | Status: SHIPPED | OUTPATIENT
Start: 2021-10-05 | End: 2022-01-03

## 2021-10-05 NOTE — TELEPHONE ENCOUNTER
Requested Prescriptions   Pending Prescriptions Disp Refills     zolpidem (AMBIEN) 5 MG tablet 90 tablet 0     Sig: Take 1 tablet (5 mg) by mouth nightly as needed for sleep       There is no refill protocol information for this order        Last Written Prescription Date:  7/6/21  Last Fill Quantity: 90,  # refills: 0   Last office visit: 7/14/2020 with prescribing provider:     Future Office Visit:

## 2021-10-13 DIAGNOSIS — L20.82 FLEXURAL ECZEMA: ICD-10-CM

## 2021-11-10 DIAGNOSIS — M62.830 BACK MUSCLE SPASM: ICD-10-CM

## 2021-11-11 NOTE — TELEPHONE ENCOUNTER
Routing refill request to provider for review/approval because:  Failed RN protocol:    Blood pressure under 140/90 in past 12 months        BP Readings from Last 3 Encounters:   07/22/20 138/80   07/14/20 124/80   06/29/20 (!) 146/74             Normal ALT on file in past 12 months        Recent Labs   Lab Test 06/06/19  1419   ALT 24         Normal AST on file in past 12 months        Recent Labs   Lab Test 06/06/19  1419   AST 17     SANDY Alex

## 2021-11-12 RX ORDER — MELOXICAM 7.5 MG/1
7.5 TABLET ORAL DAILY
Qty: 30 TABLET | Refills: 1 | Status: SHIPPED | OUTPATIENT
Start: 2021-11-12 | End: 2022-01-13

## 2021-11-19 DIAGNOSIS — I73.00 RAYNAUD'S DISEASE WITHOUT GANGRENE: ICD-10-CM

## 2021-11-23 NOTE — TELEPHONE ENCOUNTER
Routing refill request to provider for review/approval because:  BP not current.  JENNIFER Hernandez RN

## 2021-11-24 RX ORDER — NITROGLYCERIN 20 MG/G
OINTMENT TOPICAL
Qty: 60 G | Refills: 1 | Status: SHIPPED | OUTPATIENT
Start: 2021-11-24 | End: 2023-08-07

## 2021-12-02 ENCOUNTER — VIRTUAL VISIT (OUTPATIENT)
Dept: FAMILY MEDICINE | Facility: CLINIC | Age: 56
End: 2021-12-02
Payer: COMMERCIAL

## 2021-12-02 DIAGNOSIS — M54.12 CERVICAL RADICULOPATHY: ICD-10-CM

## 2021-12-02 DIAGNOSIS — M62.830 BACK MUSCLE SPASM: ICD-10-CM

## 2021-12-02 DIAGNOSIS — Z13.220 SCREENING FOR HYPERLIPIDEMIA: Primary | ICD-10-CM

## 2021-12-02 PROCEDURE — 99214 OFFICE O/P EST MOD 30 MIN: CPT | Mod: GT | Performed by: NURSE PRACTITIONER

## 2021-12-02 RX ORDER — MELOXICAM 15 MG/1
15 TABLET ORAL DAILY
Qty: 90 TABLET | Refills: 0 | Status: SHIPPED | OUTPATIENT
Start: 2021-12-02 | End: 2022-02-28

## 2021-12-02 NOTE — PROGRESS NOTES
Maryann is a 56 year old who is being evaluated via a billable video visit.      How would you like to obtain your AVS? Mail a copy  If the video visit is dropped, the invitation should be resent by: Text to cell phone: [phone]  Will anyone else be joining your video visit? No    Video Start Time: 4:03 PM    Assessment & Plan     (Z13.220) Screening for hyperlipidemia  (primary encounter diagnosis)  Comment: had elevated triglycerides at work physical will have follow-up  Plan: Lipid panel reflex to direct LDL Fasting      (M62.830) Back muscle spasm  Comment: Patient presently on Mobic 7.5 daily will increase to 15 mg daily  Plan: Basic metabolic panel  (Ca, Cl, CO2, Creat,         Gluc, K, Na, BUN)      (M54.12) Cervical radiculopathy  Comment: *  Plan: meloxicam (MOBIC) 15 MG tablet     [id number]}         No follow-ups on file.    CONTRERAS Sales CNP  [facility]    Subjective   Maryann is a 56 year old who presents for the following health issues     HPI     Patient would like to follow up with some abnormal lab results that she had at her yearly health screening at work.    She would also like to discuss her meloxicam. She doesn't feel like it is doing anything for her.        Review of Systems   Constitutional, HEENT, cardiovascular, pulmonary, gi and gu systems are negative, except as otherwise noted.      Objective           Vitals:  No vitals were obtained today due to virtual visit.    Physical Exam   GENERAL: Healthy, alert and no distress  EYES: Eyes grossly normal to inspection.  No discharge or erythema, or obvious scleral/conjunctival abnormalities.  RESP: No audible wheeze, cough, or visible cyanosis.  No visible retractions or increased work of breathing.    SKIN: Visible skin clear. No significant rash, abnormal pigmentation or lesions.  NEURO: Cranial nerves grossly intact.  Mentation and speech appropriate for age.  PSYCH: Mentation appears normal, affect  normal/bright, judgement and insight intact, normal speech and appearance well-groomed.    No results found for any visits on 12/02/21.            Video-Visit Details    Type of service:  Video Visit    Video End Time:4:03 PM    Originating Location (pt. Location): Home    Distant Location (provider location):  [facility]     Platform used for Video Visit: SoniaYuepu Sifang

## 2021-12-18 ENCOUNTER — LAB (OUTPATIENT)
Dept: LAB | Facility: CLINIC | Age: 56
End: 2021-12-18
Payer: COMMERCIAL

## 2021-12-18 DIAGNOSIS — Z13.220 SCREENING FOR HYPERLIPIDEMIA: ICD-10-CM

## 2021-12-18 DIAGNOSIS — M62.830 BACK MUSCLE SPASM: ICD-10-CM

## 2021-12-18 LAB
ANION GAP SERPL CALCULATED.3IONS-SCNC: 6 MMOL/L (ref 3–14)
BUN SERPL-MCNC: 16 MG/DL (ref 7–30)
CALCIUM SERPL-MCNC: 9.2 MG/DL (ref 8.5–10.1)
CHLORIDE BLD-SCNC: 100 MMOL/L (ref 94–109)
CHOLEST SERPL-MCNC: 223 MG/DL
CO2 SERPL-SCNC: 30 MMOL/L (ref 20–32)
CREAT SERPL-MCNC: 0.75 MG/DL (ref 0.52–1.04)
FASTING STATUS PATIENT QL REPORTED: YES
GFR SERPL CREATININE-BSD FRML MDRD: 88 ML/MIN/1.73M2
GLUCOSE BLD-MCNC: 100 MG/DL (ref 70–99)
HDLC SERPL-MCNC: 88 MG/DL
LDLC SERPL CALC-MCNC: 123 MG/DL
NONHDLC SERPL-MCNC: 135 MG/DL
POTASSIUM BLD-SCNC: 3.5 MMOL/L (ref 3.4–5.3)
SODIUM SERPL-SCNC: 136 MMOL/L (ref 133–144)
TRIGL SERPL-MCNC: 59 MG/DL

## 2021-12-18 PROCEDURE — 80048 BASIC METABOLIC PNL TOTAL CA: CPT

## 2021-12-18 PROCEDURE — 80061 LIPID PANEL: CPT

## 2021-12-18 PROCEDURE — 36415 COLL VENOUS BLD VENIPUNCTURE: CPT

## 2022-01-03 DIAGNOSIS — G47.09 OTHER INSOMNIA: ICD-10-CM

## 2022-01-03 RX ORDER — ZOLPIDEM TARTRATE 5 MG/1
5 TABLET ORAL
Qty: 90 TABLET | Refills: 0 | Status: SHIPPED | OUTPATIENT
Start: 2022-01-03 | End: 2022-04-05

## 2022-01-03 NOTE — TELEPHONE ENCOUNTER
Requested Prescriptions   Pending Prescriptions Disp Refills     zolpidem (AMBIEN) 5 MG tablet 90 tablet 0     Sig: Take 1 tablet (5 mg) by mouth nightly as needed for sleep       There is no refill protocol information for this order        Last Written Prescription Date:  10/5/21  Last Fill Quantity: 90,  # refills: 0   Last office visit: 7/14/2020 with prescribing provider:     Future Office Visit:

## 2022-01-12 DIAGNOSIS — M62.830 BACK MUSCLE SPASM: ICD-10-CM

## 2022-01-13 RX ORDER — MELOXICAM 7.5 MG/1
TABLET ORAL
Qty: 30 TABLET | Refills: 1 | Status: SHIPPED | OUTPATIENT
Start: 2022-01-13 | End: 2022-03-21

## 2022-01-15 ENCOUNTER — HEALTH MAINTENANCE LETTER (OUTPATIENT)
Age: 57
End: 2022-01-15

## 2022-01-17 DIAGNOSIS — M15.0 PRIMARY GENERALIZED (OSTEO)ARTHRITIS: ICD-10-CM

## 2022-01-17 DIAGNOSIS — M62.830 BACK MUSCLE SPASM: ICD-10-CM

## 2022-01-17 DIAGNOSIS — M53.3 SI (SACROILIAC) JOINT DYSFUNCTION: ICD-10-CM

## 2022-01-17 RX ORDER — CYCLOBENZAPRINE HCL 10 MG
10 TABLET ORAL DAILY
Qty: 90 TABLET | Refills: 1 | Status: SHIPPED | OUTPATIENT
Start: 2022-01-17 | End: 2022-08-04

## 2022-01-17 RX ORDER — ACETAMINOPHEN 500 MG
TABLET ORAL
Qty: 100 TABLET | Refills: 3 | Status: SHIPPED | OUTPATIENT
Start: 2022-01-17 | End: 2022-08-03

## 2022-01-31 DIAGNOSIS — I73.00 RAYNAUD'S DISEASE WITHOUT GANGRENE: ICD-10-CM

## 2022-02-01 RX ORDER — NIFEDIPINE 30 MG/1
TABLET, EXTENDED RELEASE ORAL
Qty: 90 TABLET | Refills: 3 | Status: SHIPPED | OUTPATIENT
Start: 2022-02-01 | End: 2022-12-09

## 2022-02-01 NOTE — TELEPHONE ENCOUNTER
"Requested Prescriptions   Pending Prescriptions Disp Refills     NIFEdipine ER OSMOTIC (PROCARDIA XL) 30 MG 24 hr tablet [Pharmacy Med Name: NIFEDIPINE ER 30 MG TABLET] 90 tablet 3     Sig: TAKE 1 TABLET BY MOUTH EVERY DAY       Calcium Channel Blockers Protocol  Failed - 1/31/2022 12:29 AM        Failed - Blood pressure under 140/90 in past 12 months     BP Readings from Last 3 Encounters:   07/22/20 138/80   07/14/20 124/80   06/29/20 (!) 146/74                 Passed - Recent (12 mo) or future (30 days) visit within the authorizing provider's specialty     Patient has had an office visit with the authorizing provider or a provider within the authorizing providers department within the previous 12 mos or has a future within next 30 days. See \"Patient Info\" tab in inbasket, or \"Choose Columns\" in Meds & Orders section of the refill encounter.              Passed - Medication is active on med list        Passed - Patient is age 18 or older        Passed - No active pregnancy on record        Passed - Normal serum creatinine on file in past 12 months     Recent Labs   Lab Test 12/18/21  0906   CR 0.75       Ok to refill medication if creatinine is low          Passed - No positive pregnancy test in past 12 months             "

## 2022-02-18 ENCOUNTER — OFFICE VISIT (OUTPATIENT)
Dept: FAMILY MEDICINE | Facility: CLINIC | Age: 57
End: 2022-02-18
Payer: OTHER MISCELLANEOUS

## 2022-02-18 VITALS
RESPIRATION RATE: 18 BRPM | TEMPERATURE: 97.9 F | BODY MASS INDEX: 23.34 KG/M2 | DIASTOLIC BLOOD PRESSURE: 70 MMHG | HEIGHT: 68 IN | SYSTOLIC BLOOD PRESSURE: 122 MMHG | WEIGHT: 154 LBS | HEART RATE: 80 BPM

## 2022-02-18 DIAGNOSIS — Z01.818 PREOP GENERAL PHYSICAL EXAM: Primary | ICD-10-CM

## 2022-02-18 DIAGNOSIS — Z79.899 ENCOUNTER FOR LONG-TERM (CURRENT) USE OF MEDICATIONS: ICD-10-CM

## 2022-02-18 DIAGNOSIS — S83.282D TEAR OF LATERAL MENISCUS OF LEFT KNEE, CURRENT, UNSPECIFIED TEAR TYPE, SUBSEQUENT ENCOUNTER: ICD-10-CM

## 2022-02-18 DIAGNOSIS — Z11.4 SCREENING FOR HIV (HUMAN IMMUNODEFICIENCY VIRUS): ICD-10-CM

## 2022-02-18 LAB
ANION GAP SERPL CALCULATED.3IONS-SCNC: 2 MMOL/L (ref 3–14)
BUN SERPL-MCNC: 17 MG/DL (ref 7–30)
CALCIUM SERPL-MCNC: 9.3 MG/DL (ref 8.5–10.1)
CHLORIDE BLD-SCNC: 108 MMOL/L (ref 94–109)
CO2 SERPL-SCNC: 30 MMOL/L (ref 20–32)
CREAT SERPL-MCNC: 0.68 MG/DL (ref 0.52–1.04)
ERYTHROCYTE [DISTWIDTH] IN BLOOD BY AUTOMATED COUNT: 12.7 % (ref 10–15)
GFR SERPL CREATININE-BSD FRML MDRD: >90 ML/MIN/1.73M2
GLUCOSE BLD-MCNC: 93 MG/DL (ref 70–99)
HCT VFR BLD AUTO: 40.6 % (ref 35–47)
HGB BLD-MCNC: 13.9 G/DL (ref 11.7–15.7)
MCH RBC QN AUTO: 29.8 PG (ref 26.5–33)
MCHC RBC AUTO-ENTMCNC: 34.2 G/DL (ref 31.5–36.5)
MCV RBC AUTO: 87 FL (ref 78–100)
PLATELET # BLD AUTO: 263 10E3/UL (ref 150–450)
POTASSIUM BLD-SCNC: 4 MMOL/L (ref 3.4–5.3)
RBC # BLD AUTO: 4.67 10E6/UL (ref 3.8–5.2)
SODIUM SERPL-SCNC: 140 MMOL/L (ref 133–144)
WBC # BLD AUTO: 7 10E3/UL (ref 4–11)

## 2022-02-18 PROCEDURE — 85027 COMPLETE CBC AUTOMATED: CPT | Performed by: NURSE PRACTITIONER

## 2022-02-18 PROCEDURE — 99214 OFFICE O/P EST MOD 30 MIN: CPT | Performed by: NURSE PRACTITIONER

## 2022-02-18 PROCEDURE — 36415 COLL VENOUS BLD VENIPUNCTURE: CPT | Performed by: NURSE PRACTITIONER

## 2022-02-18 PROCEDURE — 80048 BASIC METABOLIC PNL TOTAL CA: CPT | Performed by: NURSE PRACTITIONER

## 2022-02-18 ASSESSMENT — PAIN SCALES - GENERAL: PAINLEVEL: NO PAIN (0)

## 2022-02-18 NOTE — LETTER
February 18, 2022      Maryann Riggs  [address] [place] [address]        Dear ,    We are writing to inform you of your test results.    preop labs are within normal limits       Resulted Orders   Basic metabolic panel  (Ca, Cl, CO2, Creat, Gluc, K, Na, BUN)   Result Value Ref Range    Sodium 140 133 - 144 mmol/L    Potassium 4.0 3.4 - 5.3 mmol/L    Chloride 108 94 - 109 mmol/L    Carbon Dioxide (CO2) 30 20 - 32 mmol/L    Anion Gap 2 (L) 3 - 14 mmol/L    Urea Nitrogen 17 7 - 30 mg/dL    Creatinine 0.68 0.52 - 1.04 mg/dL    Calcium 9.3 8.5 - 10.1 mg/dL    Glucose 93 70 - 99 mg/dL    GFR Estimate >90 >60 mL/min/1.73m2      Comment:      Effective December 21, 2021 eGFRcr in adults is calculated using the 2021 CKD-EPI creatinine equation which includes age and gender (Germania et al., NE, DOI: 10.1056/HJRDsz9812071)   CBC with platelets   Result Value Ref Range    WBC Count 7.0 4.0 - 11.0 10e3/uL    RBC Count 4.67 3.80 - 5.20 10e6/uL    Hemoglobin 13.9 11.7 - 15.7 g/dL    Hematocrit 40.6 35.0 - 47.0 %    MCV 87 78 - 100 fL    MCH 29.8 26.5 - 33.0 pg    MCHC 34.2 31.5 - 36.5 g/dL    RDW 12.7 10.0 - 15.0 %    Platelet Count 263 150 - 450 10e3/uL       If you have any questions or concerns, please call the clinic at the number listed above.       Sincerely,      Debbie Vila, APRN CNP/dw

## 2022-02-18 NOTE — PROGRESS NOTES
[facility]  [address] [place] [address]  Phone: [phone]  Fax: [phone]  Primary Provider: Javi Orellana  Pre-op Performing Provider: JAVI ORELLANA      PREOPERATIVE EVALUATION:  Today's date: 2/18/2022    Maryann Riggs is a 56 year old female who presents for a preoperative evaluation.    Surgical Information:  Surgery/Procedure: left knee arthroscopy partial lateral meniscectomy chondroplasty  Surgery Location: [facility]  Surgeon: Dr. Soni  Surgery Date: 2/28/22  Time of Surgery:   Where patient plans to recover: At home with family  Fax number for surgical facility: [phone]    Type of Anesthesia Anticipated: to be determined    Assessment & Plan     The proposed surgical procedure is considered LOW risk.    (Z01.818) Preop general physical exam  (primary encounter diagnosis)  Comment:   Plan: Basic metabolic panel  (Ca, Cl, CO2, Creat,         Gluc, K, Na, BUN), CBC with platelets            (S83.282D) Tear of lateral meniscus of left knee, current, unspecified tear type, subsequent encounter  Comment:   Plan:        Risks and Recommendations:  The patient has the following additional risks and recommendations for perioperative complications:   - No identified additional risk factors other than previously addressed    Medication Instructions:   - meloxicam (Mobic): HOLD 10 days before surgery.     RECOMMENDATION:  APPROVAL GIVEN to proceed with proposed procedure, without further diagnostic evaluation.            Subjective     HPI related to upcoming procedure: left knee arthroscopy partial lateral meniscectomy chondroplasty      Preop Questions 2/18/2022   1. Have you ever had a heart attack or stroke? No   2. Have you ever had surgery on your heart or blood vessels, such as a stent placement, a coronary artery bypass, or surgery on an artery in your head, neck, heart, or legs? No   3. Do you have chest pain with activity? No   4. Do  you have a history of  heart failure? No   5. Do you currently have a cold, bronchitis or symptoms of other infection? No   6. Do you have a cough, shortness of breath, or wheezing? No   7. Do you or anyone in your family have previous history of blood clots? No   8. Do you or does anyone in your family have a serious bleeding problem such as prolonged bleeding following surgeries or cuts? No   9. Have you ever had problems with anemia or been told to take iron pills? YES    10. Have you had any abnormal blood loss such as black, tarry or bloody stools, or abnormal vaginal bleeding? No   11. Have you ever had a blood transfusion? No   12. Are you willing to have a blood transfusion if it is medically needed before, during, or after your surgery? Yes   13. Have you or any of your relatives ever had problems with anesthesia? No   14. Do you have sleep apnea, excessive snoring or daytime drowsiness? No   15. Do you have any artifical heart valves or other implanted medical devices like a pacemaker, defibrillator, or continuous glucose monitor? No   16. Do you have artificial joints? No   17. Are you allergic to latex? No   18. Is there any chance that you may be pregnant? No       Health Care Directive:  Patient does not have a Health Care Directive or Living Will: Discussed advance care planning with patient; however, patient declined at this time.    Preoperative Review of :   reviewed - no record of controlled substances prescribed.      Status of Chronic Conditions:  See problem list for active medical problems.  Problems all longstanding and stable, except as noted/documented.  See ROS for pertinent symptoms related to these conditions.      Review of Systems  Constitutional, neuro, ENT, endocrine, pulmonary, cardiac, gastrointestinal, genitourinary, musculoskeletal, integument and psychiatric systems are negative, except as otherwise noted.    Patient Active Problem List    Diagnosis Date Noted     Lumbar  radiculopathy 07/02/2020     Priority: Medium     Chronic bilateral low back pain without sciatica 09/19/2016     Priority: Medium     SI (sacroiliac) joint dysfunction; see details, pain management contract 03/01/2016     Priority: Medium     Primary generalized (osteo)arthritis 11/04/2013     Priority: Medium     Sprain of MCP joint of hand 09/23/2013     Priority: Medium     3rd mcp inflammation        Rotator cuff injury; bilateral repairs 2005 09/23/2013     Priority: Medium     Esophageal stricture 01/20/2012     Priority: Medium     Has it dilated every 14 weeks  Dr5. Gephart x 14 weeks          Episodic mood disorder (H) 12/15/2011     Priority: Medium     Problem list name updated by automated process. Provider to review       Muscle tension headache 10/09/2011     Priority: Medium     Esophageal abnormality 10/09/2011     Priority: Medium     CARDIOVASCULAR SCREENING; LDL GOAL LESS THAN 160 05/30/2011     Priority: Medium     PTSD (post-traumatic stress disorder) 05/30/2011     Priority: Medium     TMJ (temporomandibular joint syndrome) 05/27/2011     Priority: Medium     Raynaud's disease 02/10/2011     Priority: Medium     Procardia and nitro paste          Past Medical History:   Diagnosis Date     Anger reaction     buspar helping     Cholelithiasis      Esophageal stricture     dilatation every 3 months     Generalized osteoarthrosis, unspecified site (aka ARTHRITIS)     small joints     PTSD (post-traumatic stress disorder)      Raynaud's disease      Past Surgical History:   Procedure Laterality Date     MAMMOPLASTY REDUCTION       OTHER SURGICAL HISTORY      s/p left knee arthroscopinc surgery     ROTATOR CUFF REPAIR RT/LT  2005     SMALL BOWEL RESECTION       TUBAL LIGATION       Current Outpatient Medications   Medication Sig Dispense Refill     acetaminophen (ACETAMINOPHEN EXTRA STRENGTH) 500 MG tablet TAKE 1-2 TABLETS (500-1,000 MG) BY MOUTH EVERY 6 HOURS AS NEEDED FOR MILD PAIN 100 tablet 3      Ascorbic Acid (VITAMIN C CR PO) Take  by mouth.       clobetasol (TEMOVATE) 0.05 % external ointment as needed. Only as needed occasionally needs after working in garden  Profile Rx: patient will contact pharmacy when needed (Patient not taking: Reported on 12/2/2021) 45 g 5     Cyanocobalamin (VITAMIN B 12 PO) Take  by mouth.       cyclobenzaprine (FLEXERIL) 10 MG tablet Take 1 tablet (10 mg) by mouth daily 90 tablet 1     desonide (VERDESO) 0.05 % external foam Apply to affected areas  g 1     ferrous sulfate (FEROSUL) 325 (65 Fe) MG tablet Take 1 tablet (325 mg) by mouth daily (with breakfast) 90 tablet 1     GLUCOSAMINE CHONDROITIN COMPLX PO Take  by mouth.       LANSOPRAZOLE PO        lidocaine (XYLOCAINE) 5 % external ointment Apply dime size amount topically to painful areas (choose 2-3 per application) 3 times daily as needed. 70.88 g 2     melatonin 3 MG tablet Take 1 tablet by mouth nightly as needed for sleep. 30 tablet 0     meloxicam (MOBIC) 15 MG tablet Take 1 tablet (15 mg) by mouth daily 90 tablet 0     meloxicam (MOBIC) 7.5 MG tablet TAKE 1 TABLET BY MOUTH EVERY DAY 30 tablet 1     NIFEdipine ER OSMOTIC (PROCARDIA XL) 30 MG 24 hr tablet TAKE 1 TABLET BY MOUTH EVERY DAY 90 tablet 3     NITRO-BID 2 % OINT ointment PLACE 1 INCH (15 MG) ONTO THE SKIN DAILY 60 g 1     order for DME Equipment being ordered: TENS 1 Units 0     triamcinolone (KENALOG) 0.1 % external cream Apply topically 2 times daily 45 g 0     zolpidem (AMBIEN) 5 MG tablet Take 1 tablet (5 mg) by mouth nightly as needed for sleep 90 tablet 0       Allergies   Allergen Reactions     Desyrel [Trazodone Hcl]      Sig mood disturbance       Lamictal [Lamotrigine]         Social History     Tobacco Use     Smoking status: Never Smoker     Smokeless tobacco: Never Used   Substance Use Topics     Alcohol use: Yes     Comment: scoial     Family History   Problem Relation Age of Onset     Unknown/Adopted Mother      Unknown/Adopted  Father      Unknown/Adopted Maternal Grandmother      Unknown/Adopted Maternal Grandfather      Unknown/Adopted Paternal Grandmother      Unknown/Adopted Paternal Grandfather      History   Drug Use No         Objective     LMP 06/26/2014     Physical Exam    GENERAL APPEARANCE: healthy, alert and no distress     EYES: EOMI, PERRL     HENT: ear canals and TM's normal and nose and mouth without ulcers or lesions     NECK: no adenopathy, no asymmetry, masses, or scars and thyroid normal to palpation     RESP: lungs clear to auscultation - no rales, rhonchi or wheezes     CV: regular rates and rhythm, normal S1 S2, no S3 or S4 and no murmur, click or rub     ABDOMEN:  soft, nontender, no HSM or masses and bowel sounds normal     MS: extremities normal- no gross deformities noted, no evidence of inflammation in joints, FROM in all extremities.     SKIN: no suspicious lesions or rashes     NEURO: Normal strength and tone, sensory exam grossly normal, mentation intact and speech normal     PSYCH: mentation appears normal. and affect normal/bright     LYMPHATICS: No cervical adenopathy    Recent Labs   Lab Test 12/18/21  0906 07/09/21  1552   HGB  --  13.8   PLT  --  266    140   POTASSIUM 3.5 3.5   CR 0.75 0.69        Diagnostics:  Results for orders placed or performed in visit on 02/18/22   Basic metabolic panel  (Ca, Cl, CO2, Creat, Gluc, K, Na, BUN)     Status: Abnormal   Result Value Ref Range    Sodium 140 133 - 144 mmol/L    Potassium 4.0 3.4 - 5.3 mmol/L    Chloride 108 94 - 109 mmol/L    Carbon Dioxide (CO2) 30 20 - 32 mmol/L    Anion Gap 2 (L) 3 - 14 mmol/L    Urea Nitrogen 17 7 - 30 mg/dL    Creatinine 0.68 0.52 - 1.04 mg/dL    Calcium 9.3 8.5 - 10.1 mg/dL    Glucose 93 70 - 99 mg/dL    GFR Estimate >90 >60 mL/min/1.73m2   CBC with platelets     Status: Normal   Result Value Ref Range    WBC Count 7.0 4.0 - 11.0 10e3/uL    RBC Count 4.67 3.80 - 5.20 10e6/uL    Hemoglobin 13.9 11.7 - 15.7 g/dL     Hematocrit 40.6 35.0 - 47.0 %    MCV 87 78 - 100 fL    MCH 29.8 26.5 - 33.0 pg    MCHC 34.2 31.5 - 36.5 g/dL    RDW 12.7 10.0 - 15.0 %    Platelet Count 263 150 - 450 10e3/uL       No EKG required, no history of coronary heart disease, significant arrhythmia, peripheral arterial disease or other structural heart disease.    Revised Cardiac Risk Index (RCRI):  The patient has the following serious cardiovascular risks for perioperative complications:   - No serious cardiac risks = 0 points     RCRI Interpretation: 0 points: Class I (very low risk - 0.4% complication rate)      Signed Electronically by: CONTRERAS Sales CNP  Copy of this evaluation report is provided to requesting physician.

## 2022-02-25 DIAGNOSIS — M54.12 CERVICAL RADICULOPATHY: ICD-10-CM

## 2022-02-28 RX ORDER — MELOXICAM 15 MG/1
TABLET ORAL
Qty: 90 TABLET | Refills: 0 | Status: SHIPPED | OUTPATIENT
Start: 2022-02-28 | End: 2022-05-31

## 2022-02-28 NOTE — TELEPHONE ENCOUNTER
"Requested Prescriptions   Pending Prescriptions Disp Refills    meloxicam (MOBIC) 15 MG tablet [Pharmacy Med Name: MELOXICAM 15 MG TABLET] 90 tablet 0     Sig: TAKE 1 TABLET BY MOUTH EVERY DAY        NSAID Medications Failed - 2/25/2022  2:11 AM        Failed - Normal ALT on file in past 12 months     Recent Labs   Lab Test 06/06/19  1419   ALT 24               Failed - Normal AST on file in past 12 months       Recent Labs   Lab Test 06/06/19  1419   AST 17             Passed - Blood pressure under 140/90 in past 12 months       BP Readings from Last 3 Encounters:   02/18/22 122/70   07/22/20 138/80   07/14/20 124/80                 Passed - Recent (12 mo) or future (30 days) visit within the authorizing provider's specialty     Patient has had an office visit with the authorizing provider or a provider within the authorizing providers department within the previous 12 mos or has a future within next 30 days. See \"Patient Info\" tab in inbasket, or \"Choose Columns\" in Meds & Orders section of the refill encounter.              Passed - Patient is age 6-64 years        Passed - Normal CBC on file in past 12 months     Recent Labs   Lab Test 02/18/22  1019   WBC 7.0   RBC 4.67   HGB 13.9   HCT 40.6                      Passed - Medication is active on med list        Passed - No active pregnancy on record        Passed - Normal serum creatinine on file in past 12 months     Recent Labs   Lab Test 02/18/22  1019   CR 0.68       Ok to refill medication if creatinine is low          Passed - No positive pregnancy test in past 12 months            Sherice Escobedo RN     "

## 2022-03-08 ENCOUNTER — TRANSFERRED RECORDS (OUTPATIENT)
Dept: HEALTH INFORMATION MANAGEMENT | Facility: CLINIC | Age: 57
End: 2022-03-08
Payer: COMMERCIAL

## 2022-03-08 LAB — HEMOCCULT STL QL IA: NEGATIVE

## 2022-03-11 ENCOUNTER — TRANSFERRED RECORDS (OUTPATIENT)
Dept: HEALTH INFORMATION MANAGEMENT | Facility: CLINIC | Age: 57
End: 2022-03-11
Payer: COMMERCIAL

## 2022-03-15 DIAGNOSIS — M62.830 BACK MUSCLE SPASM: ICD-10-CM

## 2022-03-17 NOTE — TELEPHONE ENCOUNTER
Routing refill request to provider for review/approval because:  Labs not current:  ALT,AST  Carlie Rogel RN

## 2022-03-21 RX ORDER — MELOXICAM 7.5 MG/1
TABLET ORAL
Qty: 30 TABLET | Refills: 1 | Status: SHIPPED | OUTPATIENT
Start: 2022-03-21 | End: 2022-09-08

## 2022-04-04 DIAGNOSIS — G47.09 OTHER INSOMNIA: ICD-10-CM

## 2022-04-05 RX ORDER — ZOLPIDEM TARTRATE 5 MG/1
5 TABLET ORAL
Qty: 90 TABLET | Refills: 0 | Status: SHIPPED | OUTPATIENT
Start: 2022-04-05 | End: 2022-06-21

## 2022-04-05 NOTE — TELEPHONE ENCOUNTER
Requested Prescriptions   Pending Prescriptions Disp Refills     zolpidem (AMBIEN) 5 MG tablet 90 tablet 0     Sig: Take 1 tablet (5 mg) by mouth nightly as needed for sleep       There is no refill protocol information for this order        Last Written Prescription Date:  1/3/22  Last Fill Quantity: 90,  # refills: 0   Last office visit: 2/18/2022 with prescribing provider:     Future Office Visit:

## 2022-04-08 ENCOUNTER — TRANSFERRED RECORDS (OUTPATIENT)
Dept: HEALTH INFORMATION MANAGEMENT | Facility: CLINIC | Age: 57
End: 2022-04-08
Payer: COMMERCIAL

## 2022-04-22 ENCOUNTER — TRANSFERRED RECORDS (OUTPATIENT)
Dept: HEALTH INFORMATION MANAGEMENT | Facility: CLINIC | Age: 57
End: 2022-04-22
Payer: COMMERCIAL

## 2022-05-28 DIAGNOSIS — M54.12 CERVICAL RADICULOPATHY: ICD-10-CM

## 2022-05-31 RX ORDER — MELOXICAM 15 MG/1
TABLET ORAL
Qty: 90 TABLET | Refills: 0 | Status: SHIPPED | OUTPATIENT
Start: 2022-05-31 | End: 2022-06-21

## 2022-05-31 NOTE — TELEPHONE ENCOUNTER
"Requested Prescriptions   Pending Prescriptions Disp Refills     meloxicam (MOBIC) 15 MG tablet [Pharmacy Med Name: MELOXICAM 15 MG TABLET] 90 tablet 0     Sig: TAKE 1 TABLET BY MOUTH EVERY DAY       NSAID Medications Failed - 5/28/2022  7:39 AM        Failed - Normal ALT on file in past 12 months     Recent Labs   Lab Test 06/06/19  1419   ALT 24             Failed - Normal AST on file in past 12 months     Recent Labs   Lab Test 06/06/19  1419   AST 17             Passed - Blood pressure under 140/90 in past 12 months     BP Readings from Last 3 Encounters:   02/18/22 122/70   07/22/20 138/80   07/14/20 124/80                 Passed - Recent (12 mo) or future (30 days) visit within the authorizing provider's specialty     Patient has had an office visit with the authorizing provider or a provider within the authorizing providers department within the previous 12 mos or has a future within next 30 days. See \"Patient Info\" tab in inbasket, or \"Choose Columns\" in Meds & Orders section of the refill encounter.              Passed - Patient is age 6-64 years        Passed - Normal CBC on file in past 12 months     Recent Labs   Lab Test 02/18/22  1019   WBC 7.0   RBC 4.67   HGB 13.9   HCT 40.6                    Passed - Medication is active on med list        Passed - No active pregnancy on record        Passed - Normal serum creatinine on file in past 12 months     Recent Labs   Lab Test 02/18/22  1019   CR 0.68       Ok to refill medication if creatinine is low          Passed - No positive pregnancy test in past 12 months             "

## 2022-06-09 ENCOUNTER — TELEPHONE (OUTPATIENT)
Dept: FAMILY MEDICINE | Facility: CLINIC | Age: 57
End: 2022-06-09
Payer: COMMERCIAL

## 2022-06-09 NOTE — TELEPHONE ENCOUNTER
Patient Quality Outreach    Patient is due for the following:   Cervical Cancer Screening - PAP Needed    NEXT STEPS:   Schedule a yearly physical    Type of outreach:    Sent [employer] message.      Questions for provider review:    None     [facility]

## 2022-08-02 DIAGNOSIS — G47.09 OTHER INSOMNIA: ICD-10-CM

## 2022-08-02 DIAGNOSIS — M62.830 BACK MUSCLE SPASM: ICD-10-CM

## 2022-08-02 DIAGNOSIS — M53.3 SI (SACROILIAC) JOINT DYSFUNCTION: ICD-10-CM

## 2022-08-02 DIAGNOSIS — M15.0 PRIMARY GENERALIZED (OSTEO)ARTHRITIS: ICD-10-CM

## 2022-08-03 RX ORDER — ACETAMINOPHEN 500 MG
TABLET ORAL
Qty: 100 TABLET | Refills: 3 | Status: SHIPPED | OUTPATIENT
Start: 2022-08-03 | End: 2023-03-08

## 2022-08-03 NOTE — TELEPHONE ENCOUNTER
Requested Prescriptions   Pending Prescriptions Disp Refills     zolpidem (AMBIEN) 5 MG tablet [Pharmacy Med Name: ZOLPIDEM TARTRATE 5 MG TABLET] 30 tablet 0     Sig: TAKE 1 TABLET (5 MG) BY MOUTH NIGHTLY AS NEEDED FOR SLEEP       There is no refill protocol information for this order        Last Written Prescription Date:  6/21/22  Last Fill Quantity: 30,  # refills: 0   Last office visit: 2/18/2022 with prescribing provider:     Future Office Visit:

## 2022-08-04 RX ORDER — ZOLPIDEM TARTRATE 5 MG/1
5 TABLET ORAL
Qty: 30 TABLET | Refills: 0 | Status: SHIPPED | OUTPATIENT
Start: 2022-08-04 | End: 2022-09-08

## 2022-08-04 RX ORDER — CYCLOBENZAPRINE HCL 10 MG
10 TABLET ORAL DAILY
Qty: 90 TABLET | Refills: 1 | Status: SHIPPED | OUTPATIENT
Start: 2022-08-04 | End: 2022-12-09

## 2022-09-08 ENCOUNTER — VIRTUAL VISIT (OUTPATIENT)
Dept: FAMILY MEDICINE | Facility: CLINIC | Age: 57
End: 2022-09-08
Payer: COMMERCIAL

## 2022-09-08 DIAGNOSIS — G47.09 OTHER INSOMNIA: ICD-10-CM

## 2022-09-08 PROCEDURE — 99213 OFFICE O/P EST LOW 20 MIN: CPT | Mod: GT | Performed by: NURSE PRACTITIONER

## 2022-09-08 RX ORDER — ZOLPIDEM TARTRATE 5 MG/1
5 TABLET ORAL
Qty: 30 TABLET | Refills: 3 | Status: SHIPPED | OUTPATIENT
Start: 2022-09-08 | End: 2022-12-09

## 2022-09-08 NOTE — PROGRESS NOTES
Adrianne is a 57 year old who is being evaluated via a billable video visit.      How would you like to obtain your AVS? MyChart  If the video visit is dropped, the invitation should be resent by: Text to cell phone: [phone]  Will anyone else be joining your video visit? No        Assessment & Plan     Other insomnia   Controlled no change in treatment plan  - zolpidem (AMBIEN) 5 MG tablet; Take 1 tablet (5 mg) by mouth nightly as needed for sleep  No follow-ups on file.    CONTRERAS Sales CNP  [facility]    Subjective   Pat is a 57 year old, presenting for the following health issues:  No chief complaint on file.      HPI     Medication Followup of zolpidem    Taking Medication as prescribed: yes    Side Effects:  None    Medication Helping Symptoms:  yes      Review of Systems   Constitutional, HEENT, cardiovascular, pulmonary, gi and gu systems are negative, except as otherwise noted.      Objective           Vitals:  No vitals were obtained today due to virtual visit.    Physical Exam   GENERAL: Healthy, alert and no distress  EYES: Eyes grossly normal to inspection.  No discharge or erythema, or obvious scleral/conjunctival abnormalities.  RESP: No audible wheeze, cough, or visible cyanosis.  No visible retractions or increased work of breathing.    SKIN: Visible skin clear. No significant rash, abnormal pigmentation or lesions.  NEURO: Cranial nerves grossly intact.  Mentation and speech appropriate for age.  PSYCH: Mentation appears normal, affect normal/bright, judgement and insight intact, normal speech and appearance well-groomed.            Video-Visit Details    Video Start Time: 11:45 AM    Type of service:  Video Visit    Video End Time:11:58    Originating Location (pt. Location): Home    Distant Location (provider location):  [facility]     Platform used for Video Visit: Jg

## 2022-09-11 DIAGNOSIS — M54.12 CERVICAL RADICULOPATHY: ICD-10-CM

## 2022-09-15 RX ORDER — MELOXICAM 15 MG/1
TABLET ORAL
Qty: 30 TABLET | Refills: 0 | Status: SHIPPED | OUTPATIENT
Start: 2022-09-15 | End: 2022-10-18

## 2022-09-15 NOTE — TELEPHONE ENCOUNTER
"Requested Prescriptions   Pending Prescriptions Disp Refills    meloxicam (MOBIC) 15 MG tablet [Pharmacy Med Name: MELOXICAM 15 MG TABLET] 30 tablet 0     Sig: TAKE 1 TABLET BY MOUTH EVERY DAY        NSAID Medications Failed - 9/11/2022  6:00 PM        Failed - Normal ALT on file in past 12 months     Recent Labs   Lab Test 06/06/19  1419   ALT 24               Failed - Normal AST on file in past 12 months       Recent Labs   Lab Test 06/06/19  1419   AST 17             Passed - Blood pressure under 140/90 in past 12 months       BP Readings from Last 3 Encounters:   02/18/22 122/70   07/22/20 138/80   07/14/20 124/80                 Passed - Recent (12 mo) or future (30 days) visit within the authorizing provider's specialty     Patient has had an office visit with the authorizing provider or a provider within the authorizing providers department within the previous 12 mos or has a future within next 30 days. See \"Patient Info\" tab in inbasket, or \"Choose Columns\" in Meds & Orders section of the refill encounter.              Passed - Patient is age 6-64 years        Passed - Normal CBC on file in past 12 months     Recent Labs   Lab Test 02/18/22  1019   WBC 7.0   RBC 4.67   HGB 13.9   HCT 40.6                      Passed - Medication is active on med list        Passed - No active pregnancy on record        Passed - Normal serum creatinine on file in past 12 months     Recent Labs   Lab Test 02/18/22  1019   CR 0.68       Ok to refill medication if creatinine is low          Passed - No positive pregnancy test in past 12 months              "

## 2022-10-13 ENCOUNTER — TELEPHONE (OUTPATIENT)
Dept: FAMILY MEDICINE | Facility: CLINIC | Age: 57
End: 2022-10-13

## 2022-10-13 NOTE — TELEPHONE ENCOUNTER
Patient Quality Outreach    Patient is due for the following:   Breast Cancer Screening - Mammogram  Cervical Cancer Screening - PAP Needed    Next Steps:   Schedule a Adult Preventative    Type of outreach:    Phone, left message for patient/parent to call back.      Questions for provider review:    None     Kourtney Murray, CMA

## 2022-10-18 DIAGNOSIS — M54.12 CERVICAL RADICULOPATHY: ICD-10-CM

## 2022-10-18 RX ORDER — MELOXICAM 15 MG/1
TABLET ORAL
Qty: 30 TABLET | Refills: 0 | Status: SHIPPED | OUTPATIENT
Start: 2022-10-18 | End: 2022-11-23

## 2022-10-18 NOTE — TELEPHONE ENCOUNTER
"Requested Prescriptions   Pending Prescriptions Disp Refills    meloxicam (MOBIC) 15 MG tablet [Pharmacy Med Name: MELOXICAM 15 MG TABLET] 30 tablet 0     Sig: TAKE 1 TABLET BY MOUTH EVERY DAY       NSAID Medications Failed - 10/18/2022 12:33 AM        Failed - Normal ALT on file in past 12 months     Recent Labs   Lab Test 06/06/19  1419   ALT 24             Failed - Normal AST on file in past 12 months     Recent Labs   Lab Test 06/06/19  1419   AST 17             Passed - Blood pressure under 140/90 in past 12 months     BP Readings from Last 3 Encounters:   02/18/22 122/70   07/22/20 138/80   07/14/20 124/80                 Passed - Recent (12 mo) or future (30 days) visit within the authorizing provider's specialty     Patient has had an office visit with the authorizing provider or a provider within the authorizing providers department within the previous 12 mos or has a future within next 30 days. See \"Patient Info\" tab in inbasket, or \"Choose Columns\" in Meds & Orders section of the refill encounter.              Passed - Patient is age 6-64 years        Passed - Normal CBC on file in past 12 months     Recent Labs   Lab Test 02/18/22  1019   WBC 7.0   RBC 4.67   HGB 13.9   HCT 40.6                    Passed - Medication is active on med list        Passed - No active pregnancy on record        Passed - Normal serum creatinine on file in past 12 months     Recent Labs   Lab Test 02/18/22  1019   CR 0.68       Ok to refill medication if creatinine is low          Passed - No positive pregnancy test in past 12 months             "

## 2022-10-27 ENCOUNTER — TELEPHONE (OUTPATIENT)
Dept: FAMILY MEDICINE | Facility: CLINIC | Age: 57
End: 2022-10-27

## 2022-10-27 NOTE — TELEPHONE ENCOUNTER
Reason for call:    Symptom or request:     Patient called requesting referral physical therapy to [facility] due to head injury      Best Time:  any    Can we leave a detailed message on this number?  YES     May RODRIGUEZ  Station

## 2022-11-01 NOTE — TELEPHONE ENCOUNTER
Pt said she will pay Cash for the appointment. Pt does not want to make appt at this time.   Nehal Orn Station Sec

## 2022-11-01 NOTE — TELEPHONE ENCOUNTER
See Note below  Pt was seen [facility] Fax [phone]  Pt had this procedure in Jan/ Feb 2020 for Work comp Head injuries.  Requesting Referral to go again for this 11/4/22 due to dizziness

## 2022-11-18 ENCOUNTER — HOSPITAL ENCOUNTER (OUTPATIENT)
Dept: MAMMOGRAPHY | Facility: CLINIC | Age: 57
Discharge: HOME | End: 2022-11-18
Attending: NURSE PRACTITIONER | Admitting: NURSE PRACTITIONER
Payer: COMMERCIAL

## 2022-11-18 DIAGNOSIS — Z12.31 VISIT FOR SCREENING MAMMOGRAM: ICD-10-CM

## 2022-11-18 PROCEDURE — 77067 SCR MAMMO BI INCL CAD: CPT

## 2022-11-20 DIAGNOSIS — M54.12 CERVICAL RADICULOPATHY: ICD-10-CM

## 2022-11-23 RX ORDER — MELOXICAM 15 MG/1
TABLET ORAL
Qty: 30 TABLET | Refills: 0 | Status: SHIPPED | OUTPATIENT
Start: 2022-11-23 | End: 2022-12-09

## 2022-11-23 NOTE — TELEPHONE ENCOUNTER
"Has appointment scheduled for 12/9/22      Requested Prescriptions   Pending Prescriptions Disp Refills     meloxicam (MOBIC) 15 MG tablet [Pharmacy Med Name: MELOXICAM 15 MG TABLET] 30 tablet 0     Sig: TAKE 1 TABLET BY MOUTH EVERY DAY       NSAID Medications Failed - 11/20/2022 10:17 AM        Failed - Normal ALT on file in past 12 months     Recent Labs   Lab Test 06/06/19  1419   ALT 24             Failed - Normal AST on file in past 12 months     Recent Labs   Lab Test 06/06/19  1419   AST 17             Passed - Blood pressure under 140/90 in past 12 months     BP Readings from Last 3 Encounters:   02/18/22 122/70   07/22/20 138/80   07/14/20 124/80                 Passed - Recent (12 mo) or future (30 days) visit within the authorizing provider's specialty     Patient has had an office visit with the authorizing provider or a provider within the authorizing providers department within the previous 12 mos or has a future within next 30 days. See \"Patient Info\" tab in inbasket, or \"Choose Columns\" in Meds & Orders section of the refill encounter.              Passed - Patient is age 6-64 years        Passed - Normal CBC on file in past 12 months     Recent Labs   Lab Test 02/18/22  1019   WBC 7.0   RBC 4.67   HGB 13.9   HCT 40.6                    Passed - Medication is active on med list        Passed - No active pregnancy on record        Passed - Normal serum creatinine on file in past 12 months     Recent Labs   Lab Test 02/18/22  1019   CR 0.68       Ok to refill medication if creatinine is low          Passed - No positive pregnancy test in past 12 months             "

## 2022-12-03 DIAGNOSIS — M54.12 CERVICAL RADICULOPATHY: ICD-10-CM

## 2022-12-06 RX ORDER — MELOXICAM 15 MG/1
TABLET ORAL
Qty: 30 TABLET | Refills: 0 | OUTPATIENT
Start: 2022-12-06

## 2022-12-09 ENCOUNTER — OFFICE VISIT (OUTPATIENT)
Dept: FAMILY MEDICINE | Facility: CLINIC | Age: 57
End: 2022-12-09
Payer: COMMERCIAL

## 2022-12-09 VITALS
WEIGHT: 147 LBS | TEMPERATURE: 97.3 F | SYSTOLIC BLOOD PRESSURE: 122 MMHG | HEIGHT: 68 IN | DIASTOLIC BLOOD PRESSURE: 60 MMHG | HEART RATE: 92 BPM | BODY MASS INDEX: 22.28 KG/M2

## 2022-12-09 DIAGNOSIS — I73.00 RAYNAUD'S DISEASE WITHOUT GANGRENE: ICD-10-CM

## 2022-12-09 DIAGNOSIS — G47.09 OTHER INSOMNIA: ICD-10-CM

## 2022-12-09 DIAGNOSIS — M62.830 BACK MUSCLE SPASM: ICD-10-CM

## 2022-12-09 DIAGNOSIS — Z00.00 ROUTINE GENERAL MEDICAL EXAMINATION AT A HEALTH CARE FACILITY: Primary | ICD-10-CM

## 2022-12-09 DIAGNOSIS — M54.12 CERVICAL RADICULOPATHY: ICD-10-CM

## 2022-12-09 LAB
ANION GAP SERPL CALCULATED.3IONS-SCNC: 8 MMOL/L (ref 7–15)
BUN SERPL-MCNC: 19.3 MG/DL (ref 8–23)
CALCIUM SERPL-MCNC: 9.5 MG/DL (ref 8.6–10)
CHLORIDE SERPL-SCNC: 101 MMOL/L (ref 98–107)
CHOLEST SERPL-MCNC: 216 MG/DL
CREAT SERPL-MCNC: 0.71 MG/DL (ref 0.51–0.95)
DEPRECATED HCO3 PLAS-SCNC: 32 MMOL/L (ref 22–29)
ERYTHROCYTE [DISTWIDTH] IN BLOOD BY AUTOMATED COUNT: 12 % (ref 10–15)
FERRITIN SERPL-MCNC: 89 NG/ML (ref 11–328)
GFR SERPL CREATININE-BSD FRML MDRD: >90 ML/MIN/1.73M2
GLUCOSE SERPL-MCNC: 108 MG/DL (ref 70–99)
HBA1C MFR BLD: 6 % (ref 0–5.6)
HCT VFR BLD AUTO: 42.9 % (ref 35–47)
HDLC SERPL-MCNC: 76 MG/DL
HGB BLD-MCNC: 14.2 G/DL (ref 11.7–15.7)
IRON BINDING CAPACITY (ROCHE): 359 UG/DL (ref 240–430)
IRON SATN MFR SERPL: 39 % (ref 15–46)
IRON SERPL-MCNC: 141 UG/DL (ref 37–145)
LDLC SERPL CALC-MCNC: 116 MG/DL
MCH RBC QN AUTO: 29.4 PG (ref 26.5–33)
MCHC RBC AUTO-ENTMCNC: 33.1 G/DL (ref 31.5–36.5)
MCV RBC AUTO: 89 FL (ref 78–100)
NONHDLC SERPL-MCNC: 140 MG/DL
PLATELET # BLD AUTO: 240 10E3/UL (ref 150–450)
POTASSIUM SERPL-SCNC: 4 MMOL/L (ref 3.4–5.3)
RBC # BLD AUTO: 4.83 10E6/UL (ref 3.8–5.2)
SODIUM SERPL-SCNC: 141 MMOL/L (ref 136–145)
TRIGL SERPL-MCNC: 118 MG/DL
WBC # BLD AUTO: 6.3 10E3/UL (ref 4–11)

## 2022-12-09 PROCEDURE — 83550 IRON BINDING TEST: CPT | Performed by: NURSE PRACTITIONER

## 2022-12-09 PROCEDURE — 82728 ASSAY OF FERRITIN: CPT | Performed by: NURSE PRACTITIONER

## 2022-12-09 PROCEDURE — 80061 LIPID PANEL: CPT | Performed by: NURSE PRACTITIONER

## 2022-12-09 PROCEDURE — 85027 COMPLETE CBC AUTOMATED: CPT | Performed by: NURSE PRACTITIONER

## 2022-12-09 PROCEDURE — 87624 HPV HI-RISK TYP POOLED RSLT: CPT | Performed by: NURSE PRACTITIONER

## 2022-12-09 PROCEDURE — 83540 ASSAY OF IRON: CPT | Performed by: NURSE PRACTITIONER

## 2022-12-09 PROCEDURE — 99396 PREV VISIT EST AGE 40-64: CPT | Performed by: NURSE PRACTITIONER

## 2022-12-09 PROCEDURE — 80048 BASIC METABOLIC PNL TOTAL CA: CPT | Performed by: NURSE PRACTITIONER

## 2022-12-09 PROCEDURE — G0123 SCREEN CERV/VAG THIN LAYER: HCPCS | Performed by: NURSE PRACTITIONER

## 2022-12-09 PROCEDURE — 36415 COLL VENOUS BLD VENIPUNCTURE: CPT | Performed by: NURSE PRACTITIONER

## 2022-12-09 PROCEDURE — 83036 HEMOGLOBIN GLYCOSYLATED A1C: CPT | Performed by: NURSE PRACTITIONER

## 2022-12-09 RX ORDER — NIFEDIPINE 30 MG/1
30 TABLET, EXTENDED RELEASE ORAL DAILY
Qty: 90 TABLET | Refills: 3 | Status: SHIPPED | OUTPATIENT
Start: 2022-12-09 | End: 2023-08-07

## 2022-12-09 RX ORDER — CYCLOBENZAPRINE HCL 10 MG
10 TABLET ORAL DAILY
Qty: 90 TABLET | Refills: 1 | Status: SHIPPED | OUTPATIENT
Start: 2022-12-09 | End: 2023-05-01

## 2022-12-09 RX ORDER — MELOXICAM 15 MG/1
15 TABLET ORAL DAILY
Qty: 90 TABLET | Refills: 1 | Status: SHIPPED | OUTPATIENT
Start: 2022-12-09 | End: 2023-05-01

## 2022-12-09 RX ORDER — ZOLPIDEM TARTRATE 5 MG/1
5 TABLET ORAL
Qty: 30 TABLET | Refills: 3 | Status: SHIPPED | OUTPATIENT
Start: 2022-12-09 | End: 2023-05-01

## 2022-12-09 ASSESSMENT — PAIN SCALES - GENERAL: PAINLEVEL: NO PAIN (0)

## 2022-12-09 NOTE — PROGRESS NOTES
{PROVIDER CHARTING PREFERENCE:772385}    Subjective   Pat is a 57 year old{ACCOMPANIED BY STATEMENT (Optional):601975}, presenting for the following health issues:  No chief complaint on file.      HPI     {SUPERLIST (Optional):240821}  {additonal problems for provider to add (Optional):397824}    Review of Systems   {ROS COMP (Optional):575599}      Objective    LMP 06/26/2014   There is no height or weight on file to calculate BMI.  Physical Exam   {Exam List (Optional):739205}    {Diagnostic Test Results (Optional):463159}    {AMBULATORY ATTESTATION (Optional):396858}

## 2022-12-09 NOTE — PROGRESS NOTES
SUBJECTIVE:   CC: Adrianne is an 57 year old who presents for preventive health visit.     Patient has been advised of split billing requirements and indicates understanding: Yes  Healthy Habits:    Getting at least 3 servings of Calcium per day:  Yes    Bi-annual eye exam:  Yes    Dental care twice a year:  Yes    Sleep apnea or symptoms of sleep apnea:  None    Diet:  Regular (no restrictions)    Frequency of exercise:  None    Duration of exercise:  N/A    Taking medications regularly:  No    Barriers to taking medications:  Not applicable    Medication side effects:  Not applicable    PHQ-2 Total Score:    Additional concerns today:  No          Today's PHQ-2 Score:   PHQ-2 ( 1999 [employer]) 2/18/2022   Q1: Little interest or pleasure in doing things 0   Q2: Feeling down, depressed or hopeless 0   PHQ-2 Score 0   PHQ-2 Total Score (12-17 Years)- Positive if 3 or more points; Administer PHQ-A if positive -   Q1: Little interest or pleasure in doing things -   Q2: Feeling down, depressed or hopeless -   PHQ-2 Score -           Social History     Tobacco Use     Smoking status: Never     Smokeless tobacco: Never   Substance Use Topics     Alcohol use: Yes     Comment: scoial     If you drink alcohol do you typically have >3 drinks per day or >7 drinks per week? No    Alcohol Use 9/17/2019   Prescreen: >3 drinks/day or >7 drinks/week? No   Prescreen: >3 drinks/day or >7 drinks/week? -       Reviewed orders with patient.  Reviewed health maintenance and updated orders accordingly - Yes  Labs reviewed in EPIC  BP Readings from Last 3 Encounters:   12/09/22 122/60   02/18/22 122/70   07/22/20 138/80    Wt Readings from Last 3 Encounters:   12/09/22 66.7 kg (147 lb)   02/18/22 69.9 kg (154 lb)   07/22/20 72.1 kg (159 lb)                  Patient Active Problem List   Diagnosis     Raynaud's disease     TMJ (temporomandibular joint syndrome)     CARDIOVASCULAR SCREENING; LDL GOAL LESS THAN 160     PTSD (post-traumatic stress  disorder)     Muscle tension headache     Esophageal abnormality     Episodic mood disorder (H)     Esophageal stricture     Sprain of MCP joint of hand     Rotator cuff injury; bilateral repairs 2005     Primary generalized (osteo)arthritis     SI (sacroiliac) joint dysfunction; see details, pain management contract     Chronic bilateral low back pain without sciatica     Lumbar radiculopathy     Past Surgical History:   Procedure Laterality Date     MAMMOPLASTY REDUCTION       OTHER SURGICAL HISTORY      s/p left knee arthroscopinc surgery     ROTATOR CUFF REPAIR RT/LT  01/01/2005     SMALL BOWEL RESECTION       TUBAL LIGATION         Social History     Tobacco Use     Smoking status: Never     Smokeless tobacco: Never   Substance Use Topics     Alcohol use: Yes     Comment: scoial     Family History   Problem Relation Age of Onset     Unknown/Adopted Mother      Unknown/Adopted Father      Unknown/Adopted Maternal Grandmother      Unknown/Adopted Maternal Grandfather      Unknown/Adopted Paternal Grandmother      Unknown/Adopted Paternal Grandfather          Current Outpatient Medications   Medication Sig Dispense Refill     acetaminophen (ACETAMINOPHEN EXTRA STRENGTH) 500 MG tablet TAKE 1-2 TABLETS (500-1,000 MG) BY MOUTH EVERY 6 HOURS AS NEEDED FOR MILD PAIN 100 tablet 3     Ascorbic Acid (VITAMIN C CR PO) Take  by mouth.       Cyanocobalamin (VITAMIN B 12 PO) Take  by mouth.       cyclobenzaprine (FLEXERIL) 10 MG tablet Take 1 tablet (10 mg) by mouth daily 90 tablet 1     ferrous sulfate (FEROSUL) 325 (65 Fe) MG tablet Take 1 tablet (325 mg) by mouth daily (with breakfast) 90 tablet 1     GLUCOSAMINE CHONDROITIN COMPLX PO Take  by mouth.       LANSOPRAZOLE PO        melatonin 3 MG tablet Take 1 tablet by mouth nightly as needed for sleep. 30 tablet 0     meloxicam (MOBIC) 15 MG tablet Take 1 tablet (15 mg) by mouth daily 90 tablet 1     NIFEdipine ER OSMOTIC (PROCARDIA XL) 30 MG 24 hr tablet Take 1 tablet (30  mg) by mouth daily 90 tablet 3     NITRO-BID 2 % OINT ointment PLACE 1 INCH (15 MG) ONTO THE SKIN DAILY 60 g 1     zolpidem (AMBIEN) 5 MG tablet Take 1 tablet (5 mg) by mouth nightly as needed for sleep 30 tablet 3     Allergies   Allergen Reactions     Desyrel [Trazodone Hcl]      Sig mood disturbance       Lamictal [Lamotrigine]      Recent Labs   Lab Test 12/09/22  0943 02/18/22  1019 12/18/21  0906 07/09/21  1552 11/26/19  1056 09/17/19  1419 06/06/19  1419   A1C 6.0*  --   --   --   --   --   --    LDL  --   --  123*  --   --  99  --    HDL  --   --  88  --   --  84  --    TRIG  --   --  59  --   --  41  --    ALT  --   --   --   --   --   --  24   CR  --  0.68 0.75 0.69 0.70 0.80 0.73   GFRESTIMATED  --  >90 88 >90 >90 83 >90   GFRESTBLACK  --   --   --  >90 >90 >90 >90   POTASSIUM  --  4.0 3.5 3.5 3.6 3.3* 3.6        Breast Cancer Screening:    FHS-7:   Breast CA Risk Assessment (FHS-7) 11/18/2022   Did any of your first-degree relatives have breast or ovarian cancer? No   Did any of your relatives have bilateral breast cancer? No   Did any man in your family have breast cancer? No   Did any woman in your family have breast and ovarian cancer? No   Did any woman in your family have breast cancer before age 50 y? No   Do you have 2 or more relatives with breast and/or ovarian cancer? No   Do you have 2 or more relatives with breast and/or bowel cancer? No       Mammogram Screening: Recommended mammography every 1-2 years with patient discussion and risk factor consideration  Pertinent mammograms are reviewed under the imaging tab.    History of abnormal Pap smear: NO - age 30-65 PAP every 5 years with negative HPV co-testing recommended  PAP / HPV Latest Ref Rng & Units 9/16/2016 1/22/2013 5/27/2011   PAP (Historical) - NIL OTHER-NIL, See Result NIL   HPV16 NEG Negative - -   HPV18 NEG Negative - -   HRHPV NEG Negative - -     Reviewed and updated as needed this visit by clinical staff    Allergies  Meds      "         Reviewed and updated as needed this visit by Provider                 Past Medical History:   Diagnosis Date     Anger reaction     buspar helping     Cholelithiasis      Esophageal stricture     dilatation every 3 months     Generalized osteoarthrosis, unspecified site (aka ARTHRITIS)     small joints     PTSD (post-traumatic stress disorder)      Raynaud's disease       Past Surgical History:   Procedure Laterality Date     MAMMOPLASTY REDUCTION       OTHER SURGICAL HISTORY      s/p left knee arthroscopinc surgery     ROTATOR CUFF REPAIR RT/LT  01/01/2005     SMALL BOWEL RESECTION       TUBAL LIGATION       OB History   No obstetric history on file.       Review of Systems  CONSTITUTIONAL: NEGATIVE for fever, chills, change in weight  INTEGUMENTARU/SKIN: NEGATIVE for worrisome rashes, moles or lesions  EYES: NEGATIVE for vision changes or irritation  ENT: NEGATIVE for ear, mouth and throat problems  RESP: NEGATIVE for significant cough or SOB  BREAST: NEGATIVE for masses, tenderness or discharge  CV: NEGATIVE for chest pain, palpitations or peripheral edema  GI: NEGATIVE for nausea, abdominal pain, heartburn, or change in bowel habits  : NEGATIVE for unusual urinary or vaginal symptoms. Periods are regular.  MUSCULOSKELETAL: NEGATIVE for significant arthralgias or myalgia  NEURO: NEGATIVE for weakness, dizziness or paresthesias  PSYCHIATRIC: NEGATIVE for changes in mood or affect     OBJECTIVE:   /60 (BP Location: Right arm, Cuff Size: Adult Regular)   Pulse 92   Temp 97.3  F (36.3  C) (Tympanic)   Ht 1.715 m (5' 7.52\")   Wt 66.7 kg (147 lb)   LMP 06/26/2014   BMI 22.67 kg/m    Physical Exam  GENERAL APPEARANCE: healthy, alert and no distress  EYES: Eyes grossly normal to inspection, PERRL and conjunctivae and sclerae normal  HENT: ear canals and TM's normal, nose and mouth without ulcers or lesions, oropharynx clear and oral mucous membranes moist  NECK: no adenopathy, no asymmetry, masses, " or scars and thyroid normal to palpation  RESP: lungs clear to auscultation - no rales, rhonchi or wheezes  BREAST: normal without masses, tenderness or nipple discharge and no palpable axillary masses or adenopathy  CV: regular rate and rhythm, normal S1 S2, no S3 or S4, no murmur, click or rub, no peripheral edema and peripheral pulses strong  ABDOMEN: soft, nontender, no hepatosplenomegaly, no masses and bowel sounds normal   (female): normal female external genitalia, normal urethral meatus, vaginal mucosal atrophy noted, normal cervix, adnexae, and uterus without masses or abnormal discharge  MS: no musculoskeletal defects are noted and gait is age appropriate without ataxia  SKIN: no suspicious lesions or rashes  NEURO: Normal strength and tone, sensory exam grossly normal, mentation intact and speech normal  PSYCH: mentation appears normal and affect normal/bright    Diagnostic Test Results:  Labs reviewed in Epic  No results found for any visits on 12/09/22.    ASSESSMENT/PLAN:   (Z00.00) Routine general medical examination at a health care facility  (primary encounter diagnosis)  Comment:   Plan: Gynecologic Cytology (PAP), CBC with platelets,        Basic metabolic panel  (Ca, Cl, CO2, Creat,         Gluc, K, Na, BUN), Iron and iron binding         capacity, Ferritin, Lipid panel reflex to         direct LDL Fasting, Hemoglobin A1c            (M54.12) Cervical radiculopathy  Comment:  Controlled no change in treatment plan  Plan: meloxicam (MOBIC) 15 MG tablet      (M62.830) Back muscle spasm  Comment:  Controlled no change in treatment plan  Plan: cyclobenzaprine (FLEXERIL) 10 MG tablet       (I73.00) Raynaud's disease without gangrene  Comment:  Controlled no change in treatment plan  Plan: NIFEdipine ER OSMOTIC (PROCARDIA XL) 30 MG 24  hr tablet      (G47.09) Other insomnia  Comment:  Controlled no change in treatment planPlan: zolpidem (AMBIEN) 5 MG tablet        Patient has been advised of split  billing requirements and indicates understanding: Yes      COUNSELING:  Reviewed preventive health counseling, as reflected in patient instructions       Regular exercise       Healthy diet/nutrition       Vision screening       Hearing screening       Aspirin prophylaxis       Alcohol Use       Contraception       Folic Acid       Osteoporosis prevention/bone health       Colorectal Cancer Screening       (Yenni)menopause management        She reports that she has never smoked. She has never used smokeless tobacco.          CONTRERAS Sales CNP  [facility]

## 2022-12-14 LAB
BKR LAB AP GYN ADEQUACY: NORMAL
BKR LAB AP GYN INTERPRETATION: NORMAL
BKR LAB AP HPV REFLEX: NORMAL
BKR LAB AP PREVIOUS ABNORMAL: NORMAL
PATH REPORT.COMMENTS IMP SPEC: NORMAL
PATH REPORT.COMMENTS IMP SPEC: NORMAL
PATH REPORT.RELEVANT HX SPEC: NORMAL

## 2022-12-16 LAB
HUMAN PAPILLOMA VIRUS 16 DNA: NEGATIVE
HUMAN PAPILLOMA VIRUS 18 DNA: NEGATIVE
HUMAN PAPILLOMA VIRUS FINAL DIAGNOSIS: NORMAL
HUMAN PAPILLOMA VIRUS OTHER HR: NEGATIVE

## 2023-03-07 DIAGNOSIS — M53.3 SI (SACROILIAC) JOINT DYSFUNCTION: ICD-10-CM

## 2023-03-07 DIAGNOSIS — M15.0 PRIMARY GENERALIZED (OSTEO)ARTHRITIS: ICD-10-CM

## 2023-03-08 RX ORDER — ACETAMINOPHEN 500 MG
TABLET ORAL
Qty: 100 TABLET | Refills: 0 | Status: SHIPPED | OUTPATIENT
Start: 2023-03-08 | End: 2023-05-01

## 2023-04-28 DIAGNOSIS — G47.09 OTHER INSOMNIA: ICD-10-CM

## 2023-04-28 DIAGNOSIS — M15.0 PRIMARY GENERALIZED (OSTEO)ARTHRITIS: ICD-10-CM

## 2023-04-28 DIAGNOSIS — M62.830 BACK MUSCLE SPASM: ICD-10-CM

## 2023-04-28 DIAGNOSIS — M54.12 CERVICAL RADICULOPATHY: ICD-10-CM

## 2023-04-28 DIAGNOSIS — M53.3 SI (SACROILIAC) JOINT DYSFUNCTION: ICD-10-CM

## 2023-05-01 RX ORDER — ACETAMINOPHEN 500 MG
TABLET ORAL
Qty: 100 TABLET | Refills: 0 | Status: SHIPPED | OUTPATIENT
Start: 2023-05-01 | End: 2023-07-18

## 2023-05-01 RX ORDER — CYCLOBENZAPRINE HCL 10 MG
10 TABLET ORAL DAILY
Qty: 90 TABLET | Refills: 1 | Status: SHIPPED | OUTPATIENT
Start: 2023-05-01 | End: 2023-08-07

## 2023-05-01 RX ORDER — MELOXICAM 15 MG/1
TABLET ORAL
Qty: 90 TABLET | Refills: 1 | Status: SHIPPED | OUTPATIENT
Start: 2023-05-01 | End: 2023-12-18

## 2023-05-01 RX ORDER — ZOLPIDEM TARTRATE 5 MG/1
5 TABLET ORAL
Qty: 30 TABLET | Refills: 3 | Status: SHIPPED | OUTPATIENT
Start: 2023-05-01 | End: 2023-08-07

## 2023-07-17 DIAGNOSIS — M15.0 PRIMARY GENERALIZED (OSTEO)ARTHRITIS: ICD-10-CM

## 2023-07-17 DIAGNOSIS — M53.3 SI (SACROILIAC) JOINT DYSFUNCTION: ICD-10-CM

## 2023-07-18 RX ORDER — ACETAMINOPHEN 500 MG
TABLET ORAL
Qty: 100 TABLET | Refills: 1 | Status: SHIPPED | OUTPATIENT
Start: 2023-07-18 | End: 2023-08-07

## 2023-08-07 ENCOUNTER — VIRTUAL VISIT (OUTPATIENT)
Dept: FAMILY MEDICINE | Facility: CLINIC | Age: 58
End: 2023-08-07
Payer: COMMERCIAL

## 2023-08-07 DIAGNOSIS — M19.041 PRIMARY OSTEOARTHRITIS OF BOTH HANDS: ICD-10-CM

## 2023-08-07 DIAGNOSIS — M15.0 PRIMARY GENERALIZED (OSTEO)ARTHRITIS: ICD-10-CM

## 2023-08-07 DIAGNOSIS — M53.3 SI (SACROILIAC) JOINT DYSFUNCTION: ICD-10-CM

## 2023-08-07 DIAGNOSIS — M79.642 PAIN IN BOTH HANDS: Primary | ICD-10-CM

## 2023-08-07 DIAGNOSIS — I73.00 RAYNAUD'S DISEASE WITHOUT GANGRENE: ICD-10-CM

## 2023-08-07 DIAGNOSIS — M79.641 PAIN IN BOTH HANDS: Primary | ICD-10-CM

## 2023-08-07 DIAGNOSIS — M62.830 BACK MUSCLE SPASM: ICD-10-CM

## 2023-08-07 DIAGNOSIS — M19.042 PRIMARY OSTEOARTHRITIS OF BOTH HANDS: ICD-10-CM

## 2023-08-07 DIAGNOSIS — G47.09 OTHER INSOMNIA: ICD-10-CM

## 2023-08-07 PROCEDURE — 99214 OFFICE O/P EST MOD 30 MIN: CPT | Mod: VID | Performed by: NURSE PRACTITIONER

## 2023-08-07 RX ORDER — CYCLOBENZAPRINE HCL 10 MG
10 TABLET ORAL DAILY
Qty: 90 TABLET | Refills: 1 | Status: SHIPPED | OUTPATIENT
Start: 2023-08-07 | End: 2023-12-08

## 2023-08-07 RX ORDER — ZOLPIDEM TARTRATE 5 MG/1
5 TABLET ORAL
Qty: 30 TABLET | Refills: 3 | Status: SHIPPED | OUTPATIENT
Start: 2023-08-07 | End: 2024-01-04

## 2023-08-07 RX ORDER — NIFEDIPINE 30 MG/1
30 TABLET, EXTENDED RELEASE ORAL DAILY
Qty: 90 TABLET | Refills: 3 | Status: SHIPPED | OUTPATIENT
Start: 2023-08-07 | End: 2024-06-25

## 2023-08-07 RX ORDER — NITROGLYCERIN 20 MG/G
OINTMENT TOPICAL
Qty: 60 G | Refills: 1 | Status: SHIPPED | OUTPATIENT
Start: 2023-08-07

## 2023-08-07 RX ORDER — ACETAMINOPHEN 500 MG
TABLET ORAL
Qty: 100 TABLET | Refills: 1 | Status: SHIPPED | OUTPATIENT
Start: 2023-08-07

## 2023-08-07 NOTE — PROGRESS NOTES
Adrianne is a 58 year old who is being evaluated via a billable video visit.      How would you like to obtain your AVS? MyChart  If the video visit is dropped, the invitation should be resent by: Text to cell phone: [phone]  Will anyone else be joining your video visit? No          Assessment & Plan       ICD-10-CM    1. Pain in both hands  M79.641 XR Hands Bilateral PA View    M79.642       2. Other insomnia  G47.09 zolpidem (AMBIEN) 5 MG tablet      3. Raynaud's disease without gangrene  I73.00 NIFEdipine ER OSMOTIC (PROCARDIA XL) 30 MG 24 hr tablet     nitroGLYcerin (NITRO-BID) 2 % OINT ointment      4. Back muscle spasm  M62.830 cyclobenzaprine (FLEXERIL) 10 MG tablet      5. SI (sacroiliac) joint dysfunction; see details, pain management contract  M53.3 acetaminophen (PAIN RELIEF EXTRA STRENGTH) 500 MG tablet      6. Primary generalized (osteo)arthritis  M15.0 acetaminophen (PAIN RELIEF EXTRA STRENGTH) 500 MG tablet          CONTRERAS Sales CNP  [facility]    Subjective   Adrianne is a 58 year old, presenting for the following health issues:  Back Pain, Neck Pain, and Insomnia        8/7/2023     4:43 PM   Additional Questions   Roomed by Gabbi GRIDER CMA       HPI     Pain History:  When did you first notice your pain? Years, worse after a fall in 2019   Have you seen this provider for your pain in the past? Yes   Where in your body do you have pain? Neck and back   Are you seeing anyone else for your pain? No                Chronic Pain Follow Up:    Location of pain: back and hands   Analgesia/pain control:    - Recent changes:  none   - Overall control: Tolerable with discomfort    - Current treatments: none    Adherence:     - Do you ever take more pain medicine than prescribed? Yes:     - When did you take your last dose of pain medicine?     Adverse effects: No   PDMP Review       None          Last CSA Agreement:   CSA -- Patient Level:     [Media Unavailable] Controlled  Substance Agreement - Opioid - Scan on 8/29/2019  1:33 PM       Last UDS: 9/18/2019            Insomnia  Onset/Duration: Ongoing   Description:   Frequency of insomnia:  work nights   Time to fall asleep (sleep latency): anywhere from 5 minutes to an hour or more   Middle of night awakening:  YES  Early morning awakening:  YES  Progression of Symptoms:  same  Accompanying Signs & Symptoms:  Daytime sleepiness/napping: No  Excessive snoring/apnea: No  Restless legs: YES- occasionally   Waking to urinate: YES- sometimes not often   Chronic pain:  YES  Depression symptoms (if yes, do PHQ9): No  Anxiety symptoms (if yes, do JEFFREY-7): YES- work related   History:  Prior Insomnia: YES  New stressful situation: No  Precipitating factors:   Caffeine intake: No  OTC decongestants: No  Any new medications: No  Alleviating factors:  Self medicating (alcohol, etc.):  No  Stress-reduction (exercise, yoga, meditation etc): YES- meditation   Therapies tried and outcome: Ambien -  effective    Concern - Arthritis   Onset: ongoing   Description: worsening arthritis in her hands   Intensity: moderate to severe  Progression of Symptoms:  worsening  Accompanying Signs & Symptoms: none   Previous history of similar problem: yes   Precipitating factors:        Worsened by: overuse   Alleviating factors:        Improved by: na   Therapies tried and outcome:  none       Review of Systems   Constitutional, HEENT, cardiovascular, pulmonary, gi and gu systems are negative, except as otherwise noted.      Objective           Vitals:  No vitals were obtained today due to virtual visit.    Physical Exam   GENERAL: Healthy, alert and no distress  EYES: Eyes grossly normal to inspection.  No discharge or erythema, or obvious scleral/conjunctival abnormalities.  RESP: No audible wheeze, cough, or visible cyanosis.  No visible retractions or increased work of breathing.    SKIN: Visible skin clear. No significant rash, abnormal pigmentation or  lesions.  NEURO: Cranial nerves grossly intact.  Mentation and speech appropriate for age.  PSYCH: Mentation appears normal, affect normal/bright, judgement and insight intact, normal speech and appearance well-groomed.                Video-Visit Details    Type of service:  Video Visit     Originating Location (pt. Location): Home    Distant Location (provider location):  On-site  Platform used for Video Visit: Jg

## 2023-08-22 ENCOUNTER — ANCILLARY PROCEDURE (OUTPATIENT)
Dept: GENERAL RADIOLOGY | Facility: CLINIC | Age: 58
End: 2023-08-22
Attending: NURSE PRACTITIONER
Payer: COMMERCIAL

## 2023-08-22 DIAGNOSIS — M79.641 PAIN IN BOTH HANDS: ICD-10-CM

## 2023-08-22 DIAGNOSIS — M79.642 PAIN IN BOTH HANDS: ICD-10-CM

## 2023-08-22 PROCEDURE — 73120 X-RAY EXAM OF HAND: CPT | Mod: TC | Performed by: RADIOLOGY

## 2023-08-24 ENCOUNTER — TELEPHONE (OUTPATIENT)
Dept: FAMILY MEDICINE | Facility: CLINIC | Age: 58
End: 2023-08-24
Payer: COMMERCIAL

## 2023-08-24 DIAGNOSIS — G47.09 OTHER INSOMNIA: ICD-10-CM

## 2023-08-25 RX ORDER — ZOLPIDEM TARTRATE 5 MG/1
5 TABLET ORAL
Qty: 30 TABLET | Refills: 3 | OUTPATIENT
Start: 2023-08-25

## 2023-08-25 RX ORDER — ZOLPIDEM TARTRATE 5 MG/1
5 TABLET ORAL
Qty: 30 TABLET | Refills: 3 | Status: CANCELLED | OUTPATIENT
Start: 2023-08-25

## 2023-08-25 NOTE — TELEPHONE ENCOUNTER
Pt calling and is wondering why this medication was denied. She still uses this medication and it is working well for her.    Jessenia Galarza Patient

## 2023-08-25 NOTE — TELEPHONE ENCOUNTER
"Spoke with patient, informed her we did not deny her request for zolpidem 5 mg, should have 3 refills remaining.    zolpidem (AMBIEN) 5 MG tablet 30 tablet 3 8/7/2023  No   Sig - Route: Take 1 tablet (5 mg) by mouth nightly as needed for sleep - Oral   Sent to pharmacy as: Zolpidem Tartrate 5 MG Oral Tablet (AMBIEN)   Class: E-Prescribe   Order: [id number]   E-Prescribing Status: Receipt confirmed by pharmacy (8/7/2023  5:04 PM CDT)     Writer spoke with Jassi at [facility] in [facility] who states it was \"on hold due to her insurance only covering a certain quantity, was able to run it through now and it was just fine\" patient to  her script today.     Julie Behrendt RN    "

## 2023-09-19 ENCOUNTER — OFFICE VISIT (OUTPATIENT)
Dept: ORTHOPEDICS | Facility: CLINIC | Age: 58
End: 2023-09-19
Attending: NURSE PRACTITIONER
Payer: COMMERCIAL

## 2023-09-19 VITALS
DIASTOLIC BLOOD PRESSURE: 76 MMHG | BODY MASS INDEX: 22.28 KG/M2 | SYSTOLIC BLOOD PRESSURE: 124 MMHG | HEIGHT: 68 IN | WEIGHT: 147 LBS

## 2023-09-19 DIAGNOSIS — M19.041 PRIMARY OSTEOARTHRITIS OF BOTH HANDS: ICD-10-CM

## 2023-09-19 DIAGNOSIS — M19.042 PRIMARY OSTEOARTHRITIS OF BOTH HANDS: ICD-10-CM

## 2023-09-19 PROCEDURE — 99204 OFFICE O/P NEW MOD 45 MIN: CPT | Performed by: FAMILY MEDICINE

## 2023-09-19 NOTE — LETTER
2023         RE: Maryann Riggs  [address]   [place] [address]        Dear Colleague,    Thank you for referring your patient, Maryann Riggs, to the [facility] [place]. Please see a copy of my visit note below.    Maryann Riggs  :  1965  DOS: 2023  MRN: [id number]    Sports Medicine Clinic Visit    PCP: Debbie Vila    Maryann Riggs is a 58 year old Right hand dominant female who is seen in consultation at the request of  Debbie Vila C.N.P. presenting with chronic bilateral hand/finger pain.    Injury: Gradual onset of pain over the past 2+ years.  Pain located over bilateral hands, DIP joints index - little fingers, right index finger is worst, nonradiating.  Additional Features:  Positive: swelling, weakness, and joint deformities.  Symptoms are better with Aleve.  Symptoms are worse with: gripping/grasping, finger flexion, lifting, cold.  Other evaluation and/or treatments so far consists of: Aleve and Rest.  Recent imaging completed: X-rays completed 23.  Prior History of related problems: history of Raynaud's disease in both hands over the past 10+ years.    Social History: currently employed as cold package delivery    Review of Systems  Musculoskeletal: as above  Remainder of review of systems is negative including constitutional, CV, pulmonary, GI, Skin and Neurologic except as noted in HPI or medical history.    Past Medical History:   Diagnosis Date     Anger reaction     buspar helping     Cholelithiasis      Esophageal stricture     dilatation every 3 months     Generalized osteoarthrosis, unspecified site (aka ARTHRITIS)     small joints     PTSD (post-traumatic stress disorder)      Raynaud's disease      Past Surgical History:   Procedure Laterality Date     MAMMOPLASTY REDUCTION       OTHER SURGICAL HISTORY      s/p left knee arthroscopinc surgery     ROTATOR CUFF REPAIR RT/LT  2005     SMALL BOWEL  "RESECTION       TUBAL LIGATION       Family History   Problem Relation Age of Onset     Unknown/Adopted Mother      Unknown/Adopted Father      Unknown/Adopted Maternal Grandmother      Unknown/Adopted Maternal Grandfather      Unknown/Adopted Paternal Grandmother      Unknown/Adopted Paternal Grandfather          Objective  /76   Ht 1.715 m (5' 7.5\")   Wt 66.7 kg (147 lb)   LMP 01/04/2014   BMI 22.68 kg/m      General: healthy, alert and in no distress    HEENT: no scleral icterus or conjunctival erythema   Skin: no suspicious lesions or rash. No jaundice.   CV: regular rhythm by palpation, 2+ distal pulses, no pedal edema    Resp: normal respiratory effort without conversational dyspnea   Psych: normal mood and affect    Gait: nonantalgic, appropriate coordination and balance   Neuro: normal light touch sensory exam of the extremities. Motor strength as noted below     Bilateral Wrist and Hand exam    Inspection:       Swelling: mild diffuse swelling over DIP joints, others scattered throughout the hands, no redness, induration or warmth    Tender:       PIP and DIP joint of 2nd, 3rd, 4th, and 5th digit(s)  bilateral    Non Tender:       Remainder of the Wrist and Hand bilateral    ROM:       Baseline and symmetric active and passive range of motion of the forearm, wrist and digits bilateral    Strength:       5/5 strength in the muscles of the hand, wrist and forearm bilateral    Special Tests:        neg (-) laxity with collateral stress testing  bilateral 2nd-4th DIP and PIP joints    Neurovascular:       2+ radial pulses bilaterally with brisk capillary refill and      normal sensation to light touch in the radial, median and ulnar nerve distributions    Radiology:  Results for orders placed or performed in visit on 08/22/23   XR Hands Bilateral PA View    Narrative    HANDS BILATERAL PA VIEW   8/22/2023 1:44 PM     HISTORY: Pain in both hands.    COMPARISON: Radiographs from 1/15/2015.      " Impression    IMPRESSION:    Right: Moderate to severe osteoarthrosis of the STT joint, progressed.  Moderate osteoarthrosis of the first CMC joint, progressed. Mild  osteoarthrosis of the first CMC joint. Moderate osteoarthrosis of  third MCP joint. Severe osteoarthrosis of the DIP joint of the index  finger and mild to moderate osteoarthrosis of the interphalangeal  joints of the other fingers, progressed. No erosions or other signs of  inflammatory arthropathy.    Left: Severe osteoarthrosis of the STT joint and first CMC joint,  progressed. Mild osteoarthrosis of the MCP joints, progressed. Mild to  moderate osteoarthrosis of the interphalangeal joints, progressed. No  erosions or other signs of inflammatory arthropathy.    AMBIKA BROUSSARD MD         SYSTEM ID:  [id number]       Assessment:  1. Primary osteoarthritis of both hands        Plan:  Discussed the assessment with the patient.  Follow up: prn based on progress  Scattered DJD in hand/finger joints, most severe at base of thumbs, but most symptomatic in 2nd-5th DIP and PIP joints  XR images independently visualized and reviewed with patient today in clinic  Oral Tylenol and topical Voltaren gel reviewed as safe OTC options, reviewed safe dosing strategies  Compression gloves, padded gloves, activity modification reviewed  Could consider US guided CSI if a joint becomes severely painful, but reviewed limitations and risks over time, defer for now  Dietary supplement options and strategies reviewed  We discussed modified progressive pain-free activity as tolerated  Home handouts provided and supportive care reviewed  All questions were answered today  Contact us with additional questions or concerns  Signs and sx of concern reviewed    Thanks very much for sending this nice lady to us, I will keep you updated with her progress      Philipp Peña DO, CAQ  Sports Medicine Physician  [facility]          Disclaimer: This  note consists of symbols derived from keyboarding, dictation and/or voice recognition software. As a result, there may be errors in the script that have gone undetected. Please consider this when interpreting information found in this chart.      Again, thank you for allowing me to participate in the care of your patient.        Sincerely,        Philipp Peña, DO

## 2023-09-19 NOTE — PROGRESS NOTES
Maryann Riggs  :  1965  DOS: 2023  MRN: [id number]    Sports Medicine Clinic Visit    PCP: Debbie Vila    Maryann Riggs is a 58 year old Right hand dominant female who is seen in consultation at the request of  Debbie Vila C.N.P. presenting with chronic bilateral hand/finger pain.    Injury: Gradual onset of pain over the past 2+ years.  Pain located over bilateral hands, DIP joints index - little fingers, right index finger is worst, nonradiating.  Additional Features:  Positive: swelling, weakness, and joint deformities.  Symptoms are better with Aleve.  Symptoms are worse with: gripping/grasping, finger flexion, lifting, cold.  Other evaluation and/or treatments so far consists of: Aleve and Rest.  Recent imaging completed: X-rays completed 23.  Prior History of related problems: history of Raynaud's disease in both hands over the past 10+ years.    Social History: currently employed as cold package delivery    Review of Systems  Musculoskeletal: as above  Remainder of review of systems is negative including constitutional, CV, pulmonary, GI, Skin and Neurologic except as noted in HPI or medical history.    Past Medical History:   Diagnosis Date    Anger reaction     buspar helping    Cholelithiasis     Esophageal stricture     dilatation every 3 months    Generalized osteoarthrosis, unspecified site (aka ARTHRITIS)     small joints    PTSD (post-traumatic stress disorder)     Raynaud's disease      Past Surgical History:   Procedure Laterality Date    MAMMOPLASTY REDUCTION      OTHER SURGICAL HISTORY      s/p left knee arthroscopinc surgery    ROTATOR CUFF REPAIR RT/LT  2005    SMALL BOWEL RESECTION      TUBAL LIGATION       Family History   Problem Relation Age of Onset    Unknown/Adopted Mother     Unknown/Adopted Father     Unknown/Adopted Maternal Grandmother     Unknown/Adopted Maternal Grandfather     Unknown/Adopted Paternal Grandmother     Unknown/Adopted  "Paternal Grandfather          Objective  /76   Ht 1.715 m (5' 7.5\")   Wt 66.7 kg (147 lb)   LMP 01/04/2014   BMI 22.68 kg/m      General: healthy, alert and in no distress    HEENT: no scleral icterus or conjunctival erythema   Skin: no suspicious lesions or rash. No jaundice.   CV: regular rhythm by palpation, 2+ distal pulses, no pedal edema    Resp: normal respiratory effort without conversational dyspnea   Psych: normal mood and affect    Gait: nonantalgic, appropriate coordination and balance   Neuro: normal light touch sensory exam of the extremities. Motor strength as noted below     Bilateral Wrist and Hand exam    Inspection:       Swelling: mild diffuse swelling over DIP joints, others scattered throughout the hands, no redness, induration or warmth    Tender:       PIP and DIP joint of 2nd, 3rd, 4th, and 5th digit(s)  bilateral    Non Tender:       Remainder of the Wrist and Hand bilateral    ROM:       Baseline and symmetric active and passive range of motion of the forearm, wrist and digits bilateral    Strength:       5/5 strength in the muscles of the hand, wrist and forearm bilateral    Special Tests:        neg (-) laxity with collateral stress testing  bilateral 2nd-4th DIP and PIP joints    Neurovascular:       2+ radial pulses bilaterally with brisk capillary refill and      normal sensation to light touch in the radial, median and ulnar nerve distributions    Radiology:  Results for orders placed or performed in visit on 08/22/23   XR Hands Bilateral PA View    Narrative    HANDS BILATERAL PA VIEW   8/22/2023 1:44 PM     HISTORY: Pain in both hands.    COMPARISON: Radiographs from 1/15/2015.      Impression    IMPRESSION:    Right: Moderate to severe osteoarthrosis of the STT joint, progressed.  Moderate osteoarthrosis of the first CMC joint, progressed. Mild  osteoarthrosis of the first CMC joint. Moderate osteoarthrosis of  third MCP joint. Severe osteoarthrosis of the DIP joint of " the index  finger and mild to moderate osteoarthrosis of the interphalangeal  joints of the other fingers, progressed. No erosions or other signs of  inflammatory arthropathy.    Left: Severe osteoarthrosis of the STT joint and first CMC joint,  progressed. Mild osteoarthrosis of the MCP joints, progressed. Mild to  moderate osteoarthrosis of the interphalangeal joints, progressed. No  erosions or other signs of inflammatory arthropathy.    AMBIKA BROUSSARD MD         SYSTEM ID:  [id number]       Assessment:  1. Primary osteoarthritis of both hands        Plan:  Discussed the assessment with the patient.  Follow up: prn based on progress  Scattered DJD in hand/finger joints, most severe at base of thumbs, but most symptomatic in 2nd-5th DIP and PIP joints  XR images independently visualized and reviewed with patient today in clinic  Oral Tylenol and topical Voltaren gel reviewed as safe OTC options, reviewed safe dosing strategies  Compression gloves, padded gloves, activity modification reviewed  Could consider US guided CSI if a joint becomes severely painful, but reviewed limitations and risks over time, defer for now  Dietary supplement options and strategies reviewed  We discussed modified progressive pain-free activity as tolerated  Home handouts provided and supportive care reviewed  All questions were answered today  Contact us with additional questions or concerns  Signs and sx of concern reviewed    Thanks very much for sending this nice lady to us, I will keep you updated with her progress      Philipp Peña DO, [facility]  Sports Medicine Physician  [facility]          Disclaimer: This note consists of symbols derived from keyboarding, dictation and/or voice recognition software. As a result, there may be errors in the script that have gone undetected. Please consider this when interpreting information found in this chart.

## 2023-12-08 ENCOUNTER — TELEPHONE (OUTPATIENT)
Dept: FAMILY MEDICINE | Facility: CLINIC | Age: 58
End: 2023-12-08
Payer: COMMERCIAL

## 2023-12-08 NOTE — TELEPHONE ENCOUNTER
Copy sent to scanning and copy at [employer] desk. Original form placed at  for patient to .     Left detailed message on patients identfying voicemail stating form is ready for  at  of [facility]

## 2023-12-08 NOTE — TELEPHONE ENCOUNTER
Forms/Letter Request    Type of form/letter:  DOT       Do we have the form/letter: Yes: pt dropped form off AM 12/8    Who is the form from? Patient    Where did/will the form come from? Patient or family brought in       When is form/letter needed by: 12/11 deadline    How would you like the form/letter returned:     Patient Notified form requests are processed in 3-5 business days:Yes    Could we send this information to you in [place] or would you prefer to receive a phone call?:   Patient would prefer a phone call   Okay to leave a detailed message?: Yes at Home number on file [phone] (home)

## 2023-12-09 ENCOUNTER — APPOINTMENT (OUTPATIENT)
Dept: LAB | Facility: CLINIC | Age: 58
End: 2023-12-09
Payer: COMMERCIAL

## 2023-12-09 ENCOUNTER — DOCUMENTATION ONLY (OUTPATIENT)
Dept: FAMILY MEDICINE | Facility: CLINIC | Age: 58
End: 2023-12-09

## 2023-12-09 NOTE — PROGRESS NOTES
Patient presented for labs. Requisition needs to be filled out for insurance purposes by her primary.  Patient will have labs drawn after appointment with you.   Thank you lab

## 2023-12-18 DIAGNOSIS — M54.12 CERVICAL RADICULOPATHY: ICD-10-CM

## 2023-12-18 RX ORDER — MELOXICAM 15 MG/1
TABLET ORAL
Qty: 30 TABLET | Refills: 0 | Status: SHIPPED | OUTPATIENT
Start: 2023-12-18 | End: 2024-02-05

## 2023-12-22 DIAGNOSIS — M54.12 CERVICAL RADICULOPATHY: ICD-10-CM

## 2023-12-22 DIAGNOSIS — G47.09 OTHER INSOMNIA: ICD-10-CM

## 2023-12-22 RX ORDER — MELOXICAM 15 MG/1
TABLET ORAL
Qty: 30 TABLET | Refills: 0 | OUTPATIENT
Start: 2023-12-22

## 2023-12-22 RX ORDER — ZOLPIDEM TARTRATE 5 MG/1
5 TABLET ORAL
Qty: 30 TABLET | Refills: 3 | OUTPATIENT
Start: 2023-12-22

## 2024-01-04 DIAGNOSIS — G47.09 OTHER INSOMNIA: ICD-10-CM

## 2024-01-04 RX ORDER — ZOLPIDEM TARTRATE 5 MG/1
5 TABLET ORAL
Qty: 30 TABLET | Refills: 3 | Status: SHIPPED | OUTPATIENT
Start: 2024-01-04 | End: 2024-05-06

## 2024-01-04 NOTE — TELEPHONE ENCOUNTER
Pt has none of these left and needing them by Sunday 1/7  she leaves for [place].      .Bea Wen PSC

## 2024-01-14 DIAGNOSIS — M54.12 CERVICAL RADICULOPATHY: ICD-10-CM

## 2024-01-15 RX ORDER — MELOXICAM 15 MG/1
TABLET ORAL
Qty: 30 TABLET | Refills: 0 | OUTPATIENT
Start: 2024-01-15

## 2024-01-15 NOTE — TELEPHONE ENCOUNTER
Notify patient we are unable to fill her Mobic until she has lab work done has been greater than a year since she has had a creatinine checked.  Please schedule for a lab only appointment and once we receive the creatinine results will look at refill of Kristin Vila CNP   ]

## 2024-02-03 DIAGNOSIS — M54.12 CERVICAL RADICULOPATHY: ICD-10-CM

## 2024-02-05 RX ORDER — MELOXICAM 15 MG/1
TABLET ORAL
Qty: 30 TABLET | Refills: 0 | Status: SHIPPED | OUTPATIENT
Start: 2024-02-05 | End: 2024-03-04

## 2024-02-07 NOTE — ADDENDUM NOTE
"Chevy Benton is a 77 y.o. male on day 29 of admission presenting with Critical limb ischemia of both lower extremities (CMS/HCC).    Subjective   - HD tomorrow  - no complaints  - pending placement     Objective     Physical Exam  Constitutional:       Appearance: He is ill-appearing.   HENT:      Mouth/Throat:      Mouth: Mucous membranes are moist.   Cardiovascular:      Rate and Rhythm: Normal rate and regular rhythm.   Pulmonary:      Effort: Pulmonary effort is normal.      Comments: Nasal cannula   Abdominal:      Palpations: Abdomen is soft.   Musculoskeletal:      Comments: Left arm AVF - thrill noted    Skin:     General: Skin is warm and dry.      Comments: Right vac wound for right foot      Neurological:      General: No focal deficit present.      Mental Status: He is alert.         Last Recorded Vitals  Blood pressure 155/52, pulse 100, temperature 37.1 °C (98.8 °F), temperature source Temporal, resp. rate 19, height 1.778 m (5' 10\"), weight 94.9 kg (209 lb 3.5 oz), SpO2 98 %.  Intake/Output last 3 Shifts:  I/O last 3 completed shifts:  In: 1600 (16.9 mL/kg) [I.V.:800 (8.4 mL/kg); Other:800]  Out: 4800 (50.6 mL/kg) [Other:4800]  Weight: 94.9 kg     Relevant Results  Scheduled medications  acetaminophen, 975 mg, oral, q6h  allopurinol, 100 mg, oral, Daily  apixaban, 2.5 mg, oral, q12h  atorvastatin, 40 mg, oral, Nightly  B complex-vitamin C-folic acid, 1 capsule, oral, Daily  clopidogrel, 75 mg, oral, Daily  epoetin dane or biosimilar, 12,000 Units, intravenous, Once per day on Tue Thu Sat  gabapentin, 100 mg, oral, Daily  insulin glargine, 6 Units, subcutaneous, Nightly  insulin lispro, 0-5 Units, subcutaneous, TID with meals  lidocaine, 1 patch, transdermal, Daily  melatonin, 5 mg, oral, Nightly  midodrine, 20 mg, oral, q8h  pantoprazole, 40 mg, oral, Daily before breakfast  perflutren protein A microsphere, 0.5 mL, intravenous, Once in imaging  polyethylene glycol, 17 g, oral, " Addended by: JAVI ORELLANA on: 8/24/2023 01:00 PM     Modules accepted: Orders     Daily  sennosides, 1 tablet, oral, Nightly  [Held by provider] sevelamer carbonate, 800 mg, oral, TID with meals  sulfur hexafluoride microsphr, 2 mL, intravenous, Once in imaging      Continuous medications     PRN medications  PRN medications: albuterol, alum-mag hydroxide-simeth, ammonium lactate, dextrose, docusate sodium, emollient combination no.92, glucagon, heparin, hydrocortisone, ipratropium-albuteroL, povidone-iodine, white petrolatum     Results for orders placed or performed during the hospital encounter of 01/09/24 (from the past 24 hour(s))   POCT GLUCOSE   Result Value Ref Range    POCT Glucose 132 (H) 74 - 99 mg/dL   POCT GLUCOSE   Result Value Ref Range    POCT Glucose 161 (H) 74 - 99 mg/dL   POCT GLUCOSE   Result Value Ref Range    POCT Glucose 120 (H) 74 - 99 mg/dL   POCT GLUCOSE   Result Value Ref Range    POCT Glucose 155 (H) 74 - 99 mg/dL            Assessment/Plan   Principal Problem:    Critical limb ischemia of both lower extremities (CMS/HCC)  Active Problems:    ESRD (end stage renal disease) on dialysis (CMS/HCC)    Non-pressure chronic ulcer of other part of right foot with fat layer exposed (CMS/HCC)    Critical limb ischemia of right lower extremity (CMS/HCC)    Subclavian vein stenosis, left    Chevy Benton is a 77 y.o. male with PMH of ESRD on HD TTS (Lt chest TDC, Hx of LUE Fistula Pseudoaneurysms), DM, HTN, HFrEF (TTE 10/23 EF 35-40%), PTA right lower extremity on 9/20/23 with subsequent TMA right foot on 9/22/23, Non Occlusive DVT (Rt Subclavian), Aortic Root Dilation, MR, chronic afib and SSS s/p pacemaker who presents DAVID angioplasty C/b perforation of AT artery. Patient admitted to CICU for further monitoring. Additionally patient noted to be overloaded on examination, on 2L with diffuse anasarca more pronounced in upper extremities. He is anuric at baseline and is on TTS dialysis. Patient was stable overnight with no signs/symptoms of compartment syndrome.       PVD  S/p PTA to RLE c/b perforation of AT  hx of right tma  hx of rt subclavian dvt  mild stenosis of left subclavian vein  Hx of right TMA  ::1/19 Podiatry  revision of right TMA    Podiatry following for surgical revision and VAC to right foot    Plan:  - Cont plavix   - stop ASA  - Resumed eliquis 2.5mg BID d/w podiatry and endovascular, monitor for bleednig  - Cont home atorva      Chronic Hfref EF 35 - 40%   afib  HTN  - TTE 1/23/24 with LVEF to 40-45%, mildly improved from previous in 2023, no other new acute findings    -Cont home metoprolol succ 25mg  -Uptitrate GDMT as tolerated consider hydralazine and isordil when able, currently limited 2/2 hypotension on midodrine  - consideration should be given to further ischemic workup in the outpatient setting due to extensive peripheral disease, risk factors, and no evidence of previous ischemic cardiac workup      - eliquis as above     AHRF  2/2 to ESRD and Hfref  Covid infection s/p dexamethasone  - Afebrile, no leukocytosis  - xray with pulmonary edema  - Continue to monitor for worsening signs or sx of pneumonia  - S/p 10 day course of dex (ended 1/14)  - Continue with dialysis regimen  - Wean and titrate oxygen as needed  - Cont IS, pulm hygiene     #ESRD with dialysis TTS  #LUE fistula occlusion s/p venoplasty  Plan:  -Cont with dialysis schedule - via left AVF   - 2/2 removed left dialysis tunnel catheter by IR   - Cont home renvela  - Cont home nephro caps  - HD today     Gerd  -Cont home pantoprazole     hx of gout  -cont home allopurinol     Anemia of chronic disease  ::At baseline hgb  ::2/2 to ESRD  Plan:  - CTM    DM with neuropathy  -Cont home gabapentin  -Insulin glargine 6 units nightly    -SSI    F: Carb diet, NPO midnight   E: K > 4, Mg > 2  N: Renal diet  A: PIV, left subclaavian trialysos  DVT ppx: Heparin SubQ  GI ppx: Protonix    Disposition   Rehab/SNF on dialysis   2/4 Medically cleared needs new PT/OT notes for SNF return when can be arranged  , pending placement     Code Status: Full code   Surrogate Medical Decision-maker: Spouse ()      Seen and discussed with Dr. Perez Coker, APRN-CNP, DNP

## 2024-02-10 ENCOUNTER — HEALTH MAINTENANCE LETTER (OUTPATIENT)
Age: 59
End: 2024-02-10

## 2024-02-26 DIAGNOSIS — M62.830 BACK MUSCLE SPASM: ICD-10-CM

## 2024-02-26 NOTE — TELEPHONE ENCOUNTER
Medication Question or Refill        What medication are you calling about (include dose and sig)?: cyclobenzaprine (FLEXERIL) 10 MG tablet     Preferred Pharmacy:   [place] [address] [place] [address] [address] [place] [address] [facility] [address]  Phone: [phone] Fax: [phone]      Controlled Substance Agreement on file:   CSA -- Patient Level:     [Media Unavailable] Controlled Substance Agreement - Opioid - Scan on 8/29/2019  1:33 PM       Who prescribed the medication?: Julito    Do you need a refill? Yes    When did you use the medication last? Pt states she has 3 pills left    Patient offered an appointment? No    Do you have any questions or concerns?  No      Could we send this information to you in [place] or would you prefer to receive a phone call?:   Patient would prefer a phone call   Okay to leave a detailed message?: Yes at Home number on file [phone] (home)

## 2024-02-27 RX ORDER — CYCLOBENZAPRINE HCL 10 MG
10 TABLET ORAL DAILY
Qty: 90 TABLET | Refills: 1 | Status: SHIPPED | OUTPATIENT
Start: 2024-02-27 | End: 2024-07-05

## 2024-03-02 DIAGNOSIS — M54.12 CERVICAL RADICULOPATHY: ICD-10-CM

## 2024-03-04 RX ORDER — MELOXICAM 15 MG/1
TABLET ORAL
Qty: 30 TABLET | Refills: 0 | Status: SHIPPED | OUTPATIENT
Start: 2024-03-04 | End: 2024-04-02

## 2024-03-11 NOTE — PROGRESS NOTES
Rhonda is a 24 year old year old female, , here for an OB visit. She is accompanied today by her Spouse, \"Francesco.\" Gestational Age 13w1d; COOPER 9/15/2024. Pt has no complaints. She denies headaches, vision changes, nausea, vomiting, diarrhea, constipation, RUQ/epigastric pain, regular contractions, leakage of fluid, vaginal bleeding, dysuria, abnormal vaginal discharge or edema. Denies onset of FM.    Visit Vitals  BP 98/70   Wt 64 kg (141 lb)   LMP 12/10/2023 (Exact Date)   BMI 23.46 kg/m²     0 weight gain so far    PNC:  Patient Active Problem List    Diagnosis Date Noted    WIR ([place] Immunization Registry)  has incorrect info regarding HPV vaccine  2022     Priority: Medium     patient completed 3 doses of gardasil          Susceptible to varicella (non-immune), currently pregnant 2024     Priority: Low    Prenatal care, antepartum 2024     Priority: Low     COOPER: 09/15/2024  Support person- SO Max  Planned pregnancy  Prenatal labs completed- varicella NI  Genetic screening: referred to genetic counselor   Vaccinations: TDAP ** FLU declined  Anatomy US at 20 weeks --> scheduled  Feeding intentions: **  Gender: **  Prenatal classes: **  Pediatrician: **  Postpartum contraception: **  GBS: **        OSVALDO (generalized anxiety disorder) 2022     Priority: Low       Danger S/S and FMC reviewed; contact the office with any concerns.  RTC in as scheduled or sooner as needed for concerns.    Future Appointments   Date Time Provider Department Center   2024  2:40 PM Dipti Haas MD [facility]   2024  2:15 PM [facility]   2024  3:15 PM Kian Oh MD [facility]         All of the patient’s questions were answered; she verbalizes understanding and is in agreement with the above mentioned plan.  She is certainly encouraged to call my office should any questions or concerns develop prior to her follow up appointment.    Kati Phan,  Subjective     Maryann Riggs is a 54 year old female who presents to clinic today for the following health issues:    HPI   Chief Complaint   Patient presents with     Work Comp     Has not had an MRI. 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     When she stands up she feels unsteady and off, so she has to sit until this settles.     Has a shukri on her left elbow that she is unsure of what it is. When she fell she was reaching for something so she is unsure if it is from that fall.         Patient Active Problem List   Diagnosis     Raynaud's disease     TMJ (temporomandibular joint syndrome)     CARDIOVASCULAR SCREENING; LDL GOAL LESS THAN 160     PTSD (post-traumatic stress disorder)     Muscle tension headache     Esophageal abnormality     Episodic mood disorder (H)     Esophageal stricture     Sprain of MCP joint of hand     Rotator cuff injury; bilateral repairs 2005     Primary generalized (osteo)arthritis     SI (sacroiliac) joint dysfunction; see details, pain management contract     Chronic bilateral low back pain without sciatica     Past Surgical History:   Procedure Laterality Date     MAMMOPLASTY REDUCTION       OTHER SURGICAL HISTORY      s/p left knee arthroscopinc surgery     ROTATOR CUFF REPAIR RT/LT  2005     SMALL BOWEL RESECTION       TUBAL LIGATION         Social History     Tobacco Use     Smoking status: Never Smoker     Smokeless tobacco: Never Used   Substance Use Topics     Alcohol use: Yes     Comment: scoial     Family History   Problem Relation Age of Onset     Unknown/Adopted Mother      Unknown/Adopted Father      Unknown/Adopted Maternal Grandmother      Unknown/Adopted Maternal Grandfather      Unknown/Adopted Paternal Grandmother      Unknown/Adopted Paternal Grandfather          Current Outpatient Medications   Medication Sig Dispense Refill     Ascorbic Acid (VITAMIN C CR PO) Take  by mouth.       Calcium Carbonate-Vitamin D  (CALTRATE 600+D) 600-400 MG-UNIT CHEW Take  by mouth.       Cyanocobalamin (VITAMIN B 12 PO) Take  by mouth.       cyclobenzaprine (FLEXERIL) 10 MG tablet TAKE ONE TABLET BY MOUTH DAILY AS NEEDED FOR MUSCLE SPASMS 90 tablet 4     ferrous sulfate (FEROSUL) 325 (65 Fe) MG tablet Take 1 tablet (325 mg) by mouth daily (with breakfast) 90 tablet 1     GLUCOSAMINE CHONDROITIN COMPLX PO Take  by mouth.       [START ON 12/11/2019] HYDROcodone-acetaminophen (NORCO) 5-325 MG tablet Take 1 tablet by mouth daily 30 tablet 0     lidocaine (XYLOCAINE) 5 % external ointment Apply dime size amount topically to painful areas (choose 2-3 per application) 3 times daily as needed. 70.88 g 2     melatonin 3 MG tablet Take 1 tablet by mouth nightly as needed for sleep. 30 tablet 0     NIFEdipine ER OSMOTIC (PROCARDIA XL) 30 MG 24 hr tablet Take 1 tablet (30 mg) by mouth daily 90 tablet 3     TYLENOL 325 MG OR TABS 2 TABLETS BY MOUTH EVERY 6 HOURS AS NEEDED       zolpidem (AMBIEN) 5 MG tablet Take 1 tablet (5 mg) by mouth nightly as needed for sleep 90 tablet 0     clobetasol (TEMOVATE) 0.05 % ointment as needed. Only as needed occasionally needs after working in garden  Profile Rx: patient will contact pharmacy when needed (Patient not taking: Reported on 11/20/2019) 45 g 5     nitroGLYcerin (NITRO-BID) 2 % OINT ointment Place 1 inch (15 mg) onto the skin daily (Patient not taking: Reported on 11/20/2019) 30 packet 12     omeprazole (PRILOSEC) 40 MG capsule Take 1 capsule (40 mg) by mouth daily Take 30-60 minutes before a meal. 90 capsule 3     order for DME Equipment being ordered: TENS 1 Units 0     POTASSIUM CARBONATE        Allergies   Allergen Reactions     Desyrel [Trazodone Hcl]      Sig mood disturbance       Lamictal [Lamotrigine]      Recent Labs   Lab Test 09/17/19  1419 06/06/19  1419 09/12/14 03/05/14 11/01/13  1024 08/23/13   A1C  --   --  5.5 6.0 5.7  --    LDL 99  --  81  --   --  100  100   HDL 84  --  73  --   --  66   NONI  03/11/2024   66   TRIG 41  --  70  --   --  94  94   ALT  --  24 28  --   --  18   CR 0.80 0.73 0.79  --   --  0.75   GFRESTIMATED 83 >90 87  --   --   --    GFRESTBLACK >90 >90 100  --   --   --    POTASSIUM 3.3* 3.6 3.9  --   --  3.6   TSH  --   --  1.750  --   --  1.550  4.550      BP Readings from Last 3 Encounters:   11/20/19 138/84   11/13/19 128/64   11/13/19 128/64    Wt Readings from Last 3 Encounters:   11/20/19 69.4 kg (153 lb)   11/13/19 70.3 kg (155 lb)   11/13/19 70.3 kg (155 lb)              Reviewed and updated as needed this visit by Provider         Review of Systems   ROS COMP: Constitutional, HEENT, cardiovascular, pulmonary, GI, , musculoskeletal, neuro, skin, endocrine and psych systems are negative, except as otherwise noted.      Objective    LMP 06/26/2014   Body mass index is 23.79 kg/m .  Physical Exam   GENERAL: healthy, alert and no distress  EYES: Eyes grossly normal to inspection, PERRL and conjunctivae and sclerae normal  HENT: ear canals and TM's normal, nose and mouth without ulcers or lesions  NECK: no adenopathy, no asymmetry, masses, or scars and thyroid normal to palpation  RESP: lungs clear to auscultation - no rales, rhonchi or wheezes  CV: regular rate and rhythm, normal S1 S2, no S3 or S4, no murmur, click or rub, no peripheral edema and peripheral pulses strong  ABDOMEN: soft, nontender, no hepatosplenomegaly, no masses and bowel sounds normal  MS: no gross musculoskeletal defects noted, no edema  SKIN: no suspicious lesions or rashes to centimeter mobile mass/hematoma  noted to right elbow  NEURO: Normal strength and tone, mentation intact and speech normal  PSYCH: mentation appears normal, affect normal/bright  BACK:  contusion noted to lower lumbar area and right hip  Tender:  lumbar spinous processes, lumbar facet joints, left para lumbar muscles, right para lumbar muscles, coccyx area   Non-tender:  thoracic spinous processes, thoracic facet joints, left parathoracic muscles,  right parathoracic muscles, left SI joint, right SI joint, left sciatic notch, right sciatic notch  Range of Motion:  left lateral thoracic bending   full, right lateral thoracic bending  full, left thoracic rotation  full, right thoracic rotation  full, lumbar flexion  painful, lumbar extension  painful, left lateral lumbar bending  painful, right lateral lumbar bending  painful, left lateral lumbar rotation  painful, right lateral lumbar rotation  painful  Strength:  able to heel walk  Special tests:  negative straight leg raises          Assessment & Plan     (S39.012D) Strain of lumbar region, subsequent encounter  (primary encounter diagnosis)  Comment: Patient continues to have lumbar pain post fall awaiting MRI and physical therapy to determine next steps for work  Plan: Obtain MRI    (M54.16) Lumbar radiculopathy  Patient continues to have lumbar pain post fall awaiting MRI and physical therapy to determine next steps for work  Plan: Obtain MRI    (S30.0XXD) Contusion of lower back, subsequent encounter  Comment: Mild improvement with a contusion to the lower back will obtain CBC  Plan: CBC with platelets      (H81.13) Benign paroxysmal positional vertigo due to bilateral vestibular disorder  Comment: Patient noted to have vertigo symptoms we will have patient follow-up with physical therapy  Plan: PHYSICAL THERAPY REFERRAL, meclizine (ANTIVERT)        25 MG tablet, CANCELED: CBC with platelets      (R22.32) Mass of left upper extremity   Comment: Questionable mass versus hematoma to left elbow due to fall at work will obtain ultrasound   plan: HC US EXTREMITY NON-VASCULAR      (E87.6) Low blood potassium  Comment: Recheck potassium  Plan: **Basic metabolic panel FUTURE anytime,         CANCELED: Basic metabolic panel       See Patient Instructions    No follow-ups on file.    CONTRERAS Sales [facility]

## 2024-04-02 DIAGNOSIS — M54.12 CERVICAL RADICULOPATHY: ICD-10-CM

## 2024-04-02 RX ORDER — MELOXICAM 15 MG/1
TABLET ORAL
Qty: 30 TABLET | Refills: 0 | Status: SHIPPED | OUTPATIENT
Start: 2024-04-02 | End: 2024-06-07

## 2024-04-02 NOTE — TELEPHONE ENCOUNTER
Requested Prescriptions   Pending Prescriptions Disp Refills    meloxicam (MOBIC) 15 MG tablet [Pharmacy Med Name: MELOXICAM 15 MG TABLET] 30 tablet 0     Sig: TAKE 1 TABLET BY MOUTH EVERY DAY       NSAID Medications Failed - 4/2/2024  1:18 AM        Failed - Normal ALT on file in past 12 months     Recent Labs   Lab Test 06/06/19  1419   ALT 24             Failed - Normal AST on file in past 12 months     Recent Labs   Lab Test 06/06/19  1419   AST 17             Failed - Normal CBC on file in past 12 months     Recent Labs   Lab Test 12/09/22  0943   WBC 6.3   RBC 4.83   HGB 14.2   HCT 42.9                    Failed - Always Fail Criteria - Chart Review Required     Validate Diagnosis. If the medication is requested for an acute flare of a chronic pain associated with a musculoskeletal or rheumatologic condition; okay to authorize if all other criteria are met. If not, then forward to provider for review.          Failed - Normal GFR on file in past 12 months     Recent Labs   Lab Test 12/09/22  0943 12/18/21  0906 07/09/21  1552   GFRESTIMATED >90   < > >90   GFRESTBLACK  --   --  >90    < > = values in this interval not displayed.             Failed - Normal serum creatinine on file in past 12 months     Recent Labs   Lab Test 12/09/22  0943   CR 0.71                 Passed - Blood pressure under 140/90 in past 12 months     BP Readings from Last 3 Encounters:   09/19/23 124/76   12/09/22 122/60   02/18/22 122/70       No data recorded            Passed - Patient is age 6-64 years        Passed - Medication is active on med list        Passed - Recent (12 mo) or future (90 days) visit within the authorizing provider's specialty     The patient must have completed an in-person or virtual visit within the past 12 months or has a future visit scheduled within the next 90 days with the authorizing provider s specialty.  Urgent care and e-visits do not quality as an office visit for this protocol.           Passed - No active pregnancy on record        Passed - No positive pregnancy test in past 12 months

## 2024-05-02 DIAGNOSIS — M54.12 CERVICAL RADICULOPATHY: ICD-10-CM

## 2024-05-02 RX ORDER — MELOXICAM 15 MG/1
TABLET ORAL
Qty: 30 TABLET | Refills: 0 | OUTPATIENT
Start: 2024-05-02

## 2024-05-05 DIAGNOSIS — G47.09 OTHER INSOMNIA: ICD-10-CM

## 2024-05-06 RX ORDER — ZOLPIDEM TARTRATE 5 MG/1
5 TABLET ORAL
Qty: 30 TABLET | Refills: 3 | Status: SHIPPED | OUTPATIENT
Start: 2024-05-06 | End: 2024-06-25

## 2024-06-06 DIAGNOSIS — M54.12 CERVICAL RADICULOPATHY: ICD-10-CM

## 2024-06-07 RX ORDER — MELOXICAM 15 MG/1
TABLET ORAL
Qty: 30 TABLET | Refills: 0 | Status: SHIPPED | OUTPATIENT
Start: 2024-06-07 | End: 2024-06-25

## 2024-06-25 ENCOUNTER — OFFICE VISIT (OUTPATIENT)
Dept: FAMILY MEDICINE | Facility: CLINIC | Age: 59
End: 2024-06-25
Payer: COMMERCIAL

## 2024-06-25 ENCOUNTER — ORDERS ONLY (AUTO-RELEASED) (OUTPATIENT)
Dept: FAMILY MEDICINE | Facility: CLINIC | Age: 59
End: 2024-06-25

## 2024-06-25 VITALS
HEIGHT: 68 IN | TEMPERATURE: 98.1 F | HEART RATE: 91 BPM | DIASTOLIC BLOOD PRESSURE: 82 MMHG | BODY MASS INDEX: 24.1 KG/M2 | OXYGEN SATURATION: 96 % | RESPIRATION RATE: 20 BRPM | SYSTOLIC BLOOD PRESSURE: 128 MMHG | WEIGHT: 159 LBS

## 2024-06-25 DIAGNOSIS — Z00.00 ROUTINE GENERAL MEDICAL EXAMINATION AT A HEALTH CARE FACILITY: Primary | ICD-10-CM

## 2024-06-25 DIAGNOSIS — G47.09 OTHER INSOMNIA: ICD-10-CM

## 2024-06-25 DIAGNOSIS — R19.5 POSITIVE COLORECTAL CANCER SCREENING USING COLOGUARD TEST: ICD-10-CM

## 2024-06-25 DIAGNOSIS — I73.00 RAYNAUD'S DISEASE WITHOUT GANGRENE: ICD-10-CM

## 2024-06-25 DIAGNOSIS — Z12.11 SCREEN FOR COLON CANCER: ICD-10-CM

## 2024-06-25 DIAGNOSIS — M54.12 CERVICAL RADICULOPATHY: ICD-10-CM

## 2024-06-25 LAB
ALBUMIN SERPL BCG-MCNC: 4.6 G/DL (ref 3.5–5.2)
ALP SERPL-CCNC: 81 U/L (ref 40–150)
ALT SERPL W P-5'-P-CCNC: 41 U/L (ref 0–50)
ANION GAP SERPL CALCULATED.3IONS-SCNC: 16 MMOL/L (ref 7–15)
AST SERPL W P-5'-P-CCNC: 25 U/L (ref 0–45)
BILIRUB SERPL-MCNC: <0.2 MG/DL
BUN SERPL-MCNC: 27.1 MG/DL (ref 8–23)
CALCIUM SERPL-MCNC: 9.3 MG/DL (ref 8.8–10.2)
CHLORIDE SERPL-SCNC: 103 MMOL/L (ref 98–107)
CHOLEST SERPL-MCNC: 217 MG/DL
CREAT SERPL-MCNC: 0.72 MG/DL (ref 0.51–0.95)
DEPRECATED HCO3 PLAS-SCNC: 20 MMOL/L (ref 22–29)
EGFRCR SERPLBLD CKD-EPI 2021: >90 ML/MIN/1.73M2
ERYTHROCYTE [DISTWIDTH] IN BLOOD BY AUTOMATED COUNT: 12.5 % (ref 10–15)
FASTING STATUS PATIENT QL REPORTED: NO
FASTING STATUS PATIENT QL REPORTED: NO
GLUCOSE SERPL-MCNC: 105 MG/DL (ref 70–99)
HCT VFR BLD AUTO: 40.6 % (ref 35–47)
HDLC SERPL-MCNC: 84 MG/DL
HGB BLD-MCNC: 13.1 G/DL (ref 11.7–15.7)
LDLC SERPL CALC-MCNC: 114 MG/DL
MAGNESIUM SERPL-MCNC: 2.6 MG/DL (ref 1.7–2.3)
MCH RBC QN AUTO: 28.7 PG (ref 26.5–33)
MCHC RBC AUTO-ENTMCNC: 32.3 G/DL (ref 31.5–36.5)
MCV RBC AUTO: 89 FL (ref 78–100)
NONHDLC SERPL-MCNC: 133 MG/DL
PLATELET # BLD AUTO: 304 10E3/UL (ref 150–450)
POTASSIUM SERPL-SCNC: 3.8 MMOL/L (ref 3.4–5.3)
PROT SERPL-MCNC: 7 G/DL (ref 6.4–8.3)
RBC # BLD AUTO: 4.56 10E6/UL (ref 3.8–5.2)
SODIUM SERPL-SCNC: 139 MMOL/L (ref 135–145)
TRIGL SERPL-MCNC: 94 MG/DL
WBC # BLD AUTO: 9.3 10E3/UL (ref 4–11)

## 2024-06-25 PROCEDURE — 36415 COLL VENOUS BLD VENIPUNCTURE: CPT | Performed by: NURSE PRACTITIONER

## 2024-06-25 PROCEDURE — 99214 OFFICE O/P EST MOD 30 MIN: CPT | Mod: 25 | Performed by: NURSE PRACTITIONER

## 2024-06-25 PROCEDURE — 99396 PREV VISIT EST AGE 40-64: CPT | Performed by: NURSE PRACTITIONER

## 2024-06-25 PROCEDURE — 80053 COMPREHEN METABOLIC PANEL: CPT | Performed by: NURSE PRACTITIONER

## 2024-06-25 PROCEDURE — 85027 COMPLETE CBC AUTOMATED: CPT | Performed by: NURSE PRACTITIONER

## 2024-06-25 PROCEDURE — 80061 LIPID PANEL: CPT | Performed by: NURSE PRACTITIONER

## 2024-06-25 PROCEDURE — 83735 ASSAY OF MAGNESIUM: CPT | Performed by: NURSE PRACTITIONER

## 2024-06-25 RX ORDER — MELOXICAM 15 MG/1
15 TABLET ORAL DAILY
Qty: 30 TABLET | Refills: 3 | Status: SHIPPED | OUTPATIENT
Start: 2024-06-25

## 2024-06-25 RX ORDER — NIFEDIPINE 30 MG/1
30 TABLET, EXTENDED RELEASE ORAL DAILY
Qty: 90 TABLET | Refills: 3 | Status: SHIPPED | OUTPATIENT
Start: 2024-06-25

## 2024-06-25 SDOH — HEALTH STABILITY: PHYSICAL HEALTH: ON AVERAGE, HOW MANY DAYS PER WEEK DO YOU ENGAGE IN MODERATE TO STRENUOUS EXERCISE (LIKE A BRISK WALK)?: 7 DAYS

## 2024-06-25 ASSESSMENT — PAIN SCALES - GENERAL: PAINLEVEL: SEVERE PAIN (7)

## 2024-06-25 ASSESSMENT — SOCIAL DETERMINANTS OF HEALTH (SDOH): HOW OFTEN DO YOU GET TOGETHER WITH FRIENDS OR RELATIVES?: ONCE A WEEK

## 2024-06-25 NOTE — PROGRESS NOTES
Preventive Care Visit  [facility]  CONTRERAS Sales CNP, Family Medicine  Jun 25, 2024      Assessment & Plan   Problem List Items Addressed This Visit          Circulatory    Raynaud's disease    Relevant Medications    NIFEdipine ER OSMOTIC (PROCARDIA XL) 30 MG 24 hr tablet    Other Relevant Orders    OFFICE/OUTPT VISIT,EST,LEVL IV (Completed)     Other Visit Diagnoses       Routine general medical examination at a health care facility    -  Primary    Relevant Orders    Comprehensive metabolic panel (BMP + Alb, Alk Phos, ALT, AST, Total. Bili, TP) (Completed)    CBC with platelets (Completed)    Lipid panel reflex to direct LDL Fasting (Completed)    Magnesium (Completed)    Cervical radiculopathy        Relevant Medications    meloxicam (MOBIC) 15 MG tablet    Other Relevant Orders    OFFICE/OUTPT VISIT,EST,LEVL IV (Completed)    Screen for colon cancer        Relevant Orders    COLOGUARD(EXACT SCIENCES)    Other insomnia        Relevant Medications    zolpidem (AMBIEN) 5 MG tablet    Other Relevant Orders    OFFICE/OUTPT VISIT,EST,LEVL IV (Completed)             Patient has been advised of split billing requirements and indicates understanding: Yes       Counseling  Appropriate preventive services were discussed with this patient, including applicable screening as appropriate for fall prevention, nutrition, physical activity, Tobacco-use cessation, weight loss and cognition.  Checklist reviewing preventive services available has been given to the patient.    See Patient Instructions      Subjective   Pat is a 59 year old, presenting for the following:  Physical        6/25/2024     2:36 PM   Additional Questions   Roomed by [facility] Directive  Patient does not have a Health Care Directive or Living Will: Discussed advance care planning with patient; however, patient declined at this time.    Tom MENDENHALL            6/25/2024   General  Health   How would you rate your overall physical health? Excellent   Feel stress (tense, anxious, or unable to sleep) Only a little      (!) STRESS CONCERN      6/25/2024   Nutrition   Three or more servings of calcium each day? Yes   Diet: Regular (no restrictions)   How many servings of fruit and vegetables per day? 4 or more   How many sweetened beverages each day? 0-1            6/25/2024   Exercise   Days per week of moderate/strenous exercise 7 days            6/25/2024   Social Factors   Frequency of gathering with friends or relatives Once a week   Worry food won't last until get money to buy more No   Food not last or not have enough money for food? No   Do you have housing? (Housing is defined as stable permanent housing and does not include staying ouside in a car, in a tent, in an abandoned building, in an overnight shelter, or couch-surfing.) Yes   Are you worried about losing your housing? No   Lack of transportation? No   Unable to get utilities (heat,electricity)? No            6/25/2024   Fall Risk   Fallen 2 or more times in the past year? No   Trouble with walking or balance? No             6/25/2024   Dental   Dentist two times every year? Yes            6/25/2024   TB Screening   Were you born outside of the [place]? No            Today's PHQ-2 Score:       6/25/2024     2:31 PM   PHQ-2 ( 1999 [employer])   Q1: Little interest or pleasure in doing things 0   Q2: Feeling down, depressed or hopeless 0   PHQ-2 Score 0   Q1: Little interest or pleasure in doing things Not at all   Q2: Feeling down, depressed or hopeless Not at all   PHQ-2 Score 0           6/25/2024   Substance Use   Alcohol more than 3/day or more than 7/wk No   Do you use any other substances recreationally? (!) ALCOHOL        Social History     Tobacco Use    Smoking status: Never    Smokeless tobacco: Never   Vaping Use    Vaping status: Never Used   Substance Use Topics    Alcohol use: Yes     Comment: scoial    Drug use: No            2022   LAST FHS-7 RESULTS   1st degree relative breast or ovarian cancer No   Any relative bilateral breast cancer No   Any male have breast cancer No   Any ONE woman have BOTH breast AND ovarian cancer No   Any woman with breast cancer before 50yrs No   2 or more relatives with breast AND/OR ovarian cancer No   2 or more relatives with breast AND/OR bowel cancer No           Mammogram Screening - Mammogram every 1-2 years updated in Health Maintenance based on mutual decision making        2024   STI Screening   New sexual partner(s) since last STI/HIV test? No        History of abnormal Pap smear: No - age 30- 64 PAP with HPV every 5 years recommended        Latest Ref Rng & Units 2022     9:08 AM 2016     2:46 PM 2016    12:00 AM   PAP / HPV   PAP  Negative for Intraepithelial Lesion or Malignancy (NILM)      PAP (Historical)    NIL    HPV 16 DNA Negative Negative  Negative     HPV 18 DNA Negative Negative  Negative     Other HR HPV Negative Negative  Negative       ASCVD Risk   The 10-year ASCVD risk score (Emily MONSIVAIS, et al., 2019) is: 3.2%    Values used to calculate the score:      Age: 59 years      Sex: Female      Is Non- : No      Diabetic: No      Tobacco smoker: No      Systolic Blood Pressure: 128 mmHg      Is BP treated: No      HDL Cholesterol: 76 mg/dL      Total Cholesterol: 216 mg/dL    Fracture Risk Assessment Tool  Link to Frax Calculator  Use the information below to complete the Frax calculator  : 1965  Sex: female  Weight (kg): 72.1 kg (actual weight)  Height (cm): 171.5 cm  Previous Fragility Fracture:  No  History of parent with fractured hip:  No  Current Smoking:  No  Patient has been on glucocorticoids for more than 3 months (5mg/day or more): No  Rheumatoid Arthritis on Problem List:  No  Secondary Osteoporosis on Problem List:  No  Consumes 3 or more units of alcohol per day: No  Femoral Neck BMD (g/cm2)            Reviewed and updated as needed this visit by Provider                    Labs reviewed in EPIC  BP Readings from Last 3 Encounters:   06/25/24 128/82   09/19/23 124/76   12/09/22 122/60    Wt Readings from Last 3 Encounters:   06/25/24 72.1 kg (159 lb)   09/19/23 66.7 kg (147 lb)   12/09/22 66.7 kg (147 lb)                  Patient Active Problem List   Diagnosis    Raynaud's disease    TMJ (temporomandibular joint syndrome)    CARDIOVASCULAR SCREENING; LDL GOAL LESS THAN 160    PTSD (post-traumatic stress disorder)    Muscle tension headache    Esophageal abnormality    Episodic mood disorder (H24)    Esophageal stricture    Sprain of MCP joint of hand    Rotator cuff injury; bilateral repairs 2005    Primary generalized (osteo)arthritis    SI (sacroiliac) joint dysfunction; see details, pain management contract    Chronic bilateral low back pain without sciatica    Lumbar radiculopathy     Past Surgical History:   Procedure Laterality Date    MAMMOPLASTY REDUCTION      OTHER SURGICAL HISTORY      s/p left knee arthroscopinc surgery    ROTATOR CUFF REPAIR RT/LT  01/01/2005    SMALL BOWEL RESECTION      TUBAL LIGATION         Social History     Tobacco Use    Smoking status: Never    Smokeless tobacco: Never   Substance Use Topics    Alcohol use: Yes     Comment: scoial     Family History   Problem Relation Age of Onset    Unknown/Adopted Mother     Unknown/Adopted Father     Unknown/Adopted Maternal Grandmother     Unknown/Adopted Maternal Grandfather     Unknown/Adopted Paternal Grandmother     Unknown/Adopted Paternal Grandfather          Current Outpatient Medications   Medication Sig Dispense Refill    acetaminophen (PAIN RELIEF EXTRA STRENGTH) 500 MG tablet TAKE 1-2 TABLETS (500-1,000 MG) BY MOUTH EVERY 6 HOURS AS NEEDED FOR MILD PAIN 100 tablet 1    Ascorbic Acid (VITAMIN C CR PO) Take  by mouth.      Cyanocobalamin (VITAMIN B 12 PO) Take  by mouth.      cyclobenzaprine (FLEXERIL) 10 MG tablet Take 1 tablet  "(10 mg) by mouth daily 90 tablet 1    DTx Lo - Wellness (WW CORE) MISC 21 capsules daily      ferrous sulfate (FEROSUL) 325 (65 Fe) MG tablet Take 1 tablet (325 mg) by mouth daily (with breakfast) 90 tablet 1    GLUCOSAMINE CHONDROITIN COMPLX PO Take  by mouth.      MAGNESIUM OXIDE PO Take 400 mg by mouth daily      melatonin 3 MG tablet Take 1 tablet by mouth nightly as needed for sleep. 30 tablet 0    meloxicam (MOBIC) 15 MG tablet TAKE 1 TABLET BY MOUTH EVERY DAY 30 tablet 0    NIFEdipine ER OSMOTIC (PROCARDIA XL) 30 MG 24 hr tablet Take 1 tablet (30 mg) by mouth daily 90 tablet 3    zolpidem (AMBIEN) 5 MG tablet TAKE 1 TABLET (5 MG) BY MOUTH NIGHTLY AS NEEDED FOR SLEEP 30 tablet 3    nitroGLYcerin (NITRO-BID) 2 % OINT ointment PLACE 1 INCH (15 MG) ONTO THE SKIN DAILY (Patient not taking: Reported on 6/25/2024) 60 g 1     Allergies   Allergen Reactions    Desyrel [Trazodone Hcl]      Sig mood disturbance      Lamictal [Lamotrigine]      Recent Labs   Lab Test 12/09/22  0943 02/18/22  1019 12/18/21  0906 12/18/21  0906 07/09/21  1552 11/26/19  1056 09/17/19  1419 06/06/19  1419   A1C 6.0*  --   --   --   --   --   --   --    *  --   --  123*  --   --  99  --    HDL 76  --   --  88  --   --  84  --    TRIG 118  --   --  59  --   --  41  --    ALT  --   --   --   --   --   --   --  24   CR 0.71 0.68   < > 0.75 0.69 0.70 0.80 0.73   GFRESTIMATED >90 >90   < > 88 >90 >90 83 >90   GFRESTBLACK  --   --   --   --  >90 >90 >90 >90   POTASSIUM 4.0 4.0   < > 3.5 3.5 3.6 3.3* 3.6    < > = values in this interval not displayed.          Review of Systems  Constitutional, HEENT, cardiovascular, pulmonary, gi and gu systems are negative, except as otherwise noted.     Objective    Exam  /82 (BP Location: Right arm)   Pulse 91   Temp 98.1  F (36.7  C) (Tympanic)   Resp 20   Ht 1.715 m (5' 7.5\")   Wt 72.1 kg (159 lb)   LMP 01/04/2014   SpO2 96%   BMI 24.54 kg/m     Estimated body mass index is 24.54 kg/m  as " "calculated from the following:    Height as of this encounter: 1.715 m (5' 7.5\").    Weight as of this encounter: 72.1 kg (159 lb).    Physical Exam  GENERAL: alert and no distress  EYES: Eyes grossly normal to inspection, PERRL and conjunctivae and sclerae normal  HENT: ear canals and TM's normal, nose and mouth without ulcers or lesions  NECK: no adenopathy, no asymmetry, masses, or scars  RESP: lungs clear to auscultation - no rales, rhonchi or wheezes  CV: regular rate and rhythm, normal S1 S2, no S3 or S4, no murmur, click or rub, no peripheral edema  ABDOMEN: soft, nontender, no hepatosplenomegaly, no masses and bowel sounds normal  MS: no gross musculoskeletal defects noted, no edema  SKIN: no suspicious lesions or rashes  NEURO: Normal strength and tone, mentation intact and speech normal  PSYCH: mentation appears normal, affect normal/bright        Signed Electronically by: CONTRERAS Sales CNP    "

## 2024-06-25 NOTE — PATIENT INSTRUCTIONS
"Patient Education   Preventive Care Advice   This is general advice we often give to help people stay healthy. Your care team may have specific advice just for you. Please talk to your care team about your own preventive care needs.  Lifestyle  Exercise at least 150 minutes each week (30 minutes a day, 5 days a week).  Do muscle strengthening activities 2 days a week. These help control your weight and prevent disease.  No smoking.  Wear sunscreen to prevent skin cancer.  Have your home tested for radon every 2 to 5 years. Radon is a colorless, odorless gas that can harm your lungs. To learn more, go to www.health.[facility].us and search for \"Radon in Homes.\"  Keep guns unloaded and locked up in a safe place like a safe or gun vault, or, use a gun lock and hide the keys. Always lock away bullets separately. To learn more, visit [employer].[place].gov and search for \"safe gun storage.\"  Nutrition  Eat 5 or more servings of fruits and vegetables each day.  Try wheat bread, brown rice and whole grain pasta (instead of white bread, rice, and pasta).  Get enough calcium and vitamin D. Check the label on foods and aim for 100% of the RDA (recommended daily allowance).  Regular exams  Have a dental exam and cleaning every 6 months.  See your health care team every year to talk about:  Any changes in your health.  Any medicines your care team has prescribed.  Preventive care, family planning, and ways to prevent chronic diseases.  Shots (vaccines)   HPV shots (up to age 26), if you've never had them before.  Hepatitis B shots (up to age 59), if you've never had them before.  COVID-19 shot: Get this shot when it's due.  Flu shot: Get a flu shot every year.  Tetanus shot: Get a tetanus shot every 10 years.  Pneumococcal, hepatitis A, and RSV shots: Ask your care team if you need these based on your risk.  Shingles shot (for age 50 and up).  General health tests  Diabetes screening:  Starting at age 35, Get screened for diabetes at least " every 3 years.  If you are younger than age 35, ask your care team if you should be screened for diabetes.  Cholesterol test: At age 39, start having a cholesterol test every 5 years, or more often if advised.  Bone density scan (DEXA): At age 50, ask your care team if you should have this scan for osteoporosis (brittle bones).  Hepatitis C: Get tested at least once in your life.  Abdominal aortic aneurysm screening: Talk to your doctor about having this screening if you:  Have ever smoked; and  Are biologically male; and  Are between the ages of 65 and 75.  STIs (sexually transmitted infections)  Before age 24: Ask your care team if you should be screened for STIs.  After age 24: Get screened for STIs if you're at risk. You are at risk for STIs (including HIV) if:  You are sexually active with more than one person.  You don't use condoms every time.  You or a partner was diagnosed with a sexually transmitted infection.  If you are at risk for HIV, ask about PrEP medicine to prevent HIV.  Get tested for HIV at least once in your life, whether you are at risk for HIV or not.  Cancer screening tests  Cervical cancer screening: If you have a cervix, begin getting regular cervical cancer screening tests at age 21. Most people who have regular screenings with normal results can stop after age 65. Talk about this with your provider.  Breast cancer scan (mammogram): If you've ever had breasts, begin having regular mammograms starting at age 40. This is a scan to check for breast cancer.  Colon cancer screening: It is important to start screening for colon cancer at age 45.  Have a colonoscopy test every 10 years (or more often if you're at risk) Or, ask your provider about stool tests like a FIT test every year or Cologuard test every 3 years.  To learn more about your testing options, visit: www.[employer]/531780.pdf.  For help making a decision, visit: belen/ry09960.  Prostate cancer screening test: If you have a  prostate and are age 55 to 69, ask your provider if you would benefit from a yearly prostate cancer screening test.  Lung cancer screening: If you are a current or former smoker age 50 to 80, ask your care team if ongoing lung cancer screenings are right for you.  For informational purposes only. Not to replace the advice of your health care provider. Copyright   2023 [facility]. All rights reserved. Clinically reviewed by the  [place] [facility]. [employer] 101691 - REV 04/24.  Substance Use Disorder: Care Instructions  Overview     You can improve your life and health by stopping your use of alcohol or drugs. When you don't drink or use drugs, you may feel and sleep better. You may get along better with your family, friends, and coworkers. There are medicines and programs that can help with substance use disorder.  How can you care for yourself at home?  Here are some ways to help you stay sober and prevent relapse.  If you have been given medicine to help keep you sober or reduce your cravings, be sure to take it exactly as prescribed.  Talk to your doctor about programs that can help you stop using drugs or drinking alcohol.  Do not keep alcohol or drugs in your home.  Plan ahead. Think about what you'll say if other people ask you to drink or use drugs. Try not to spend time with people who drink or use drugs.  Use the time and money spent on drinking or drugs to do something that's important to you.  Preventing a relapse  Have a plan to deal with relapse. Learn to recognize changes in your thinking that lead you to drink or use drugs. Get help before you start to drink or use drugs again.  Try to stay away from situations, friends, or places that may lead you to drink or use drugs.  If you feel the need to drink alcohol or use drugs again, seek help right away. Call a trusted friend or family member. Some people get support from organizations such as Narcotics Anonymous or [employer]  [employer] or from treatment facilities.  If you relapse, get help as soon as you can. Some people make a plan with another person that outlines what they want that person to do for them if they relapse. The plan usually includes how to handle the relapse and who to notify in case of relapse.  Don't give up. Remember that a relapse doesn't mean that you have failed. Use the experience to learn the triggers that lead you to drink or use drugs. Then quit again. Recovery is a lifelong process. Many people have several relapses before they are able to quit for good.  Follow-up care is a key part of your treatment and safety. Be sure to make and go to all appointments, and call your doctor if you are having problems. It's also a good idea to know your test results and keep a list of the medicines you take.  When should you call for help?   Call 911  anytime you think you may need emergency care. For example, call if you or someone else:    Has overdosed or has withdrawal signs. Be sure to tell the emergency workers that you are or someone else is using or trying to quit using drugs. Overdose or withdrawal signs may include:  Losing consciousness.  Seizure.  Seeing or hearing things that aren't there (hallucinations).     Is thinking or talking about suicide or harming others.   Where to get help 24 hours a day, 7 days a week   If you or someone you know talks about suicide, self-harm, a mental health crisis, a substance use crisis, or any other kind of emotional distress, get help right away. You can:    Call the Suicide and Crisis Lifeline at 988.     Call 2-734-210-TALK (1-183.892.5675).     Text HOME to 124743 to access the Crisis Text Line.   Consider saving these numbers in your phone.  Go to [employer] for more information or to chat online.  Call your doctor now or seek immediate medical care if:    You are having withdrawal symptoms. These may include nausea or vomiting, sweating, shakiness, and anxiety.  "  Watch closely for changes in your health, and be sure to contact your doctor if:    You have a relapse.     You need more help or support to stop.   Where can you learn more?  Go to https://www.Scoop.it.net/patiented  Enter H573 in the search box to learn more about \"Substance Use Disorder: Care Instructions.\"  Current as of: November 15, 2023               Content Version: 14.0    1412-1845 [employer].   Care instructions adapted under license by your healthcare professional. If you have questions about a medical condition or this instruction, always ask your healthcare professional. [employer] disclaims any warranty or liability for your use of this information.         "

## 2024-07-04 DIAGNOSIS — M62.830 BACK MUSCLE SPASM: ICD-10-CM

## 2024-07-05 LAB — NONINV COLON CA DNA+OCC BLD SCRN STL QL: POSITIVE

## 2024-07-05 RX ORDER — CYCLOBENZAPRINE HCL 10 MG
10 TABLET ORAL DAILY
Qty: 90 TABLET | Refills: 1 | Status: SHIPPED | OUTPATIENT
Start: 2024-07-05

## 2024-07-05 RX ORDER — ZOLPIDEM TARTRATE 5 MG/1
5 TABLET ORAL
Qty: 30 TABLET | Refills: 3 | Status: SHIPPED | OUTPATIENT
Start: 2024-07-05

## 2024-09-21 NOTE — TELEPHONE ENCOUNTER
Voice message left on 1/12/2017 at 1:49pm    Reason for call: Other  Patient called regarding (reason for call): returning call  Additional comments: Patient returned clinic's call. Per patient, clinic called her yesterday around 4:07pm (she was shoveling snow and when she got the message, it was too late to call clinic back) regarding Hydrocodone refill. Patient saw Dr Nunez on December 15 and at that time, she asked if she needed a refill. Patient said Dr Sanabria was filling it but she hasn t refilled it. Patient is down to 3  . Patient hasn t gotten a refill since 10/28/2016. Unsure if she s supposed to see Dr Sanabria again or what to do. If pain clinic calls back, patient is home now.    Phone Number Pt can be reached at: Cell number on file:    Telephone Information:   Mobile [phone]     Lisa [place]    [facility]   [phone]

## 2024-09-26 ENCOUNTER — ANESTHESIA EVENT (OUTPATIENT)
Dept: GASTROENTEROLOGY | Facility: CLINIC | Age: 59
End: 2024-09-26
Payer: COMMERCIAL

## 2024-09-26 NOTE — ANESTHESIA PREPROCEDURE EVALUATION
Anesthesia Pre-Procedure Evaluation    Patient: Maryann Riggs   MRN: [id number] : 1965        Procedure : Procedure(s):  Colonoscopy          Past Medical History:   Diagnosis Date    Anger reaction     buspar helping    Cholelithiasis     Esophageal stricture     dilatation every 3 months    Generalized osteoarthrosis, unspecified site (aka ARTHRITIS)     small joints    PTSD (post-traumatic stress disorder)     Raynaud's disease       Past Surgical History:   Procedure Laterality Date    MAMMOPLASTY REDUCTION      OTHER SURGICAL HISTORY      s/p left knee arthroscopinc surgery    ROTATOR CUFF REPAIR RT/LT  2005    SMALL BOWEL RESECTION      TUBAL LIGATION        Allergies   Allergen Reactions    Desyrel [Trazodone Hcl]      Sig mood disturbance      Lamictal [Lamotrigine]       Social History     Tobacco Use    Smoking status: Never    Smokeless tobacco: Never   Substance Use Topics    Alcohol use: Yes     Comment: scoial      Wt Readings from Last 1 Encounters:   24 72.1 kg (159 lb)        Anesthesia Evaluation   Pt has had prior anesthetic.         ROS/MED HX  ENT/Pulmonary:       Neurologic:       Cardiovascular:  - neg cardiovascular ROS     METS/Exercise Tolerance:     Hematologic:       Musculoskeletal:   (+)  arthritis,             GI/Hepatic:       Renal/Genitourinary:       Endo:       Psychiatric/Substance Use:     (+) psychiatric history        Infectious Disease:       Malignancy:       Other:            Physical Exam    Airway        Mallampati: I   TM distance: > 3 FB   Neck ROM: full   Mouth opening: > 3 cm    Respiratory Devices and Support         Dental       (+) Minor Abnormalities - some fillings, tiny chips      Cardiovascular   cardiovascular exam normal          Pulmonary   pulmonary exam normal                OUTSIDE LABS:  CBC:   Lab Results   Component Value Date    WBC 9.3 2024    WBC 6.3 2022    HGB 13.1 2024    HGB 14.2 2022    HCT 40.6  "06/25/2024    HCT 42.9 12/09/2022     06/25/2024     12/09/2022     BMP:   Lab Results   Component Value Date     06/25/2024     12/09/2022    POTASSIUM 3.8 06/25/2024    POTASSIUM 4.0 12/09/2022    CHLORIDE 103 06/25/2024    CHLORIDE 101 12/09/2022    CO2 20 (L) 06/25/2024    CO2 32 (H) 12/09/2022    BUN 27.1 (H) 06/25/2024    BUN 19.3 12/09/2022    CR 0.72 06/25/2024    CR 0.71 12/09/2022     (H) 06/25/2024     (H) 12/09/2022     COAGS: No results found for: \"PTT\", \"INR\", \"FIBR\"  POC: No results found for: \"BGM\", \"HCG\", \"HCGS\"  HEPATIC:   Lab Results   Component Value Date    ALBUMIN 4.6 06/25/2024    PROTTOTAL 7.0 06/25/2024    ALT 41 06/25/2024    AST 25 06/25/2024    GGT 10 09/12/2014    ALKPHOS 81 06/25/2024    BILITOTAL <0.2 06/25/2024     OTHER:   Lab Results   Component Value Date    A1C 6.0 (H) 12/09/2022    ARIC 9.3 06/25/2024    PHOS 3.3 08/23/2013    MAG 2.6 (H) 06/25/2024    LIPASE 25 03/06/2008    LIPASE 28 03/06/2008    AMYLASE 45 03/06/2008    TSH 1.750 09/12/2014    CRP  10/10/2014     <0.2  Reference Values:   Low Risk:           <1.0 mg/L   Average Risk:       1.0-3.0 mg/L   High Risk:          >3.0 mg/L   Acute Inflammation: >8.0 mg/L      SED 4 10/10/2014       Anesthesia Plan    ASA Status:  3       Anesthesia Type: General.              Consents    Anesthesia Plan(s) and associated risks, benefits, and realistic alternatives discussed. Questions answered and patient/representative(s) expressed understanding.     - Discussed: Risks, Benefits and Alternatives for BOTH SEDATION and the PROCEDURE were discussed     - Discussed with:  Patient            Postoperative Care       PONV prophylaxis: Background Propofol Infusion, Ondansetron (or other 5HT-3), Dexamethasone or Solumedrol     Comments:               CONTRERAS Sanchez CRNA    I have reviewed the pertinent notes and labs in the chart from the past 30 days and (re)examined the patient.  Any " updates or changes from those notes are reflected in this note.

## 2024-09-27 ENCOUNTER — ANESTHESIA (OUTPATIENT)
Dept: GASTROENTEROLOGY | Facility: CLINIC | Age: 59
End: 2024-09-27
Payer: COMMERCIAL

## 2024-09-27 ENCOUNTER — HOSPITAL ENCOUNTER (OUTPATIENT)
Facility: CLINIC | Age: 59
Discharge: HOME | End: 2024-09-27
Attending: STUDENT IN AN ORGANIZED HEALTH CARE EDUCATION/TRAINING PROGRAM | Admitting: STUDENT IN AN ORGANIZED HEALTH CARE EDUCATION/TRAINING PROGRAM
Payer: COMMERCIAL

## 2024-09-27 VITALS
OXYGEN SATURATION: 100 % | SYSTOLIC BLOOD PRESSURE: 133 MMHG | DIASTOLIC BLOOD PRESSURE: 84 MMHG | BODY MASS INDEX: 24.1 KG/M2 | WEIGHT: 159 LBS | TEMPERATURE: 97.8 F | HEIGHT: 68 IN | HEART RATE: 71 BPM | RESPIRATION RATE: 16 BRPM

## 2024-09-27 LAB — COLONOSCOPY: NORMAL

## 2024-09-27 PROCEDURE — 258N000003 HC RX IP 258 OP 636: Performed by: PHYSICIAN ASSISTANT

## 2024-09-27 PROCEDURE — 250N000009 HC RX 250: Performed by: PHYSICIAN ASSISTANT

## 2024-09-27 PROCEDURE — 250N000011 HC RX IP 250 OP 636: Performed by: NURSE ANESTHETIST, CERTIFIED REGISTERED

## 2024-09-27 PROCEDURE — 370N000017 HC ANESTHESIA TECHNICAL FEE, PER MIN: Performed by: STUDENT IN AN ORGANIZED HEALTH CARE EDUCATION/TRAINING PROGRAM

## 2024-09-27 PROCEDURE — 45385 COLONOSCOPY W/LESION REMOVAL: CPT | Mod: PT | Performed by: STUDENT IN AN ORGANIZED HEALTH CARE EDUCATION/TRAINING PROGRAM

## 2024-09-27 PROCEDURE — 88305 TISSUE EXAM BY PATHOLOGIST: CPT | Mod: TC | Performed by: STUDENT IN AN ORGANIZED HEALTH CARE EDUCATION/TRAINING PROGRAM

## 2024-09-27 RX ORDER — FLUMAZENIL 0.1 MG/ML
0.2 INJECTION, SOLUTION INTRAVENOUS
Status: DISCONTINUED | OUTPATIENT
Start: 2024-09-27 | End: 2024-09-27 | Stop reason: HOSPADM

## 2024-09-27 RX ORDER — SODIUM CHLORIDE, SODIUM LACTATE, POTASSIUM CHLORIDE, CALCIUM CHLORIDE 600; 310; 30; 20 MG/100ML; MG/100ML; MG/100ML; MG/100ML
INJECTION, SOLUTION INTRAVENOUS CONTINUOUS
Status: DISCONTINUED | OUTPATIENT
Start: 2024-09-27 | End: 2024-09-27 | Stop reason: HOSPADM

## 2024-09-27 RX ORDER — NALOXONE HYDROCHLORIDE 0.4 MG/ML
0.2 INJECTION, SOLUTION INTRAMUSCULAR; INTRAVENOUS; SUBCUTANEOUS
Status: DISCONTINUED | OUTPATIENT
Start: 2024-09-27 | End: 2024-09-27 | Stop reason: HOSPADM

## 2024-09-27 RX ORDER — NALOXONE HYDROCHLORIDE 0.4 MG/ML
0.4 INJECTION, SOLUTION INTRAMUSCULAR; INTRAVENOUS; SUBCUTANEOUS
Status: DISCONTINUED | OUTPATIENT
Start: 2024-09-27 | End: 2024-09-27 | Stop reason: HOSPADM

## 2024-09-27 RX ORDER — LIDOCAINE 40 MG/G
CREAM TOPICAL
Status: DISCONTINUED | OUTPATIENT
Start: 2024-09-27 | End: 2024-09-27 | Stop reason: HOSPADM

## 2024-09-27 RX ORDER — PROPOFOL 10 MG/ML
INJECTION, EMULSION INTRAVENOUS PRN
Status: DISCONTINUED | OUTPATIENT
Start: 2024-09-27 | End: 2024-09-27

## 2024-09-27 RX ADMIN — PROPOFOL 150 MG: 10 INJECTION, EMULSION INTRAVENOUS at 12:24

## 2024-09-27 RX ADMIN — LIDOCAINE HYDROCHLORIDE 0.1 ML: 10 INJECTION, SOLUTION EPIDURAL; INFILTRATION; INTRACAUDAL; PERINEURAL at 11:51

## 2024-09-27 RX ADMIN — PROPOFOL 150 MG: 10 INJECTION, EMULSION INTRAVENOUS at 12:26

## 2024-09-27 RX ADMIN — SODIUM CHLORIDE, POTASSIUM CHLORIDE, SODIUM LACTATE AND CALCIUM CHLORIDE 1000 ML: 600; 310; 30; 20 INJECTION, SOLUTION INTRAVENOUS at 11:51

## 2024-09-27 RX ADMIN — PROPOFOL 50 MG: 10 INJECTION, EMULSION INTRAVENOUS at 12:30

## 2024-09-27 RX ADMIN — PROPOFOL 150 MG: 10 INJECTION, EMULSION INTRAVENOUS at 12:23

## 2024-09-27 ASSESSMENT — ACTIVITIES OF DAILY LIVING (ADL)
ADLS_ACUITY_SCORE: 35
ADLS_ACUITY_SCORE: 35

## 2024-09-27 NOTE — H&P
[facility]    Pre-Endoscopy History and Physical     Maryann Riggs MRN# [id number]   YOB: 1965 Age: 59 year old     Date of Procedure: 9/27/2024  Primary care provider: Debbie Vila  Type of Endoscopy: Colonoscopy with possible biopsy, possible polypectomy  Reason for Procedure: diagnostic, positive cologuard test  Type of Anesthesia Anticipated: Conscious Sedation    HPI:    Maryann is a 59 year old female who will be undergoing the above procedure.      A history and physical has been performed. The patient's medications and allergies have been reviewed. The risks and benefits of the procedure and the sedation options and risks were discussed with the patient.  All questions were answered and informed consent was obtained.      She denies a personal or family history of anesthesia complications or bleeding disorders.     Colonoscopy, diagnostic for positive cologuard, last one 2013. No AC. ASA II. Surgical hx of small bowel resection. No family hx of colon cancer.      Patient Active Problem List   Diagnosis    Raynaud's disease    TMJ (temporomandibular joint syndrome)    CARDIOVASCULAR SCREENING; LDL GOAL LESS THAN 160    PTSD (post-traumatic stress disorder)    Muscle tension headache    Esophageal abnormality    Episodic mood disorder (H)    Esophageal stricture    Sprain of MCP joint of hand    Rotator cuff injury; bilateral repairs 2005    Primary generalized (osteo)arthritis    SI (sacroiliac) joint dysfunction; see details, pain management contract    Chronic bilateral low back pain without sciatica    Lumbar radiculopathy        Past Medical History:   Diagnosis Date    Anger reaction     buspar helping    Cholelithiasis     Esophageal stricture     dilatation every 3 months    Generalized osteoarthrosis, unspecified site (aka ARTHRITIS)     small joints    PTSD (post-traumatic stress disorder)     Raynaud's disease         Past Surgical History:   Procedure  Laterality Date    MAMMOPLASTY REDUCTION      OTHER SURGICAL HISTORY      s/p left knee arthroscopinc surgery    ROTATOR CUFF REPAIR RT/LT  01/01/2005    SMALL BOWEL RESECTION      TUBAL LIGATION         Social History     Tobacco Use    Smoking status: Never    Smokeless tobacco: Never   Substance Use Topics    Alcohol use: Yes     Comment: scoial       Family History   Problem Relation Age of Onset    Unknown/Adopted Mother     Unknown/Adopted Father     Unknown/Adopted Maternal Grandmother     Unknown/Adopted Maternal Grandfather     Unknown/Adopted Paternal Grandmother     Unknown/Adopted Paternal Grandfather        Prior to Admission medications    Medication Sig Start Date End Date Taking? Authorizing Provider   acetaminophen (PAIN RELIEF EXTRA STRENGTH) 500 MG tablet TAKE 1-2 TABLETS (500-1,000 MG) BY MOUTH EVERY 6 HOURS AS NEEDED FOR MILD PAIN 8/7/23  Yes Debbie Vila APRN CNP   Ascorbic Acid (VITAMIN C CR PO) Take  by mouth.   Yes Reported, Patient   Cyanocobalamin (VITAMIN B 12 PO) Take  by mouth.   Yes Reported, Patient   cyclobenzaprine (FLEXERIL) 10 MG tablet TAKE 1 TABLET (10 MG) BY MOUTH DAILY. 7/5/24  Yes Debbie Vila APRN CNP   DTx Lo - Wellness (WW CORE) MISC 21 capsules daily   Yes Reported, Patient   ferrous sulfate (FEROSUL) 325 (65 Fe) MG tablet Take 1 tablet (325 mg) by mouth daily (with breakfast) 6/9/19  Yes Debbie Vila APRN CNP   GLUCOSAMINE CHONDROITIN COMPLX PO Take  by mouth.   Yes Reported, Patient   MAGNESIUM OXIDE PO Take 400 mg by mouth daily   Yes Reported, Patient   melatonin 3 MG tablet Take 1 tablet by mouth nightly as needed for sleep. 11/1/11  Yes Lakeisha Lopez MD   meloxicam (MOBIC) 15 MG tablet Take 1 tablet (15 mg) by mouth daily 6/25/24  Yes Debbie Vila APRN CNP   NIFEdipine ER OSMOTIC (PROCARDIA XL) 30 MG 24 hr tablet Take 1 tablet (30 mg) by mouth daily 6/25/24  Yes Debbie Vila APRN CNP   zolpidem (AMBIEN) 5 MG tablet Take 1 tablet (5  "mg) by mouth nightly as needed for sleep 7/5/24  Yes Debbie Vila APRN CNP   nitroGLYcerin (NITRO-BID) 2 % OINT ointment PLACE 1 INCH (15 MG) ONTO THE SKIN DAILY  Patient not taking: Reported on 6/25/2024 8/7/23   Debbie Vila APRN CNP       Allergies   Allergen Reactions    Desyrel [Trazodone Hcl]      Sig mood disturbance      Lamictal [Lamotrigine]         REVIEW OF SYSTEMS:   5 point ROS negative except as noted above in HPI, including Gen., Resp., CV, GI &  system review.    PHYSICAL EXAM:   [facility] 01/04/2014  Estimated body mass index is 24.54 kg/m  as calculated from the following:    Height as of 6/25/24: 1.715 m (5' 7.5\").    Weight as of 6/25/24: 72.1 kg (159 lb).   Constitutional: Awake, alert, no acute distress.  Eyes: No scleral icterus.  Conjunctiva are without injection.  ENMT: Mucous membranes moist, dentition and gums are intact.   Neck: Soft, supple, trachea midline.    Endocrine: n/a   Lymphatic: There is no cervical, submandibularadenopathy.  Respiratory: normal efforts on room air  Cardiovascular: extremities warm and well perfused  Abdomen: Non-distended, non-tender,  No masses,  Musculoskeletal: Full range of motion in the upper and lower extremities.    Skin: No skin rashes or lesions to inspection.  No petechia.    Neurologic: alerted and oriented 3x  Psychiatric: The patient's affect is not blunted and mood is appropriate.  DIAGNOSTICS:    Not indicated    IMPRESSION   ASA Class 2 - Mild systemic disease    PLAN:   Plan for Colonoscopy with possible biopsy, possible polypectomy. We discussed the risks, benefits and alternatives and the patient wished to proceed.  Patient is cleared for the above procedure.    The above has been forwarded to the consulting provider.    Bk Laura MD on 9/27/2024 at 11:22 AM  [facility]        "

## 2024-09-27 NOTE — ANESTHESIA POSTPROCEDURE EVALUATION
Patient: Maryann Riggs    Procedure: Procedure(s):  COLONOSCOPY, FLEXIBLE, WITH LESION REMOVAL USING SNARE       Anesthesia Type:  General    Note:  Disposition: Outpatient   Postop Pain Control: Uneventful            Sign Out: Well controlled pain   PONV: No   Neuro/Psych: Uneventful            Sign Out: Acceptable/Baseline neuro status   Airway/Respiratory: Uneventful            Sign Out: Acceptable/Baseline resp. status   CV/Hemodynamics: Uneventful            Sign Out: Acceptable CV status; No obvious hypovolemia; No obvious fluid overload   Other NRE: NONE   DID A NON-ROUTINE EVENT OCCUR? No           Last vitals:  Vitals Value Taken Time   /60 09/27/24 1250   Temp     Pulse 77 09/27/24 1245   Resp 14 09/27/24 1245   SpO2 98 % 09/27/24 1251   Vitals shown include unfiled device data.    Electronically Signed By: CONTRERAS Garcia CRNA  September 27, 2024  12:52 PM

## 2024-09-27 NOTE — LETTER
Maryann Riggs  [address]  [place] [address]      October 4, 2024    Dear Maryann,  This letter is written to inform you of the results of your recent colonoscopy.  Your examination showed polyp(s) in your rectum. All polyps were removed in their entirety and sent for review by a pathologist. As you will see on the pathology report below, the tissue(s) were hyperplastic polyps. Your examination was otherwise without abnormality.    Hyperplastic polyps are entirely benign (non-cancerous) and rarely associated with the development of additional polyps or colorectal cancer.    Given these findings, I recommend that you undergo a repeat colonoscopy in ten years for screening. We will enter you into a recall system so you receive a reminder closer to the time that you are due for repeat examination. Your physician also recommends that you adhere to a high fiber diet indefinitely to promote colon health.     Please remember that this recommendation is made with the understanding that you are not experiencing persistent changes in bowel function, bleeding per rectum, and/or significant abdominal pain. If you experience these symptoms, please contact your primary care provider for a further evaluation.     If you have any questions or concerns about the results of your colonoscopy or the appropriate follow-up, please contact my assistant at [phone]    Sincerely,      Bk Laura MD   [facility]  ___                    Resulted Orders   Surgical Pathology Exam   Result Value Ref Range    Case Report       Surgical Pathology Report                         Case: [id number]                                  Authorizing Provider:  Bk Laura MD       Collected:           09/27/2024 12:39 PM          Ordering Location:     [facility]   Received:            09/27/2024 01:03 PM                                 [place]                                                          "             Pathologist:           No Hunt MD                                                           Specimen:    Rectum, rectal polyp                                                                       Final Diagnosis       Rectum, polyp, biopsy/polypectomy:  -Hyperplastic polyp.       Clinical Information       Positive colorectal cancer screening using Cologuard test.      Gross Description       A(1). Rectum, rectal polyp:  The specimen is received in formalin, labeled with the patient's name, medical record number and other identifying information designated \"rectal polyp\". It consists of a 1.0 cm tan-pink soft tissue fragment.  Inked black, sectioned, and submitted entirely in 1 cassette.   (DENIA Torre)9/30/2024 8:05 AM       Microscopic Description       Microscopic examination is performed.        Performing Labs       The technical component of this testing was completed at [facility].    Stain controls for all stains resulted within this report have been reviewed and show appropriate reactivity.       Case Images           "

## 2024-09-27 NOTE — ANESTHESIA CARE TRANSFER NOTE
Patient: Maryann Riggs    Procedure: Procedure(s):  COLONOSCOPY, FLEXIBLE, WITH LESION REMOVAL USING SNARE       Diagnosis: Screen for colon cancer [Z12.11]  Positive colorectal cancer screening using Cologuard test [R19.5]  Diagnosis Additional Information: No value filed.    Anesthesia Type:   General     Note:    Oropharynx: oropharynx clear of all foreign objects and spontaneously breathing  Level of Consciousness: awake  Oxygen Supplementation: room air    Independent Airway: airway patency satisfactory and stable  Dentition: dentition unchanged  Vital Signs Stable: post-procedure vital signs reviewed and stable  Report to RN Given: handoff report given  Patient transferred to: Phase II    Handoff Report: Identifed the Patient, Identified the Reponsible Provider, Reviewed the pertinent medical history, Discussed the surgical course, Reviewed Intra-OP anesthesia mangement and issues during anesthesia, Set expectations for post-procedure period and Allowed opportunity for questions and acknowledgement of understanding      Vitals:  Vitals Value Taken Time   BP     Temp     Pulse     Resp     SpO2         Electronically Signed By: CONTRERAS Garcia CRNA  September 27, 2024  12:42 PM

## 2024-10-02 LAB
PATH REPORT.COMMENTS IMP SPEC: NORMAL
PATH REPORT.COMMENTS IMP SPEC: NORMAL
PATH REPORT.FINAL DX SPEC: NORMAL
PATH REPORT.GROSS SPEC: NORMAL
PATH REPORT.MICROSCOPIC SPEC OTHER STN: NORMAL
PATH REPORT.RELEVANT HX SPEC: NORMAL
PHOTO IMAGE: NORMAL

## 2024-10-02 PROCEDURE — 88305 TISSUE EXAM BY PATHOLOGIST: CPT | Mod: 26

## 2024-10-08 ENCOUNTER — OFFICE VISIT (OUTPATIENT)
Dept: FAMILY MEDICINE | Facility: CLINIC | Age: 59
End: 2024-10-08
Payer: COMMERCIAL

## 2024-10-08 VITALS
WEIGHT: 155 LBS | SYSTOLIC BLOOD PRESSURE: 138 MMHG | TEMPERATURE: 98.3 F | RESPIRATION RATE: 16 BRPM | OXYGEN SATURATION: 96 % | DIASTOLIC BLOOD PRESSURE: 72 MMHG | HEART RATE: 82 BPM | BODY MASS INDEX: 23.49 KG/M2 | HEIGHT: 68 IN

## 2024-10-08 DIAGNOSIS — Z48.02: Primary | ICD-10-CM

## 2024-10-08 PROBLEM — Z98.0 HISTORY OF BILLROTH II OPERATION: Status: ACTIVE | Noted: 2023-04-25

## 2024-10-08 PROBLEM — T54.3X1A: Status: ACTIVE | Noted: 2023-04-25

## 2024-10-08 PROCEDURE — 99212 OFFICE O/P EST SF 10 MIN: CPT

## 2024-10-08 ASSESSMENT — PAIN SCALES - GENERAL: PAINLEVEL: NO PAIN (0)

## 2024-10-08 NOTE — PATIENT INSTRUCTIONS
Continue to clean site gentle with soap and water. Crusting will fall off over time. Monitor for signs/symptoms of infection: fever, chills, purulent drainage. Seek additional treatment if such symptoms present.

## 2024-10-08 NOTE — PROGRESS NOTES
"  Assessment & Plan     Encounter for removal of staples  Seven staples removed without difficulty. Minor bleeding noted at staple sites.     MED REC REQUIRED  Post Medication Reconciliation Status: patient was not discharged from an inpatient facility or TCU      Subjective   Pat is a 59 year old, presenting for the following health issues:  ER F/U (Remove 7 staples from left side of head. ED visit in [place] after a fall on rocks.)        10/8/2024     3:14 PM   Additional Questions   Roomed by La MENDENHALL   Feeling well today, no headaches, nausea, vision changes; no concerns.     ED/UC Followup:    Facility:  [facility] ([place], [place])  Date of visit: 9/29/24  Reason for visit: Scalp laceration, left parietal - 7 staples used for closure per ED note; declined CT scan in ED  Current Status: no pain, swelling has resolved        Objective    /72 (BP Location: Right arm, Patient Position: Sitting, Cuff Size: Adult Regular)   Pulse 82   Temp 98.3  F (36.8  C) (Tympanic)   Resp 16   Ht 1.715 m (5' 7.5\")   Wt 70.3 kg (155 lb)   LMP 01/04/2014   SpO2 96%   BMI 23.92 kg/m    Body mass index is 23.92 kg/m .  Physical Exam   GENERAL: alert and no distress  SKIN: edges of laceration well approximated, 7 staples present  PSYCH: mentation appears normal, affect normal/bright      Signed Electronically by: CONTRERAS Putnam, CNP    "

## 2024-10-09 ENCOUNTER — PATIENT OUTREACH (OUTPATIENT)
Dept: GASTROENTEROLOGY | Facility: CLINIC | Age: 59
End: 2024-10-09
Payer: COMMERCIAL

## 2024-10-31 DIAGNOSIS — G47.09 OTHER INSOMNIA: ICD-10-CM

## 2024-11-02 RX ORDER — ZOLPIDEM TARTRATE 5 MG/1
5 TABLET ORAL
Qty: 30 TABLET | Refills: 3 | Status: SHIPPED | OUTPATIENT
Start: 2024-11-02

## 2024-11-27 DIAGNOSIS — M54.12 CERVICAL RADICULOPATHY: ICD-10-CM

## 2024-11-27 RX ORDER — MELOXICAM 15 MG/1
15 TABLET ORAL DAILY
Qty: 30 TABLET | Refills: 3 | Status: SHIPPED | OUTPATIENT
Start: 2024-11-27

## 2024-12-17 ENCOUNTER — OFFICE VISIT (OUTPATIENT)
Dept: FAMILY MEDICINE | Facility: CLINIC | Age: 59
End: 2024-12-17
Payer: COMMERCIAL

## 2024-12-17 VITALS
HEART RATE: 88 BPM | DIASTOLIC BLOOD PRESSURE: 62 MMHG | OXYGEN SATURATION: 97 % | TEMPERATURE: 97.5 F | HEIGHT: 68 IN | RESPIRATION RATE: 18 BRPM | WEIGHT: 152 LBS | BODY MASS INDEX: 23.04 KG/M2 | SYSTOLIC BLOOD PRESSURE: 114 MMHG

## 2024-12-17 DIAGNOSIS — Z01.30 BLOOD PRESSURE CHECK: ICD-10-CM

## 2024-12-17 DIAGNOSIS — I73.00 RAYNAUD'S DISEASE WITHOUT GANGRENE: Primary | ICD-10-CM

## 2024-12-17 DIAGNOSIS — M62.830 BACK MUSCLE SPASM: ICD-10-CM

## 2024-12-17 DIAGNOSIS — D50.9 IRON DEFICIENCY ANEMIA, UNSPECIFIED IRON DEFICIENCY ANEMIA TYPE: ICD-10-CM

## 2024-12-17 DIAGNOSIS — Z12.31 VISIT FOR SCREENING MAMMOGRAM: ICD-10-CM

## 2024-12-17 PROCEDURE — 99214 OFFICE O/P EST MOD 30 MIN: CPT | Performed by: NURSE PRACTITIONER

## 2024-12-17 PROCEDURE — G2211 COMPLEX E/M VISIT ADD ON: HCPCS | Performed by: NURSE PRACTITIONER

## 2024-12-17 RX ORDER — NIFEDIPINE 30 MG/1
30 TABLET, EXTENDED RELEASE ORAL DAILY
Qty: 90 TABLET | Refills: 3 | Status: SHIPPED | OUTPATIENT
Start: 2024-12-17

## 2024-12-17 RX ORDER — CYCLOBENZAPRINE HCL 10 MG
10 TABLET ORAL DAILY
Qty: 90 TABLET | Refills: 1 | Status: SHIPPED | OUTPATIENT
Start: 2024-12-17

## 2024-12-17 RX ORDER — FERROUS SULFATE 325(65) MG
325 TABLET ORAL
Qty: 90 TABLET | Refills: 1 | Status: SHIPPED | OUTPATIENT
Start: 2024-12-17

## 2024-12-17 RX ORDER — LISINOPRIL 2.5 MG/1
2.5 TABLET ORAL DAILY
Qty: 90 TABLET | Refills: 2 | Status: SHIPPED | OUTPATIENT
Start: 2024-12-17

## 2024-12-17 ASSESSMENT — PAIN SCALES - GENERAL: PAINLEVEL_OUTOF10: NO PAIN (0)

## 2024-12-17 NOTE — PATIENT INSTRUCTIONS
Elevated blood pressure is defined as blood pressure greater then 140/80.   Continue to monitor your blood pressure  If it remains elevated follow-up.

## 2024-12-17 NOTE — PROGRESS NOTES
Assessment & Plan     Raynaud's disease without gangrene  Stable   - NIFEdipine ER OSMOTIC (PROCARDIA XL) 30 MG 24 hr tablet; Take 1 tablet (30 mg) by mouth daily.    Back muscle spasm  Stable   - cyclobenzaprine (FLEXERIL) 10 MG tablet; Take 1 tablet (10 mg) by mouth daily.    Iron deficiency anemia, unspecified iron deficiency anemia type  Stable will continue with current medication  - ferrous sulfate (FEROSUL) 325 (65 Fe) MG tablet; Take 1 tablet (325 mg) by mouth daily (with breakfast).  - lisinopril (ZESTRIL) 2.5 MG tablet; Take 1 tablet (2.5 mg) by mouth daily.    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  - Miscellaneous DME Order    Visit for screening mammogram  - MA Screening Bilateral w/ Ra; Future      The longitudinal plan of care for the diagnosis(es)/condition(s) as documented were addressed during this visit. Due to the added complexity in care, I will continue to support Pat in the subsequent management and with ongoing continuity of care.        See Patient Instructions    Subjective   Pat is a 59 year old, presenting for the following health issues:  Hypertension        12/17/2024     1:07 PM   Additional Questions   Roomed by Charu ORTEGA     History of Present Illness       Reason for visit:  Blood pressure   She is taking medications regularly.         Concern - High Blood pressure readings  Onset: checked about 1 wk ago at DOT exam , needs to be checked by primary provider for DOT          Review of Systems  Constitutional, HEENT, cardiovascular, pulmonary, gi and gu systems are negative, except as otherwise noted.      Objective    /62   Pulse 88   Temp 97.5  F  "(36.4  C) (Tympanic)   Resp 18   Ht 1.715 m (5' 7.5\")   Wt 68.9 kg (152 lb)   LMP 01/04/2014   SpO2 97%   BMI 23.46 kg/m    Body mass index is 23.46 kg/m .  Physical Exam   GENERAL: alert and no distress  EYES: Eyes grossly normal to inspection, PERRL and conjunctivae and sclerae normal  HENT: ear canals and TM's normal, nose and mouth without ulcers or lesions  NECK: no adenopathy, no asymmetry, masses, or scars  RESP: lungs clear to auscultation - no rales, rhonchi or wheezes  CV: regular rate and rhythm, normal S1 S2, no S3 or S4, no murmur, click or rub, no peripheral edema  ABDOMEN: soft, nontender, no hepatosplenomegaly, no masses and bowel sounds normal  MS: no gross musculoskeletal defects noted, no edema  SKIN: no suspicious lesions or rashes  NEURO: Normal strength and tone, mentation intact and speech normal  PSYCH: mentation appears normal, affect normal/bright    No results found for any visits on 12/17/24.        Signed Electronically by: CONTRERAS Sales CNP    DME (Durable Medical Equipment) Orders and Documentation  Orders Placed This Encounter   Procedures    Miscellaneous DME Order        The patient was assessed and it was determined the patient is in need of the following listed DME Supplies/Equipment. Please complete supporting documentation below to demonstrate medical necessity.         "

## 2024-12-18 ENCOUNTER — PATIENT OUTREACH (OUTPATIENT)
Dept: CARE COORDINATION | Facility: CLINIC | Age: 59
End: 2024-12-18
Payer: COMMERCIAL

## 2025-02-08 ENCOUNTER — HEALTH MAINTENANCE LETTER (OUTPATIENT)
Age: 60
End: 2025-02-08

## 2025-03-25 DIAGNOSIS — M54.12 CERVICAL RADICULOPATHY: ICD-10-CM

## 2025-03-25 RX ORDER — MELOXICAM 15 MG/1
15 TABLET ORAL DAILY
Qty: 30 TABLET | Refills: 3 | Status: SHIPPED | OUTPATIENT
Start: 2025-03-25

## 2025-04-12 ENCOUNTER — ANCILLARY PROCEDURE (OUTPATIENT)
Dept: GENERAL RADIOLOGY | Facility: CLINIC | Age: 60
End: 2025-04-12
Attending: FAMILY MEDICINE
Payer: COMMERCIAL

## 2025-04-12 ENCOUNTER — OFFICE VISIT (OUTPATIENT)
Dept: URGENT CARE | Facility: URGENT CARE | Age: 60
End: 2025-04-12
Payer: COMMERCIAL

## 2025-04-12 VITALS
RESPIRATION RATE: 18 BRPM | TEMPERATURE: 99.1 F | SYSTOLIC BLOOD PRESSURE: 145 MMHG | OXYGEN SATURATION: 99 % | HEIGHT: 68 IN | DIASTOLIC BLOOD PRESSURE: 77 MMHG | BODY MASS INDEX: 22.88 KG/M2 | HEART RATE: 91 BPM | WEIGHT: 151 LBS

## 2025-04-12 DIAGNOSIS — R05.1 ACUTE COUGH: ICD-10-CM

## 2025-04-12 DIAGNOSIS — R07.1 CHEST PAIN ON BREATHING: ICD-10-CM

## 2025-04-12 DIAGNOSIS — J06.9 UPPER RESPIRATORY TRACT INFECTION, UNSPECIFIED TYPE: Primary | ICD-10-CM

## 2025-04-12 LAB
FLUAV AG SPEC QL IA: NEGATIVE
FLUBV AG SPEC QL IA: NEGATIVE

## 2025-04-12 PROCEDURE — 87635 SARS-COV-2 COVID-19 AMP PRB: CPT | Performed by: FAMILY MEDICINE

## 2025-04-12 PROCEDURE — 3078F DIAST BP <80 MM HG: CPT | Performed by: FAMILY MEDICINE

## 2025-04-12 PROCEDURE — 87804 INFLUENZA ASSAY W/OPTIC: CPT | Performed by: FAMILY MEDICINE

## 2025-04-12 PROCEDURE — 3077F SYST BP >= 140 MM HG: CPT | Performed by: FAMILY MEDICINE

## 2025-04-12 PROCEDURE — 93000 ELECTROCARDIOGRAM COMPLETE: CPT | Performed by: FAMILY MEDICINE

## 2025-04-12 PROCEDURE — 71046 X-RAY EXAM CHEST 2 VIEWS: CPT | Mod: TC | Performed by: INTERNAL MEDICINE

## 2025-04-12 PROCEDURE — 99214 OFFICE O/P EST MOD 30 MIN: CPT | Performed by: FAMILY MEDICINE

## 2025-04-12 RX ORDER — PREDNISONE 20 MG/1
20 TABLET ORAL DAILY
Qty: 5 TABLET | Refills: 0 | Status: SHIPPED | OUTPATIENT
Start: 2025-04-12 | End: 2025-04-17

## 2025-04-12 NOTE — PROGRESS NOTES
Assessment & Plan       ICD-10-CM    1. Upper respiratory tract infection, unspecified type  J06.9       2. Acute cough  R05.1 XR Chest 2 Views     Influenza A & B Antigen - Clinic Collect     COVID-19 Virus (Coronavirus) by PCR Nose     predniSONE (DELTASONE) 20 MG tablet      3. Chest pain on breathing  R07.1 EKG 12-lead complete w/read - Clinics     predniSONE (DELTASONE) 20 MG tablet         I reassured her that her exam today is normal.  Based on her history it is unlikely that she has a rib fracture especially if it is bilateral but more than likely pulled her intercostal muscles.  We discussed using heat and Tylenol for that.  She may continue her routine chronic medications.  I did agree to put her on a short course of prednisone 20 mg daily.  Hopefully this will help with her pain and inflammation and cough so that she can get some sleep.  We discussed the need to stay better hydrated.  Discussed symptoms to watch for with worsening illness and follow-up as needed.  Work excuse given.    Will notify if COVID-positive.    See patient instructions:    Patient Instructions   Pat, I am happy to see that your x-ray and your EKG are both normal today.  I did send you a prescription for prednisone 20 mg.  Please take 1 tablet daily for the next 5 days.  Hopefully this will help with some of the pain that you are having as well as the coughing.    Please drink plenty of fluids to stay well hydrated.      Use acetaminophen (Tylenol and other brands) as needed for fever or pain.      I have excused you from work through Tuesday. You may return Wednesday or later when you are feeling better, at your discretion.     If your symptoms are getting worse with increased pain, difficulty breathing, or high fever or inability to stay hydrated then you might need to come to the emergency room.      Homa Castro MD  [facility] [place]    Subjective     Adrianne is a 60 year old female who  "presents to clinic today for the following health issues:  Chief Complaint   Patient presents with    Urgent Care    Cough     Per patient states she had a cold on and off since March with cough states last night she had violent cough and now painful to take a deep breath in, has had vomiting episodes thinks its due to the cough      HPI    See notes above.   She has been sick but not this bad since early March, but it actually seemed to get somewhat better. Then last weekend she spent time with friends and it seemed to get worse afterward, she was out shopping and stayed at hotel so concerned she may have been exposed to something new. Monday her symptoms started with a little catch in her throat, then developed cough and last night she had a severe coughing fit and ended up having anterior chest pain, lower rib cage, R>L, coughing so bad that she had some post tussive emesis and couldn't sleep. No fever. No blood, little bit of phlegm but mostly dry cough. No other vomiting, no diarrhea. Has had sips water today. Yesterday took cough syrup/Dayquil. Never smoker.     7 pt ROS is otherwise negative except as noted in HPI.      Objective    BP (!) 145/77   Pulse 91   Temp 99.1  F (37.3  C) (Tympanic)   Resp 18   Ht 1.715 m (5' 7.5\")   Wt 68.5 kg (151 lb)   LMP 01/04/2014   SpO2 99%   BMI 23.30 kg/m    Physical Exam   Vitals noted.  Patient alert, oriented, and in no acute distress but is leaning forward, uncomfortable to sit in chair.   No respiratory distress, speaks full sentences.   Ears:  Canals clear, TM's nl bilaterally.  No erythema or fluid.   Eyes:  bright without discharge or injection. PER and EOMI.    Oral:  Oropharynx nl without erythema, exudate, mass or other lesions.   Neck:  Supple without lymphadenopathy, JVD or masses.   CV:  RRR without murmur.   Respiratory:  Lungs clear to auscultation bilaterally.   No tenderness to palpation of the rib cage anteriorly or the RUQ, midepigastrium or LUQ. " No HSM.     CXR taken today and independently reviewed by me and shows:   No acute infiltrate, mass or other acute pathology.  No evidence for rib fractures.    EKG taken and shows NSR with occasional ectopic beat but no ischemic changes.     Orders Placed This Encounter   Procedures    XR Chest 2 Views    COVID-19 Virus (Coronavirus) by PCR Nose    EKG 12-lead complete w/read - Clinics      Results for orders placed or performed in visit on 04/12/25   XR Chest 2 Views     Status: None    Narrative    EXAM: XR CHEST 2 VIEWS  LOCATION: [facility]  DATE: 4/12/2025    INDICATION: cough and chest pain, rule out pneumonia  COMPARISON: None.      Impression    IMPRESSION: Negative chest.   Results for orders placed or performed in visit on 04/12/25   Influenza A & B Antigen - Clinic Collect     Status: Normal    Specimen: Nose; Swab   Result Value Ref Range    Influenza A antigen Negative Negative    Influenza B antigen Negative Negative    Narrative    Test results must be correlated with clinical data. If necessary, results should be confirmed by a molecular assay or viral culture.      Influenza negative, covid pending.

## 2025-04-12 NOTE — PATIENT INSTRUCTIONS
Pat, I am happy to see that your x-ray and your EKG are both normal today.  I did send you a prescription for prednisone 20 mg.  Please take 1 tablet daily for the next 5 days.  Hopefully this will help with some of the pain that you are having as well as the coughing.    Please drink plenty of fluids to stay well hydrated.      Use acetaminophen (Tylenol and other brands) as needed for fever or pain.      I have excused you from work through Tuesday. You may return Wednesday or later when you are feeling better, at your discretion.     If your symptoms are getting worse with increased pain, difficulty breathing, or high fever or inability to stay hydrated then you might need to come to the emergency room.

## 2025-04-12 NOTE — LETTER
April 12, 2025      Maryann Riggs  [address] [id number]        To Whom It May Concern:    Maryann Riggs  was seen on 4/12/2025.  Please excuse her until at least Wednesday 4/16/25 due to illness. She may return after that date when her symptoms have improved.             Sincerely,          Homa Castro MD

## 2025-04-12 NOTE — PROGRESS NOTES
Urgent Care Clinic Visit    Chief Complaint   Patient presents with    Urgent Care    Cough     Per patient states she had a cold on and off since March with cough states last night she had violent cough and now painful to take a deep breath in, has had vomiting episodes thinks its due to the cough               4/12/2025     9:14 AM   Additional Questions   Roomed by PARUL Gonzalez   Accompanied by Self

## 2025-04-13 LAB — SARS-COV-2 RNA RESP QL NAA+PROBE: NEGATIVE

## 2025-04-15 ENCOUNTER — HOSPITAL ENCOUNTER (EMERGENCY)
Facility: CLINIC | Age: 60
Discharge: HOME | End: 2025-04-15
Attending: PHYSICIAN ASSISTANT | Admitting: PHYSICIAN ASSISTANT
Payer: COMMERCIAL

## 2025-04-15 VITALS
HEART RATE: 87 BPM | TEMPERATURE: 97.7 F | RESPIRATION RATE: 16 BRPM | OXYGEN SATURATION: 98 % | SYSTOLIC BLOOD PRESSURE: 135 MMHG | DIASTOLIC BLOOD PRESSURE: 76 MMHG

## 2025-04-15 DIAGNOSIS — R07.81 RIB PAIN ON RIGHT SIDE: ICD-10-CM

## 2025-04-15 DIAGNOSIS — R05.9 COUGH: ICD-10-CM

## 2025-04-15 PROCEDURE — 96372 THER/PROPH/DIAG INJ SC/IM: CPT | Performed by: PHYSICIAN ASSISTANT

## 2025-04-15 PROCEDURE — 99213 OFFICE O/P EST LOW 20 MIN: CPT | Performed by: PHYSICIAN ASSISTANT

## 2025-04-15 PROCEDURE — 250N000011 HC RX IP 250 OP 636: Mod: JZ | Performed by: PHYSICIAN ASSISTANT

## 2025-04-15 PROCEDURE — G0463 HOSPITAL OUTPT CLINIC VISIT: HCPCS | Performed by: PHYSICIAN ASSISTANT

## 2025-04-15 RX ORDER — PREDNISONE 20 MG/1
TABLET ORAL
Qty: 10 TABLET | Refills: 0 | Status: SHIPPED | OUTPATIENT
Start: 2025-04-15

## 2025-04-15 RX ORDER — HYDROCODONE BITARTRATE AND ACETAMINOPHEN 5; 325 MG/1; MG/1
1 TABLET ORAL EVERY 6 HOURS PRN
Qty: 8 TABLET | Refills: 0 | Status: SHIPPED | OUTPATIENT
Start: 2025-04-15 | End: 2025-04-18

## 2025-04-15 RX ORDER — KETOROLAC TROMETHAMINE 30 MG/ML
30 INJECTION, SOLUTION INTRAMUSCULAR; INTRAVENOUS ONCE
Status: COMPLETED | OUTPATIENT
Start: 2025-04-15 | End: 2025-04-15

## 2025-04-15 RX ORDER — BENZONATATE 200 MG/1
200 CAPSULE ORAL 3 TIMES DAILY PRN
Qty: 20 CAPSULE | Refills: 0 | Status: SHIPPED | OUTPATIENT
Start: 2025-04-15

## 2025-04-15 RX ADMIN — KETOROLAC TROMETHAMINE 30 MG: 30 INJECTION, SOLUTION INTRAMUSCULAR; INTRAVENOUS at 15:18

## 2025-04-15 ASSESSMENT — COLUMBIA-SUICIDE SEVERITY RATING SCALE - C-SSRS
1. IN THE PAST MONTH, HAVE YOU WISHED YOU WERE DEAD OR WISHED YOU COULD GO TO SLEEP AND NOT WAKE UP?: NO
2. HAVE YOU ACTUALLY HAD ANY THOUGHTS OF KILLING YOURSELF IN THE PAST MONTH?: NO
6. HAVE YOU EVER DONE ANYTHING, STARTED TO DO ANYTHING, OR PREPARED TO DO ANYTHING TO END YOUR LIFE?: NO

## 2025-04-15 ASSESSMENT — ACTIVITIES OF DAILY LIVING (ADL): ADLS_ACUITY_SCORE: 41

## 2025-04-15 ASSESSMENT — ENCOUNTER SYMPTOMS
COUGH: 1
CONSTITUTIONAL NEGATIVE: 1
GASTROINTESTINAL NEGATIVE: 1
FEVER: 0

## 2025-04-15 NOTE — LETTER
April 15, 2025      To Whom It May Concern:      Maryann Riggs was seen in our Urgent Care today, 04/15/25.  Please excuse patient from work for the remainder of this week as she will be taking pain medications for cough-related rib pain and should not drive or operate machinery.  Thank you.      Sincerely,        Lea Serrano PA-C  Electronically signed

## 2025-04-15 NOTE — ED PROVIDER NOTES
History     Chief Complaint   Patient presents with    Cough     HPI  Maryann Riggs is a 60 year old female who presents to Urgent Care with complaints of ongoing coughing fits and right lower rib pain.  She states she has had an ongoing cough since last month but it acutely worsened over the past week.  The cough is nonproductive.  She was evaluated at [facility] 3 days ago for the same with negative chest x-ray and viral testing at that time.  She was prescribed prednisone which she has been taking with some improvement but this is her last day.  She states she felt pretty good yesterday and then her coughing fits increased along with spasms of pain to her right lower chest wall.  Rib pain is worse with coughing and palpation.  Patient denies associated shortness of breath or difficulties breathing.  Has some slight nasal drainage with this.  Denies fevers, chills, nausea, vomiting, diarrhea, abdominal pain or leg pain/swelling.  Denies history of asthma or underlying lung disease.  She does not smoke.      Allergies:  Allergies   Allergen Reactions    Desyrel [Trazodone Hcl]      Sig mood disturbance      Lamictal [Lamotrigine]        Problem List:    Patient Active Problem List    Diagnosis Date Noted    History of Billroth II operation 04/25/2023     Priority: Medium    Lye ingestion 04/25/2023     Priority: Medium    Lumbar radiculopathy 07/02/2020     Priority: Medium    Chronic bilateral low back pain without sciatica 09/19/2016     Priority: Medium    SI (sacroiliac) joint dysfunction; see details, pain management contract 03/01/2016     Priority: Medium    Primary generalized (osteo)arthritis 11/04/2013     Priority: Medium    Adenomatous polyp of colon 10/10/2013     Priority: Medium    Sprain of MCP joint of hand 09/23/2013     Priority: Medium     3rd mcp inflammation       Rotator cuff injury; bilateral repairs 2005 09/23/2013     Priority: Medium    Esophageal stricture 01/20/2012      Priority: Medium     Has it dilated every 14 weeks  Dr5. Gephart x 14 weeks         Episodic mood disorder 12/15/2011     Priority: Medium     Problem list name updated by automated process. Provider to review      Muscle tension headache 10/09/2011     Priority: Medium    Esophageal abnormality 10/09/2011     Priority: Medium    CARDIOVASCULAR SCREENING; LDL GOAL LESS THAN 160 05/30/2011     Priority: Medium    PTSD (post-traumatic stress disorder) 05/30/2011     Priority: Medium    TMJ (temporomandibular joint syndrome) 05/27/2011     Priority: Medium    Raynaud's disease 02/10/2011     Priority: Medium     Procardia and nitro paste        Pain in joint 01/07/2011     Priority: Medium        Past Medical History:    Past Medical History:   Diagnosis Date    Anger reaction     Cholelithiasis     Esophageal stricture     Generalized osteoarthrosis, unspecified site (aka ARTHRITIS)     PTSD (post-traumatic stress disorder)     Raynaud's disease        Past Surgical History:    Past Surgical History:   Procedure Laterality Date    COLONOSCOPY N/A 9/27/2024    Procedure: COLONOSCOPY, FLEXIBLE, WITH LESION REMOVAL USING SNARE;  Surgeon: Bk Laura MD;  Location: [facility]    MAMMOPLASTY REDUCTION      OTHER SURGICAL HISTORY      s/p left knee arthroscopinc surgery    ROTATOR CUFF REPAIR RT/LT  01/01/2005    SMALL BOWEL RESECTION      TUBAL LIGATION         Family History:    Family History   Problem Relation Age of Onset    Unknown/Adopted Mother     Unknown/Adopted Father     Unknown/Adopted Maternal Grandmother     Unknown/Adopted Maternal Grandfather     Unknown/Adopted Paternal Grandmother     Unknown/Adopted Paternal Grandfather        Social History:  Marital Status:  Single [1]  Social History     Tobacco Use    Smoking status: Never    Smokeless tobacco: Never   Vaping Use    Vaping status: Never Used   Substance Use Topics    Alcohol use: Yes     Comment: scoial    Drug use: No        Medications:     benzonatate (TESSALON) 200 MG capsule  HYDROcodone-acetaminophen (NORCO) 5-325 MG tablet  predniSONE (DELTASONE) 20 MG tablet  acetaminophen (PAIN RELIEF EXTRA STRENGTH) 500 MG tablet  Ascorbic Acid (VITAMIN C CR PO)  Cyanocobalamin (VITAMIN B 12 PO)  cyclobenzaprine (FLEXERIL) 10 MG tablet  DTx Lo - Wellness (WW CORE) MISC  ferrous sulfate (FEROSUL) 325 (65 Fe) MG tablet  GLUCOSAMINE CHONDROITIN COMPLX PO  lisinopril (ZESTRIL) 2.5 MG tablet  MAGNESIUM OXIDE PO  melatonin 3 MG tablet  meloxicam (MOBIC) 15 MG tablet  NIFEdipine ER OSMOTIC (PROCARDIA XL) 30 MG 24 hr tablet  omeprazole (PRILOSEC) 20 MG DR capsule  predniSONE (DELTASONE) 20 MG tablet  zolpidem (AMBIEN) 5 MG tablet          Review of Systems   Constitutional: Negative.  Negative for fever.   Respiratory:  Positive for cough.    Gastrointestinal: Negative.    Musculoskeletal:         Right-sided rib pain   All other systems reviewed and are negative.      Physical Exam   BP: 135/76  Pulse: 87  Temp: 97.7  F (36.5  C)  Resp: 16  SpO2: 98 %      Physical Exam  Constitutional:       General: She is in acute distress.      Appearance: Normal appearance. She is well-developed. She is not ill-appearing, toxic-appearing or diaphoretic.   HENT:      Head: Normocephalic and atraumatic.      Right Ear: Tympanic membrane, ear canal and external ear normal.      Left Ear: Tympanic membrane, ear canal and external ear normal.      Nose: Nose normal. No congestion or rhinorrhea.      Mouth/Throat:      Lips: Pink.      Mouth: Mucous membranes are moist.      Pharynx: Oropharynx is clear. Uvula midline. No pharyngeal swelling, oropharyngeal exudate, posterior oropharyngeal erythema, uvula swelling or postnasal drip.      Tonsils: No tonsillar exudate or tonsillar abscesses.   Eyes:      Extraocular Movements: Extraocular movements intact.      Conjunctiva/sclera: Conjunctivae normal.      Pupils: Pupils are equal, round, and reactive to light.   Cardiovascular:       Rate and Rhythm: Normal rate and regular rhythm.      Heart sounds: Normal heart sounds.   Pulmonary:      Effort: Pulmonary effort is normal. No respiratory distress.      Breath sounds: Normal breath sounds and air entry. No stridor, decreased air movement or transmitted upper airway sounds. No decreased breath sounds, wheezing, rhonchi or rales.      Comments: Occasional coughing fits during the exam  Chest:      Chest wall: Tenderness present.          Comments: Tenderness to palpation along the right anterior inferior ribs inferior to the right breast.  No overlying skin changes or rash.    Abdominal:      General: There is no distension.      Palpations: Abdomen is soft.      Tenderness: There is no abdominal tenderness. There is no right CVA tenderness, left CVA tenderness, guarding or rebound.   Musculoskeletal:         General: Normal range of motion.      Cervical back: Full passive range of motion without pain, normal range of motion and neck supple. No rigidity. Normal range of motion.   Lymphadenopathy:      Cervical: No cervical adenopathy.   Skin:     General: Skin is warm and dry.   Neurological:      Mental Status: She is alert and oriented to person, place, and time.   Psychiatric:         Behavior: Behavior is cooperative.         ED Course        Procedures    No results found for this or any previous visit (from the past 24 hours).    Medications   ketorolac (TORADOL) injection 30 mg (30 mg Intramuscular $Given 4/15/25 8653)       WELLS PE SCORE for Pulmonary Embolism likelihood (calculator)  Background  Calculates likelihood of PE based on 7 criteria including suspected DVT, HR>100, recent surgery or immobilization, prior VTE, hemoptysis, active cancer.  Data  has Raynaud's disease; TMJ (temporomandibular joint syndrome); CARDIOVASCULAR SCREENING; LDL GOAL LESS THAN 160; PTSD (post-traumatic stress disorder); Muscle tension headache; Esophageal abnormality; Episodic mood disorder; Esophageal  stricture; Sprain of MCP joint of hand; Rotator cuff injury; bilateral repairs 2005; Primary generalized (osteo)arthritis; SI (sacroiliac) joint dysfunction; see details, pain management contract; Chronic bilateral low back pain without sciatica; Lumbar radiculopathy; Adenomatous polyp of colon; History of Billroth II operation; Lye ingestion; and Pain in joint on their problem list.   has a past surgical history that includes Mammoplasty reduction; tubal ligation; small bowel resection; rotator cuff repair rt/lt (01/01/2005); other surgical history; and Colonoscopy (N/A, 9/27/2024).  Pulse: 87  Criteria   Of possible 12.5 points for 7 possible items:  NEGATIVE  Interpretation  Ludell PE Score: 0  Score 0-2 points: Low PE probability (3.6% risk)      Assessments & Plan (with Medical Decision Making)     Pt is a 60 year old female who presents to Urgent Care with complaints of ongoing coughing fits and right lower rib pain.  She states she has had an ongoing cough since last month but it acutely worsened over the past week.  The cough is nonproductive.  She was evaluated at [place] [facility] 3 days ago for the same with negative chest x-ray and viral testing at that time.  She was prescribed prednisone which she has been taking with some improvement but this is her last day.  She states she felt pretty good yesterday and then her coughing fits increased along with spasms of pain to her right lower chest wall.  Rib pain is worse with coughing and palpation.  Patient denies associated shortness of breath or difficulties breathing.     Pt is afebrile on arrival.  Exam as above.  Pt is not hypoxic or tachycardic.  No respiratory distress.  She has tenderness to palpation along the right anterior inferior ribs inferior to the right breast on exam.  This recreates her pain.  No overlying skin changes or rash.  X-rays from 3 days ago were reviewed and were negative for evidence of pneumonia or rib fracture.  We  discussed limitation of x-ray in regards to evaluating for rib fracture and discussed that she could have an occult rib fracture from all of her coughing or possibly an intercostal muscle strain given her preceding coughing and reproducible pain on exam.  Will therefore extend her prednisone course as this has been helpful with treating her cough.  Will also add on Tessalon for the cough and Norco for severe breakthrough rib pain.  Also recommended use of lidocaine patches over-the-counter.  Lower suspicion for PE given her lack of dyspnea/shortness of breath, normal oxygen saturations, normal heart rate, and lack of risk factors.  IM Toradol given here for pain.  Encouraged continued monitoring and symptomatic treatments at home.  Return precautions were reviewed.  Hand-outs were provided.    Patient was instructed to follow-up with PCP for continued care and management.  She is to return to the ED for persistent and/or worsening symptoms.  Patient expressed understanding of the diagnosis and plan and was discharged home in good condition.    I have reviewed the nursing notes.    I have reviewed the findings, diagnosis, plan and need for follow up with the patient.    Discharge Medication List as of 4/15/2025  3:16 PM        START taking these medications    Details   benzonatate (TESSALON) 200 MG capsule Take 1 capsule (200 mg) by mouth 3 times daily as needed for cough., Disp-20 capsule, R-0, E-Prescribe      HYDROcodone-acetaminophen (NORCO) 5-325 MG tablet Take 1 tablet by mouth every 6 hours as needed for severe pain., Disp-8 tablet, R-0, Local Print             Final diagnoses:   Cough   Rib pain on right side       4/15/2025   [facility] EMERGENCY DEPT      Disclaimer:  This note consists of symbols derived from keyboarding, dictation and/or voice recognition software.  As a result, there may be errors in the script that have gone undetected.  Please consider this when interpreting information  found in this chart.     Lea Serrano PA-C  04/15/25 [id number]

## 2025-04-15 NOTE — Clinical Note
[facility] EMERGENCY DEPT  [address] [address]  [phone]      April 15, 2025    Maryann Riggs  [address] [address] [phone] (home) [phone] (work)    : 1965      To Whom it may concern:    Mayrann Riggs was seen in our Emergency Department today, April 15, 2025 for an injury that was reported to be work related.      For the next 1 days she should not work    The employee might be taking medication so that they cannot operate moving machinery or perform activities that require balancing or working above ground.    {Restrictions:006227057}    {WC RESTRICT:842010}    Sincerely,    Lea Serrano PA-C    Electronically signed

## 2025-04-17 ENCOUNTER — VIRTUAL VISIT (OUTPATIENT)
Dept: FAMILY MEDICINE | Facility: CLINIC | Age: 60
End: 2025-04-17
Payer: COMMERCIAL

## 2025-04-17 DIAGNOSIS — M54.6 ACUTE BILATERAL THORACIC BACK PAIN: ICD-10-CM

## 2025-04-17 DIAGNOSIS — J20.9 ACUTE BRONCHITIS WITH SYMPTOMS > 10 DAYS: Primary | ICD-10-CM

## 2025-04-17 RX ORDER — OXYCODONE HYDROCHLORIDE 5 MG/1
5 TABLET ORAL EVERY 6 HOURS PRN
Qty: 12 TABLET | Refills: 0 | Status: SHIPPED | OUTPATIENT
Start: 2025-04-17 | End: 2025-04-20

## 2025-04-17 RX ORDER — ALBUTEROL SULFATE 90 UG/1
2 INHALANT RESPIRATORY (INHALATION) EVERY 6 HOURS PRN
Qty: 18 G | Refills: 0 | Status: SHIPPED | OUTPATIENT
Start: 2025-04-17

## 2025-04-17 RX ORDER — PREDNISONE 20 MG/1
TABLET ORAL
Qty: 20 TABLET | Refills: 0 | Status: SHIPPED | OUTPATIENT
Start: 2025-04-17

## 2025-04-17 NOTE — PROGRESS NOTES
"Adrianne is a 60 year old who is being evaluated via a billable video visit.    How would you like to obtain your AVS? MyChart  If the video visit is dropped, the invitation should be resent by: Text to cell phone: [phone]  Will anyone else be joining your video visit? No  {If patient encounters technical issues they should call [phone] :672556}    {PROVIDER CHARTING PREFERENCE:905501}    Subjective   Pat is a 60 year old, presenting for the following health issues:  No chief complaint on file.  {(!) Visit Details have not yet been documented.  Please enter Visit Details and then use this list to pull in documentation. (Optional):028260}  HPI      {SUPERLIST (Optional):177367}  {additonal problems for provider to add (Optional):893975}    {ROS Picklists (Optional):154827}      Objective           Vitals:  No vitals were obtained today due to virtual visit.    Physical Exam   {video visit exam brief selected:995286}    {Diagnostic Test Results (Optional):947681}      Video-Visit Details    Type of service:  Video Visit   Originating Location (pt. Location): {video visit patient location:937457::\"Home\"}  {PROVIDER LOCATION On-site should be selected for visits conducted from your clinic location or adjoining [facility] hospital, academic office, or other nearby [facility] building. Off-site should be selected for all other provider locations, including home:396699}  Distant Location (provider location):  {virtual location provider:177086}  Platform used for Video Visit: {Virtual Visit Platforms:737998::\"Cura TV\"}  Signed Electronically by: CONTRERAS Sales CNP  {Email feedback regarding this note to [email or web address]   :558363}  "

## 2025-04-17 NOTE — LETTER
April 17, 2025      Maryann Riggs  [address] [address]        To Whom It May Concern:    Maryann Riggs was seen in our clinic. She may return to work without restrictions on 4/21/2025.      Sincerely,        CONTRERAS Sales CNP    Electronically signed

## 2025-04-17 NOTE — PROGRESS NOTES
Adrianne is a 60 year old who is being evaluated via a billable video visit.    How would you like to obtain your AVS? MyChart  If the video visit is dropped, the invitation should be resent by: Text to cell phone: [phone]  Will anyone else be joining your video visit? No      Assessment & Plan   Problem List Items Addressed This Visit    None  Visit Diagnoses       Acute bronchitis with symptoms > 10 days    -  Primary    Relevant Medications    predniSONE (DELTASONE) 20 MG tablet    albuterol (PROAIR HFA/PROVENTIL HFA/VENTOLIN HFA) 108 (90 Base) MCG/ACT inhaler    Other Relevant Orders    REVIEW OF HEALTH MAINTENANCE PROTOCOL ORDERS (Completed)    Acute bilateral thoracic back pain        Relevant Medications    predniSONE (DELTASONE) 20 MG tablet    oxyCODONE (ROXICODONE) 5 MG tablet                MED REC REQUIRED  Post Medication Reconciliation Status:  Discharge medications reconciled, continue medications without change  Follow-up       Subjective   Adrianne is a 60 year old, presenting for the following health issues:  ER F/U        4/17/2025    10:55 AM   Additional Questions   Roomed by parminder   Accompanied by self     HPI      ED/UC Followup:    Facility:  [facility]  Date of visit: 4/15/2025  Reason for visit: cough and rib pain   Current Status: still having coughing attacks, coughs so hard she is dry heaving, hydrocodone is helping with the pain   Would like more tessalon             Review of Systems  Constitutional, HEENT, cardiovascular, pulmonary, gi and gu systems are negative, except as otherwise noted.      Objective           Vitals:  No vitals were obtained today due to virtual visit.    Physical Exam   GENERAL: alert and no distress  EYES: Eyes grossly normal to inspection.  No discharge or erythema, or obvious scleral/conjunctival abnormalities.  RESP: No audible wheeze, cough, or visible cyanosis.    SKIN: Visible skin clear. No significant rash, abnormal pigmentation or  lesions.  NEURO: Cranial nerves grossly intact.  Mentation and speech appropriate for age.  PSYCH: Appropriate affect, tone, and pace of words    No results found for any visits on 04/17/25.      Video-Visit Details    Type of service:  Video Visit   Originating Location (pt. Location): Home    Distant Location (provider location):  On-site  Platform used for Video Visit: [place]  Signed Electronically by: CONTRERAS Sales CNP

## 2025-04-18 ENCOUNTER — HOSPITAL ENCOUNTER (EMERGENCY)
Facility: CLINIC | Age: 60
Discharge: ANOTHER HOSPITAL | DRG: 853 | End: 2025-04-19
Payer: COMMERCIAL

## 2025-04-18 ENCOUNTER — APPOINTMENT (OUTPATIENT)
Dept: CT IMAGING | Facility: CLINIC | Age: 60
DRG: 853 | End: 2025-04-18
Payer: COMMERCIAL

## 2025-04-18 DIAGNOSIS — K81.9 CHOLECYSTITIS: ICD-10-CM

## 2025-04-18 DIAGNOSIS — A41.9 SEPSIS, DUE TO UNSPECIFIED ORGANISM, UNSPECIFIED WHETHER ACUTE ORGAN DYSFUNCTION PRESENT (H): ICD-10-CM

## 2025-04-18 DIAGNOSIS — K83.09 CHOLANGITIS (H): ICD-10-CM

## 2025-04-18 LAB
ERYTHROCYTE [DISTWIDTH] IN BLOOD BY AUTOMATED COUNT: 12.4 % (ref 10–15)
HCT VFR BLD AUTO: 42.7 % (ref 35–47)
HGB BLD-MCNC: 14.1 G/DL (ref 11.7–15.7)
HOLD SPECIMEN: NORMAL
LIPASE SERPL-CCNC: 32 U/L (ref 13–60)
MCH RBC QN AUTO: 29 PG (ref 26.5–33)
MCHC RBC AUTO-ENTMCNC: 33 G/DL (ref 31.5–36.5)
MCV RBC AUTO: 88 FL (ref 78–100)
PLATELET # BLD AUTO: 311 10E3/UL (ref 150–450)
RBC # BLD AUTO: 4.86 10E6/UL (ref 3.8–5.2)
TROPONIN T SERPL HS-MCNC: 36 NG/L
WBC # BLD AUTO: 18.8 10E3/UL (ref 4–11)

## 2025-04-18 PROCEDURE — 80048 BASIC METABOLIC PNL TOTAL CA: CPT

## 2025-04-18 PROCEDURE — 83690 ASSAY OF LIPASE: CPT

## 2025-04-18 PROCEDURE — 99285 EMERGENCY DEPT VISIT HI MDM: CPT | Mod: 25

## 2025-04-18 PROCEDURE — 36415 COLL VENOUS BLD VENIPUNCTURE: CPT

## 2025-04-18 PROCEDURE — 250N000011 HC RX IP 250 OP 636

## 2025-04-18 PROCEDURE — 93005 ELECTROCARDIOGRAM TRACING: CPT

## 2025-04-18 PROCEDURE — 250N000009 HC RX 250

## 2025-04-18 PROCEDURE — 93010 ELECTROCARDIOGRAM REPORT: CPT

## 2025-04-18 PROCEDURE — 74177 CT ABD & PELVIS W/CONTRAST: CPT

## 2025-04-18 PROCEDURE — 85014 HEMATOCRIT: CPT

## 2025-04-18 PROCEDURE — 82248 BILIRUBIN DIRECT: CPT

## 2025-04-18 PROCEDURE — 84484 ASSAY OF TROPONIN QUANT: CPT

## 2025-04-18 PROCEDURE — 99285 EMERGENCY DEPT VISIT HI MDM: CPT

## 2025-04-18 RX ORDER — IOPAMIDOL 755 MG/ML
72 INJECTION, SOLUTION INTRAVASCULAR ONCE
Status: COMPLETED | OUTPATIENT
Start: 2025-04-18 | End: 2025-04-18

## 2025-04-18 RX ORDER — ONDANSETRON 2 MG/ML
INJECTION INTRAMUSCULAR; INTRAVENOUS
Status: COMPLETED
Start: 2025-04-18 | End: 2025-04-18

## 2025-04-18 RX ORDER — HYDROMORPHONE HYDROCHLORIDE 1 MG/ML
0.5 INJECTION, SOLUTION INTRAMUSCULAR; INTRAVENOUS; SUBCUTANEOUS
Status: COMPLETED | OUTPATIENT
Start: 2025-04-18 | End: 2025-04-19

## 2025-04-18 RX ADMIN — SODIUM CHLORIDE 80 ML: 9 INJECTION, SOLUTION INTRAVENOUS at 23:47

## 2025-04-18 RX ADMIN — ONDANSETRON 4 MG: 2 INJECTION, SOLUTION INTRAMUSCULAR; INTRAVENOUS at 23:36

## 2025-04-18 RX ADMIN — IOPAMIDOL 72 ML: 755 INJECTION, SOLUTION INTRAVENOUS at 23:47

## 2025-04-18 ASSESSMENT — COLUMBIA-SUICIDE SEVERITY RATING SCALE - C-SSRS
2. HAVE YOU ACTUALLY HAD ANY THOUGHTS OF KILLING YOURSELF IN THE PAST MONTH?: NO
6. HAVE YOU EVER DONE ANYTHING, STARTED TO DO ANYTHING, OR PREPARED TO DO ANYTHING TO END YOUR LIFE?: NO
1. IN THE PAST MONTH, HAVE YOU WISHED YOU WERE DEAD OR WISHED YOU COULD GO TO SLEEP AND NOT WAKE UP?: NO

## 2025-04-18 ASSESSMENT — ACTIVITIES OF DAILY LIVING (ADL): ADLS_ACUITY_SCORE: 41

## 2025-04-19 ENCOUNTER — APPOINTMENT (OUTPATIENT)
Dept: ULTRASOUND IMAGING | Facility: CLINIC | Age: 60
DRG: 853 | End: 2025-04-19
Attending: EMERGENCY MEDICINE
Payer: COMMERCIAL

## 2025-04-19 ENCOUNTER — APPOINTMENT (OUTPATIENT)
Dept: INTERVENTIONAL RADIOLOGY/VASCULAR | Facility: CLINIC | Age: 60
DRG: 853 | End: 2025-04-19
Payer: COMMERCIAL

## 2025-04-19 ENCOUNTER — HOSPITAL ENCOUNTER (INPATIENT)
Facility: CLINIC | Age: 60
LOS: 4 days | Discharge: HOME | DRG: 853 | End: 2025-04-23
Attending: EMERGENCY MEDICINE | Admitting: STUDENT IN AN ORGANIZED HEALTH CARE EDUCATION/TRAINING PROGRAM
Payer: COMMERCIAL

## 2025-04-19 ENCOUNTER — APPOINTMENT (OUTPATIENT)
Dept: MRI IMAGING | Facility: CLINIC | Age: 60
DRG: 853 | End: 2025-04-19
Attending: INTERNAL MEDICINE
Payer: COMMERCIAL

## 2025-04-19 VITALS
WEIGHT: 151 LBS | HEART RATE: 98 BPM | SYSTOLIC BLOOD PRESSURE: 141 MMHG | TEMPERATURE: 101.4 F | OXYGEN SATURATION: 93 % | BODY MASS INDEX: 22.88 KG/M2 | HEIGHT: 68 IN | DIASTOLIC BLOOD PRESSURE: 82 MMHG

## 2025-04-19 DIAGNOSIS — M54.6 ACUTE BILATERAL THORACIC BACK PAIN: ICD-10-CM

## 2025-04-19 DIAGNOSIS — R79.89 ELEVATED LFTS: ICD-10-CM

## 2025-04-19 DIAGNOSIS — K83.09 CHOLANGITIS (H): ICD-10-CM

## 2025-04-19 DIAGNOSIS — R65.10 SIRS (SYSTEMIC INFLAMMATORY RESPONSE SYNDROME) (H): ICD-10-CM

## 2025-04-19 DIAGNOSIS — R78.81 GRAM-NEGATIVE BACTEREMIA: ICD-10-CM

## 2025-04-19 DIAGNOSIS — Z98.84 HISTORY OF ROUX-EN-Y GASTRIC BYPASS: ICD-10-CM

## 2025-04-19 DIAGNOSIS — R10.11 ABDOMINAL PAIN, RIGHT UPPER QUADRANT: ICD-10-CM

## 2025-04-19 DIAGNOSIS — M25.511 RIGHT SHOULDER PAIN, UNSPECIFIED CHRONICITY: Primary | ICD-10-CM

## 2025-04-19 LAB
ABO + RH BLD: NORMAL
ACB COMPLEX DNA BLD POS QL NAA+NON-PROBE: NOT DETECTED
ALBUMIN SERPL BCG-MCNC: 3.3 G/DL (ref 3.5–5.2)
ALBUMIN SERPL BCG-MCNC: 3.9 G/DL (ref 3.5–5.2)
ALP SERPL-CCNC: 487 U/L (ref 40–150)
ALP SERPL-CCNC: 648 U/L (ref 40–150)
ALT SERPL W P-5'-P-CCNC: 1295 U/L (ref 0–50)
ALT SERPL W P-5'-P-CCNC: 920 U/L (ref 0–50)
ANION GAP SERPL CALCULATED.3IONS-SCNC: 11 MMOL/L (ref 7–15)
ANION GAP SERPL CALCULATED.3IONS-SCNC: 13 MMOL/L (ref 7–15)
AST SERPL W P-5'-P-CCNC: 221 U/L (ref 0–45)
AST SERPL W P-5'-P-CCNC: 340 U/L (ref 0–45)
B FRAGILIS DNA BLD POS QL NAA+NON-PROBE: NOT DETECTED
BASOPHILS # BLD AUTO: 0.1 10E3/UL (ref 0–0.2)
BASOPHILS NFR BLD AUTO: 0 %
BILIRUB DIRECT SERPL-MCNC: 3.03 MG/DL (ref 0–0.3)
BILIRUB SERPL-MCNC: 3.9 MG/DL
BILIRUB SERPL-MCNC: 4.6 MG/DL
BLACTX-M ISLT/SPM QL: NOT DETECTED
BLAIMP ISLT/SPM QL: NOT DETECTED
BLAKPC ISLT/SPM QL: NOT DETECTED
BLAOXA-48-LIKE ISLT/SPM QL: NOT DETECTED
BLAVIM ISLT/SPM QL: NOT DETECTED
BLD GP AB SCN SERPL QL: NEGATIVE
BUN SERPL-MCNC: 10.5 MG/DL (ref 8–23)
BUN SERPL-MCNC: 12.8 MG/DL (ref 8–23)
C ALBICANS DNA BLD POS QL NAA+NON-PROBE: NOT DETECTED
C AURIS DNA BLD POS QL NAA+NON-PROBE: NOT DETECTED
C GATTII+NEOFOR DNA BLD POS QL NAA+N-PRB: NOT DETECTED
C GLABRATA DNA BLD POS QL NAA+NON-PROBE: NOT DETECTED
C KRUSEI DNA BLD POS QL NAA+NON-PROBE: NOT DETECTED
C PARAP DNA BLD POS QL NAA+NON-PROBE: NOT DETECTED
C TROPICLS DNA BLD POS QL NAA+NON-PROBE: NOT DETECTED
CALCIUM SERPL-MCNC: 8.5 MG/DL (ref 8.8–10.4)
CALCIUM SERPL-MCNC: 9.6 MG/DL (ref 8.8–10.4)
CHLORIDE SERPL-SCNC: 89 MMOL/L (ref 98–107)
CHLORIDE SERPL-SCNC: 96 MMOL/L (ref 98–107)
COLISTIN RES MCR-1 ISLT/SPM QL: NOT DETECTED
CREAT SERPL-MCNC: 0.56 MG/DL (ref 0.51–0.95)
CREAT SERPL-MCNC: 0.59 MG/DL (ref 0.51–0.95)
E CLOAC COMP DNA BLD POS NAA+NON-PROBE: NOT DETECTED
E COLI DNA BLD POS QL NAA+NON-PROBE: NOT DETECTED
E FAECALIS DNA BLD POS QL NAA+NON-PROBE: NOT DETECTED
E FAECIUM DNA BLD POS QL NAA+NON-PROBE: NOT DETECTED
EGFRCR SERPLBLD CKD-EPI 2021: >90 ML/MIN/1.73M2
EGFRCR SERPLBLD CKD-EPI 2021: >90 ML/MIN/1.73M2
EOSINOPHIL # BLD AUTO: 0 10E3/UL (ref 0–0.7)
EOSINOPHIL NFR BLD AUTO: 0 %
ERYTHROCYTE [DISTWIDTH] IN BLOOD BY AUTOMATED COUNT: 12.8 % (ref 10–15)
GLUCOSE SERPL-MCNC: 211 MG/DL (ref 70–99)
GLUCOSE SERPL-MCNC: 265 MG/DL (ref 70–99)
GP B STREP DNA BLD POS QL NAA+NON-PROBE: NOT DETECTED
HAEM INFLU DNA BLD POS QL NAA+NON-PROBE: NOT DETECTED
HCO3 SERPL-SCNC: 25 MMOL/L (ref 22–29)
HCO3 SERPL-SCNC: 28 MMOL/L (ref 22–29)
HCT VFR BLD AUTO: 36.3 % (ref 35–47)
HGB BLD-MCNC: 12.2 G/DL (ref 11.7–15.7)
HOLD SPECIMEN: NORMAL
IMM GRANULOCYTES # BLD: 1.4 10E3/UL
IMM GRANULOCYTES NFR BLD: 4 %
INR BLD: 0.9
K OXYTOCA DNA BLD POS QL NAA+NON-PROBE: NOT DETECTED
KLEBSIELLA SP DNA BLD POS QL NAA+NON-PRB: DETECTED
KLEBSIELLA SP DNA BLD POS QL NAA+NON-PRB: NOT DETECTED
L MONOCYTOG DNA BLD POS QL NAA+NON-PROBE: NOT DETECTED
LACTATE SERPL-SCNC: 0.8 MMOL/L (ref 0.7–2)
LACTATE SERPL-SCNC: 1.9 MMOL/L (ref 0.7–2)
LACTATE SERPL-SCNC: 2.2 MMOL/L (ref 0.7–2)
LIPASE SERPL-CCNC: 169 U/L (ref 13–60)
LYMPHOCYTES # BLD AUTO: 1.2 10E3/UL (ref 0.8–5.3)
LYMPHOCYTES NFR BLD AUTO: 3 %
MCH RBC QN AUTO: 29.7 PG (ref 26.5–33)
MCHC RBC AUTO-ENTMCNC: 33.6 G/DL (ref 31.5–36.5)
MCV RBC AUTO: 88 FL (ref 78–100)
MONOCYTES # BLD AUTO: 2.2 10E3/UL (ref 0–1.3)
MONOCYTES NFR BLD AUTO: 6 %
N MEN DNA BLD POS QL NAA+NON-PROBE: NOT DETECTED
NDM: NOT DETECTED
NEUTROPHILS # BLD AUTO: 29.7 10E3/UL (ref 1.6–8.3)
NEUTROPHILS NFR BLD AUTO: 86 %
NRBC # BLD AUTO: 0 10E3/UL
NRBC BLD AUTO-RTO: 0 /100
P AERUGINOSA DNA BLD POS NAA+NON-PROBE: NOT DETECTED
PLAT MORPH BLD: NORMAL
PLATELET # BLD AUTO: 235 10E3/UL (ref 150–450)
POTASSIUM SERPL-SCNC: 3.6 MMOL/L (ref 3.4–5.3)
POTASSIUM SERPL-SCNC: 3.8 MMOL/L (ref 3.4–5.3)
PROT SERPL-MCNC: 5.9 G/DL (ref 6.4–8.3)
PROT SERPL-MCNC: 7.5 G/DL (ref 6.4–8.3)
PROTEUS SP DNA BLD POS QL NAA+NON-PROBE: NOT DETECTED
RBC # BLD AUTO: 4.11 10E6/UL (ref 3.8–5.2)
RBC MORPH BLD: NORMAL
S AUREUS DNA BLD POS QL NAA+NON-PROBE: NOT DETECTED
S AUREUS+CONS DNA BLD POS NAA+NON-PROBE: NOT DETECTED
S EPIDERMIDIS DNA BLD POS QL NAA+NON-PRB: NOT DETECTED
S LUGDUNENSIS DNA BLD POS QL NAA+NON-PRB: NOT DETECTED
S MALTOPHILIA DNA BLD POS QL NAA+NON-PRB: NOT DETECTED
S MARCESCENS DNA BLD POS NAA+NON-PROBE: NOT DETECTED
S PNEUM DNA BLD POS QL NAA+NON-PROBE: NOT DETECTED
S PYO DNA BLD POS QL NAA+NON-PROBE: NOT DETECTED
SALMONELLA DNA BLD POS QL NAA+NON-PROBE: NOT DETECTED
SODIUM SERPL-SCNC: 130 MMOL/L (ref 135–145)
SODIUM SERPL-SCNC: 132 MMOL/L (ref 135–145)
SPECIMEN EXP DATE BLD: NORMAL
STREPTOCOCCUS DNA BLD POS NAA+NON-PROBE: NOT DETECTED
TROPONIN T SERPL HS-MCNC: 39 NG/L
WBC # BLD AUTO: 34.5 10E3/UL (ref 4–11)

## 2025-04-19 PROCEDURE — 120N000002 HC R&B MED SURG/OB UMMC

## 2025-04-19 PROCEDURE — 83605 ASSAY OF LACTIC ACID: CPT

## 2025-04-19 PROCEDURE — 83690 ASSAY OF LIPASE: CPT | Performed by: EMERGENCY MEDICINE

## 2025-04-19 PROCEDURE — 83605 ASSAY OF LACTIC ACID: CPT | Performed by: EMERGENCY MEDICINE

## 2025-04-19 PROCEDURE — 96375 TX/PRO/DX INJ NEW DRUG ADDON: CPT | Performed by: EMERGENCY MEDICINE

## 2025-04-19 PROCEDURE — 76705 ECHO EXAM OF ABDOMEN: CPT | Mod: 26 | Performed by: RADIOLOGY

## 2025-04-19 PROCEDURE — 87186 SC STD MICRODIL/AGAR DIL: CPT

## 2025-04-19 PROCEDURE — 99233 SBSQ HOSP IP/OBS HIGH 50: CPT | Mod: GC | Performed by: SURGERY

## 2025-04-19 PROCEDURE — 99207 PR APP CREDIT; MD BILLING SHARED VISIT: CPT | Mod: FS | Performed by: STUDENT IN AN ORGANIZED HEALTH CARE EDUCATION/TRAINING PROGRAM

## 2025-04-19 PROCEDURE — 96374 THER/PROPH/DIAG INJ IV PUSH: CPT | Performed by: EMERGENCY MEDICINE

## 2025-04-19 PROCEDURE — 85610 PROTHROMBIN TIME: CPT

## 2025-04-19 PROCEDURE — 250N000011 HC RX IP 250 OP 636: Performed by: EMERGENCY MEDICINE

## 2025-04-19 PROCEDURE — 99222 1ST HOSP IP/OBS MODERATE 55: CPT | Mod: GC | Performed by: INTERNAL MEDICINE

## 2025-04-19 PROCEDURE — 76705 ECHO EXAM OF ABDOMEN: CPT | Mod: 26 | Performed by: STUDENT IN AN ORGANIZED HEALTH CARE EDUCATION/TRAINING PROGRAM

## 2025-04-19 PROCEDURE — 0FJ43ZZ INSPECTION OF GALLBLADDER, PERCUTANEOUS APPROACH: ICD-10-PCS | Performed by: RADIOLOGY

## 2025-04-19 PROCEDURE — 258N000003 HC RX IP 258 OP 636: Performed by: PHYSICIAN ASSISTANT

## 2025-04-19 PROCEDURE — 250N000013 HC RX MED GY IP 250 OP 250 PS 637: Performed by: PHYSICIAN ASSISTANT

## 2025-04-19 PROCEDURE — 250N000009 HC RX 250: Mod: JW

## 2025-04-19 PROCEDURE — 80053 COMPREHEN METABOLIC PANEL: CPT | Performed by: EMERGENCY MEDICINE

## 2025-04-19 PROCEDURE — 258N000003 HC RX IP 258 OP 636

## 2025-04-19 PROCEDURE — 258N000003 HC RX IP 258 OP 636: Performed by: EMERGENCY MEDICINE

## 2025-04-19 PROCEDURE — 250N000011 HC RX IP 250 OP 636: Performed by: PHYSICIAN ASSISTANT

## 2025-04-19 PROCEDURE — 250N000011 HC RX IP 250 OP 636: Mod: JZ | Performed by: STUDENT IN AN ORGANIZED HEALTH CARE EDUCATION/TRAINING PROGRAM

## 2025-04-19 PROCEDURE — 74183 MRI ABD W/O CNTR FLWD CNTR: CPT

## 2025-04-19 PROCEDURE — 99285 EMERGENCY DEPT VISIT HI MDM: CPT | Mod: 25 | Performed by: EMERGENCY MEDICINE

## 2025-04-19 PROCEDURE — 99418 PROLNG IP/OBS E/M EA 15 MIN: CPT | Mod: FS | Performed by: PHYSICIAN ASSISTANT

## 2025-04-19 PROCEDURE — 85025 COMPLETE CBC W/AUTO DIFF WBC: CPT | Performed by: EMERGENCY MEDICINE

## 2025-04-19 PROCEDURE — 272N000149 HC KIT CR9

## 2025-04-19 PROCEDURE — 99285 EMERGENCY DEPT VISIT HI MDM: CPT | Performed by: EMERGENCY MEDICINE

## 2025-04-19 PROCEDURE — 87154 CUL TYP ID BLD PTHGN 6+ TRGT: CPT

## 2025-04-19 PROCEDURE — 99152 MOD SED SAME PHYS/QHP 5/>YRS: CPT | Mod: GC | Performed by: RADIOLOGY

## 2025-04-19 PROCEDURE — C1769 GUIDE WIRE: HCPCS

## 2025-04-19 PROCEDURE — 250N000011 HC RX IP 250 OP 636

## 2025-04-19 PROCEDURE — 86708 HEPATITIS A ANTIBODY: CPT | Performed by: INTERNAL MEDICINE

## 2025-04-19 PROCEDURE — 96365 THER/PROPH/DIAG IV INF INIT: CPT | Mod: 59

## 2025-04-19 PROCEDURE — 76705 ECHO EXAM OF ABDOMEN: CPT

## 2025-04-19 PROCEDURE — 96375 TX/PRO/DX INJ NEW DRUG ADDON: CPT

## 2025-04-19 PROCEDURE — 74183 MRI ABD W/O CNTR FLWD CNTR: CPT | Mod: 26 | Performed by: RADIOLOGY

## 2025-04-19 PROCEDURE — 250N000011 HC RX IP 250 OP 636: Mod: JZ | Performed by: PHYSICIAN ASSISTANT

## 2025-04-19 PROCEDURE — A9585 GADOBUTROL INJECTION: HCPCS | Performed by: INTERNAL MEDICINE

## 2025-04-19 PROCEDURE — 250N000013 HC RX MED GY IP 250 OP 250 PS 637

## 2025-04-19 PROCEDURE — 36415 COLL VENOUS BLD VENIPUNCTURE: CPT

## 2025-04-19 PROCEDURE — 255N000002 HC RX 255 OP 636: Performed by: INTERNAL MEDICINE

## 2025-04-19 PROCEDURE — 36415 COLL VENOUS BLD VENIPUNCTURE: CPT | Performed by: EMERGENCY MEDICINE

## 2025-04-19 PROCEDURE — 87340 HEPATITIS B SURFACE AG IA: CPT | Performed by: INTERNAL MEDICINE

## 2025-04-19 PROCEDURE — 86803 HEPATITIS C AB TEST: CPT | Performed by: INTERNAL MEDICINE

## 2025-04-19 PROCEDURE — 84484 ASSAY OF TROPONIN QUANT: CPT

## 2025-04-19 PROCEDURE — 99207 PR NO BILLABLE SERVICE THIS VISIT: CPT | Mod: GC | Performed by: RADIOLOGY

## 2025-04-19 PROCEDURE — 86900 BLOOD TYPING SEROLOGIC ABO: CPT

## 2025-04-19 PROCEDURE — 272N000508 HC NEEDLE CR8

## 2025-04-19 PROCEDURE — 96376 TX/PRO/DX INJ SAME DRUG ADON: CPT

## 2025-04-19 PROCEDURE — 99152 MOD SED SAME PHYS/QHP 5/>YRS: CPT

## 2025-04-19 PROCEDURE — 258N000003 HC RX IP 258 OP 636: Performed by: STUDENT IN AN ORGANIZED HEALTH CARE EDUCATION/TRAINING PROGRAM

## 2025-04-19 PROCEDURE — 99223 1ST HOSP IP/OBS HIGH 75: CPT | Mod: FS | Performed by: PHYSICIAN ASSISTANT

## 2025-04-19 PROCEDURE — 96361 HYDRATE IV INFUSION ADD-ON: CPT

## 2025-04-19 RX ORDER — PANTOPRAZOLE SODIUM 40 MG/1
40 TABLET, DELAYED RELEASE ORAL
Status: DISCONTINUED | OUTPATIENT
Start: 2025-04-19 | End: 2025-04-23 | Stop reason: HOSPADM

## 2025-04-19 RX ORDER — ONDANSETRON 2 MG/ML
4 INJECTION INTRAMUSCULAR; INTRAVENOUS EVERY 6 HOURS PRN
Status: DISCONTINUED | OUTPATIENT
Start: 2025-04-19 | End: 2025-04-23 | Stop reason: HOSPADM

## 2025-04-19 RX ORDER — IODIXANOL 320 MG/ML
50 INJECTION, SOLUTION INTRAVASCULAR ONCE
Status: DISCONTINUED | OUTPATIENT
Start: 2025-04-19 | End: 2025-04-21

## 2025-04-19 RX ORDER — ACETAMINOPHEN 325 MG/1
650 TABLET ORAL EVERY 6 HOURS PRN
Status: DISCONTINUED | OUTPATIENT
Start: 2025-04-19 | End: 2025-04-19

## 2025-04-19 RX ORDER — ACETAMINOPHEN 500 MG
1000 TABLET ORAL ONCE
Status: COMPLETED | OUTPATIENT
Start: 2025-04-19 | End: 2025-04-19

## 2025-04-19 RX ORDER — GADOBUTROL 604.72 MG/ML
0.1 INJECTION INTRAVENOUS ONCE
Status: COMPLETED | OUTPATIENT
Start: 2025-04-19 | End: 2025-04-19

## 2025-04-19 RX ORDER — AMOXICILLIN 250 MG
1 CAPSULE ORAL 2 TIMES DAILY PRN
Status: DISCONTINUED | OUTPATIENT
Start: 2025-04-19 | End: 2025-04-19

## 2025-04-19 RX ORDER — SODIUM CHLORIDE 9 MG/ML
INJECTION, SOLUTION INTRAVENOUS CONTINUOUS
Status: DISCONTINUED | OUTPATIENT
Start: 2025-04-19 | End: 2025-04-22

## 2025-04-19 RX ORDER — FLUTICASONE PROPIONATE 50 MCG
2 SPRAY, SUSPENSION (ML) NASAL DAILY
Status: DISCONTINUED | OUTPATIENT
Start: 2025-04-19 | End: 2025-04-20

## 2025-04-19 RX ORDER — HYDROMORPHONE HYDROCHLORIDE 1 MG/ML
0.5 INJECTION, SOLUTION INTRAMUSCULAR; INTRAVENOUS; SUBCUTANEOUS EVERY 30 MIN PRN
Status: DISCONTINUED | OUTPATIENT
Start: 2025-04-19 | End: 2025-04-19 | Stop reason: HOSPADM

## 2025-04-19 RX ORDER — CYCLOBENZAPRINE HCL 5 MG
10 TABLET ORAL DAILY PRN
Status: DISCONTINUED | OUTPATIENT
Start: 2025-04-19 | End: 2025-04-23 | Stop reason: HOSPADM

## 2025-04-19 RX ORDER — NALOXONE HYDROCHLORIDE 0.4 MG/ML
0.2 INJECTION, SOLUTION INTRAMUSCULAR; INTRAVENOUS; SUBCUTANEOUS
Status: DISCONTINUED | OUTPATIENT
Start: 2025-04-19 | End: 2025-04-19

## 2025-04-19 RX ORDER — NALOXONE HYDROCHLORIDE 0.4 MG/ML
0.4 INJECTION, SOLUTION INTRAMUSCULAR; INTRAVENOUS; SUBCUTANEOUS
Status: DISCONTINUED | OUTPATIENT
Start: 2025-04-19 | End: 2025-04-19

## 2025-04-19 RX ORDER — SENNOSIDES 8.6 MG
8.6 TABLET ORAL 2 TIMES DAILY PRN
Status: DISCONTINUED | OUTPATIENT
Start: 2025-04-19 | End: 2025-04-23 | Stop reason: HOSPADM

## 2025-04-19 RX ORDER — AMOXICILLIN 250 MG
2 CAPSULE ORAL 2 TIMES DAILY PRN
Status: DISCONTINUED | OUTPATIENT
Start: 2025-04-19 | End: 2025-04-19

## 2025-04-19 RX ORDER — POLYETHYLENE GLYCOL 3350 17 G/17G
17 POWDER, FOR SOLUTION ORAL 2 TIMES DAILY PRN
Status: DISCONTINUED | OUTPATIENT
Start: 2025-04-19 | End: 2025-04-23 | Stop reason: HOSPADM

## 2025-04-19 RX ORDER — HYDROMORPHONE HYDROCHLORIDE 1 MG/ML
0.5 INJECTION, SOLUTION INTRAMUSCULAR; INTRAVENOUS; SUBCUTANEOUS
Status: DISCONTINUED | OUTPATIENT
Start: 2025-04-19 | End: 2025-04-21

## 2025-04-19 RX ORDER — BENZONATATE 100 MG/1
200 CAPSULE ORAL 3 TIMES DAILY PRN
Status: DISCONTINUED | OUTPATIENT
Start: 2025-04-19 | End: 2025-04-23 | Stop reason: HOSPADM

## 2025-04-19 RX ORDER — LISINOPRIL 2.5 MG/1
2.5 TABLET ORAL DAILY
Status: DISCONTINUED | OUTPATIENT
Start: 2025-04-19 | End: 2025-04-19

## 2025-04-19 RX ORDER — HYDROMORPHONE HYDROCHLORIDE 1 MG/ML
0.5 INJECTION, SOLUTION INTRAMUSCULAR; INTRAVENOUS; SUBCUTANEOUS ONCE
Status: COMPLETED | OUTPATIENT
Start: 2025-04-19 | End: 2025-04-19

## 2025-04-19 RX ORDER — OXYCODONE HYDROCHLORIDE 5 MG/1
5 TABLET ORAL EVERY 4 HOURS PRN
Status: DISCONTINUED | OUTPATIENT
Start: 2025-04-19 | End: 2025-04-21

## 2025-04-19 RX ORDER — FENTANYL CITRATE 50 UG/ML
25-50 INJECTION, SOLUTION INTRAMUSCULAR; INTRAVENOUS EVERY 5 MIN PRN
Status: DISCONTINUED | OUTPATIENT
Start: 2025-04-19 | End: 2025-04-19

## 2025-04-19 RX ORDER — CALCIUM CARBONATE 500 MG/1
1000 TABLET, CHEWABLE ORAL 4 TIMES DAILY PRN
Status: DISCONTINUED | OUTPATIENT
Start: 2025-04-19 | End: 2025-04-23 | Stop reason: HOSPADM

## 2025-04-19 RX ORDER — LISINOPRIL 2.5 MG/1
2.5 TABLET ORAL DAILY
Status: DISCONTINUED | OUTPATIENT
Start: 2025-04-19 | End: 2025-04-23 | Stop reason: HOSPADM

## 2025-04-19 RX ORDER — LIDOCAINE 40 MG/G
CREAM TOPICAL
Status: DISCONTINUED | OUTPATIENT
Start: 2025-04-19 | End: 2025-04-23 | Stop reason: HOSPADM

## 2025-04-19 RX ORDER — PROCHLORPERAZINE MALEATE 10 MG
10 TABLET ORAL EVERY 6 HOURS PRN
Status: DISCONTINUED | OUTPATIENT
Start: 2025-04-19 | End: 2025-04-23 | Stop reason: HOSPADM

## 2025-04-19 RX ORDER — PIPERACILLIN SODIUM, TAZOBACTAM SODIUM 3; .375 G/15ML; G/15ML
3.38 INJECTION, POWDER, LYOPHILIZED, FOR SOLUTION INTRAVENOUS EVERY 6 HOURS
Status: DISCONTINUED | OUTPATIENT
Start: 2025-04-19 | End: 2025-04-21

## 2025-04-19 RX ORDER — PIPERACILLIN SODIUM, TAZOBACTAM SODIUM 3; .375 G/15ML; G/15ML
3.38 INJECTION, POWDER, LYOPHILIZED, FOR SOLUTION INTRAVENOUS EVERY 6 HOURS
Status: DISCONTINUED | OUTPATIENT
Start: 2025-04-19 | End: 2025-04-19 | Stop reason: HOSPADM

## 2025-04-19 RX ORDER — SODIUM CHLORIDE, SODIUM LACTATE, POTASSIUM CHLORIDE, CALCIUM CHLORIDE 600; 310; 30; 20 MG/100ML; MG/100ML; MG/100ML; MG/100ML
INJECTION, SOLUTION INTRAVENOUS CONTINUOUS
Status: DISCONTINUED | OUTPATIENT
Start: 2025-04-19 | End: 2025-04-19

## 2025-04-19 RX ORDER — ONDANSETRON 4 MG/1
4 TABLET, ORALLY DISINTEGRATING ORAL EVERY 6 HOURS PRN
Status: DISCONTINUED | OUTPATIENT
Start: 2025-04-19 | End: 2025-04-23 | Stop reason: HOSPADM

## 2025-04-19 RX ORDER — FLUMAZENIL 0.1 MG/ML
0.2 INJECTION, SOLUTION INTRAVENOUS
Status: DISCONTINUED | OUTPATIENT
Start: 2025-04-19 | End: 2025-04-19

## 2025-04-19 RX ADMIN — PANTOPRAZOLE SODIUM 40 MG: 40 TABLET, DELAYED RELEASE ORAL at 10:50

## 2025-04-19 RX ADMIN — SODIUM CHLORIDE, SODIUM LACTATE, POTASSIUM CHLORIDE, AND CALCIUM CHLORIDE: .6; .31; .03; .02 INJECTION, SOLUTION INTRAVENOUS at 03:22

## 2025-04-19 RX ADMIN — FENTANYL CITRATE 50 MCG: 50 INJECTION, SOLUTION INTRAMUSCULAR; INTRAVENOUS at 12:13

## 2025-04-19 RX ADMIN — PIPERACILLIN AND TAZOBACTAM 3.38 G: 3; .375 INJECTION, POWDER, LYOPHILIZED, FOR SOLUTION INTRAVENOUS at 19:38

## 2025-04-19 RX ADMIN — HYDROMORPHONE HYDROCHLORIDE 0.5 MG: 1 INJECTION, SOLUTION INTRAMUSCULAR; INTRAVENOUS; SUBCUTANEOUS at 03:24

## 2025-04-19 RX ADMIN — HYDROMORPHONE HYDROCHLORIDE 0.5 MG: 1 INJECTION, SOLUTION INTRAMUSCULAR; INTRAVENOUS; SUBCUTANEOUS at 06:53

## 2025-04-19 RX ADMIN — FENTANYL CITRATE 50 MCG: 50 INJECTION, SOLUTION INTRAMUSCULAR; INTRAVENOUS at 12:10

## 2025-04-19 RX ADMIN — Medication 750 MG: at 01:24

## 2025-04-19 RX ADMIN — SODIUM CHLORIDE 1000 ML: 0.9 INJECTION, SOLUTION INTRAVENOUS at 00:02

## 2025-04-19 RX ADMIN — SODIUM CHLORIDE, SODIUM LACTATE, POTASSIUM CHLORIDE, AND CALCIUM CHLORIDE 1000 ML: .6; .31; .03; .02 INJECTION, SOLUTION INTRAVENOUS at 02:31

## 2025-04-19 RX ADMIN — ACETAMINOPHEN 1000 MG: 500 TABLET, FILM COATED ORAL at 00:39

## 2025-04-19 RX ADMIN — HYDROMORPHONE HYDROCHLORIDE 0.5 MG: 1 INJECTION, SOLUTION INTRAMUSCULAR; INTRAVENOUS; SUBCUTANEOUS at 00:02

## 2025-04-19 RX ADMIN — SODIUM CHLORIDE: 9 INJECTION, SOLUTION INTRAVENOUS at 08:03

## 2025-04-19 RX ADMIN — MIDAZOLAM 2 MG: 1 INJECTION INTRAMUSCULAR; INTRAVENOUS at 12:09

## 2025-04-19 RX ADMIN — HYDROMORPHONE HYDROCHLORIDE 0.5 MG: 1 INJECTION, SOLUTION INTRAMUSCULAR; INTRAVENOUS; SUBCUTANEOUS at 02:42

## 2025-04-19 RX ADMIN — PIPERACILLIN AND TAZOBACTAM 3.38 G: 3; .375 INJECTION, POWDER, LYOPHILIZED, FOR SOLUTION INTRAVENOUS at 13:30

## 2025-04-19 RX ADMIN — PIPERACILLIN AND TAZOBACTAM 3.38 G: 3; .375 INJECTION, POWDER, FOR SOLUTION INTRAVENOUS at 00:39

## 2025-04-19 RX ADMIN — HYDROMORPHONE HYDROCHLORIDE 0.5 MG: 1 INJECTION, SOLUTION INTRAMUSCULAR; INTRAVENOUS; SUBCUTANEOUS at 19:48

## 2025-04-19 RX ADMIN — LIDOCAINE HYDROCHLORIDE 10 ML: 10 INJECTION, SOLUTION EPIDURAL; INFILTRATION; INTRACAUDAL; PERINEURAL at 12:12

## 2025-04-19 RX ADMIN — HYDROMORPHONE HYDROCHLORIDE 0.5 MG: 1 INJECTION, SOLUTION INTRAMUSCULAR; INTRAVENOUS; SUBCUTANEOUS at 01:25

## 2025-04-19 RX ADMIN — SODIUM CHLORIDE: 9 INJECTION, SOLUTION INTRAVENOUS at 17:05

## 2025-04-19 RX ADMIN — HYDROMORPHONE HYDROCHLORIDE 0.5 MG: 1 INJECTION, SOLUTION INTRAMUSCULAR; INTRAVENOUS; SUBCUTANEOUS at 08:25

## 2025-04-19 RX ADMIN — GADOBUTROL 6.8 ML: 604.72 INJECTION INTRAVENOUS at 17:53

## 2025-04-19 RX ADMIN — ONDANSETRON 4 MG: 2 INJECTION INTRAMUSCULAR; INTRAVENOUS at 11:54

## 2025-04-19 RX ADMIN — SODIUM CHLORIDE, SODIUM LACTATE, POTASSIUM CHLORIDE, AND CALCIUM CHLORIDE 1000 ML: .6; .31; .03; .02 INJECTION, SOLUTION INTRAVENOUS at 01:30

## 2025-04-19 RX ADMIN — PIPERACILLIN AND TAZOBACTAM 3.38 G: 3; .375 INJECTION, POWDER, LYOPHILIZED, FOR SOLUTION INTRAVENOUS at 08:25

## 2025-04-19 RX ADMIN — LISINOPRIL 2.5 MG: 2.5 TABLET ORAL at 13:30

## 2025-04-19 RX ADMIN — CYCLOBENZAPRINE HYDROCHLORIDE 10 MG: 5 TABLET, FILM COATED ORAL at 19:48

## 2025-04-19 ASSESSMENT — COLUMBIA-SUICIDE SEVERITY RATING SCALE - C-SSRS
6. HAVE YOU EVER DONE ANYTHING, STARTED TO DO ANYTHING, OR PREPARED TO DO ANYTHING TO END YOUR LIFE?: NO
2. HAVE YOU ACTUALLY HAD ANY THOUGHTS OF KILLING YOURSELF IN THE PAST MONTH?: NO
1. IN THE PAST MONTH, HAVE YOU WISHED YOU WERE DEAD OR WISHED YOU COULD GO TO SLEEP AND NOT WAKE UP?: NO

## 2025-04-19 ASSESSMENT — ACTIVITIES OF DAILY LIVING (ADL)
ADLS_ACUITY_SCORE: 47
ADLS_ACUITY_SCORE: 41
ADLS_ACUITY_SCORE: 47
ADLS_ACUITY_SCORE: 41
ADLS_ACUITY_SCORE: 47
ADLS_ACUITY_SCORE: 47
ADLS_ACUITY_SCORE: 41
ADLS_ACUITY_SCORE: 47
ADLS_ACUITY_SCORE: 41

## 2025-04-19 NOTE — CONSULTS
GASTROENTEROLOGY CONSULTATION      Date of Admission:  4/19/2025  Requesting physician: Carloz Correa MD            Reason for Consultation:   Referral from [facility] w/ question of acute andrade w/ possible CBD obstruction; prior bypass surgery            ASSESSMENT AND RECOMMENDATIONS:   Assessment:  Maryann Riggs is a 60 year old female with prior lye ingestion s/p Bilroth II and persistent esophageal stricture requiring intermittent dilation who was admitted on 4/19 for cholecystitis and possible cholangitis.       # Cholecystitis   # Possible choledocholithiasis   # S/p Bilroth II   CT showed evidence for cholecystitis with a possible contained perforation. It also showed biliary dilation and abnormal LFT with Tbili 4.6. While it is possible that she has choledocholithiasis, the LFT abnormalities could be due to the severe GB infection. Will need MRI/MRCP to evaluate for choledocholithiasis.     The plan per discussion with surgery was to undergo PTC to drain GB. Unfortunately, IR attempted this today and unable. Will defer to surgery for further management of GB infection.        # Esophageal stenosis  Intermittent EGD dilation. Recent EGD 4/15 noted mild stenosis and dilated. Denies dysphagia after dilation.       Recommendations  - MRI/MRCP w/wo contrast (ordered)  - Appreciate surgery's input  - Continue zosyn   - Trend LFT daily       Thank you for involving us in this patient's care. Please do not hesitate to contact the GI service with any questions or concerns.     Pt care plan discussed with Dr. Joel, GI staff physician.      Dianna Harvey MD PhD  GI Fellow   [phone]   -------------------------------------------------------------------------------------------------------------------           History of Present Illness:   Maryann Riggs is a 60 year old female with prior lye ingestion s/p Bilroth II and persistent esophageal stricture requiring intermittent dilation who  was admitted on 4/19 for cholecystitis and possible cholangitis.     She presented for [facility] ED yesterday with persistent interscapular pain. LFT was found to be abnormal and CT showed CT showed evidence for cholecystitis and biliary dilation. Given her Bilroth II anatomy, she was transferred to [facility] for further care.             Past Medical History:   Reviewed and edited as appropriate  Past Medical History:   Diagnosis Date    Anger reaction     buspar helping    Cholelithiasis     Esophageal stricture     dilatation every 3 months    Generalized osteoarthrosis, unspecified site (aka ARTHRITIS)     small joints    PTSD (post-traumatic stress disorder)     Raynaud's disease             Past Surgical History:   Reviewed and edited as appropriate   Past Surgical History:   Procedure Laterality Date    COLONOSCOPY N/A 9/27/2024    Procedure: COLONOSCOPY, FLEXIBLE, WITH LESION REMOVAL USING SNARE;  Surgeon: Bk Laura MD;  Location: [facility]    MAMMOPLASTY REDUCTION      OTHER SURGICAL HISTORY      s/p left knee arthroscopinc surgery    ROTATOR CUFF REPAIR RT/LT  01/01/2005    SMALL BOWEL RESECTION      TUBAL LIGATION              Social History:   Reviewed and edited as appropriate  Social History     Socioeconomic History    Marital status: Single     Spouse name: Not on file    Number of children: Not on file    Years of education: Not on file    Highest education level: Not on file   Occupational History     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 Yes     Comment: unaware - pt adopted   Social History Narrative    Not on file     Social Drivers of Health     Financial Resource  Strain: Low Risk  (6/25/2024)    Financial Resource Strain     Within the past 12 months, have you or your family members you live with been unable to get utilities (heat, electricity) when it was really needed?: No   Food Insecurity: Low Risk  (6/25/2024)    Food Insecurity     Within the past 12 months, did you worry that your food would run out before you got money to buy more?: No     Within the past 12 months, did the food you bought just not last and you didn t have money to get more?: No   Transportation Needs: Low Risk  (6/25/2024)    Transportation Needs     Within the past 12 months, has lack of transportation kept you from medical appointments, getting your medicines, non-medical meetings or appointments, work, or from getting things that you need?: No   Physical Activity: Unknown (6/25/2024)    Exercise Vital Sign     Days of Exercise per Week: 7 days     Minutes of Exercise per Session: Not on file   Stress: No Stress Concern Present (6/25/2024)    [employer] [place] [employer] - Occupational Stress Questionnaire     Feeling of Stress : Only a little   Social Connections: Unknown (6/25/2024)    Social Connection and Isolation Panel [NHANES]     Frequency of Communication with Friends and Family: Not on file     Frequency of Social Gatherings with Friends and Family: Once a week     Attends Anglican Services: Not on file     Active Member of Clubs or Organizations: Not on file     Attends Club or Organization Meetings: Not on file     Marital Status: Not on file   Interpersonal Safety: Low Risk  (9/27/2024)    Interpersonal Safety     Do you feel physically and emotionally safe where you currently live?: Yes     Within the past 12 months, have you been hit, slapped, kicked or otherwise physically hurt by someone?: No     Within the past 12 months, have you been humiliated or emotionally abused in other ways by your partner or ex-partner?: No   Housing Stability: Low Risk  (6/25/2024)     "Housing Stability     Do you have housing? : Yes     Are you worried about losing your housing?: No            Family History:   Reviewed and edited as appropriate  Family History   Problem Relation Age of Onset    Unknown/Adopted Mother     Unknown/Adopted Father     Unknown/Adopted Maternal Grandmother     Unknown/Adopted Maternal Grandfather     Unknown/Adopted Paternal Grandmother     Unknown/Adopted Paternal Grandfather       No known history of colorectal cancer, liver disease, or inflammatory bowel disease.         Allergies:   Reviewed and edited as appropriate     Allergies   Allergen Reactions    Desyrel [Trazodone Hcl]      Sig mood disturbance      Lamictal [Lamotrigine]             Medications:   (Not in a hospital admission)            Review of Systems:     A complete 10 point review of systems was performed and is negative except as noted in the HPI           Physical Exam:   BP (!) 159/90   Pulse 90   Temp 98.1  F (36.7  C) (Oral)   Resp 18   Ht 1.727 m (5' 8\")   Wt 68.5 kg (151 lb)   LMP 01/04/2014   SpO2 97%   BMI 22.96 kg/m    Wt:   Wt Readings from Last 2 Encounters:   04/19/25 68.5 kg (151 lb)   04/18/25 68.5 kg (151 lb)      Constitutional: No acute distress, resting comfortably in bed  Eyes: Sclera icteric  Ears/nose/mouth/throat: Moist mucus membranes, hearing intact  Neck: supple  CV: No edema  Respiratory: Breathing comfortably on room air  Abd: Soft, nontender, nondistended, bowel sounds present  Skin: warm, perfused, no jaundice  Neuro: AAO x 3  Psych: Normal affect  MSK: No gross deformities         Data:   Labs and imaging below were independently reviewed and interpreted    BMP  Recent Labs   Lab 04/19/25  0803 04/18/25  2315   * 130*   POTASSIUM 3.8 3.6   CHLORIDE 96* 89*   ARIC 8.5* 9.6   CO2 25 28   BUN 10.5 12.8   CR 0.56 0.59   * 265*     CBC  Recent Labs   Lab 04/19/25  0803 04/18/25  2315   WBC 34.5* 18.8*   RBC 4.11 4.86   HGB 12.2 14.1   HCT 36.3 42.7 "   MCV 88 88   MCH 29.7 29.0   MCHC 33.6 33.0   RDW 12.8 12.4    311     INRNo lab results found in last 7 days.  LFTs  Recent Labs   Lab 04/19/25  0803 04/18/25  2315   ALKPHOS 487* 648*   * 340*   * 1,295*   BILITOTAL 4.6* 3.9*   PROTTOTAL 5.9* 7.5   ALBUMIN 3.3* 3.9      PANC  Recent Labs   Lab 04/18/25  2315   LIPASE 32       Imaging: Reviewed    Endoscopy: Reviewed

## 2025-04-19 NOTE — CONSULTS
Emergency General Surgery Consult   2025    Maryann Riggs  : 1965    Date of Service: 2025 8:06 AM    Chief complaint: abdominal pain    Assessment and Plan:  Maryann Riggs is a 60yoF with history of cholelithiasis, esophageal stricture with dilations every 3 months, osteoarthritis, Raynaud's disease, and h/o Billroth II after lye ingestion in  who presented to ED on 25 from an OSH with 1-week history of mid scapular pain and 1-day history of RUQ and epigastric tenderness -- EGS was consulted for concerns of acute cholecystitis.     Her work-up is overall concerning for acute cholangitis rather than acute cholecystitis given her fever, worsening leukocytosis and worsening hyperbilirubinemia.     - We agree with GI consult for consideration of ERCP  - We recommend IR consult for percutaneous cholecystotomy tube placement  - Agree with antibiotics   - No surgical intervention indicated at this time. However, she will likely need cholecystectomy this admission.     Discussed with chief resident, Dr. Matute, and staff, Dr. Lancaster.     Lisa Moreau, DO  General Surgery, PGY-2   Formerly [facility], Pager [phone]    History of Present Illness:    Maryann Riggs is a 60 year old female that presents with history of cholelithiasis, esophageal stricture with dilations every 3 months, osteoarthritis, Raynaud's disease, and h/o Billroth II who presented to ED on 25 from an OSH with a 1-week history of right and mid scapular pain. She had onset of RUQ and epigastric tenderness that started last night.     She denies any fevers. She endorses chills, nausea, and emesis. She denies any changes to voiding or bowel habits. There was concern for aortic pathology and a CTA was obtained which showed intrahepatic and extrahpeatic biliary ductal dilation, thickened gallbladder wall, mild pericholecystic fluid, and gallbladder wall in discontinuity c/f acute cholecystitis and possible gallbladder  perforation At the OSH, her labs are notable for Na 130, elevated alk phos 648, transaminitis (ALT 1295, ), hyperbilirubinemia (total bili 3.9, direct bili 3.03), elevated GGT at 265, and leukocytosis (WBC 18.8), and elevated troponin at 39 from 36. Lactate is now normal.     U/S today also showed concern for acute cholecystitis and gallbladder perforation. Labs upon admission show worsening leukocytosis and hyperbilirubinemia. In the ED, he was febrile to 101.4, tachycardic, and hypertensive. Tachycardia and fever are now resolved. She was started on zosyn in ED.    Past Medical History:  Past Medical History:   Diagnosis Date    Anger reaction     buspar helping    Cholelithiasis     Esophageal stricture     dilatation every 3 months    Generalized osteoarthrosis, unspecified site (aka ARTHRITIS)     small joints    PTSD (post-traumatic stress disorder)     Raynaud's disease        Past Surgical History  Past Surgical History:   Procedure Laterality Date    COLONOSCOPY N/A 9/27/2024    Procedure: COLONOSCOPY, FLEXIBLE, WITH LESION REMOVAL USING SNARE;  Surgeon: Bk Laura MD;  Location: [facility]    MAMMOPLASTY REDUCTION      OTHER SURGICAL HISTORY      s/p left knee arthroscopinc surgery    ROTATOR CUFF REPAIR RT/LT  01/01/2005    SMALL BOWEL RESECTION      TUBAL LIGATION         Family History:  Family History   Problem Relation Age of Onset    Unknown/Adopted Mother     Unknown/Adopted Father     Unknown/Adopted Maternal Grandmother     Unknown/Adopted Maternal Grandfather     Unknown/Adopted Paternal Grandmother     Unknown/Adopted Paternal Grandfather      No family h/o bleeding/clotting disorders or problems with anesthesia.    Social History:  Social History     Socioeconomic History    Marital status: Single     Spouse name: Not on file    Number of children: Not on file    Years of education: Not on file    Highest education level: Not on file   Occupational History     Employer: [employer]      Comment: Food    Tobacco Use    Smoking status: Never    Smokeless tobacco: Never   Vaping Use    Vaping status: Never Used   Substance and Sexual Activity    Alcohol use: Yes     Comment: scoial    Drug use: No    Sexual activity: Not Currently   Other Topics Concern    Parent/sibling w/ CABG, MI or angioplasty before 65F 55M? Yes     Comment: unaware - pt adopted   Social History Narrative    Not on file     Social Drivers of Health     Financial Resource Strain: Low Risk  (6/25/2024)    Financial Resource Strain     Within the past 12 months, have you or your family members you live with been unable to get utilities (heat, electricity) when it was really needed?: No   Food Insecurity: Low Risk  (6/25/2024)    Food Insecurity     Within the past 12 months, did you worry that your food would run out before you got money to buy more?: No     Within the past 12 months, did the food you bought just not last and you didn t have money to get more?: No   Transportation Needs: Low Risk  (6/25/2024)    Transportation Needs     Within the past 12 months, has lack of transportation kept you from medical appointments, getting your medicines, non-medical meetings or appointments, work, or from getting things that you need?: No   Physical Activity: Unknown (6/25/2024)    Exercise Vital Sign     Days of Exercise per Week: 7 days     Minutes of Exercise per Session: Not on file   Stress: No Stress Concern Present (6/25/2024)    [employer] [place] [employer] - Occupational Stress Questionnaire     Feeling of Stress : Only a little   Social Connections: Unknown (6/25/2024)    Social Connection and Isolation Panel [NHANES]     Frequency of Communication with Friends and Family: Not on file     Frequency of Social Gatherings with Friends and Family: Once a week     Attends Yarsani Services: Not on file     Active Member of Clubs or Organizations: Not on file     Attends Club or Organization Meetings:  Not on file     Marital Status: Not on file   Interpersonal Safety: Low Risk  (9/27/2024)    Interpersonal Safety     Do you feel physically and emotionally safe where you currently live?: Yes     Within the past 12 months, have you been hit, slapped, kicked or otherwise physically hurt by someone?: No     Within the past 12 months, have you been humiliated or emotionally abused in other ways by your partner or ex-partner?: No   Housing Stability: Low Risk  (6/25/2024)    Housing Stability     Do you have housing? : Yes     Are you worried about losing your housing?: No       Medications:  Current Outpatient Medications   Medication Sig Dispense Refill    acetaminophen (PAIN RELIEF EXTRA STRENGTH) 500 MG tablet TAKE 1-2 TABLETS (500-1,000 MG) BY MOUTH EVERY 6 HOURS AS NEEDED FOR MILD PAIN 100 tablet 1    albuterol (PROAIR HFA/PROVENTIL HFA/VENTOLIN HFA) 108 (90 Base) MCG/ACT inhaler Inhale 2 puffs into the lungs every 6 hours as needed for shortness of breath, wheezing or cough. 18 g 0    Ascorbic Acid (VITAMIN C CR PO) Take  by mouth.      benzonatate (TESSALON) 200 MG capsule Take 1 capsule (200 mg) by mouth 3 times daily as needed for cough. 20 capsule 0    Cyanocobalamin (VITAMIN B 12 PO) Take  by mouth.      cyclobenzaprine (FLEXERIL) 10 MG tablet Take 1 tablet (10 mg) by mouth daily. 90 tablet 1    DTx Lo - Wellness (WW CORE) MISC 21 capsules daily      ferrous sulfate (FEROSUL) 325 (65 Fe) MG tablet Take 1 tablet (325 mg) by mouth daily (with breakfast). 90 tablet 1    GLUCOSAMINE CHONDROITIN COMPLX PO Take  by mouth.      lisinopril (ZESTRIL) 2.5 MG tablet Take 1 tablet (2.5 mg) by mouth daily. 90 tablet 2    MAGNESIUM OXIDE PO Take 400 mg by mouth daily      melatonin 3 MG tablet Take 1 tablet by mouth nightly as needed for sleep. (Patient not taking: Reported on 4/17/2025) 30 tablet 0    meloxicam (MOBIC) 15 MG tablet TAKE 1 TABLET BY MOUTH EVERY DAY (Patient not taking: Reported on 4/17/2025) 30 tablet 3  "   NIFEdipine ER OSMOTIC (PROCARDIA XL) 30 MG 24 hr tablet Take 1 tablet (30 mg) by mouth daily. (Patient not taking: Reported on 4/17/2025) 90 tablet 3    omeprazole (PRILOSEC) 20 MG DR capsule Take 20 mg by mouth daily.      oxyCODONE (ROXICODONE) 5 MG tablet Take 1 tablet (5 mg) by mouth every 6 hours as needed for pain. 12 tablet 0    predniSONE (DELTASONE) 20 MG tablet Take 3 tabs by mouth daily x 1 days, then 2 tabs daily x 3 days, then 1 tab daily x 3 days, then 1/2 tab daily x 3 days. 20 tablet 0    predniSONE (DELTASONE) 20 MG tablet Take two tablets (= 40mg) each day for 5 (five) days 10 tablet 0    zolpidem (AMBIEN) 5 MG tablet TAKE 1 TABLET (5 MG) BY MOUTH NIGHTLY AS NEEDED FOR SLEEP (Patient not taking: Reported on 4/17/2025) 30 tablet 3       Allergies:     Allergies   Allergen Reactions    Desyrel [Trazodone Hcl]      Sig mood disturbance      Lamictal [Lamotrigine]        Review of Symptoms:  A 10 point review of symptoms has been conducted and is negative except for that mentioned in the above HPI.    Physical Exam:  Blood pressure (!) 160/91, pulse 90, temperature 98.1  F (36.7  C), temperature source Oral, resp. rate 18, height 1.727 m (5' 8\"), weight 68.5 kg (151 lb), last menstrual period 01/04/2014, SpO2 97%, not currently breastfeeding.  A&O, NAD   NLB on RA   RRR   Abd soft, ND, minimally TTP in epigastric area. Pace's negative.   Ext wwp  No focal neurological deficits     Labs:  BMP  Recent Labs   Lab 04/18/25 2315   *   POTASSIUM 3.6   CHLORIDE 89*   CO2 28   BUN 12.8   CR 0.59   *     CBC  Recent Labs   Lab 04/18/25 2315   WBC 18.8*   HGB 14.1        LFT  Recent Labs   Lab 04/18/25 2315   *   ALT 1,295*   ALKPHOS 648*   BILITOTAL 3.9*   ALBUMIN 3.9     Recent Labs   Lab 04/18/25 2315   *       Imaging:  Reviewed     "

## 2025-04-19 NOTE — ED TRIAGE NOTES
Back pain between and below shoulder blades. Pain started x1 weeks ago. Pain went away and now back and worse. Having lower abdominal pain as well.     Triage Assessment (Adult)       Row Name 04/18/25 4558          Triage Assessment    Airway WDL WDL        Respiratory WDL    Respiratory WDL WDL        Skin Circulation/Temperature WDL    Skin Circulation/Temperature WDL WDL        Cardiac WDL    Cardiac WDL WDL        Peripheral/Neurovascular WDL    Peripheral Neurovascular WDL WDL        Cognitive/Neuro/Behavioral WDL    Cognitive/Neuro/Behavioral WDL WDL

## 2025-04-19 NOTE — LETTER
[facility] 7C MED SURG  [address] [address]  Phone: [phone]    April 23, 2025        Maryann Riggs  [address] [address]          To whom it may concern:    RE: Maryann Riggs        Please contact me for questions or concerns.      Sincerely,      Mariann Carvalho, DO

## 2025-04-19 NOTE — LETTER
[facility] 7C MED SURG  [address]  [place] [address]  Phone: [phone]    April 23, 2025        Maryann Riggs  [address] [address]          To whom it may concern:    RE: Maryann Riggs      Work restrictions   No strenuous exercise for 4 weeks - No lifting, pushing, pulling more than 15-20 pounds for 3-4 weeks -- Do not drive until you can press the brake pedal quickly and fully without pain - Do not operate a motor vehicle while taking narcotic pain medications Incisions     Next 4 weeks, may be perform administrative tasks  or non-strenuous light duty work such dose not include lifting, pushing or pulling more than 15-20 pounds, operating heavy duty equipment, or other activities that could cause bodily harm.     Please contact me for questions or concerns.      Sincerely,      Juan C Vera PA-C

## 2025-04-19 NOTE — CONSULTS
INTERVENTIONAL RADIOLOGY CONSULT NOTE    Reason for referral:   Concern for ascending cholangitis. Gen surg and GI requesting percutaneous cholecystostomy tube placement     History:   61 yo w/ Bilroth II anatomy who presented to [facility] found to have imaging and laboratory evidence concerning for cholangitis and perforated cholecystitis. IR is consulted for cholecystotomy tube placement. Patient febrile and with WBC of 35.     Case discussed with GI. Only requesting gallbladder drain for now.    Per ED, patient is not on any anticoagulation. They have been NPO since last night.    Imaging:   CT 4/18/25 reviewed - findings suspicious for contained perforated cholecystitis. Suitable window for drain placement is identified.    Assessment/Plan:   Urgent cholecystotomy tube is indicated for source control in this patient. Patient is on IR schedule for image guided cholecystostomy tube placement.  - consent will be obtained from patient in IR.  - iStat INR to be obtained in INR prior to procedure.      Labs:  CBC RESULTS:   Recent Labs   Lab Test 04/19/25  0803   WBC 34.5*   RBC 4.11   HGB 12.2   HCT 36.3   MCV 88   MCH 29.7   MCHC 33.6   RDW 12.8           Discussed with Dr. Pino.  Tom Guillen M.D.  Interventional Radiology PGY-6  IR pass pager: [phone]    I, Jm Pino, was present with the resident/fellow/ZAIN during the history and exam. I discussed the case with the resident/fellow/ZAIN and agree with the findings as documented in the assessment and plan.    Jm Pino MD    Vascular and Interventional Radiolgy  [facility]

## 2025-04-19 NOTE — PROGRESS NOTES
Brief surgery update-     I spoke with GI to discuss.  The odd finding at the gallbladder fundus (possible contained perforation?) is of unclear significance to me- my impression of her diagnosis is not acute cholecystitis- but cholangitis. GI is considering options for decompression.  This could be followed by a cholecystectomy this admission.

## 2025-04-19 NOTE — PHARMACY-VANCOMYCIN DOSING SERVICE
Pharmacy Vancomycin Initial Note  Date of Service 2025  Patient's  1965  60 year old, female    Indication: Sepsis and cholangitis    Current estimated CrCl = Estimated Creatinine Clearance: 106.9 mL/min (based on SCr of 0.59 mg/dL).    Creatinine for last 3 days  2025: 11:15 PM Creatinine 0.59 mg/dL    Recent Vancomycin Level(s) for last 3 days  No results found for requested labs within last 3 days.      Vancomycin IV Administrations (past 72 hours)        No vancomycin orders with administrations in past 72 hours.                    Nephrotoxins and other renal medications (From now, onward)      Start     Dose/Rate Route Frequency Ordered Stop    25 0100  vancomycin (VANCOCIN) 750 mg in 0.9% NaCl 250 mL intermittent infusion         750 mg  over 60 Minutes Intravenous EVERY 8 HOURS 25 0057      25 0015  piperacillin-tazobactam (ZOSYN) 3.375 g vial to attach to  mL bag         3.375 g  over 30 Minutes Intravenous EVERY 6 HOURS 25 0011              Contrast Orders - past 72 hours (72h ago, onward)      Start     Dose/Rate Route Frequency Stop    25 2345  iopamidol (ISOVUE-370) solution 72 mL         72 mL Intravenous ONCE 25 2347            [email or web address] Prediction of Planned Initial Vancomycin Regimen  Regimen: 750 mg IV every 8 hours.  Start time: 00:55 on 2025  Exposure target: AUC24 (range)400-600 mg/L.hr   AUC24,ss: 537 mg/L.hr  Probability of AUC24 > 400: 79 %  Ctrough,ss: 17.8 mg/L  Probability of Ctrough,ss > 20: 40 %  Probability of nephrotoxicity (Lodise ARCADIO ): 14 %        Plan:  Start vancomycin  750 mg IV q8h.   Vancomycin monitoring method: AUC  Vancomycin therapeutic monitoring goal: 400-600 mg*h/L  Pharmacy will check vancomycin levels as appropriate in 1-3 Days.    Serum creatinine levels will be ordered daily for the first week of therapy and at least twice weekly for subsequent weeks.      Domenico uA, formerly [facility]

## 2025-04-19 NOTE — PRE-PROCEDURE
GENERAL PRE-PROCEDURE:   Procedure:  Image guided gallbladder drain insertion  Date/Time:  4/19/2025 12:01 PM    Verbal consent obtained?: Yes    Written consent obtained?: Yes    Risks and benefits: Risks, benefits and alternatives were discussed    Consent given by:  Patient  Patient states understanding of procedure being performed: Yes    Patient's understanding of procedure matches consent: Yes    Procedure consent matches procedure scheduled: Yes    Expected level of sedation:  Moderate  Appropriately NPO:  Yes  ASA Class:  2  Mallampati  :  Grade 2- soft palate, base of uvula, tonsillar pillars, and portion of posterior pharyngeal wall visible  Lungs:  Lungs clear with good breath sounds bilaterally  Heart:  Normal heart sounds and rate  History & Physical reviewed:  History and physical reviewed and no updates needed  Statement of review:  I have reviewed the lab findings, diagnostic data, medications, and the plan for sedation

## 2025-04-19 NOTE — H&P
[facility]    History and Physical - Hospitalist Service, GOLD TEAM        Date of Admission:  4/19/2025    Assessment & Plan      Maryann Riggs is a 60 year old woman with a history of remote lye ingestion s/p Bilroth II resection with esophageal stricture requiring regular dilations (last on 4/15/25), GERD, Raynaud's, cervical radiculopathy, and hypertension who was admitted with sepsis 2/2 cholecystis with possible cholangitis.     Sepsis 2/2 acute cholecystitis, possible cholangitis  Choledocholithiasis  Hx of lye ingestion s/p Bilroth II with persistent esophageal stricture s/p repeated dilations  Follows with [facility] for esophageal stricture, last had EGD with esophageal dilation on 4/15, EGD was also notable for gastritis. Presented to [facility] ED with ~1 week of progressive epigastric abdominal pain radiating to the back, nausea, and vomiting. Febrile in ED with WBC 34.5, , , T bili 4.6 concerning for obstructive biliary process. CT revealed moderate intrahepatic and extrahepatic biliary dilation, gallbladder thickening with partially discontinuous wall at the fundus raising c/f possible perforation, small gallstones or gas at gallbladder fundus.    - GI panc/bili, Surgery, IR all consulted:   - Planning for perc andrade tube today. Further procedural plans TBD   - MRCP   - IV Zosyn 3.375 g q6h   - Pain: Tylenol PRN, oxycodone 5 mg q4h PRN, IV Dilaudid 0.5 mg q3h PRN   - Nausea: Zofran, Compazine PRN   - Blood cultures in process   - Trend CBC, CMP   - NPO pending procedural plans   - NS IVF at 100 mL/hr    Subacute cough  GERD  Several weeks of persistent cough, started in early March. Multiple outpatient evaluations for this. Cough failed to improve with 2 courses of prednisone. Suspect related to allergic rhinitis and/or GERD.   - Trial Flonase   - Continue PTA PPI    Hypertension: Continue PTA lisinopril.     Chronic back pain:  Continue PTA Flexeril, Tylenol.         Diet: NPO for Procedure/Surgery per Anesthesia Guidelines Except for: Meds; Clear liquids before procedure/surgery: ADULT (Age GREATER than or Equal to 18 years) - Clear liquids 2 hours before procedure/surgery    DVT Prophylaxis: Pneumatic Compression Devices  Danielle Catheter: Not present  Lines: None     Cardiac Monitoring: None  Code Status: Full Code      Clinically Significant Risk Factors Present on Admission         # Hyponatremia: Lowest Na = 130 mmol/L in last 2 days, will monitor as appropriate  # Hypochloremia: Lowest Cl = 89 mmol/L in last 2 days, will monitor as appropriate  # Hypocalcemia: Lowest Ca = 8.5 mg/dL in last 2 days, will monitor and replace as appropriate     # Hypoalbuminemia: Lowest albumin = 3.3 g/dL at 4/19/2025  8:03 AM, will monitor as appropriate     # Hypertension: Home medication list includes antihypertensive(s)                      Disposition Plan     Medically Ready for Discharge: Anticipated in 2-4 Days         The patient's care was discussed with the Attending Physician, Dr. Swenson and Patient.    Juana Ba PA-C  Hospitalist Service, [facility]  Securely message with Ztory (more info)  Text page via [facility] Paging/Directory   See signed in provider for up to date coverage information    ______________________________________________________________________    Chief Complaint   Abdominal pain    History is obtained from the patient, chart review    History of Present Illness   Maryann Riggs is a 60 year old woman with a history of remote lye ingestion s/p Bilroth II with esophageal stricture requiring regular dilations (last on 4/15/25), Raynaud's, cervical radiculopathy, who was admitted with sepsis 2/2 cholecystis with possible cholangitis. She presented to [facility] ED yesterday evening with 1 week of progressively worsening epigastric abdominal pain that began  radiating to her mid back and 1 day of non-bloody vomiting. Labs in ED notable for transaminitis and leukocytosis. CT with biliary ductal dilation concerning for obstructive biliary process. Transferred to [facility] for procedural intervention.     She is also concerned about a cough that started in early March. Cough is often precipitated by a tickle in her throat. No improvement in cough after 2 courses of prednisone. No sputum production, dyspnea, cold symptoms.       Past Medical History    Past Medical History:   Diagnosis Date    Anger reaction     buspar helping    Cholelithiasis     Esophageal stricture     dilatation every 3 months    Generalized osteoarthrosis, unspecified site (aka ARTHRITIS)     small joints    PTSD (post-traumatic stress disorder)     Raynaud's disease        Past Surgical History   Past Surgical History:   Procedure Laterality Date    COLONOSCOPY N/A 2024    Procedure: COLONOSCOPY, FLEXIBLE, WITH LESION REMOVAL USING SNARE;  Surgeon: Bk Laura MD;  Location: [facility]    MAMMOPLASTY REDUCTION      OTHER SURGICAL HISTORY      s/p left knee arthroscopinc surgery    ROTATOR CUFF REPAIR RT/LT  2005    SMALL BOWEL RESECTION      TUBAL LIGATION         Prior to Admission Medications   Prior to Admission Medications   Prescriptions Last Dose Informant Patient Reported? Taking?   Ascorbic Acid (VITAMIN C CR PO)   Yes No   Sig: Take  by mouth.   Cyanocobalamin (VITAMIN B 12 PO)   Yes No   Sig: Take  by mouth.   DTx Lo - Wellness (WW CORE) MISC   Yes No   Si capsules daily   GLUCOSAMINE CHONDROITIN COMPLX PO   Yes No   Sig: Take  by mouth.   HYDROcodone-acetaminophen (NORCO) 5-325 MG tablet   No No   Sig: Take 1 tablet by mouth every 6 hours as needed for severe pain.   MAGNESIUM OXIDE PO   Yes No   Sig: Take 400 mg by mouth daily   NIFEdipine ER OSMOTIC (PROCARDIA XL) 30 MG 24 hr tablet   No No   Sig: Take 1 tablet (30 mg) by mouth daily.   Patient not taking: Reported on  4/17/2025   acetaminophen (PAIN RELIEF EXTRA STRENGTH) 500 MG tablet   No No   Sig: TAKE 1-2 TABLETS (500-1,000 MG) BY MOUTH EVERY 6 HOURS AS NEEDED FOR MILD PAIN   albuterol (PROAIR HFA/PROVENTIL HFA/VENTOLIN HFA) 108 (90 Base) MCG/ACT inhaler   No No   Sig: Inhale 2 puffs into the lungs every 6 hours as needed for shortness of breath, wheezing or cough.   benzonatate (TESSALON) 200 MG capsule   No No   Sig: Take 1 capsule (200 mg) by mouth 3 times daily as needed for cough.   cyclobenzaprine (FLEXERIL) 10 MG tablet   No No   Sig: Take 1 tablet (10 mg) by mouth daily.   ferrous sulfate (FEROSUL) 325 (65 Fe) MG tablet   No No   Sig: Take 1 tablet (325 mg) by mouth daily (with breakfast).   lisinopril (ZESTRIL) 2.5 MG tablet   No No   Sig: Take 1 tablet (2.5 mg) by mouth daily.   melatonin 3 MG tablet   No No   Sig: Take 1 tablet by mouth nightly as needed for sleep.   Patient not taking: Reported on 4/17/2025   meloxicam (MOBIC) 15 MG tablet   No No   Sig: TAKE 1 TABLET BY MOUTH EVERY DAY   Patient not taking: Reported on 4/17/2025   omeprazole (PRILOSEC) 20 MG DR capsule   Yes No   Sig: Take 20 mg by mouth daily.   oxyCODONE (ROXICODONE) 5 MG tablet   No No   Sig: Take 1 tablet (5 mg) by mouth every 6 hours as needed for pain.   predniSONE (DELTASONE) 20 MG tablet   No No   Sig: Take two tablets (= 40mg) each day for 5 (five) days   predniSONE (DELTASONE) 20 MG tablet   No No   Sig: Take 3 tabs by mouth daily x 1 days, then 2 tabs daily x 3 days, then 1 tab daily x 3 days, then 1/2 tab daily x 3 days.   zolpidem (AMBIEN) 5 MG tablet   No No   Sig: TAKE 1 TABLET (5 MG) BY MOUTH NIGHTLY AS NEEDED FOR SLEEP   Patient not taking: Reported on 4/17/2025      Facility-Administered Medications: None          Physical Exam   Vital Signs: Temp: 98.2  F (36.8  C) Temp src: Oral BP: (!) 146/85 Pulse: 90   Resp: 18 SpO2: 97 % O2 Device: None (Room air)    Weight: 151 lbs 0 oz    Constitutional: Awake and alert, in no apparent  distress.   Eyes: Sclera clear, anicteric   Respiratory: Breathing non-labored. CTAB  Cardiovascular:  RRR, normal S1/S2. No rubs or murmurs. No peripheral edema.   GI: + epigastric and RUQ tenderness. No rebound or guarding. Soft, non-distended. Bowel sounds present.  Skin:  No visible rashes, lesions, or bruising of concern.   Neurologic: Alert and oriented.      Medical Decision Making       75 MINUTES SPENT BY ME on the date of service doing chart review, history, exam, documentation & further activities per the note.      Data   ------------------------- PAST 24 HR DATA REVIEWED -----------------------------------------------    I have personally reviewed the following data over the past 24 hrs:    34.5 (H)  \   12.2   / 235     132 (L) 96 (L) 10.5 /  211 (H)   3.8 25 0.56 \     ALT: 920 (HH) AST: 221 (H) AP: 487 (H) TBILI: 4.6 (H)   ALB: 3.3 (L) TOT PROTEIN: 5.9 (L) LIPASE: 32     Trop: 39 (H) BNP: N/A     Procal: N/A CRP: N/A Lactic Acid: 0.8         Imaging results reviewed over the past 24 hrs:   Recent Results (from the past 24 hours)   CT Aortic Survey w Contrast    Narrative    EXAM: CT AORTIC SURVEY W CONTRAST  LOCATION: [facility]  DATE: 4/19/2025    INDICATION: 60F, interscapular pain, forceful coughing, now epigastric pain   on exam very uncomfortable, grimacing, actively vomtiing, 190s 100s, HR 120s.  COMPARISON: None.  TECHNIQUE: CT angiogram chest abdomen pelvis during arterial phase of injection of IV contrast. 2D and 3D MIP reconstructions were performed by the CT technologist. Dose reduction techniques were used.   CONTRAST: 72 mL Isovue 370    FINDINGS:   CT ANGIOGRAM CHEST, ABDOMEN, AND PELVIS: No intramural hematoma on noncontrast imaging. No thoracic aortic dissection or aneurysm. Maximal diameter descending thoracic aorta 3.2 cm. Mild thoracic arterial calcification. There is no evidence of pulmonary   embolism, though distal arteries are not optimally evaluated  given aortic contrast bolus timing. Origins of the great vessels from the aortic arch are widely patent.    No abdominal aortic dissection or aneurysm. Aortoiliac atherosclerosis is present. No evidence of arterial occlusion or high-grade stenosis. Patent celiac artery and superior and inferior mesenteric arteries. Patent renal arteries.    LUNGS AND PLEURA: Mild biapical pleural-parenchymal scarring. No consolidation. No pneumothorax or pleural effusion.    MEDIASTINUM/AXILLAE: No lymphadenopathy.     CORONARY ARTERY CALCIFICATION: Moderate.    HEPATOBILIARY: Moderate intrahepatic and extrahepatic biliary ductal dilatation. Common duct measures up to 13 mm. Gallbladder is elongated, measuring up to 11.5 cm in length. Gallbladder wall is enhancing, thickened, and irregular, and appears partially   discontinuous at the gallbladder fundus. Small gallstones and/or dots of gas at the gallbladder fundus. There is mild pericholecystic fluid, but no large fluid collection, and no free pneumoperitoneum. No focal liver lesions on arterial phase imaging.   Hepatic parenchymal heterogeneity could represent hepatic venous congestion.    PANCREAS: Pancreatic duct is upper limits of normal in size. Suspect small cystic lesion at the anterior pancreatic head measuring 1.2 cm, coronal image 59. Otherwise, no discrete mass identified.    SPLEEN: Normal.    ADRENAL GLANDS: Normal.    KIDNEYS/BLADDER: No hydronephrosis. A 2 mm nonobstructive calculus at the right lower pole.    BOWEL: No evidence of bowel obstruction. Chain suture material at the left upper quadrant. No ascites. No free pneumoperitoneum.    LYMPH NODES: No lymphadenopathy.    PELVIC ORGANS: Urinary bladder distention. No pelvic mass.    MUSCULOSKELETAL: Degenerative thoracolumbar scoliosis and spondylosis.      Impression    IMPRESSION:  1.  No thoracic or abdominal aortic dissection or aneurysm.  2.  Findings compatible with biliary obstructive process. Moderate  intrahepatic and extrahepatic biliary ductal dilatation. Suspect small 1.2 cm cystic lesion at the anterior pancreatic head - otherwise, no discrete mass identified. Recommend further   evaluation with MRCP and/or ERCP to assess for choledocholithiasis or occult mass.  3.  Findings suspicious for acute cholecystitis. Gallbladder is elongated, with wall thickening and enhancement. The gallbladder wall appears partially discontinuous at the gallbladder fundus, and contained gallbladder perforation is not excluded. Mild   pericholecystic fluid, but no large fluid collection, and no free pneumoperitoneum.  4.  No focal liver lesions on arterial phase imaging. Hepatic parenchymal heterogeneity could represent hepatic venous congestion.  5.  No hydronephrosis. A 2 mm nonobstructive calculus at the right lower pole.     US Abdomen Limited    Impression    RESIDENT PRELIMINARY INTERPRETATION  IMPRESSION:   1. Length and gallbladder with thickening at the fundus and diffuse  shadowing debris. This could represent acute cholecystitis and  gallbladder perforation, but the gallbladder discontinuity is better  visualized on CT CAP 4/18/2025  2. Biliary and pancreatic ductal dilatation. Recommend MRCP/ERCP for  further evaluation.  3. Diffuse hepatic parenchymal hyperechogenicity commonly seen in  hepatic steatosis.

## 2025-04-19 NOTE — ED PROVIDER NOTES
ED PROVIDER NOTE  April 19, 2025  History     Chief Complaint   Patient presents with    Abdominal Pain     HPI  Maryann Riggs is a 60 year old female who has a history segment for Samir-en-Y bypass arriving today to the emergency department as a transfer from outside facility due to concern of possible acute cholecystitis versus ascending cholangitis versus obstruction of the common bile duct.  Patient presented to the outside facility with 1 week of right and mid scapular pain.  This increased in intensity through the week last night was associate with a nausea and episode of nonbilious nonbloody emesis x 1.  As part of her workup the patient did have a CT aortogram which demonstrated findings concerning for possible acute cholecystitis.  Laboratory studies demonstrated significant elevation of LFTs with ALT above 1000.  Case discussed with outside facility.  She does follow with [facility] who noted complexity based on prior bypass surgery.  Case discussed with [facility] gastroenterology and general surgery with recommendation for transportation due to possible need for percutaneous cholecystostomy tube versus ERCP and cholecystectomy.  Upon arrival patient reports ongoing moderate to severe intensity mid scapular pain.  She reports no recent trauma.  She reports no current nausea.  She reports no recent fever or chills.  Patient denies change in stool such as diarrhea, melena, hematochezia.      Past Medical History  Past Medical History:   Diagnosis Date    Anger reaction     buspar helping    Cholelithiasis     Esophageal stricture     dilatation every 3 months    Generalized osteoarthrosis, unspecified site (aka ARTHRITIS)     small joints    PTSD (post-traumatic stress disorder)     Raynaud's disease      Past Surgical History:   Procedure Laterality Date    COLONOSCOPY N/A 9/27/2024    Procedure: COLONOSCOPY, FLEXIBLE, WITH LESION REMOVAL USING SNARE;  Surgeon: Bk Laura  "MD;  Location: [facility]    MAMMOPLASTY REDUCTION      OTHER SURGICAL HISTORY      s/p left knee arthroscopinc surgery    ROTATOR CUFF REPAIR RT/LT  01/01/2005    SMALL BOWEL RESECTION      TUBAL LIGATION       acetaminophen (PAIN RELIEF EXTRA STRENGTH) 500 MG tablet  albuterol (PROAIR HFA/PROVENTIL HFA/VENTOLIN HFA) 108 (90 Base) MCG/ACT inhaler  Ascorbic Acid (VITAMIN C CR PO)  benzonatate (TESSALON) 200 MG capsule  Cyanocobalamin (VITAMIN B 12 PO)  cyclobenzaprine (FLEXERIL) 10 MG tablet  DTx Lo - Wellness (WW CORE) MISC  ferrous sulfate (FEROSUL) 325 (65 Fe) MG tablet  GLUCOSAMINE CHONDROITIN COMPLX PO  lisinopril (ZESTRIL) 2.5 MG tablet  MAGNESIUM OXIDE PO  melatonin 3 MG tablet  meloxicam (MOBIC) 15 MG tablet  NIFEdipine ER OSMOTIC (PROCARDIA XL) 30 MG 24 hr tablet  omeprazole (PRILOSEC) 20 MG DR capsule  oxyCODONE (ROXICODONE) 5 MG tablet  predniSONE (DELTASONE) 20 MG tablet  predniSONE (DELTASONE) 20 MG tablet  zolpidem (AMBIEN) 5 MG tablet      Allergies   Allergen Reactions    Desyrel [Trazodone Hcl]      Sig mood disturbance      Lamictal [Lamotrigine]      Family History  Family History   Problem Relation Age of Onset    Unknown/Adopted Mother     Unknown/Adopted Father     Unknown/Adopted Maternal Grandmother     Unknown/Adopted Maternal Grandfather     Unknown/Adopted Paternal Grandmother     Unknown/Adopted Paternal Grandfather      Social History   Social History     Tobacco Use    Smoking status: Never    Smokeless tobacco: Never   Vaping Use    Vaping status: Never Used   Substance Use Topics    Alcohol use: Yes     Comment: scoial    Drug use: No         A medically appropriate review of systems was performed with pertinent positives and negatives noted in the HPI, and all other systems negative.      Physical Exam   BP: (!) 160/91  Pulse: 90  Temp: 98.1  F (36.7  C)  Resp: 18  Height: 172.7 cm (5' 8\")  Weight: 68.5 kg (151 lb)  SpO2: 97 %      Physical Exam  Vitals and nursing note reviewed. "   Constitutional:       General: She is in acute distress.      Appearance: Normal appearance. She is not ill-appearing, toxic-appearing or diaphoretic.   HENT:      Head: Normocephalic and atraumatic.      Right Ear: External ear normal.      Left Ear: External ear normal.      Nose: Nose normal. No congestion.      Mouth/Throat:      Mouth: Mucous membranes are moist.      Pharynx: Oropharynx is clear. No oropharyngeal exudate.   Eyes:      Extraocular Movements: Extraocular movements intact.      Conjunctiva/sclera: Conjunctivae normal.      Pupils: Pupils are equal, round, and reactive to light.   Cardiovascular:      Rate and Rhythm: Normal rate.      Pulses: Normal pulses.      Heart sounds: Normal heart sounds. No murmur heard.     No friction rub.   Pulmonary:      Effort: Pulmonary effort is normal. No respiratory distress.      Breath sounds: No stridor. No wheezing, rhonchi or rales.   Abdominal:      General: Abdomen is flat. There is no distension.      Tenderness: There is abdominal tenderness in the right upper quadrant. There is no guarding or rebound.   Musculoskeletal:         General: No deformity or signs of injury. Normal range of motion.      Cervical back: Normal range of motion.   Skin:     General: Skin is warm.      Capillary Refill: Capillary refill takes less than 2 seconds.      Coloration: Skin is not pale.      Findings: No bruising or erythema.   Neurological:      General: No focal deficit present.      Mental Status: She is alert.      Cranial Nerves: No cranial nerve deficit.      Motor: No weakness.   Psychiatric:         Mood and Affect: Mood normal.         Behavior: Behavior normal.         ED Course        Procedures         Results for orders placed or performed during the hospital encounter of 04/19/25 (from the past 24 hours)   CBC with platelets differential    Narrative    The following orders were created for panel order CBC with platelets differential.  Procedure                                Abnormality         Status                     ---------                               -----------         ------                     CBC with platelets and ...[[id number]]  Abnormal            Final result               RBC and Platelet Morpho...[[id number]]                      Final result                 Please view results for these tests on the individual orders.   Comprehensive metabolic panel   Result Value Ref Range    Sodium 132 (L) 135 - 145 mmol/L    Potassium 3.8 3.4 - 5.3 mmol/L    Carbon Dioxide (CO2) 25 22 - 29 mmol/L    Anion Gap 11 7 - 15 mmol/L    Urea Nitrogen 10.5 8.0 - 23.0 mg/dL    Creatinine 0.56 0.51 - 0.95 mg/dL    GFR Estimate >90 >60 mL/min/1.73m2    Calcium 8.5 (L) 8.8 - 10.4 mg/dL    Chloride 96 (L) 98 - 107 mmol/L    Glucose 211 (H) 70 - 99 mg/dL    Alkaline Phosphatase 487 (H) 40 - 150 U/L     (H) 0 - 45 U/L     (HH) 0 - 50 U/L    Protein Total 5.9 (L) 6.4 - 8.3 g/dL    Albumin 3.3 (L) 3.5 - 5.2 g/dL    Bilirubin Total 4.6 (H) <=1.2 mg/dL   CBC with platelets and differential   Result Value Ref Range    WBC Count 34.5 (H) 4.0 - 11.0 10e3/uL    RBC Count 4.11 3.80 - 5.20 10e6/uL    Hemoglobin 12.2 11.7 - 15.7 g/dL    Hematocrit 36.3 35.0 - 47.0 %    MCV 88 78 - 100 fL    MCH 29.7 26.5 - 33.0 pg    MCHC 33.6 31.5 - 36.5 g/dL    RDW 12.8 10.0 - 15.0 %    Platelet Count 235 150 - 450 10e3/uL    % Neutrophils 86 %    % Lymphocytes 3 %    % Monocytes 6 %    % Eosinophils 0 %    % Basophils 0 %    % Immature Granulocytes 4 %    NRBCs per 100 WBC 0 <1 /100    Absolute Neutrophils 29.7 (H) 1.6 - 8.3 10e3/uL    Absolute Lymphocytes 1.2 0.8 - 5.3 10e3/uL    Absolute Monocytes 2.2 (H) 0.0 - 1.3 10e3/uL    Absolute Eosinophils 0.0 0.0 - 0.7 10e3/uL    Absolute Basophils 0.1 0.0 - 0.2 10e3/uL    Absolute Immature Granulocytes 1.4 (H) <=0.4 10e3/uL    Absolute NRBCs 0.0 10e3/uL   RBC and Platelet Morphology   Result Value Ref Range    RBC Morphology Confirmed RBC  Indices     Platelet Assessment  Automated Count Confirmed. Platelet morphology is normal.     Automated Count Confirmed. Platelet morphology is normal.   US Abdomen Limited    Impression    RESIDENT PRELIMINARY INTERPRETATION  IMPRESSION:   1. Length and gallbladder with thickening at the fundus and diffuse  shadowing debris. This could represent acute cholecystitis and  gallbladder perforation, but the gallbladder discontinuity is better  visualized on CT CAP 4/18/2025  2. Biliary and pancreatic ductal dilatation. Recommend MRCP/ERCP for  further evaluation.  3. Diffuse hepatic parenchymal hyperechogenicity commonly seen in  hepatic steatosis.   Lactic acid whole blood with 1x repeat in 2 hr when >2   Result Value Ref Range    Lactic Acid, Initial 0.8 0.7 - 2.0 mmol/L     Medications   sodium chloride 0.9 % infusion ( Intravenous Rate/Dose Verify 4/19/25 1001)   piperacillin-tazobactam (ZOSYN) 3.375 g vial to attach to  mL bag (0 g Intravenous Stopped 4/19/25 1001)   HYDROmorphone (PF) (DILAUDID) injection 0.5 mg (has no administration in time range)   HYDROmorphone (PF) (DILAUDID) injection 0.5 mg (0.5 mg Intravenous $Given 4/19/25 0825)             Critical care was not performed.     Medical Decision Making  The patient's presentation was of high complexity (an acute health issue posing potential threat to life or bodily function).    The patient's evaluation involved:  review of external note(s) from 3+ sources (see separate area of note for details)  review of 3+ test result(s) ordered prior to this encounter (see separate area of note for details)  ordering and/or review of 3+ test(s) in this encounter (see separate area of note for details)    The patient's management necessitated high risk (a decision regarding hospitalization).    Assessments & Plan (with Medical Decision Making)     Maryann Riggs is a 60 year old female who has a history segment for Samir-en-Y bypass arriving today to the  emergency department as a transfer from outside facility due to concern of possible acute cholecystitis versus ascending cholangitis versus obstruction of the common bile duct.  Patient noted alert.  Presently afebrile and hemodynamic stable with mild systolic hypertension.  She appears to be uncomfortable is nontoxic.  She does have right upper quadrant abdominal discomfort.  There is no involuntary guarding, mass, or evidence of herniation.  No sign of external trauma.  I reviewed outside laboratory studies with noted significant elevation in LFTs.  She did have a CT aortic gram with concern of possible biliary obstruction or acute cholecystitis I would plan for formal ultrasonography.  Case to be discussed with specialty service is aware of this patient including general surgery and gastroenterology.    Case discussed with general surgery who have evaluated the patient in the emergency department with recommendation to reach out to GI and consideration of cholecystostomy tube placement percutaneously.  I did discuss case with gastroenterology who will plan to evaluate the patient noting complexity with prior Samir-en-Y bypass for ERCP.  Case further discussed with interventional radiology fellow who will plan to reach out to gastroenterology regarding tentative plan moving forward with possible placement of cholecystostomy tube.  Patient will remain n.p.o. at this time.  Antibiotics administered this morning upon arrival as she was due for Zosyn.  She remains hemodynamically stable at this time with ongoing discomfort.  Repeat laboratory studies do demonstrate uptrending leukocytosis now at 35 with no elevation lactic acid will continue hydration and close clinical monitoring of this patient.    I have reviewed the nursing notes.    I have reviewed the findings, diagnosis, plan and need for follow up with the patient.    New Prescriptions    No medications on file       Final diagnoses:   Cholangitis (H)   SIRS  (systemic inflammatory response syndrome) (H)   Elevated LFTs   History of Samir-en-Y gastric bypass       CARLOZ RONDON MD    4/19/2025   [facility] EMERGENCY DEPARTMENT     Carloz Rondon MD  04/19/25 1017

## 2025-04-19 NOTE — ED PROVIDER NOTES
"[facility]  Emergency Department Visit Note    PATIENT:  Maryann Riggs     60 year old     female      [id number]    Chief complaint:  Chief Complaint   Patient presents with    Back Pain     Back pain between and below shoulder blades. Pain started x1 weeks ago. Pain went away and now back and worse.            History of present illness:  Patient is a 60 year old female with prior Samir-en-Y, PTSD, chronic bilateral low back pain presenting for evaluation of back pain.    Seen in urgent care 3 days ago for coughing fits and lower rib pain.  At that time cough for the preceding month.  Further workup at that time not indicated, sent with continuation of prednisone as this had been helping from prior prescription, trial of benzonatate and hydrocodone/acetaminophen for rib pain.    Presents today due to persistent symptoms.  She reaffirmed symptoms started roughly 1 week ago after a forceful coughing fit.  She has had intermittent, though mostly constant, bilateral anterior chest pain and interscapular pain.  She has had intermittent relief with medications as above, though presents this evening due to again recurrence of severe shoulder blade pain and chest pain.  Not having fevers.  No dyspnea.  Has continued to cough.  Has not had vomiting until during history/exam.  No abdominal pain.  Does report today she has had some epigastric abdominal discomfort.    Additional history from chart review taken after results as below:    She follows with Dr. Bray with [facility] for therapy of esophageal stricture, this has been a regular occurrence due to distant history of lye ingestion.  Underwent endoscopy 4 days ago for same.     Review of Systems:  As in HPI above    BP (!) 184/95   Pulse 114   Temp 99.1  F (37.3  C)   Ht 1.727 m (5' 8\")   Wt 68.5 kg (151 lb)   LMP 01/04/2014   SpO2 98%   BMI 22.96 kg/m        Physical Exam:  Constitutional: Sitting up in hospital bed, alert, " grimacing, answering questions appropriately, oriented, actively vomiting at 1 point throughout exam  HEENT: normocephalic, atraumatic, pupils 3mm, equal, round, and reactive to light, and sclerae anicteric  Neck: no stridor  Cardiovascular: tachycardic, regular rhythm, and no murmurs, rubs, or extra heart sounds  Pulmonary: breathing comfortably on room air and lungs clear to auscultation bilaterally  Abdominal: soft, non-tender, non-distended  Extremities/MSK: No obvious exam finding on visual inspection.  No clear reproducible tenderness infrascapular or anterior chest (though patient notes this is the area of pain)  Skin: warm, dry  Neurologic: moves all four extremities spontaneously  Psychiatric: calm, appropriate      MDM:  Patient is a 60 year old female with above history presenting for evaluation of chest pain.    Vitals are notable for hypertension and tachycardia though afebrile, satting well on room air. Exam notable for patient who appears very uncomfortable, grimacing throughout exam, actively vomiting at one point throughout exam which she reports is new over the past week.    Unusual presentation. With this a more acute presentation, dissection would clearly be at the front of the differential, though intermittent course over the past week certainly argues against this.  Would be unusual presentation for ACS, though worsening of symptoms with now onset of vomiting raises question of this.  Spontaneous pneumothorax possible since the initial chest x-ray possible.  No obvious infectious signs or symptoms.  Musculoskeletal pain seems the most likely based on the waxing and waning course over the past week, however, given how uncomfortable she appears with active vomiting and vital sign changes warrants further workup currently.    Labs, ECG, CT aortic survey.  Hydromorphone, ondansetron, 1 L NS bolus for initial therapeutics.    Disposition pending above workup. Remainder of ED course below.    ED  COURSE:  ED Course as of 04/19/25 0158   Fri Apr 18, 2025   2341 CBC with platelets(!)  Leukocytosis to 18.8, broad differential in this context.  No anemia.  Platelets normal.   2351 Troponin T, High Sensitivity(!): 36  Noted, mildly elevated, awaiting ECG, will trend.   Sat Apr 19, 2025   0001 Basic metabolic panel(!)  Overall reassuring, mild hyponatremia unlikely contributing in any significant way.  No concerning electrolyte abnormalities.  No kidney injury.   0008 CT Aortic Survey w Contrast  Preliminary image independently reviewed, no acute aortic syndrome but gallbladder looks inflamed and concerning on imaging, also looks like perhaps some biliary obstruction.  Awaiting labs and formal read.   0010 Hepatic panel(!!)  Elevated transaminases, hyperbilirubinemia potentially consistent with choledocholithiasis.  Treated with antibiotics.  Will discuss with GI, will need transfer.   0012 EKG 12 lead  My independent ECG interpretation:  - Ventricular rate 122 bpm, regular  - VT, QRS, QT intervals normal  - Axis normal  - no ST segment or T wave changes concerning for acute ischemia  - Comparison to prior ECGs: No significant change  - My independent interpretation: Sinus tachycardia, nonspecific ST segment changes   0026 Patient is now febrile, giving acetaminophen.   0046 Lactic Acid(!): 2.2  Noted, getting fluids (will increase to full 30cc/kg bolus), abx ordered. Recheck pending.   0047 CT Aortic Survey w Contrast  Radiographic findings consistent with obstructive hepatobiliary pathology as well as acute cholecystitis with possible perforation, but without intraperitoneal free air.    Now with fever, leukocytosis, hyperbilirubinemia presentation in line with cholangitis.  Will touch base with [facility] as she follows with them regularly as noted above.   0111 Had discussion with general surgery and GI together through [facility], picture overall complicated by the fact she has had a prior  Samir-en-Y.  There are enough uncertainties and potential obstacles that they did not feel she would be best served at their facility, and would be better served at a facility with other subspecialty care.  Will attempt to reach out to [facility] for further discussion.   0135 Spoke with Dr. Timmons and Dr. Lancaster with GI and surgery, respectively. They are agreeable with transfer and ongoing management for ?perc drain vs ERCP and cholecystectomy vs other.   0144 Third liter of crystalloid ordered.  On repeat reperfusion exam, after roughly 1.5 L of crystalloid in the form of boluses and IV carriers, remains tachycardic, still mentating well.   0148 Spoke with Dr. Estrada at [facility], accepted for transfer but given high volume of borders wondered if patient would be stable to keep here overnight.  Dr. Timmons was brought to the line and noted that procedure would not occur overnight, and so delaying transfer to try to alleviate some system volume concerns overnight tonight, but would ideally transfer her ED to ED after 6 AM.    She is still in the active resuscitation phase and we will, of course, continue appropriate resuscitation as indicated.     Will continue closely monitoring clinical picture that might necessitate more emergent transfer.       Encounter Diagnoses:  Final diagnoses:   Cholangitis (H)   Cholecystitis   Sepsis, due to unspecified organism, unspecified whether acute organ dysfunction present (H)       Final disposition: pending further workup, patient signed out to oncoming physician, Dr. Rudolph Marques MD  4/18/2025  11:14 PM   Emergency Medicine  [facility]     Addendum: updated ED course     Abilio Marques MD  04/19/25 0208

## 2025-04-19 NOTE — IR NOTE
Patient Name: Maryann Riggs  Medical Record Number: [id number]  Today's Date: 4/19/2025    Procedure: Gallbladder Drain Placement  Proceduralist: Dr. Pino & Dr. Guillen  Pathology present: N/A    Procedure Start: 1209  Procedure end: 1226  Sedation medications administered: 2 mg versed & 100 mcg fentanyl   Other: 4 mg Zofran given    Report given to: ED SANDY GRIDER  : N/A    Other Notes: Pt arrived to IR room 1 from ED. Consent reviewed. Pt denies any questions or concerns regarding procedure. Pt positioned supine and monitored per protocol. Pt tolerated procedure without any noted complications. Pt transferred back to ED.

## 2025-04-19 NOTE — ED TRIAGE NOTES
BIBA from [facility] with epigastric and midscapular pain. Cholecystitis and sepsis. 1L LR given at [facility]. 2/26/25 dilaudid given q3, last given 45 min ago.

## 2025-04-19 NOTE — LETTER
[facility] MED SURG  [address] [address]  Phone: [phone]    April 23, 2025        Maryann Riggs  [address] [address]          To whom it may concern:    RE: Maryann Riggs    To whom it may concern:    RE: Maryann Riggs      Work restrictions   No strenuous exercise for 4 weeks - No lifting, pushing, pulling more than 15-20 pounds for 3-4 weeks -- Do not drive until you can press the brake pedal quickly and fully without pain - Do not operate a motor vehicle while taking narcotic pain medications.    Next 4 weeks, may be perform administrative tasks or non-strenuous light duty work such dose not include lifting, pushing or pulling more than 15-20 pounds, operating heavy duty equipment, or other activities that could cause bodily harm.     Sincerely,      Juan C Vera PA-C

## 2025-04-19 NOTE — PROCEDURES
[facility]    Procedure: IR Procedure Note    Date/Time: 4/19/2025 12:36 PM    Performed by: Tom Guillen MD  Authorized by: Tom Guillen MD  IR Fellow Physician: VALENTINA Guillen MD  Other(s) attending procedure: LYSSA Pino MD    Pre Procedure Diagnosis: cholecystitis and cholangigits  Post Procedure Diagnosis: same    UNIVERSAL PROTOCOL   Site Marked: NA  Prior Images Obtained and Reviewed:  Yes  Required items: Required blood products, implants, devices and special equipment available    Patient identity confirmed:  Arm band, provided demographic data, hospital-assigned identification number and verbally with patient  Patient was reevaluated immediately before administering moderate or deep sedation or anesthesia  Confirmation Checklist:  Correct equipment/implants were available, procedure was appropriate and matched the consent or emergent situation, relevant allergies and patient's identity using two indicators  Time out: Immediately prior to the procedure a time out was called    Universal Protocol: the Joint Commission Universal Protocol was followed    Preparation: Patient was prepped and draped in usual sterile fashion    ESBL (mL):  1     ANESTHESIA    Anesthesia:  Local infiltration  Local Anesthetic:  Lidocaine 1% without epinephrine  Anesthetic Total (mL):  10      SEDATION  Patient Sedated: Yes    Sedation Type:  Moderate (conscious) sedation  Sedation:  Fentanyl and midazolam  Vital signs: Vital signs monitored during sedation    See dictated procedure note for full details.  Findings: See dictation.    Specimens: none    Procedural Complications: None    Condition: Stable    Plan: Return to patient care unit.  Primary team, Dr. Swenson, and GI fellow Dr. Morris were updated on the case.      PROCEDURE  Describe Procedure: Attempted gallbladder drain placement was unsuccessful. The gallbladder was very decompressed and extremely mobile, pushing  away from even a 22 gauge needle. Even if access was established into the gallbladder, this would not be able to be dilated. Procedure aborted. No drain placed.  Patient Tolerance:  Patient tolerated the procedure well with no immediate complications  Length of time physician/provider present for 1:1 monitoring during sedation:  0-22 min

## 2025-04-20 LAB
ALBUMIN SERPL BCG-MCNC: 3 G/DL (ref 3.5–5.2)
ALBUMIN UR-MCNC: 10 MG/DL
ALP SERPL-CCNC: 420 U/L (ref 40–150)
ALT SERPL W P-5'-P-CCNC: 597 U/L (ref 0–50)
ANION GAP SERPL CALCULATED.3IONS-SCNC: 11 MMOL/L (ref 7–15)
APPEARANCE UR: CLEAR
AST SERPL W P-5'-P-CCNC: 90 U/L (ref 0–45)
BILIRUB SERPL-MCNC: 1.6 MG/DL
BILIRUB UR QL STRIP: NEGATIVE
BUN SERPL-MCNC: 15.6 MG/DL (ref 8–23)
CALCIUM SERPL-MCNC: 8.6 MG/DL (ref 8.8–10.4)
CHLORIDE SERPL-SCNC: 100 MMOL/L (ref 98–107)
COLOR UR AUTO: YELLOW
CREAT SERPL-MCNC: 0.64 MG/DL (ref 0.51–0.95)
EGFRCR SERPLBLD CKD-EPI 2021: >90 ML/MIN/1.73M2
ERYTHROCYTE [DISTWIDTH] IN BLOOD BY AUTOMATED COUNT: 13.2 % (ref 10–15)
GLUCOSE SERPL-MCNC: 101 MG/DL (ref 70–99)
GLUCOSE UR STRIP-MCNC: 50 MG/DL
HAV AB SER QL IA: NONREACTIVE
HAV IGM SERPL QL IA: NONREACTIVE
HBV CORE AB SERPL QL IA: NONREACTIVE
HBV SURFACE AB SERPL IA-ACNC: <3.5 M[IU]/ML
HBV SURFACE AB SERPL IA-ACNC: NONREACTIVE M[IU]/ML
HBV SURFACE AG SERPL QL IA: NONREACTIVE
HCO3 SERPL-SCNC: 24 MMOL/L (ref 22–29)
HCT VFR BLD AUTO: 34.4 % (ref 35–47)
HCV AB SERPL QL IA: NONREACTIVE
HGB BLD-MCNC: 11.5 G/DL (ref 11.7–15.7)
HGB UR QL STRIP: ABNORMAL
KETONES UR STRIP-MCNC: 40 MG/DL
LEUKOCYTE ESTERASE UR QL STRIP: NEGATIVE
MCH RBC QN AUTO: 29.1 PG (ref 26.5–33)
MCHC RBC AUTO-ENTMCNC: 33.4 G/DL (ref 31.5–36.5)
MCV RBC AUTO: 87 FL (ref 78–100)
MUCOUS THREADS #/AREA URNS LPF: PRESENT /LPF
NITRATE UR QL: NEGATIVE
PH UR STRIP: 6.5 [PH] (ref 5–7)
PLATELET # BLD AUTO: 227 10E3/UL (ref 150–450)
POTASSIUM SERPL-SCNC: 3.2 MMOL/L (ref 3.4–5.3)
POTASSIUM SERPL-SCNC: 3.5 MMOL/L (ref 3.4–5.3)
PROT SERPL-MCNC: 5.8 G/DL (ref 6.4–8.3)
RBC # BLD AUTO: 3.95 10E6/UL (ref 3.8–5.2)
RBC URINE: 3 /HPF
SODIUM SERPL-SCNC: 135 MMOL/L (ref 135–145)
SP GR UR STRIP: 1.02 (ref 1–1.03)
SQUAMOUS EPITHELIAL: <1 /HPF
UROBILINOGEN UR STRIP-MCNC: NORMAL MG/DL
WBC # BLD AUTO: 18.4 10E3/UL (ref 4–11)
WBC URINE: 1 /HPF

## 2025-04-20 PROCEDURE — 36415 COLL VENOUS BLD VENIPUNCTURE: CPT | Performed by: PHYSICIAN ASSISTANT

## 2025-04-20 PROCEDURE — 120N000002 HC R&B MED SURG/OB UMMC

## 2025-04-20 PROCEDURE — 99233 SBSQ HOSP IP/OBS HIGH 50: CPT | Mod: FS

## 2025-04-20 PROCEDURE — 250N000011 HC RX IP 250 OP 636: Mod: JZ | Performed by: PHYSICIAN ASSISTANT

## 2025-04-20 PROCEDURE — 85027 COMPLETE CBC AUTOMATED: CPT | Performed by: PHYSICIAN ASSISTANT

## 2025-04-20 PROCEDURE — 99207 PR APP CREDIT; MD BILLING SHARED VISIT: CPT | Mod: FS | Performed by: STUDENT IN AN ORGANIZED HEALTH CARE EDUCATION/TRAINING PROGRAM

## 2025-04-20 PROCEDURE — 86706 HEP B SURFACE ANTIBODY: CPT | Performed by: INTERNAL MEDICINE

## 2025-04-20 PROCEDURE — 87040 BLOOD CULTURE FOR BACTERIA: CPT | Performed by: PHYSICIAN ASSISTANT

## 2025-04-20 PROCEDURE — 250N000013 HC RX MED GY IP 250 OP 250 PS 637: Performed by: STUDENT IN AN ORGANIZED HEALTH CARE EDUCATION/TRAINING PROGRAM

## 2025-04-20 PROCEDURE — 36415 COLL VENOUS BLD VENIPUNCTURE: CPT | Performed by: STUDENT IN AN ORGANIZED HEALTH CARE EDUCATION/TRAINING PROGRAM

## 2025-04-20 PROCEDURE — 99418 PROLNG IP/OBS E/M EA 15 MIN: CPT | Mod: FS

## 2025-04-20 PROCEDURE — 86704 HEP B CORE ANTIBODY TOTAL: CPT | Performed by: INTERNAL MEDICINE

## 2025-04-20 PROCEDURE — 84155 ASSAY OF PROTEIN SERUM: CPT | Performed by: INTERNAL MEDICINE

## 2025-04-20 PROCEDURE — 86709 HEPATITIS A IGM ANTIBODY: CPT | Performed by: INTERNAL MEDICINE

## 2025-04-20 PROCEDURE — 84132 ASSAY OF SERUM POTASSIUM: CPT | Performed by: STUDENT IN AN ORGANIZED HEALTH CARE EDUCATION/TRAINING PROGRAM

## 2025-04-20 PROCEDURE — 250N000013 HC RX MED GY IP 250 OP 250 PS 637: Performed by: PHYSICIAN ASSISTANT

## 2025-04-20 PROCEDURE — 250N000011 HC RX IP 250 OP 636: Performed by: PHYSICIAN ASSISTANT

## 2025-04-20 PROCEDURE — 81003 URINALYSIS AUTO W/O SCOPE: CPT

## 2025-04-20 PROCEDURE — 258N000003 HC RX IP 258 OP 636: Performed by: PHYSICIAN ASSISTANT

## 2025-04-20 RX ORDER — FLUTICASONE PROPIONATE 50 MCG
2 SPRAY, SUSPENSION (ML) NASAL DAILY PRN
Status: DISCONTINUED | OUTPATIENT
Start: 2025-04-20 | End: 2025-04-23 | Stop reason: HOSPADM

## 2025-04-20 RX ORDER — POTASSIUM CHLORIDE 750 MG/1
40 TABLET, EXTENDED RELEASE ORAL ONCE
Status: COMPLETED | OUTPATIENT
Start: 2025-04-20 | End: 2025-04-20

## 2025-04-20 RX ADMIN — PANTOPRAZOLE SODIUM 40 MG: 40 TABLET, DELAYED RELEASE ORAL at 08:40

## 2025-04-20 RX ADMIN — LISINOPRIL 2.5 MG: 2.5 TABLET ORAL at 10:33

## 2025-04-20 RX ADMIN — PIPERACILLIN AND TAZOBACTAM 3.38 G: 3; .375 INJECTION, POWDER, LYOPHILIZED, FOR SOLUTION INTRAVENOUS at 02:18

## 2025-04-20 RX ADMIN — PIPERACILLIN AND TAZOBACTAM 3.38 G: 3; .375 INJECTION, POWDER, LYOPHILIZED, FOR SOLUTION INTRAVENOUS at 20:24

## 2025-04-20 RX ADMIN — POTASSIUM CHLORIDE 40 MEQ: 750 TABLET, EXTENDED RELEASE ORAL at 13:47

## 2025-04-20 RX ADMIN — SODIUM CHLORIDE: 9 INJECTION, SOLUTION INTRAVENOUS at 06:44

## 2025-04-20 RX ADMIN — PIPERACILLIN AND TAZOBACTAM 3.38 G: 3; .375 INJECTION, POWDER, LYOPHILIZED, FOR SOLUTION INTRAVENOUS at 08:26

## 2025-04-20 RX ADMIN — HYDROMORPHONE HYDROCHLORIDE 0.5 MG: 1 INJECTION, SOLUTION INTRAMUSCULAR; INTRAVENOUS; SUBCUTANEOUS at 22:22

## 2025-04-20 RX ADMIN — PIPERACILLIN AND TAZOBACTAM 3.38 G: 3; .375 INJECTION, POWDER, LYOPHILIZED, FOR SOLUTION INTRAVENOUS at 13:47

## 2025-04-20 RX ADMIN — SODIUM CHLORIDE: 9 INJECTION, SOLUTION INTRAVENOUS at 17:32

## 2025-04-20 ASSESSMENT — ACTIVITIES OF DAILY LIVING (ADL)
FALL_HISTORY_WITHIN_LAST_SIX_MONTHS: NO
ADLS_ACUITY_SCORE: 58
ADLS_ACUITY_SCORE: 58
ADLS_ACUITY_SCORE: 51
WALKING_OR_CLIMBING_STAIRS_DIFFICULTY: NO
ADLS_ACUITY_SCORE: 58
ADLS_ACUITY_SCORE: 51
ADLS_ACUITY_SCORE: 58
ADLS_ACUITY_SCORE: 51
ADLS_ACUITY_SCORE: 58
HEARING_DIFFICULTY_OR_DEAF: NO
VISION_MANAGEMENT: GLASSES
ADLS_ACUITY_SCORE: 58
ADLS_ACUITY_SCORE: 58
WEAR_GLASSES_OR_BLIND: YES
ADLS_ACUITY_SCORE: 51
CHANGE_IN_FUNCTIONAL_STATUS_SINCE_ONSET_OF_CURRENT_ILLNESS/INJURY: NO
DIFFICULTY_EATING/SWALLOWING: NO
TOILETING_ISSUES: NO
ADLS_ACUITY_SCORE: 58
ADLS_ACUITY_SCORE: 51
ADLS_ACUITY_SCORE: 58
ADLS_ACUITY_SCORE: 51
DIFFICULTY_COMMUNICATING: NO
ADLS_ACUITY_SCORE: 58
ADLS_ACUITY_SCORE: 58
CONCENTRATING,_REMEMBERING_OR_MAKING_DECISIONS_DIFFICULTY: NO
DRESSING/BATHING_DIFFICULTY: NO
ADLS_ACUITY_SCORE: 51
DOING_ERRANDS_INDEPENDENTLY_DIFFICULTY: NO

## 2025-04-20 NOTE — PROGRESS NOTES
"GENERAL SURGERY PROGRESS NOTE  04/20/2025    Patient: Maryann Riggs  MRN: [id number]    Subjective  No acute overnight events. IR unable to place perc andrade drain given the decompressed and mobile gallbladder. MRCP negative for choledocholithiasis.   Pain much better this AM. No nausea or emesis. Feels hungry.   Afebrile.      Objective  /79 (BP Location: Right arm)   Pulse 78   Temp 98.7  F (37.1  C) (Oral)   Resp 16   Ht 1.727 m (5' 8\")   Wt 68.5 kg (151 lb)   LMP 01/04/2014   SpO2 97%   BMI 22.96 kg/m      General: AAOx4, NAD, lying comfortably in bed  CV: Non cyanotic   Pulm: Breathing comfortably  Abd: Soft, non-distended, non-tender  Extremities: No edema  Neuro: moving all extremities spontaneously without apparent deficit    Intake/Output Summary (Last 24 hours) at 4/20/2025 0932  Last data filed at 4/20/2025 0644  Gross per 24 hour   Intake 2319.58 ml   Output --   Net 2319.58 ml        Labs: Pending this AM    Imaging:  Results for orders placed or performed during the hospital encounter of 04/19/25   US Abdomen Limited    Impression    IMPRESSION:       1. Cholelithiasis, mild gallbladder wall thickening, question trace  pericholecystic fluid near the gallbladder fundus; acute cholecystitis  not excluded.  2. Dilated visualized common bile duct with prominent main pancreatic  duct in the visualized pancreas, no definite obstructive lesion  demonstrated, consider MRCP for further evaluation.  3. Mild hepatomegaly.  4. Increased hepatic echogenicity which may be seen with hepatic  parenchymal disease including steatosis.  5. Prominent IVC which may at times be seen with volume  overload/congestive changes        I have personally reviewed the examination and initial interpretation  and I agree with the findings.    LE FONG MD         SYSTEM ID:  [id number]   IR Gallbladder Drain Placement    Impression    Impression:  Procedure aborted due to small, extremely mobile " gallbladder,  prohibiting safe dilation for placement of a drain.     MR Abdomen MRCP w/o & w Contrast    Impression    IMPRESSION:  1.  Cholelithiasis. Acute cholecystitis is considered less likely  since the gallbladder is not distended and there is no pericholecystic  fluid. The gallbladder wall discontinuity as described in the CT scan  is not seen. Asymmetric thickening of portion of the gallbladder wall  abutting the liver suggests reactive thickening due to underlying  liver disease such as hepatitis, given the presence of hepatomegaly  with periportal edema. If there is continued clinical suspicion of  acute cholecystitis HIDA scan may be considered for confirmation.  2.  There is no choledocholithiasis or biliary dilatation.  3.  Hepatomegaly with diffuse steatosis and periportal edema.    SHAHID REYES MD         SYSTEM ID:  [id number]     Assessment & Plan    60 year old female with hx of lye ingestion c/b esophageal stricture requiring serial dilations, altered Billroth 2 anatomy, who presented with acute cholangitis. Imaging was suggestive of perforated gallbladder/cholecystitis, however, based on the decompressed and mobile gallbladder it is less likely. MRCP without choledocholithiasis.     - follow up labs  - continue antibiotics  - Cholecystectomy this admission  - if no procedures planned by any team, ok for clears from surgery standpoint and NPO at midnight.     Patient discussed with staff.    Melany Matute MD  General Surgery PGY-7  [phone]

## 2025-04-20 NOTE — PROGRESS NOTES
[facility]    Medicine Progress Note - Hospitalist Service, GOLD TEAM 6    Date of Admission:  4/19/2025    Assessment & Plan   Maryann Riggs is a 60 year old woman with a history of lye ingestion (1980s) s/p Bilroth II resection with esophageal stricture requiring regular dilations ~ every 3 months (last on 4/15/25), GERD, Raynaud's, cervical radiculopathy, and HTN who was admitted with sepsis 2/2 cholecystis with possible cholangitis.     Updates 4/20:  - Per GI, likely passed gallbladder stone per clinical and imaging improvement (no stone on MRCP, LFTs improving, WBC improving, CBD dilation resolved). GI signed off  - Surgery will perform cholecystectomy on this admission.     Sepsis 2/2 acute cholecystitis, possible cholangitis  Choledocholithiasis  Hx of lye ingestion s/p Bilroth II with persistent esophageal stricture s/p repeated dilations  Follows with [facility] for esophageal stricture. Last had EGD with esophageal dilation on 4/15, EGD was also notable for gastritis. Presented to [facility] ED with ~1 week of progressive epigastric abdominal pain radiating to between her shoulder blades, nausea, and vomiting. Febrile in ED with WBC 34.5, , , T bili 4.6 concerning for obstructive biliary process. CT revealed moderate intrahepatic and extrahepatic biliary dilation, gallbladder thickening with partially discontinuous wall at the fundus raising c/f possible perforation, small gallstones or gas at gallbladder fundus.    - GI panc/bili consulted  - Per GI, likely passed gallbladder stone per clinical and imaging improvement (no stone on MRCP, LFTs improving, WBC improving, CBD dilation resolved). GI signed off 4/20  - Surgery following, will perform cholecystectomy on this admission.  - IV Zosyn 3.375 g q6h  - Pain: Tylenol PRN, oxycodone 5 mg q4h PRN, IV Dilaudid 0.5 mg q3h PRN  - Nausea: Zofran, Compazine PRN  - Blood cultures in  process  - Trend CBC, CMP  - NPO at midnight pending procedural plans  - NS IVF at 100 mL/hr     Subacute cough  GERD  Several weeks of persistent cough, started in early March. Multiple outpatient evaluations for this. Cough failed to improve with 2 courses of prednisone. Suspect related to allergic rhinitis and/or GERD.  - Trial Flonase  - Continue PTA PPI     Hypokalemia, mild  K 3.2 on 4/20.   - RN replacement protocol    Hypertension  Continue PTA lisinopril.      Chronic back pain  Continue PTA Flexeril, Tylenol         Diet: Clear Liquid Diet  NPO for Procedure/Surgery per Anesthesia Guidelines Except for: Meds; Clear liquids before procedure/surgery: NO clear liquids before procedure/surgery    DVT Prophylaxis: Pneumatic Compression Devices  Danielle Catheter: Not present  Lines: None     Cardiac Monitoring: None  Code Status: Full Code      Clinically Significant Risk Factors        # Hypokalemia: Lowest K = 3.2 mmol/L in last 2 days, will replace as needed  # Hyponatremia: Lowest Na = 130 mmol/L in last 2 days, will monitor as appropriate  # Hypochloremia: Lowest Cl = 89 mmol/L in last 2 days, will monitor as appropriate      # Hypoalbuminemia: Lowest albumin = 3 g/dL at 4/20/2025  8:50 AM, will monitor as appropriate                           Social Drivers of Health    Physical Activity: Unknown (6/25/2024)    Exercise Vital Sign     Days of Exercise per Week: 7 days   Social Connections: Unknown (6/25/2024)    Social Connection and Isolation Panel [NHANES]     Frequency of Social Gatherings with Friends and Family: Once a week          Disposition Plan     Medically Ready for Discharge: Anticipated in 2-4 Days         The patient's care was discussed with the Attending Physician, Dr. Swenson, Patient, and GI Team.    Gudelia Calderon PA-C  Hospitalist Service, GOLD TEAM [address] [place] [address]  Securely message with BackTrack (more info)  Text page via [employer] Paging/Directory    See signed in provider for up to date coverage information  ______________________________________________________________________    Interval History   Patient reports feeling significantly better today compared to the past week. Minimal to no abdominal pain. Denies chest pain or shortness of breath.     Physical Exam   Vital Signs: Temp: (P) 98.2  F (36.8  C) Temp src: (P) Oral BP: (P) 136/82 Pulse: 78   Resp: (P) 16 SpO2: (P) 96 % O2 Device: None (Room air)    Weight: 151 lbs 0 oz    Constitutional: Awake and alert, in no apparent distress.   Eyes: Sclera clear, anicteric   Respiratory: Breathing non-labored. CTAB  Cardiovascular:  RRR, normal S1/S2. No rubs or murmurs. No peripheral edema.   GI: No epigastric or RUQ tenderness. No rebound or guarding. Soft, non-distended. Bowel sounds present.  Skin:  No visible rashes, lesions, or bruising of concern.   Neurologic: Alert and oriented.    Medical Decision Making       50 MINUTES SPENT BY ME on the date of service doing chart review, history, exam, documentation & further activities per the note.      Data     I have personally reviewed the following data over the past 24 hrs:    18.4 (H)  \   11.5 (L)   / 227     135 100 15.6 /  101 (H)   3.2 (L) 24 0.64 \     ALT: 597 (HH) AST: 90 (H) AP: 420 (H) TBILI: 1.6 (H)   ALB: 3.0 (L) TOT PROTEIN: 5.8 (L) LIPASE: N/A       Imaging results reviewed over the past 24 hrs:   Recent Results (from the past 24 hours)   MR Abdomen MRCP w/o & w Contrast    Narrative    Exam: MR ABDOMEN MRCP W/O & W CONTRAST, 4/19/2025 9:13 PM    Indication: severe cholecystitis with abnormal LFT, query  choledocholithiasis    Comparison: Ultrasound abdomen and CT chest same day    Technique: Images were acquired with and without intravenous  gadolinium contrast through the upper abdomen. The following MR images  were acquired without intravenous contrast: TrueFISP, multiplanar  T2-weighted, axial T1 in/out of phase, T2-weighted MRCP images,  axial  diffusion-weighted and axial apparent diffusion coefficient.  T1-weighted images were obtained before contrast at the multiple time  points following contrast injection. 3-D reformatted images were  generated by the technologist. Contrast dose 6.8 cc Gadavist    FINDINGS:    Gallbladder/Biliary Tree: The gallbladder is elongated but not  distended. The lumen is filled with calculi and sludge. There is  asymmetric thickening of the portion of the gallbladder wall that  abuts the liver (series 28, image 49). No pericholecystic fluid.   There is no intra or extrahepatic biliary dilatation CBD measures 8 mm  in diameter and tapers normally to the ampulla. There is no  intraductal calculi or mass.    Pancreas: Normal T1 hyperintensity of the pancreas is maintained. No  ductal dilatation or focal mass.    Liver: The liver is enlarged, measuring 17.5 cm in the right mid  clavicular line. There is diffuse loss of signal on out of phase  images suggestive of steatosis. Diffuse heterogenous enhancement in  the arterial phase becomes normal in the portal venous phase  suggestive of transient perfusion difference. There is periportal  edema.    Spleen: normal.    Kidneys: No hydronephrosis or focal mass. Bilateral perinephric edema.    Adrenal glands: Normal.    Bowel: Visualized small and large bowel are normal in caliber.    Lymph nodes: No lymphadenopathy.    Blood vessels: No aortic aneurysm.    Lung bases: Unremarkable.    Bones and soft tissues: No suspicious or acute osseus lesion.    Mesentery: No ascites.      Impression    IMPRESSION:  1.  Cholelithiasis. Acute cholecystitis is considered less likely  since the gallbladder is not distended and there is no pericholecystic  fluid. The gallbladder wall discontinuity as described in the CT scan  is not seen. Asymmetric thickening of portion of the gallbladder wall  abutting the liver suggests reactive thickening due to underlying  liver disease such as hepatitis,  given the presence of hepatomegaly  with periportal edema. If there is continued clinical suspicion of  acute cholecystitis HIDA scan may be considered for confirmation.  2.  There is no choledocholithiasis or biliary dilatation.  3.  Hepatomegaly with diffuse steatosis and periportal edema.    SHAHID REYES MD         SYSTEM ID:  [id number]

## 2025-04-20 NOTE — MEDICATION SCRIBE - ADMISSION MEDICATION HISTORY
Medication Scribe Admission Medication History    Admission medication history is complete. The information provided in this note is only as accurate as the sources available at the time of the update.    Information Source(s): Patient and [facility]  via in-person    Pertinent Information: per pt+[facility], pt reported taking medications on PTA medication list as directed.     Changes made to PTA medication list:  Added: None  Deleted: None  Changed: None    Allergies reviewed with patient and updates made in EHR: yes    Medication History Completed By: Chelle Day 4/20/2025 7:16 AM    PTA Med List   Medication Sig Last Dose/Taking    acetaminophen (PAIN RELIEF EXTRA STRENGTH) 500 MG tablet TAKE 1-2 TABLETS (500-1,000 MG) BY MOUTH EVERY 6 HOURS AS NEEDED FOR MILD PAIN 4/17/2025    albuterol (PROAIR HFA/PROVENTIL HFA/VENTOLIN HFA) 108 (90 Base) MCG/ACT inhaler Inhale 2 puffs into the lungs every 6 hours as needed for shortness of breath, wheezing or cough. 4/11/2025    Ascorbic Acid (VITAMIN C CR PO) Take 1 packet by mouth daily. 4/11/2025    benzonatate (TESSALON) 200 MG capsule Take 1 capsule (200 mg) by mouth 3 times daily as needed for cough. 4/11/2025    Cyanocobalamin (VITAMIN B 12 PO) Taking 2 gummies by mouth daily 4/11/2025    cyclobenzaprine (FLEXERIL) 10 MG tablet Take 1 tablet (10 mg) by mouth daily. 4/11/2025    ferrous sulfate (FEROSUL) 325 (65 Fe) MG tablet Take 1 tablet (325 mg) by mouth daily (with breakfast). 4/11/2025    GLUCOSAMINE CHONDROITIN COMPLX PO Take  by mouth. Past Week    lisinopril (ZESTRIL) 2.5 MG tablet Take 1 tablet (2.5 mg) by mouth daily. 4/11/2025    MAGNESIUM OXIDE PO Take 400 mg by mouth daily Past Week    NIFEdipine ER OSMOTIC (PROCARDIA XL) 30 MG 24 hr tablet Take 1 tablet (30 mg) by mouth daily. 4/11/2025    omeprazole (PRILOSEC) 20 MG DR capsule Take 20 mg by mouth daily. 4/11/2025    predniSONE (DELTASONE) 20 MG tablet Take two tablets (= 40mg) each day for 5 (five) days  4/18/2025

## 2025-04-20 NOTE — PROGRESS NOTES
GASTROENTEROLOGY PROGRESS NOTE    Date: 04/20/2025     ASSESSMENT:  Maryann Riggs is a 60 year old female with prior lye ingestion s/p Bilroth II and persistent esophageal stricture requiring intermittent dilation who was admitted on 4/19 for fever and LFT elevation concerning for possible and possible cholangitis and now found to have GNB.       # Klebsiella bacteremia   # CBD dilation on CT/US, resolved on MRI later  # LFT elevation  # Cholelithiasis   CT at ED showed evidence for cholecystitis with a possible contained perforation. It also showed biliary dilation and abnormal LFT with Tbili 4.6. IR attempted OTC GB drain but unsuccessful 4/19.     Interestingly, MRI 24 hours later showed improved CBD dilation without choledocholithiasis and no evidence of cholecystitis. Her LFT this morning also improved with Tbili down to 1.6. She now has Klebiella bacteremia likely from the biliary system.     Overall her presentation is most consistent with cholangitis from choledocholithiasis which has spontaneously resolved.  Therefore no indication of biliary intervention.    Given the improvement of LFT so quickly, low suspicion for viral hepatitis and hepatitis A/B/C serology was negative.        RECOMMENDATIONS:  - No indication of biliary intervention given improved CBD dilation and LFT  - Bacteremia management per primary team which would cover cholangitis   - Appreciate surgery for cholecystectomy during this admission    Panc/bili team will sign off.          Thank you for involving us in this patient's care. Please do not hesitate to contact the GI service with any questions or concerns.      Pt care plan discussed with Dr. Joel, GI staff physician.      Dianna Harvey MD, PhD  Gastroenterology Fellow  Division of Gastroenterology, Hepatology and Nutrition  [facility]  Pager: [phone]  _______________________________________________________________      Subjective:  Adrianne is doing well.   "Continues to be free of abdominal pain.  Has been afebrile.      Objective:  Blood pressure (!) 141/85, pulse 78, temperature 98.5  F (36.9  C), temperature source Oral, resp. rate 17, height 1.727 m (5' 8\"), weight 68.5 kg (151 lb), last menstrual period 01/04/2014, SpO2 97%, not currently breastfeeding.    Gen: A&Ox3, NAD  HEENT: sclera anicteric  CV: RRR  Lungs: breathing comfortably on room air  Abd:  soft, nontender, nondistended, bowel sounds present  Skin: no jaundice, no stigmata of chronic liver disease  MS: appropriate muscle mass for age  Neuro: non focal       LABS:  BMP  Recent Labs   Lab 04/19/25  0803 04/18/25  2315   * 130*   POTASSIUM 3.8 3.6   CHLORIDE 96* 89*   ARIC 8.5* 9.6   CO2 25 28   BUN 10.5 12.8   CR 0.56 0.59   * 265*     CBC  Recent Labs   Lab 04/19/25  0803 04/18/25  2315   WBC 34.5* 18.8*   RBC 4.11 4.86   HGB 12.2 14.1   HCT 36.3 42.7   MCV 88 88   MCH 29.7 29.0   MCHC 33.6 33.0   RDW 12.8 12.4    311     INR  Recent Labs   Lab 04/19/25  1152   INR 0.9     LFTs  Recent Labs   Lab 04/19/25  0803 04/18/25  2315   ALKPHOS 487* 648*   * 340*   * 1,295*   BILITOTAL 4.6* 3.9*   PROTTOTAL 5.9* 7.5   ALBUMIN 3.3* 3.9      PANC  Recent Labs   Lab 04/19/25  0803 04/18/25  2315   LIPASE 169* 32       IMAGING:  Reviewed    "

## 2025-04-20 NOTE — PLAN OF CARE
"Goal Outcome Evaluation:    3034-1290  /82 (BP Location: Right arm)   Pulse 78   Temp 98.3  F (36.8  C) (Oral)   Resp 16   Ht 1.727 m (5' 8\")   Wt 68.5 kg (151 lb)   LMP 01/04/2014   SpO2 99%   BMI 22.96 kg/m      Pt. A&O, VSS, RA. Denies pain and SOB. Up independently to bathroom. PIV infusing NS @ 100m.Potassium 3.2. Replace and recheck 3.5. Tolerating clears, NPO @ mn. No bm, voiding spontaneously.   Plan: Possible Cholecystectomy tomorrow    "

## 2025-04-20 NOTE — PLAN OF CARE
"Goal Outcome Evaluation:    /79 (BP Location: Right arm)   Pulse 78   Temp 98.7  F (37.1  C) (Oral)   Resp 16   Ht 1.727 m (5' 8\")   Wt 68.5 kg (151 lb)   LMP 01/04/2014   SpO2 97%   BMI 22.96 kg/m      Patient alert and oriented x 4. Denies pain or nausea. NPO. PIV infusing NS at 100 mL. No BM. Patient ambulating outside room independently. UA pending       Plan of Care Reviewed With: patient    Overall Patient Progress: no changeOverall Patient Progress: no change    Outcome Evaluation: Denies pain or nausea.      "

## 2025-04-21 ENCOUNTER — ANESTHESIA (OUTPATIENT)
Dept: SURGERY | Facility: CLINIC | Age: 60
DRG: 853 | End: 2025-04-21
Payer: COMMERCIAL

## 2025-04-21 ENCOUNTER — DOCUMENTATION ONLY (OUTPATIENT)
Dept: OTHER | Facility: CLINIC | Age: 60
End: 2025-04-21
Payer: COMMERCIAL

## 2025-04-21 ENCOUNTER — ANESTHESIA EVENT (OUTPATIENT)
Dept: SURGERY | Facility: CLINIC | Age: 60
DRG: 853 | End: 2025-04-21
Payer: COMMERCIAL

## 2025-04-21 LAB
ALBUMIN SERPL BCG-MCNC: 2.7 G/DL (ref 3.5–5.2)
ALP SERPL-CCNC: 339 U/L (ref 40–150)
ALT SERPL W P-5'-P-CCNC: 373 U/L (ref 0–50)
ANION GAP SERPL CALCULATED.3IONS-SCNC: 9 MMOL/L (ref 7–15)
AST SERPL W P-5'-P-CCNC: 34 U/L (ref 0–45)
BACTERIA BLD CULT: ABNORMAL
BILIRUB DIRECT SERPL-MCNC: 0.76 MG/DL (ref 0–0.3)
BILIRUB SERPL-MCNC: 1.2 MG/DL
BUN SERPL-MCNC: 7.7 MG/DL (ref 8–23)
CALCIUM SERPL-MCNC: 8.1 MG/DL (ref 8.8–10.4)
CHLORIDE SERPL-SCNC: 104 MMOL/L (ref 98–107)
CREAT SERPL-MCNC: 0.61 MG/DL (ref 0.51–0.95)
EGFRCR SERPLBLD CKD-EPI 2021: >90 ML/MIN/1.73M2
ERYTHROCYTE [DISTWIDTH] IN BLOOD BY AUTOMATED COUNT: 13.2 % (ref 10–15)
GLUCOSE BLDC GLUCOMTR-MCNC: 121 MG/DL (ref 70–99)
GLUCOSE SERPL-MCNC: 128 MG/DL (ref 70–99)
HCO3 SERPL-SCNC: 24 MMOL/L (ref 22–29)
HCT VFR BLD AUTO: 31.1 % (ref 35–47)
HGB BLD-MCNC: 10.1 G/DL (ref 11.7–15.7)
MCH RBC QN AUTO: 28.4 PG (ref 26.5–33)
MCHC RBC AUTO-ENTMCNC: 32.5 G/DL (ref 31.5–36.5)
MCV RBC AUTO: 87 FL (ref 78–100)
PLATELET # BLD AUTO: 221 10E3/UL (ref 150–450)
POTASSIUM SERPL-SCNC: 3.5 MMOL/L (ref 3.4–5.3)
PROT SERPL-MCNC: 5.3 G/DL (ref 6.4–8.3)
RBC # BLD AUTO: 3.56 10E6/UL (ref 3.8–5.2)
SODIUM SERPL-SCNC: 137 MMOL/L (ref 135–145)
WBC # BLD AUTO: 10.8 10E3/UL (ref 4–11)

## 2025-04-21 PROCEDURE — 250N000013 HC RX MED GY IP 250 OP 250 PS 637: Performed by: STUDENT IN AN ORGANIZED HEALTH CARE EDUCATION/TRAINING PROGRAM

## 2025-04-21 PROCEDURE — 370N000017 HC ANESTHESIA TECHNICAL FEE, PER MIN: Performed by: SURGERY

## 2025-04-21 PROCEDURE — 250N000011 HC RX IP 250 OP 636

## 2025-04-21 PROCEDURE — 250N000011 HC RX IP 250 OP 636: Performed by: PHYSICIAN ASSISTANT

## 2025-04-21 PROCEDURE — 85027 COMPLETE CBC AUTOMATED: CPT

## 2025-04-21 PROCEDURE — 360N000076 HC SURGERY LEVEL 3, PER MIN: Performed by: SURGERY

## 2025-04-21 PROCEDURE — 250N000025 HC SEVOFLURANE, PER MIN: Performed by: SURGERY

## 2025-04-21 PROCEDURE — 88304 TISSUE EXAM BY PATHOLOGIST: CPT | Mod: 26 | Performed by: PATHOLOGY

## 2025-04-21 PROCEDURE — 88304 TISSUE EXAM BY PATHOLOGIST: CPT | Mod: TC | Performed by: SURGERY

## 2025-04-21 PROCEDURE — 47562 LAPAROSCOPIC CHOLECYSTECTOMY: CPT | Mod: GC | Performed by: SURGERY

## 2025-04-21 PROCEDURE — 36415 COLL VENOUS BLD VENIPUNCTURE: CPT

## 2025-04-21 PROCEDURE — 250N000013 HC RX MED GY IP 250 OP 250 PS 637: Performed by: PHYSICIAN ASSISTANT

## 2025-04-21 PROCEDURE — 258N000003 HC RX IP 258 OP 636

## 2025-04-21 PROCEDURE — 999N000141 HC STATISTIC PRE-PROCEDURE NURSING ASSESSMENT: Performed by: SURGERY

## 2025-04-21 PROCEDURE — 250N000011 HC RX IP 250 OP 636: Performed by: SURGERY

## 2025-04-21 PROCEDURE — 258N000003 HC RX IP 258 OP 636: Performed by: PHYSICIAN ASSISTANT

## 2025-04-21 PROCEDURE — 82248 BILIRUBIN DIRECT: CPT | Performed by: SURGERY

## 2025-04-21 PROCEDURE — 250N000009 HC RX 250

## 2025-04-21 PROCEDURE — 250N000011 HC RX IP 250 OP 636: Mod: JZ

## 2025-04-21 PROCEDURE — 99207 PR APP CREDIT; MD BILLING SHARED VISIT: CPT | Mod: FS | Performed by: STUDENT IN AN ORGANIZED HEALTH CARE EDUCATION/TRAINING PROGRAM

## 2025-04-21 PROCEDURE — 272N000001 HC OR GENERAL SUPPLY STERILE: Performed by: SURGERY

## 2025-04-21 PROCEDURE — 99418 PROLNG IP/OBS E/M EA 15 MIN: CPT | Mod: FS

## 2025-04-21 PROCEDURE — 258N000003 HC RX IP 258 OP 636: Performed by: NURSE ANESTHETIST, CERTIFIED REGISTERED

## 2025-04-21 PROCEDURE — 120N000002 HC R&B MED SURG/OB UMMC

## 2025-04-21 PROCEDURE — 0FT44ZZ RESECTION OF GALLBLADDER, PERCUTANEOUS ENDOSCOPIC APPROACH: ICD-10-PCS | Performed by: SURGERY

## 2025-04-21 PROCEDURE — 710N000010 HC RECOVERY PHASE 1, LEVEL 2, PER MIN: Performed by: SURGERY

## 2025-04-21 PROCEDURE — 99233 SBSQ HOSP IP/OBS HIGH 50: CPT | Mod: FS

## 2025-04-21 PROCEDURE — 250N000009 HC RX 250: Performed by: NURSE ANESTHETIST, CERTIFIED REGISTERED

## 2025-04-21 RX ORDER — HYDROMORPHONE HYDROCHLORIDE 1 MG/ML
0.5 INJECTION, SOLUTION INTRAMUSCULAR; INTRAVENOUS; SUBCUTANEOUS EVERY 4 HOURS PRN
Status: DISCONTINUED | OUTPATIENT
Start: 2025-04-21 | End: 2025-04-23 | Stop reason: HOSPADM

## 2025-04-21 RX ORDER — OXYCODONE HYDROCHLORIDE 5 MG/1
5 TABLET ORAL EVERY 4 HOURS PRN
Status: DISCONTINUED | OUTPATIENT
Start: 2025-04-21 | End: 2025-04-23 | Stop reason: HOSPADM

## 2025-04-21 RX ORDER — KETOROLAC TROMETHAMINE 30 MG/ML
30 INJECTION, SOLUTION INTRAMUSCULAR; INTRAVENOUS ONCE
Status: DISCONTINUED | OUTPATIENT
Start: 2025-04-21 | End: 2025-04-21

## 2025-04-21 RX ORDER — CEFAZOLIN SODIUM 2 G/50ML
2 SOLUTION INTRAVENOUS
Status: COMPLETED | OUTPATIENT
Start: 2025-04-21 | End: 2025-04-21

## 2025-04-21 RX ORDER — NALOXONE HYDROCHLORIDE 0.4 MG/ML
0.2 INJECTION, SOLUTION INTRAMUSCULAR; INTRAVENOUS; SUBCUTANEOUS
Status: DISCONTINUED | OUTPATIENT
Start: 2025-04-21 | End: 2025-04-23 | Stop reason: HOSPADM

## 2025-04-21 RX ORDER — FENTANYL CITRATE 50 UG/ML
50 INJECTION, SOLUTION INTRAMUSCULAR; INTRAVENOUS EVERY 5 MIN PRN
Status: DISCONTINUED | OUTPATIENT
Start: 2025-04-21 | End: 2025-04-21 | Stop reason: HOSPADM

## 2025-04-21 RX ORDER — NALOXONE HYDROCHLORIDE 0.4 MG/ML
0.4 INJECTION, SOLUTION INTRAMUSCULAR; INTRAVENOUS; SUBCUTANEOUS
Status: DISCONTINUED | OUTPATIENT
Start: 2025-04-21 | End: 2025-04-23 | Stop reason: HOSPADM

## 2025-04-21 RX ORDER — PROPOFOL 10 MG/ML
INJECTION, EMULSION INTRAVENOUS PRN
Status: DISCONTINUED | OUTPATIENT
Start: 2025-04-21 | End: 2025-04-21

## 2025-04-21 RX ORDER — BUPIVACAINE HYDROCHLORIDE 2.5 MG/ML
INJECTION, SOLUTION INFILTRATION; PERINEURAL PRN
Status: DISCONTINUED | OUTPATIENT
Start: 2025-04-21 | End: 2025-04-21 | Stop reason: HOSPADM

## 2025-04-21 RX ORDER — FENTANYL CITRATE 50 UG/ML
INJECTION, SOLUTION INTRAMUSCULAR; INTRAVENOUS PRN
Status: DISCONTINUED | OUTPATIENT
Start: 2025-04-21 | End: 2025-04-21

## 2025-04-21 RX ORDER — NALOXONE HYDROCHLORIDE 0.4 MG/ML
0.1 INJECTION, SOLUTION INTRAMUSCULAR; INTRAVENOUS; SUBCUTANEOUS
Status: DISCONTINUED | OUTPATIENT
Start: 2025-04-21 | End: 2025-04-21 | Stop reason: HOSPADM

## 2025-04-21 RX ORDER — FENTANYL CITRATE 50 UG/ML
25 INJECTION, SOLUTION INTRAMUSCULAR; INTRAVENOUS EVERY 5 MIN PRN
Status: DISCONTINUED | OUTPATIENT
Start: 2025-04-21 | End: 2025-04-21 | Stop reason: HOSPADM

## 2025-04-21 RX ORDER — CEFAZOLIN SODIUM 2 G/50ML
2 SOLUTION INTRAVENOUS SEE ADMIN INSTRUCTIONS
Status: DISCONTINUED | OUTPATIENT
Start: 2025-04-21 | End: 2025-04-21 | Stop reason: HOSPADM

## 2025-04-21 RX ORDER — SODIUM CHLORIDE, SODIUM LACTATE, POTASSIUM CHLORIDE, CALCIUM CHLORIDE 600; 310; 30; 20 MG/100ML; MG/100ML; MG/100ML; MG/100ML
INJECTION, SOLUTION INTRAVENOUS CONTINUOUS PRN
Status: DISCONTINUED | OUTPATIENT
Start: 2025-04-21 | End: 2025-04-21

## 2025-04-21 RX ORDER — LIDOCAINE HYDROCHLORIDE 20 MG/ML
INJECTION, SOLUTION INFILTRATION; PERINEURAL PRN
Status: DISCONTINUED | OUTPATIENT
Start: 2025-04-21 | End: 2025-04-21

## 2025-04-21 RX ORDER — OXYCODONE HYDROCHLORIDE 10 MG/1
10 TABLET ORAL EVERY 4 HOURS PRN
Status: DISCONTINUED | OUTPATIENT
Start: 2025-04-21 | End: 2025-04-23 | Stop reason: HOSPADM

## 2025-04-21 RX ORDER — SODIUM CHLORIDE, SODIUM LACTATE, POTASSIUM CHLORIDE, CALCIUM CHLORIDE 600; 310; 30; 20 MG/100ML; MG/100ML; MG/100ML; MG/100ML
INJECTION, SOLUTION INTRAVENOUS CONTINUOUS
Status: DISCONTINUED | OUTPATIENT
Start: 2025-04-21 | End: 2025-04-21 | Stop reason: HOSPADM

## 2025-04-21 RX ORDER — DEXAMETHASONE SODIUM PHOSPHATE 4 MG/ML
INJECTION, SOLUTION INTRA-ARTICULAR; INTRALESIONAL; INTRAMUSCULAR; INTRAVENOUS; SOFT TISSUE PRN
Status: DISCONTINUED | OUTPATIENT
Start: 2025-04-21 | End: 2025-04-21

## 2025-04-21 RX ORDER — INDOCYANINE GREEN AND WATER 25 MG
KIT INJECTION PRN
Status: DISCONTINUED | OUTPATIENT
Start: 2025-04-21 | End: 2025-04-21

## 2025-04-21 RX ORDER — EPHEDRINE SULFATE 50 MG/ML
INJECTION, SOLUTION INTRAMUSCULAR; INTRAVENOUS; SUBCUTANEOUS PRN
Status: DISCONTINUED | OUTPATIENT
Start: 2025-04-21 | End: 2025-04-21

## 2025-04-21 RX ORDER — HEPARIN SODIUM 5000 [USP'U]/.5ML
5000 INJECTION, SOLUTION INTRAVENOUS; SUBCUTANEOUS
Status: COMPLETED | OUTPATIENT
Start: 2025-04-21 | End: 2025-04-21

## 2025-04-21 RX ADMIN — INDOCYANINE GREEN AND WATER 2.5 MG: KIT at 14:58

## 2025-04-21 RX ADMIN — SODIUM CHLORIDE: 9 INJECTION, SOLUTION INTRAVENOUS at 18:51

## 2025-04-21 RX ADMIN — PROCHLORPERAZINE MALEATE 10 MG: 10 TABLET, FILM COATED ORAL at 20:55

## 2025-04-21 RX ADMIN — Medication 10 MG: at 17:45

## 2025-04-21 RX ADMIN — FENTANYL CITRATE 50 MCG: 50 INJECTION INTRAMUSCULAR; INTRAVENOUS at 15:45

## 2025-04-21 RX ADMIN — Medication 20 MG: at 16:16

## 2025-04-21 RX ADMIN — HYDROMORPHONE HYDROCHLORIDE 0.5 MG: 1 INJECTION, SOLUTION INTRAMUSCULAR; INTRAVENOUS; SUBCUTANEOUS at 15:14

## 2025-04-21 RX ADMIN — FENTANYL CITRATE 100 MCG: 50 INJECTION INTRAMUSCULAR; INTRAVENOUS at 14:34

## 2025-04-21 RX ADMIN — HYDROMORPHONE HYDROCHLORIDE 0.5 MG: 1 INJECTION, SOLUTION INTRAMUSCULAR; INTRAVENOUS; SUBCUTANEOUS at 06:19

## 2025-04-21 RX ADMIN — LIDOCAINE HYDROCHLORIDE 100 MG: 20 INJECTION, SOLUTION INFILTRATION; PERINEURAL at 14:34

## 2025-04-21 RX ADMIN — HYDROMORPHONE HYDROCHLORIDE 0.5 MG: 1 INJECTION, SOLUTION INTRAMUSCULAR; INTRAVENOUS; SUBCUTANEOUS at 19:49

## 2025-04-21 RX ADMIN — SODIUM CHLORIDE, SODIUM LACTATE, POTASSIUM CHLORIDE, AND CALCIUM CHLORIDE: .6; .31; .03; .02 INJECTION, SOLUTION INTRAVENOUS at 14:30

## 2025-04-21 RX ADMIN — CEFAZOLIN SODIUM 2 G: 2 SOLUTION INTRAVENOUS at 14:38

## 2025-04-21 RX ADMIN — MIDAZOLAM 2 MG: 1 INJECTION INTRAMUSCULAR; INTRAVENOUS at 14:22

## 2025-04-21 RX ADMIN — HEPARIN SODIUM 5000 UNITS: 5000 INJECTION, SOLUTION INTRAVENOUS; SUBCUTANEOUS at 13:24

## 2025-04-21 RX ADMIN — OXYCODONE HYDROCHLORIDE 5 MG: 5 TABLET ORAL at 20:55

## 2025-04-21 RX ADMIN — Medication 80 MG: at 14:34

## 2025-04-21 RX ADMIN — FENTANYL CITRATE 50 MCG: 50 INJECTION, SOLUTION INTRAMUSCULAR; INTRAVENOUS at 19:18

## 2025-04-21 RX ADMIN — DEXMEDETOMIDINE HYDROCHLORIDE 4 MCG: 100 INJECTION, SOLUTION INTRAVENOUS at 18:22

## 2025-04-21 RX ADMIN — SODIUM CHLORIDE: 9 INJECTION, SOLUTION INTRAVENOUS at 05:26

## 2025-04-21 RX ADMIN — EPHEDRINE SULFATE 5 MG: 5 INJECTION INTRAVENOUS at 15:01

## 2025-04-21 RX ADMIN — Medication 20 MG: at 17:05

## 2025-04-21 RX ADMIN — SODIUM CHLORIDE, SODIUM LACTATE, POTASSIUM CHLORIDE, AND CALCIUM CHLORIDE: .6; .31; .03; .02 INJECTION, SOLUTION INTRAVENOUS at 15:59

## 2025-04-21 RX ADMIN — PHENYLEPHRINE HYDROCHLORIDE 100 MCG: 10 INJECTION INTRAVENOUS at 14:54

## 2025-04-21 RX ADMIN — FENTANYL CITRATE 50 MCG: 50 INJECTION, SOLUTION INTRAMUSCULAR; INTRAVENOUS at 19:26

## 2025-04-21 RX ADMIN — PROPOFOL 20 MG: 10 INJECTION, EMULSION INTRAVENOUS at 15:07

## 2025-04-21 RX ADMIN — HYDROMORPHONE HYDROCHLORIDE 0.5 MG: 1 INJECTION, SOLUTION INTRAMUSCULAR; INTRAVENOUS; SUBCUTANEOUS at 17:22

## 2025-04-21 RX ADMIN — FENTANYL CITRATE 50 MCG: 50 INJECTION, SOLUTION INTRAMUSCULAR; INTRAVENOUS at 18:47

## 2025-04-21 RX ADMIN — Medication 100 MG: at 18:22

## 2025-04-21 RX ADMIN — ONDANSETRON 4 MG: 2 INJECTION INTRAMUSCULAR; INTRAVENOUS at 18:13

## 2025-04-21 RX ADMIN — Medication 50 MG: at 18:25

## 2025-04-21 RX ADMIN — FENTANYL CITRATE 50 MCG: 50 INJECTION, SOLUTION INTRAMUSCULAR; INTRAVENOUS at 19:02

## 2025-04-21 RX ADMIN — Medication 20 MG: at 15:26

## 2025-04-21 RX ADMIN — FENTANYL CITRATE 50 MCG: 50 INJECTION INTRAMUSCULAR; INTRAVENOUS at 16:17

## 2025-04-21 RX ADMIN — LISINOPRIL 2.5 MG: 2.5 TABLET ORAL at 08:52

## 2025-04-21 RX ADMIN — ONDANSETRON 4 MG: 2 INJECTION INTRAMUSCULAR; INTRAVENOUS at 18:51

## 2025-04-21 RX ADMIN — PIPERACILLIN AND TAZOBACTAM 3.38 G: 3; .375 INJECTION, POWDER, LYOPHILIZED, FOR SOLUTION INTRAVENOUS at 08:52

## 2025-04-21 RX ADMIN — PIPERACILLIN AND TAZOBACTAM 3.38 G: 3; .375 INJECTION, POWDER, LYOPHILIZED, FOR SOLUTION INTRAVENOUS at 02:00

## 2025-04-21 RX ADMIN — PROPOFOL 150 MG: 10 INJECTION, EMULSION INTRAVENOUS at 14:34

## 2025-04-21 RX ADMIN — DEXMEDETOMIDINE HYDROCHLORIDE 8 MCG: 100 INJECTION, SOLUTION INTRAVENOUS at 18:17

## 2025-04-21 RX ADMIN — DEXAMETHASONE SODIUM PHOSPHATE 6 MG: 4 INJECTION, SOLUTION INTRA-ARTICULAR; INTRALESIONAL; INTRAMUSCULAR; INTRAVENOUS; SOFT TISSUE at 14:49

## 2025-04-21 RX ADMIN — Medication 10 MG: at 15:52

## 2025-04-21 ASSESSMENT — ACTIVITIES OF DAILY LIVING (ADL)
ADLS_ACUITY_SCORE: 40
ADLS_ACUITY_SCORE: 40
ADLS_ACUITY_SCORE: 35
ADLS_ACUITY_SCORE: 40
ADLS_ACUITY_SCORE: 35
ADLS_ACUITY_SCORE: 40
ADLS_ACUITY_SCORE: 35
ADLS_ACUITY_SCORE: 40
ADLS_ACUITY_SCORE: 40
ADLS_ACUITY_SCORE: 35
ADLS_ACUITY_SCORE: 40
ADLS_ACUITY_SCORE: 35
ADLS_ACUITY_SCORE: 35
ADLS_ACUITY_SCORE: 40

## 2025-04-21 NOTE — BRIEF OP NOTE
[facility]    Brief Operative Note    Pre-operative diagnosis: Cholangitis (H) [K83.09]  Post-operative diagnosis Acute on chronic cholecysitis    Procedure: Laparoscopic cholecystectomy, N/A - Abdomen    Surgeon: Surgeons and Role:     * Marvin Stewart MD - Primary     * Mayra Ryan MD - Resident - Assisting     * Bertha Saunders MD - Resident - Assisting     * Yolanda Blanc DO - Resident - Assisting  Anesthesia: General   Estimated Blood Loss: 100 ml    Drains: None  Specimens:   ID Type Source Tests Collected by Time Destination   1 : Gallbladder Tissue Gallbladder SURGICAL PATHOLOGY EXAM Marvin Stewart MD 4/21/2025  6:04 PM      Findings:   Gallbladder with significant inflammatory rind. Cystic artery dissected and clipped. Top down approach used to come down on cystic duct. Cystic duct ligated with 0-PDS endoloops (2 down) .  Complications: None.  Implants: * No implants in log *    Mayra Ryan MD  Surgery Resident, PGY7

## 2025-04-21 NOTE — PLAN OF CARE
"BP (!) 146/68 (BP Location: Right arm)   Pulse 81   Temp 98.2  F (36.8  C) (Oral)   Resp 16   Ht 1.727 m (5' 8\")   Wt 68.5 kg (151 lb)   LMP 01/04/2014   SpO2 99%   BMI 22.96 kg/m      0295-8995    Patient A&Ox4, on room air, independent, clear liquid diet but will be NPO at 0000, afebrile, VSS.  She arrived from the ED, two nurse skin check performed with Stormy GRAFF, apart from incision on right abdomen, no issues to report. She showered. Pain stated 7/10, dilaudid given x1. Denies nausea and SOB. Continue POC.   "

## 2025-04-21 NOTE — ANESTHESIA CARE TRANSFER NOTE
Patient: Maryann Riggs    Procedure: Procedure(s):  Laparoscopic cholecystectomy       Diagnosis: Cholangitis (H) [K83.09]  Diagnosis Additional Information: No value filed.    Anesthesia Type:   General     Note:    Oropharynx: oropharynx clear of all foreign objects and spontaneously breathing  Level of Consciousness: drowsy  Oxygen Supplementation: face mask  Level of Supplemental Oxygen (L/min / FiO2): 4  Independent Airway: airway patency satisfactory and stable  Dentition: dentition unchanged  Vital Signs Stable: post-procedure vital signs reviewed and stable  Report to RN Given: handoff report given  Patient transferred to: PACU    Handoff Report: Identifed the Patient, Identified the Reponsible Provider, Reviewed the pertinent medical history, Discussed the surgical course, Reviewed Intra-OP anesthesia mangement and issues during anesthesia, Set expectations for post-procedure period and Allowed opportunity for questions and acknowledgement of understanding  Vitals:  Vitals Value Taken Time   BP     Temp     Pulse 102 04/21/25 1839   Resp 14 04/21/25 1839   SpO2 99 % 04/21/25 1839   Vitals shown include unfiled device data.    Electronically Signed By: CONTRERAS Willingham CRNA  April 21, 2025  6:40 PM

## 2025-04-21 NOTE — PLAN OF CARE
Goal Outcome Evaluation:      Plan of Care Reviewed With: patient    Overall Patient Progress: no changeOverall Patient Progress: no change    Outcome Evaluation: Patient went down to OR this afternoon to get lap andrade, remains off the floor at this time. prior to leaving unit patient remained vitally stable on RA ex HTN. Up ad marcus in room. PIV was infusing NS at 100 mL/hr. RN managed K 3.5, next check tomorrow AM. A/O x4. No PRNs needed for abdominal pain prior to leaving for procedure. Continuing on IV ABX. Q1h rounding completed, call light w/ in reach and able to make needs known, will continue to monitor.

## 2025-04-21 NOTE — ANESTHESIA PROCEDURE NOTES
Airway       Patient location during procedure: OR       Procedure Start/Stop Times: 4/21/2025 2:36 PM  Staff -        Performed By: CRNA  Consent for Airway        Urgency: elective  Indications and Patient Condition       Indications for airway management: ny-procedural       Induction type:RSI       Mask difficulty assessment: 0 - not attempted    Final Airway Details       Final airway type: endotracheal airway       Successful airway: ETT - single and Oral  Endotracheal Airway Details        ETT size (mm): 6.5       Cuffed: yes       Successful intubation technique: direct laryngoscopy       DL Blade Type: Torres 2       Grade View of Cords: 1       Adjucts: stylet       Position: Right       Measured from: lips       Secured at (cm): 22       Bite block used: None    Post intubation assessment        Placement verified by: capnometry        Number of attempts at approach: 1       Number of other approaches attempted: 0       Secured with: tape       Ease of procedure: easy       Dentition: Intact and Unchanged    Medication(s) Administered   Medication Administration Time: 4/21/2025 2:36 PM

## 2025-04-21 NOTE — PROGRESS NOTES
CARE MANAGEMENT FOLLOW UP    Additional Information:   04/21:  CHW delegated by the RNCC, notarized the Health Care Directive. Pt was alert and oriented. Pt confirmed their Health Care Agents and signed.  A copy was forwarded to [facility] [employer] and original copy left with Pt, and a copy filed in Pt's hard chart.    Sylvia Asher   Community Health Worker, [facility].  [place], [place]   P [phone]   Fax: [phone]  Email: [email or web address]

## 2025-04-21 NOTE — ANESTHESIA POSTPROCEDURE EVALUATION
Patient: Maryann Riggs    Procedure: Procedure(s):  Laparoscopic cholecystectomy       Anesthesia Type:  General    Note:  Disposition: Inpatient   Postop Pain Control: Uneventful            Sign Out: Well controlled pain   PONV: No   Neuro/Psych: Uneventful            Sign Out: Acceptable/Baseline neuro status   Airway/Respiratory: Uneventful            Sign Out: Acceptable/Baseline resp. status   CV/Hemodynamics: Uneventful            Sign Out: Acceptable CV status; No obvious hypovolemia; No obvious fluid overload   Other NRE: NONE   DID A NON-ROUTINE EVENT OCCUR? No       Last vitals:  Vitals Value Taken Time   /77 04/21/25 1845   Temp     Pulse 104 04/21/25 1846   Resp 15 04/21/25 1846   SpO2 95 % 04/21/25 1846   Vitals shown include unfiled device data.    Electronically Signed By: Kb Glover MD  April 21, 2025  6:47 PM

## 2025-04-21 NOTE — PLAN OF CARE
Goal Outcome Evaluation:    Plan of Care Reviewed With: patient    Overall Patient Progress: no change  Overall Patient Progress: no change    Outcome Evaluation: Assumed care from [id number]. A&Ox4, vitally stable on room air. NPO since midnight for possible cholecystectomy 4/21. R PIV infusing NS @ 100 mL/hr. PRN Dilaudid administered for abdominal pain with relief. Continue with plan of care.     Stormy Eid RN

## 2025-04-21 NOTE — PROGRESS NOTES
[facility]    Medicine Progress Note - Hospitalist Service, GOLD TEAM 6    Date of Admission:  4/19/2025    Assessment & Plan   Maryann Riggs is a 60 year old woman with a history of lye ingestion (1980s) s/p Bilroth II resection with esophageal stricture requiring regular dilations ~ every 3 months (last on 4/15/25), GERD, Raynaud's, cervical radiculopathy, and HTN who was admitted with sepsis 2/2 cholecystis with possible cholangitis.     Today  Plan for cholecystectomy with general surgery, unclear OR time.   Continue n.p.o. until surgery, postoperative diet recs as per general surgery  Will continue IV Zosyn until post procedure, plan on narrowing coverage  Continue as needed Tylenol, oxycodone, Dilaudid for pain management.   No growth on repeat blood cultures after 1 day     #Sepsis 2/2 acute cholecystitis, possible cholangitis  #Choledocholithiasis  #Hx of lye ingestion s/p Bilroth II with persistent esophageal stricture s/p repeated dilations  Follows with [facility] for esophageal stricture. Last had EGD with esophageal dilation on 4/15, EGD was also notable for gastritis. Presented to [facility] ED with ~1 week of progressive epigastric abdominal pain radiating to between her shoulder blades, nausea, and vomiting. Febrile in ED with WBC 34.5, , , T bili 4.6 concerning for obstructive biliary process. CT revealed moderate intrahepatic and extrahepatic biliary dilation, gallbladder thickening with partially discontinuous wall at the fundus raising c/f possible perforation, small gallstones or gas at gallbladder fundus.    - GI panc/bili consulted  - Per GI, likely passed gallbladder stone per clinical and imaging improvement (no stone on MRCP, LFTs improving, WBC improving, CBD dilation resolved). GI signed off 4/20  - Surgery following, will perform cholecystectomy on this admission.   -Tentative plan for cholecystectomy on 4/21  - IV  Zosyn 3.375 g q6h  - Pain: Tylenol PRN, oxycodone 5 mg q4h PRN, IV Dilaudid 0.5 mg q3h PRN  - Nausea: Zofran, Compazine PRN  - Blood cultures in process  - Trend CBC, CMP  - Continue n.p.o. status  - NS IVF at 100 mL/hr     #Klebsiella pneumonia bacteremia  #Sepsis  Found to have positive culture 4/19 for Klebsiella pneumonia, pan susceptible with the exception of ampicillin.  Will continue in perioperative setting with cholecystectomy.  Plan on narrowing after procedure.  - Continue Zosyn in perioperative setting for remainder 4/21  -Plan on narrowing coverage post procedure  - CBC in a.m.  - No growth on repeat blood cultures after 1 day    #Subacute cough  #GERD  Several weeks of persistent cough, started in early March. Multiple outpatient evaluations for this. Cough failed to improve with 2 courses of prednisone. Suspect related to allergic rhinitis and/or GERD.  - Trial Flonase  - Continue PTA PPI     #Hypokalemia, mild  K 3.2 on 4/20.  3.5 on 4/21.   - RN replacement protocol    #Hypertension  Current blood pressure 140/86  -Continue PTA lisinopril 2.5 mg     #Chronic back pain  Continue PTA Flexeril, Tylenol           Diet: NPO for Procedure/Surgery per Anesthesia Guidelines Except for: Meds; Clear liquids before procedure/surgery: ADULT (Age GREATER than or Equal to 18 years) - Clear liquids 2 hours before procedure/surgery    DVT Prophylaxis: Pneumatic Compression Devices  Danielle Catheter: Not present  Lines: None     Cardiac Monitoring: None  Code Status: Full Code      Clinically Significant Risk Factors        # Hypokalemia: Lowest K = 3.2 mmol/L in last 2 days, will replace as needed        # Hypoalbuminemia: Lowest albumin = 2.7 g/dL at 4/21/2025  6:33 AM, will monitor as appropriate                           Social Drivers of Health    Physical Activity: Unknown (6/25/2024)    Exercise Vital Sign     Days of Exercise per Week: 7 days   Social Connections: Unknown (6/25/2024)    Social Connection and  Isolation Panel [NHANES]     Frequency of Social Gatherings with Friends and Family: Once a week          Disposition Plan     Medically Ready for Discharge: Anticipated in 2-4 Days         The patient's care was discussed with the Attending Physician, Dr. Anel Swenson .    Juan C Vera PA-C  Hospitalist Service, GOLD TEAM 6  [facility]  Securely message with [employer] (more info)  Text page via [employer] Paging/Directory   See signed in provider for up to date coverage information  ______________________________________________________________________    Interval History   Saw patient in room.  Continues to have pain primarily located between shoulder blades.  Describes as continuous stabbing.  Notes does not have significant abdominal pain.  Was unaware if they had previously obtained aortic imaging.  General surgery came into patient's room during encounter, to obtain consent.  Noted that procedure will likely occur today.    Physical Exam   Vital Signs: Temp: 98.3  F (36.8  C) Temp src: Oral BP: (!) 146/86 Pulse: 80   Resp: 18 SpO2: 98 % O2 Device: None (Room air)    Weight: 151 lbs 0 oz    Exam:  General: Appears stated age, no acute distress  Head: Normocephalic. No masses, lesions, tenderness or abnormalities  Cardiovascular: S1/S2, Normal rate and rhythm   Respiratory: Normal respiratory effort, lung fields clear to auscultation  Gastrointestinal: Bowel sounds normal, no significant tenderness to palpation   Neuropsych: Speaks clearly, answers questions appropriately    Medical Decision Making       55 MINUTES SPENT BY ME on the date of service doing chart review, history, exam, documentation & further activities per the note.      Data   ------------------------- PAST 24 HR DATA REVIEWED -----------------------------------------------

## 2025-04-21 NOTE — ANESTHESIA PREPROCEDURE EVALUATION
Anesthesia Pre-Procedure Evaluation    Patient: Maryann Riggs   MRN: [id number] : 1965        Procedure : Procedure(s):  Laparoscopic cholecystectomy          Past Medical History:   Diagnosis Date     Anger reaction     buspar helping     Cholelithiasis      Esophageal stricture     dilatation every 3 months     Generalized osteoarthrosis, unspecified site (aka ARTHRITIS)     small joints     PTSD (post-traumatic stress disorder)      Raynaud's disease       Past Surgical History:   Procedure Laterality Date     COLONOSCOPY N/A 2024    Procedure: COLONOSCOPY, FLEXIBLE, WITH LESION REMOVAL USING SNARE;  Surgeon: Bk Laura MD;  Location: [facility]     IR GALLBLADDER DRAIN PLACEMENT  2025     MAMMOPLASTY REDUCTION       OTHER SURGICAL HISTORY      s/p left knee arthroscopinc surgery     ROTATOR CUFF REPAIR RT/LT  2005     SMALL BOWEL RESECTION       TUBAL LIGATION        Allergies   Allergen Reactions     Desyrel [Trazodone Hcl]      Sig mood disturbance       Lamictal [Lamotrigine]       Social History     Tobacco Use     Smoking status: Never     Smokeless tobacco: Never   Substance Use Topics     Alcohol use: Yes     Comment: scoial      Wt Readings from Last 1 Encounters:   25 68.5 kg (151 lb)        Anesthesia Evaluation   Pt has had prior anesthetic. Type: MAC and General.    History of anesthetic complications  - PONV.      ROS/MED HX  ENT/Pulmonary:       Neurologic:       Cardiovascular:       METS/Exercise Tolerance:     Hematologic:       Musculoskeletal:       GI/Hepatic:     (+)   esophageal disease, Stricture,                Renal/Genitourinary:       Endo:       Psychiatric/Substance Use:  - neg psychiatric ROS     Infectious Disease:       Malignancy:       Other:            Physical Exam    Airway        Mallampati: II   TM distance: > 3 FB   Neck ROM: full   Mouth opening: > 3 cm    Respiratory Devices and Support         Dental       (+) Multiple crowns,  "permanant bridges      Cardiovascular          Rhythm and rate: regular and normal     Pulmonary           breath sounds clear to auscultation       Other findings: Discoloration of teeth present  OUTSIDE LABS:  CBC:   Lab Results   Component Value Date    WBC 10.8 04/21/2025    WBC 18.4 (H) 04/20/2025    HGB 10.1 (L) 04/21/2025    HGB 11.5 (L) 04/20/2025    HCT 31.1 (L) 04/21/2025    HCT 34.4 (L) 04/20/2025     04/21/2025     04/20/2025     BMP:   Lab Results   Component Value Date     04/21/2025     04/20/2025    POTASSIUM 3.5 04/21/2025    POTASSIUM 3.5 04/20/2025    CHLORIDE 104 04/21/2025    CHLORIDE 100 04/20/2025    CO2 24 04/21/2025    CO2 24 04/20/2025    BUN 7.7 (L) 04/21/2025    BUN 15.6 04/20/2025    CR 0.61 04/21/2025    CR 0.64 04/20/2025     (H) 04/21/2025     (H) 04/20/2025     COAGS:   Lab Results   Component Value Date    INR 0.9 04/19/2025     POC: No results found for: \"BGM\", \"HCG\", \"HCGS\"  HEPATIC:   Lab Results   Component Value Date    ALBUMIN 2.7 (L) 04/21/2025    PROTTOTAL 5.3 (L) 04/21/2025     (H) 04/21/2025    AST 34 04/21/2025    GGT 10 09/12/2014    ALKPHOS 339 (H) 04/21/2025    BILITOTAL 1.2 04/21/2025     OTHER:   Lab Results   Component Value Date    LACT 0.8 04/19/2025    A1C 6.0 (H) 12/09/2022    ARIC 8.1 (L) 04/21/2025    PHOS 3.3 08/23/2013    MAG 2.6 (H) 06/25/2024    LIPASE 169 (H) 04/19/2025    AMYLASE 45 03/06/2008    TSH 1.750 09/12/2014    CRP  10/10/2014     <0.2  Reference Values:   Low Risk:           <1.0 mg/L   Average Risk:       1.0-3.0 mg/L   High Risk:          >3.0 mg/L   Acute Inflammation: >8.0 mg/L      SED 4 10/10/2014       Anesthesia Plan    ASA Status:  3    NPO Status:  ELEVATED Aspiration Risk/Unknown    Anesthesia Type: General.     - Airway: ETT   Induction: Intravenous, Propofol, RSI.   Maintenance: Balanced.   Techniques and Equipment:     - Airway: Video-Laryngoscope     - Lines/Monitors: 2nd IV, BIS "     Consents    Anesthesia Plan(s) and associated risks, benefits, and realistic alternatives discussed. Questions answered and patient/representative(s) expressed understanding.     - Discussed: Risks, Benefits and Alternatives for BOTH SEDATION and the PROCEDURE were discussed     - Discussed with:  Patient      - Extended Intubation/Ventilatory Support Discussed: No.      - Patient is DNR/DNI Status: No     Use of blood products discussed: No .     Postoperative Care    Pain management: Multi-modal analgesia, IV analgesics.   PONV prophylaxis: Ondansetron (or other 5HT-3), Dexamethasone or Solumedrol, Background Propofol Infusion     Comments:               Nitin Crews MD    Clinically Significant Risk Factors        # Hypokalemia: Lowest K = 3.2 mmol/L in last 2 days, will replace as needed        # Hypoalbuminemia: Lowest albumin = 2.7 g/dL at 4/21/2025  6:33 AM, will monitor as appropriate

## 2025-04-22 LAB
ALBUMIN SERPL BCG-MCNC: 3 G/DL (ref 3.5–5.2)
ALP SERPL-CCNC: 355 U/L (ref 40–150)
ALT SERPL W P-5'-P-CCNC: 287 U/L (ref 0–50)
ANION GAP SERPL CALCULATED.3IONS-SCNC: 13 MMOL/L (ref 7–15)
AST SERPL W P-5'-P-CCNC: 53 U/L (ref 0–45)
BILIRUB SERPL-MCNC: 1 MG/DL
BUN SERPL-MCNC: 7.9 MG/DL (ref 8–23)
CALCIUM SERPL-MCNC: 8.5 MG/DL (ref 8.8–10.4)
CHLORIDE SERPL-SCNC: 99 MMOL/L (ref 98–107)
CREAT SERPL-MCNC: 0.57 MG/DL (ref 0.51–0.95)
EGFRCR SERPLBLD CKD-EPI 2021: >90 ML/MIN/1.73M2
ERYTHROCYTE [DISTWIDTH] IN BLOOD BY AUTOMATED COUNT: 13.2 % (ref 10–15)
GLUCOSE SERPL-MCNC: 115 MG/DL (ref 70–99)
HCO3 SERPL-SCNC: 22 MMOL/L (ref 22–29)
HCT VFR BLD AUTO: 35 % (ref 35–47)
HGB BLD-MCNC: 11.5 G/DL (ref 11.7–15.7)
MCH RBC QN AUTO: 29.4 PG (ref 26.5–33)
MCHC RBC AUTO-ENTMCNC: 32.9 G/DL (ref 31.5–36.5)
MCV RBC AUTO: 90 FL (ref 78–100)
PLATELET # BLD AUTO: 259 10E3/UL (ref 150–450)
POTASSIUM SERPL-SCNC: 4.3 MMOL/L (ref 3.4–5.3)
PROT SERPL-MCNC: 6.1 G/DL (ref 6.4–8.3)
RBC # BLD AUTO: 3.91 10E6/UL (ref 3.8–5.2)
SODIUM SERPL-SCNC: 134 MMOL/L (ref 135–145)
WBC # BLD AUTO: 17.6 10E3/UL (ref 4–11)

## 2025-04-22 PROCEDURE — 120N000002 HC R&B MED SURG/OB UMMC

## 2025-04-22 PROCEDURE — 99207 PR APP CREDIT; MD BILLING SHARED VISIT: CPT | Mod: FS

## 2025-04-22 PROCEDURE — 250N000013 HC RX MED GY IP 250 OP 250 PS 637

## 2025-04-22 PROCEDURE — 250N000013 HC RX MED GY IP 250 OP 250 PS 637: Performed by: STUDENT IN AN ORGANIZED HEALTH CARE EDUCATION/TRAINING PROGRAM

## 2025-04-22 PROCEDURE — 85014 HEMATOCRIT: CPT | Performed by: STUDENT IN AN ORGANIZED HEALTH CARE EDUCATION/TRAINING PROGRAM

## 2025-04-22 PROCEDURE — 250N000011 HC RX IP 250 OP 636

## 2025-04-22 PROCEDURE — 99232 SBSQ HOSP IP/OBS MODERATE 35: CPT | Mod: FS | Performed by: INTERNAL MEDICINE

## 2025-04-22 PROCEDURE — 80053 COMPREHEN METABOLIC PANEL: CPT | Performed by: STUDENT IN AN ORGANIZED HEALTH CARE EDUCATION/TRAINING PROGRAM

## 2025-04-22 PROCEDURE — 36415 COLL VENOUS BLD VENIPUNCTURE: CPT | Performed by: STUDENT IN AN ORGANIZED HEALTH CARE EDUCATION/TRAINING PROGRAM

## 2025-04-22 PROCEDURE — 258N000003 HC RX IP 258 OP 636: Performed by: STUDENT IN AN ORGANIZED HEALTH CARE EDUCATION/TRAINING PROGRAM

## 2025-04-22 RX ORDER — CEFTRIAXONE 2 G/1
2 INJECTION, POWDER, FOR SOLUTION INTRAMUSCULAR; INTRAVENOUS EVERY 24 HOURS
Status: DISCONTINUED | OUTPATIENT
Start: 2025-04-22 | End: 2025-04-23

## 2025-04-22 RX ADMIN — SENNOSIDES 8.6 MG: 8.6 TABLET, FILM COATED ORAL at 14:38

## 2025-04-22 RX ADMIN — OXYCODONE HYDROCHLORIDE 10 MG: 10 TABLET ORAL at 23:17

## 2025-04-22 RX ADMIN — SODIUM CHLORIDE: 9 INJECTION, SOLUTION INTRAVENOUS at 05:56

## 2025-04-22 RX ADMIN — CYCLOBENZAPRINE HYDROCHLORIDE 10 MG: 5 TABLET, FILM COATED ORAL at 10:28

## 2025-04-22 RX ADMIN — OXYCODONE HYDROCHLORIDE 10 MG: 10 TABLET ORAL at 14:35

## 2025-04-22 RX ADMIN — BENZONATATE 200 MG: 100 CAPSULE ORAL at 10:28

## 2025-04-22 RX ADMIN — OXYCODONE HYDROCHLORIDE 10 MG: 10 TABLET ORAL at 10:18

## 2025-04-22 RX ADMIN — PANTOPRAZOLE SODIUM 40 MG: 40 TABLET, DELAYED RELEASE ORAL at 07:43

## 2025-04-22 RX ADMIN — BENZONATATE 200 MG: 100 CAPSULE ORAL at 17:27

## 2025-04-22 RX ADMIN — CEFTRIAXONE SODIUM 2 G: 2 INJECTION, POWDER, FOR SOLUTION INTRAMUSCULAR; INTRAVENOUS at 17:28

## 2025-04-22 RX ADMIN — LISINOPRIL 2.5 MG: 2.5 TABLET ORAL at 07:43

## 2025-04-22 RX ADMIN — OXYCODONE HYDROCHLORIDE 10 MG: 10 TABLET ORAL at 05:45

## 2025-04-22 RX ADMIN — OXYCODONE HYDROCHLORIDE 10 MG: 10 TABLET ORAL at 18:31

## 2025-04-22 RX ADMIN — OXYCODONE HYDROCHLORIDE 10 MG: 10 TABLET ORAL at 01:00

## 2025-04-22 ASSESSMENT — ACTIVITIES OF DAILY LIVING (ADL)
ADLS_ACUITY_SCORE: 40

## 2025-04-22 NOTE — PLAN OF CARE
Goal Outcome Evaluation:      Plan of Care Reviewed With: patient    Overall Patient Progress: decliningOverall Patient Progress: declining       Shift Hours: 1900 - 0700    Assessment:  Body systems that were not at patient's baseline Respiratory. Focused body system assessments documented in flowsheets.        Activity     Fall Risk Score: 35   Bed alarm on? Yes     Activity Assistance Provided: independent      Assistive Device Utilized: other (see comments) (IV Pole)    Pain: 9/10 most of shift, pt wanted to stay with PO medications overnight and was reminded that we do have IV dilaudid on MAR. Wanted to manage with PO for now. RLQ pain from lap andrade.     Labs/RN Managed Protocols: K, BG POD 1 & 2    Lines/Drains: 2 PIV, SL. 1 removed per pt request.      Nutrition: Reg diet, no appetite after returning from lap andrade.      Goal Outcome Evaluation  A/Ox4, RA, VSS. Pain did have some crackles in LUQ that writer heard. No SOB reported or chest pain. Given PO oxycodone as available. Pt able to ambulate to bathroom with SBA.      Barriers to Discharge:   Still managing pain.

## 2025-04-22 NOTE — OP NOTE
[facility]     Operative Note    Date of procedure: 4/21/2025  Patient: Maryann Riggs  MRN: [id number]    Preop Dx: Cholangitis (H) [K83.09]   Postoperative diagnosis: Acute on chronic cholecysitis     Procedure: Laparoscopic cholecystectomy    Surgeon: Marvin Stewart MD    Resident Surgeon(s):   MD Bertha Antoine MD Olajumoke Akala, DO    Anesthesia: General     EBL: 100 cc    Complications: None    Specimens: Gallbladder    Procedure:  The patient was positioned supine on the operating table.  The abdomen was prepped and draped in sterile fashion.  A timeout was performed prior to surgery and documented by the nursing team.     A 5mm incision was made and a Veress needle was inserted at [place] point in the left upper quadrant and pneumoperitoneum was established using carbon dioxide gas to a maximum pressure of 15 mmHg. Upon insertion of the laparoscope, there were dense adhesions surrounding the port extending to the falciform ligament, limiting visualization of the abdominal cavity. The port was removed.     Next, an infraumbilical skin incision was made, and the fascia was tented anteriorly and incised to enter the abdomen under direct visualization.  A figure-of-eight suture of 0 Vicryl was positioned to close this fascial incision at the end of the case and a 10-mm Carlos trocar was placed. The laparoscope was inserted and the left upper quadrant site was inspected. There was no active bleeding or other apparent injury.     A 5-mm trocar was then placed in the subxiphoid region and two 5 mm trocars were placed in the right upper abdomen.    The fundus of the gallbladder was grasped and retracted cephalad.  The infundibulum was grasped and retracted laterally. There was a thick inflammatory rind surrounding the gallbladder and infundibulum. Careful dissection was performed bluntly and with electrocautery to identify the cystic duct and  cystic artery. The artery was quite large with multiple branch points.  The cystic artery was dissected, clipped, and transected. Due to chronic inflammatory changes, transitioned to a top-down approach to remove the gallbladder from the cystic plate and come down on the cystic duct. The duct was identified and ligated with two 0-PDS endoloops proximally and one distally, then transected. Hemostasis was assured within the bed of the gallbladder.    The gallbladder was placed in an EndoCatch bag and removed through the infraumbilical port at the end of the case.  The abdomen was irrigated until the irrigant ran clear.  Trocars were removed under direct visualization.  A second figure of eight 0 Vicryl suture was placed to close the fascia and tied along with the previously placed suture. Local anesthetic was injected at the port sites. Skin was closed with 4-0 Monocryl in a subcuticular fashion.  Dermabond was applied.  The patient tolerated the procedure well and was returned to the recovery room in stable condition.     Dr. Stewart was present for the entirety of the case.     Bertha Saunders MD PGY2  General Surgery Resident

## 2025-04-22 NOTE — PROGRESS NOTES
"BP (!) 163/85 (BP Location: Right arm)   Pulse 95   Temp 99.1  F (37.3  C) (Oral)   Resp 18   Ht 1.727 m (5' 8\")   Wt 68.5 kg (151 lb)   LMP 01/04/2014   SpO2 95%   BMI 22.96 kg/m      RLQ abd pain is well controlled with prn oxycodone except when she coughs - then pain is severe. Gets some relief with tessalon tim. Appetite is fair. Up to bathroom independently. Voiding adequately. No bm, received senna. Lap sites are cdi. Continue to encourage activity and nutrition as tolerated and anticipate probable discharge home tomorrow.   "

## 2025-04-22 NOTE — PROGRESS NOTES
Surgery Progress Note  04/22/2025       Subjective:  - Had 9/10 pain after surgery yesterday evening in RUQ. POD1 from cholecystectomy. Currently pain controlled with dilaudid and oxycodone. Not passing gas yet, no bowel movement. Voiding adequately. Regular diet today. No nausea, vomiting.     Objective:  Temp:  [97.6  F (36.4  C)-98.8  F (37.1  C)] 98.8  F (37.1  C)  Pulse:  [] 97  Resp:  [11-18] 16  BP: (137-168)/() 168/89  SpO2:  [92 %-97 %] 94 %    I/O last 3 completed shifts:  In: 2340 [P.O.:30; I.V.:2110; Other:100; IV Piggyback:100]  Out: 75 [Blood:75]      Gen: Awake, alert, NAD  Resp: NLB on RA  Abd: soft, nondistended, tender in RUQ and mildly tender in LUQ.   Incisions: c/d/I  Ext: WWP, no edema     Labs:  Recent Labs   Lab 04/22/25  0538 04/21/25  0633 04/20/25  0850   WBC 17.6* 10.8 18.4*   HGB 11.5* 10.1* 11.5*    221 227       Recent Labs   Lab 04/22/25  0538 04/21/25  1127 04/21/25  0633 04/20/25  1828 04/20/25  0850   *  --  137  --  135   POTASSIUM 4.3  --  3.5 3.5 3.2*   CHLORIDE 99  --  104  --  100   CO2 22  --  24  --  24   BUN 7.9*  --  7.7*  --  15.6   CR 0.57  --  0.61  --  0.64   * 121* 128*  --  101*   ARIC 8.5*  --  8.1*  --  8.6*       Imaging:  None new     Assessment/Plan:   60 year old female with hx of lye ingestion c/b esophageal stricture requiring serial dilations, altered Billroth 2 anatomy, who presented with acute cholangitis. Imaging was suggestive of perforated gallbladder/cholecystitis, however, based on the decompressed and mobile gallbladder it is less likely. MRCP without choledocholithiasis. Suspect she had a CBD stone which has passed on its own. She is now POD1 from a cholecystectomy. She has had increased pain but is now adequately controlled with MMPC.    - Continue MMPC  - Regular diet  - Rest of cares per excellent primary team.   - Surgery discharge instructions are in the discharge navigator  - Surgery will sign off at this time.  Please contact with any questions or concerns.    Seen, examined, and discussed with chief resident, who will discuss with staff.    Neptali Kelsey, MS3   [facility]    Resident/Fellow Attestation   I, Yolanda Blanc DO, was present with the medical/ZAIN student who participated in the service and in the documentation of the note.  I have verified the history and personally performed the physical exam and medical decision making.  I agree with the assessment and plan of care as documented in the note.      Note edited appropriately     Yolanda Blanc DO  PGY1  Date of Service (when I saw the patient): 04/22/25

## 2025-04-22 NOTE — PROGRESS NOTES
[facility]    Medicine Progress Note - Hospitalist Service, GOLD TEAM 6    Date of Admission:  4/19/2025    Assessment & Plan   Maryann Riggs is a 60 year old woman with a history of lye ingestion (1980s) s/p Bilroth II resection with esophageal stricture requiring regular dilations ~ every 3 months (last on 4/15/25), GERD, Raynaud's, cervical radiculopathy, and HTN who was admitted with sepsis 2/2 cholecystis with possible cholangitis.     Today   1. Zosyn course completed yesterday, ceftriaxone today, plan for transition to oral fluoroquinolones at discharge.  2.  Noted to have significant postoperative pain today.  Will continue pain management, can likely discharge tomorrow if pain adequately managed.  3.  LFTs grossly stable.   4.  Noted to have slight increase in WBC, may be reactive due to surgery day prior.  Will recheck CBC in a.m.    #Sepsis 2/2 acute cholecystitis, possible cholangitis  #Choledocholithiasis  #Hx of lye ingestion s/p Bilroth II with persistent esophageal stricture s/p repeated dilations  Follows with [facility] for esophageal stricture. Last had EGD with esophageal dilation on 4/15, EGD was also notable for gastritis. Presented to [facility] ED with ~1 week of progressive epigastric abdominal pain radiating to between her shoulder blades, nausea, and vomiting. Febrile in ED with WBC 34.5, , , T bili 4.6 concerning for obstructive biliary process. CT revealed moderate intrahepatic and extrahepatic biliary dilation, gallbladder thickening with partially discontinuous wall at the fundus raising c/f possible perforation, small gallstones or gas at gallbladder fundus.  Noted to have slight elevation in WBC.  May be reactive due to surgery day prior.   - GI panc/bili consulted  - Per GI, likely passed gallbladder stone per clinical and imaging improvement (no stone on MRCP, LFTs improving, WBC improving, CBD dilation  resolved). GI signed off 4/20  - Surgery following, will perform cholecystectomy on this admission.   -Tentative plan for cholecystectomy on 4/21  - IV Zosyn 3.375 g q6h  - Pain: Tylenol PRN, oxycodone 5 mg q4h PRN, IV Dilaudid 0.5 mg q3h PRN  - Nausea: Zofran, Compazine PRN  - Blood cultures in process  - Trend CBC, CMP     #Klebsiella pneumonia bacteremia  #Sepsis  Found to have positive culture 4/19 for Klebsiella pneumonia, pan susceptible with the exception of ampicillin.  Will continue in perioperative setting with cholecystectomy.  Plan on narrowing after procedure. Continue Zosyn in perioperative setting for remainder 4/2.  - Ordered ceftriaxone, can likely transition to fluoroquinolones at discharge  - CBC in a.m.  - No growth on repeat blood cultures after 2 day    #Subacute cough  #GERD  Several weeks of persistent cough, started in early March. Multiple outpatient evaluations for this. Cough failed to improve with 2 courses of prednisone. Suspect related to allergic rhinitis and/or GERD.  - Trial Flonase  - Continue PTA PPI     #Hypokalemia, mild  K 3.2 on 4/20.  4.3 on 4/21.   - RN replacement protocol    #Hypertension  Current blood pressure 140/86  -Continue PTA lisinopril 2.5 mg     #Chronic back pain  Continue PTA Flexeril, Tylenol           Diet: Regular Diet Adult  Diet    DVT Prophylaxis: Pneumatic Compression Devices  Danielle Catheter: Not present  Lines: None     Cardiac Monitoring: None  Code Status: Full Code      Clinically Significant Risk Factors        # Hypokalemia: Lowest K = 3.2 mmol/L in last 2 days, will replace as needed  # Hyponatremia: Lowest Na = 134 mmol/L in last 2 days, will monitor as appropriate       # Hypoalbuminemia: Lowest albumin = 2.7 g/dL at 4/21/2025  6:33 AM, will monitor as appropriate                           Social Drivers of Health    Physical Activity: Unknown (6/25/2024)    Exercise Vital Sign     Days of Exercise per Week: 7 days   Social Connections: Unknown  (6/25/2024)    Social Connection and Isolation Panel [NHANES]     Frequency of Social Gatherings with Friends and Family: Once a week          Disposition Plan     Medically Ready for Discharge: Anticipated Tomorrow           The patient's care was discussed with the Attending Physician, Dr. Mariann Carvalho .    Juan C Vera PA-C  Hospitalist Service, GOLD TEAM [address] [place] [address]  Securely message with [employer] (more info)  Text page via [employer] Paging/Directory   See signed in provider for up to date coverage information  ______________________________________________________________________    Interval History   Saw patient in room.  Reports that she has had significant pain following surgery.  Notes that pain is consistently 10/10.  Does not yet feel comfortable discharging.  No other significant concerns at this time.  Agreeable with plan to likely discharge tomorrow    Physical Exam   Vital Signs: Temp: 98.8  F (37.1  C) Temp src: Oral BP: (!) 168/89 Pulse: 97   Resp: 16 SpO2: 94 % O2 Device: None (Room air)    Weight: 151 lbs 0 oz    Exam:  General: Appears stated age, no acute distress  Head: Normocephalic. No masses, lesions, tenderness or abnormalities  Cardiovascular: S1/S2, Normal rate and rhythm   Respiratory: Normal respiratory effort, lung fields clear to auscultation  Gastrointestinal: Bowel sounds normal,  tenderness to palpation right upper quadrant  Neuropsych: Speaks clearly, answers questions appropriately,       Medical Decision Making       55 MINUTES SPENT BY ME on the date of service doing chart review, history, exam, documentation & further activities per the note.      Data   ------------------------- PAST 24 HR DATA REVIEWED -----------------------------------------------

## 2025-04-23 VITALS
BODY MASS INDEX: 22.88 KG/M2 | HEART RATE: 77 BPM | WEIGHT: 151 LBS | HEIGHT: 68 IN | SYSTOLIC BLOOD PRESSURE: 146 MMHG | TEMPERATURE: 97.9 F | DIASTOLIC BLOOD PRESSURE: 75 MMHG | OXYGEN SATURATION: 98 % | RESPIRATION RATE: 18 BRPM

## 2025-04-23 LAB
ALBUMIN SERPL BCG-MCNC: 3 G/DL (ref 3.5–5.2)
ALP SERPL-CCNC: 301 U/L (ref 40–150)
ALT SERPL W P-5'-P-CCNC: 198 U/L (ref 0–50)
ANION GAP SERPL CALCULATED.3IONS-SCNC: 9 MMOL/L (ref 7–15)
AST SERPL W P-5'-P-CCNC: 34 U/L (ref 0–45)
BILIRUB SERPL-MCNC: 0.8 MG/DL
BUN SERPL-MCNC: 10 MG/DL (ref 8–23)
CALCIUM SERPL-MCNC: 8.6 MG/DL (ref 8.8–10.4)
CHLORIDE SERPL-SCNC: 98 MMOL/L (ref 98–107)
CREAT SERPL-MCNC: 0.55 MG/DL (ref 0.51–0.95)
EGFRCR SERPLBLD CKD-EPI 2021: >90 ML/MIN/1.73M2
ERYTHROCYTE [DISTWIDTH] IN BLOOD BY AUTOMATED COUNT: 13.1 % (ref 10–15)
GLUCOSE SERPL-MCNC: 187 MG/DL (ref 70–99)
HCO3 SERPL-SCNC: 27 MMOL/L (ref 22–29)
HCT VFR BLD AUTO: 34.4 % (ref 35–47)
HGB BLD-MCNC: 11.3 G/DL (ref 11.7–15.7)
MCH RBC QN AUTO: 29 PG (ref 26.5–33)
MCHC RBC AUTO-ENTMCNC: 32.8 G/DL (ref 31.5–36.5)
MCV RBC AUTO: 88 FL (ref 78–100)
PLATELET # BLD AUTO: 317 10E3/UL (ref 150–450)
POTASSIUM SERPL-SCNC: 3.7 MMOL/L (ref 3.4–5.3)
PROT SERPL-MCNC: 6 G/DL (ref 6.4–8.3)
RBC # BLD AUTO: 3.9 10E6/UL (ref 3.8–5.2)
SODIUM SERPL-SCNC: 134 MMOL/L (ref 135–145)
WBC # BLD AUTO: 13.1 10E3/UL (ref 4–11)

## 2025-04-23 PROCEDURE — 85014 HEMATOCRIT: CPT

## 2025-04-23 PROCEDURE — 250N000013 HC RX MED GY IP 250 OP 250 PS 637

## 2025-04-23 PROCEDURE — 99239 HOSP IP/OBS DSCHRG MGMT >30: CPT | Mod: FS | Performed by: INTERNAL MEDICINE

## 2025-04-23 PROCEDURE — 36415 COLL VENOUS BLD VENIPUNCTURE: CPT

## 2025-04-23 PROCEDURE — 250N000013 HC RX MED GY IP 250 OP 250 PS 637: Performed by: STUDENT IN AN ORGANIZED HEALTH CARE EDUCATION/TRAINING PROGRAM

## 2025-04-23 PROCEDURE — 84155 ASSAY OF PROTEIN SERUM: CPT

## 2025-04-23 PROCEDURE — 99207 PR APP CREDIT; MD BILLING SHARED VISIT: CPT | Mod: FS

## 2025-04-23 RX ORDER — BENZONATATE 200 MG/1
200 CAPSULE ORAL 3 TIMES DAILY PRN
Qty: 20 CAPSULE | Refills: 0 | Status: SHIPPED | OUTPATIENT
Start: 2025-04-23

## 2025-04-23 RX ORDER — LEVOFLOXACIN 25 MG/ML
750 SOLUTION ORAL DAILY
Qty: 150 ML | Refills: 0 | Status: SHIPPED | OUTPATIENT
Start: 2025-04-23 | End: 2025-04-28

## 2025-04-23 RX ORDER — CEFTRIAXONE 2 G/1
2 INJECTION, POWDER, FOR SOLUTION INTRAMUSCULAR; INTRAVENOUS EVERY 24 HOURS
Status: COMPLETED | OUTPATIENT
Start: 2025-04-23 | End: 2025-04-23

## 2025-04-23 RX ORDER — OXYCODONE HYDROCHLORIDE 5 MG/1
5 TABLET ORAL EVERY 6 HOURS PRN
Qty: 28 TABLET | Refills: 0 | Status: SHIPPED | OUTPATIENT
Start: 2025-04-23 | End: 2025-04-30

## 2025-04-23 RX ORDER — CEFTRIAXONE 2 G/1
2 INJECTION, POWDER, FOR SOLUTION INTRAMUSCULAR; INTRAVENOUS EVERY 24 HOURS
Status: DISCONTINUED | OUTPATIENT
Start: 2025-04-23 | End: 2025-04-23

## 2025-04-23 RX ADMIN — PANTOPRAZOLE SODIUM 40 MG: 40 TABLET, DELAYED RELEASE ORAL at 08:23

## 2025-04-23 RX ADMIN — OXYCODONE HYDROCHLORIDE 10 MG: 10 TABLET ORAL at 12:58

## 2025-04-23 RX ADMIN — LISINOPRIL 2.5 MG: 2.5 TABLET ORAL at 08:22

## 2025-04-23 RX ADMIN — CEFTRIAXONE 2 G: 2 INJECTION, POWDER, FOR SOLUTION INTRAMUSCULAR; INTRAVENOUS at 16:01

## 2025-04-23 RX ADMIN — OXYCODONE HYDROCHLORIDE 10 MG: 10 TABLET ORAL at 04:02

## 2025-04-23 RX ADMIN — BENZONATATE 200 MG: 100 CAPSULE ORAL at 05:49

## 2025-04-23 RX ADMIN — SENNOSIDES 8.6 MG: 8.6 TABLET, FILM COATED ORAL at 05:49

## 2025-04-23 RX ADMIN — BENZONATATE 200 MG: 100 CAPSULE ORAL at 12:59

## 2025-04-23 RX ADMIN — OXYCODONE HYDROCHLORIDE 10 MG: 10 TABLET ORAL at 17:10

## 2025-04-23 RX ADMIN — OXYCODONE HYDROCHLORIDE 10 MG: 10 TABLET ORAL at 08:23

## 2025-04-23 ASSESSMENT — ACTIVITIES OF DAILY LIVING (ADL)
ADLS_ACUITY_SCORE: 40

## 2025-04-23 NOTE — PLAN OF CARE
Goal Outcome Evaluation:      Plan of Care Reviewed With: patient    Overall Patient Progress: no change    Slightly hypertensive but within parameters all other VSS, on RA.She has 5 abdominal lap sites, closed with derma bond and ROSALVA. C/o 7/10 abdominal pain getting PRN Oxy with relief. Also has cough- getting tessalon beads with relief. No BM but passing gas. Voiding spontaneously. Up ad marcus. Plan is for her to discharge this evening.     Discharge orders were placed. PIV was removed. AVS was discussed and patient was discharged.

## 2025-04-23 NOTE — PLAN OF CARE
Goal Outcome Evaluation:    Plan of Care Reviewed With: patient    Overall Patient Progress: no change  Overall Patient Progress: no change    Outcome Evaluation: Assumed care from 1900-0730. A&Ox4, vitally stable on room air. R PIV intact, SL. 5 abdominal lap sites open to air. PRN Oxycodone administered x3 for pain with relief. PRN Senna administered for constipation and Tessalon pearls for cough. Possible discharge home on oral abx. Continue with plan of care.     Stormy Eid RN

## 2025-04-23 NOTE — DISCHARGE SUMMARY
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 You will receive a call from a general surgery nurse to set-up follow-up.     Appointments on [place] and/or [facility] (with [facility] or [facility] provider or service). Call [phone] if you haven't heard regarding these appointments within 7 days of discharge.        Hospital Follow-up with Existing Primary Care Provider (PCP)          Schedule Primary Care visit within: 7 Days   Recommended labs and Imaging (to be ordered by Primary Care Provider): CBC, CMP           Repeat CBC, CMP, assess postoperative pain    Unresulted Labs Ordered in the Past 30 Days of this Admission       Date and Time Order Name Status Description    4/21/2025  6:04 PM Surgical Pathology Exam In process     4/20/2025  5:00 AM Blood Culture Arm, Left Preliminary     4/20/2025  5:00 AM Blood Culture Hand, Right Preliminary         These results will be followed up by PCP    Discharge Disposition   Discharged to home  Condition at discharge: Stable    Hospital Course   Maryann Riggs is a 60 year old woman with a history of lye ingestion (1980s) s/p Bilroth II resection with esophageal  stricture requiring regular dilations ~ every 3 months (last on 4/15/25), GERD, Raynaud's, cervical radiculopathy, and HTN who was admitted with sepsis 2/2 cholecystis with possible cholangitis.     #Sepsis 2/2 acute cholecystitis, possible cholangitis  #Choledocholithiasis  #S/p cholecystectomy   #Hx of lye ingestion s/p Bilroth II with persistent esophageal stricture s/p repeated dilations  #Leukocytosis  Follows with [facility] for esophageal stricture. Last had EGD with esophageal dilation on 4/15, EGD was also notable for gastritis. Presented to [facility] ED with ~1 week of progressive epigastric abdominal pain radiating to between her shoulder blades, nausea, and vomiting. Febrile in ED with WBC 34.5, , , T bili 4.6 concerning for obstructive biliary process. CT revealed moderate intrahepatic and extrahepatic biliary dilation, gallbladder thickening with partially discontinuous wall at the fundus raising c/f possible perforation, small gallstones or gas at gallbladder fundus.  Noted to have slight elevation in WBC.  Is suspected that patient spontaneously passed stone.  General surgery subsequently performed cholecystectomy.  Patient had significant postoperative pain 4/22.  Noted improvement 4/23 and was able to transition to oral medications, however still utilizing as needed oxycodone.   - Recommend follow-up with PCP, recheck CBC, CMP  - Scheduled Tylenol  - Ordered oxycodone for severe pain every 6 hours x 7 days  -Educated patient to seek medical attention for worsening symptoms    #Klebsiella pneumonia bacteremia  #Sepsis  Found to have positive culture 4/19 for Klebsiella pneumonia, pan susceptible with the exception of ampicillin.  Will continue in perioperative setting with cholecystectomy.  Plan on narrowing after procedure. Continue Zosyn in perioperative setting for remainder 4/2.  Received ceftriaxone on 4/22 to 4/23.  Patient noted difficulty swallowing pills requested a  solution.  Based on formulary, ordered levofloxacin 750 mg daily to complete total of 10-day antibiotic course.  -Levofloxacin 750 mg daily x 5 days  - Recommend follow-up with PCP  - Informed patient to seek medical attention for worsening symptoms    #Subacute cough  #GERD  Several weeks of persistent cough, started in early March. Multiple outpatient evaluations for this. Cough failed to improve with 2 courses of prednisone. Suspect related to allergic rhinitis and/or GERD.  - Trial Flonase  - Continue PTA PPI  -Ordered Tessalon Perles   -Follow-up with PCP for further management    #Hypokalemia, mild  K 3.2 on 4/20.  Improved with replacement  - Recommend rechecking BMP at PCP follow-up    #Hypertension  -Continue PTA lisinopril 2.5 mg     #Chronic back pain  -Continue PTA Flexeril, Tylenol     Consultations This Hospital Stay   SURGERY GENERAL ADULT IP CONSULT  GI PANCREATICOBILIARY ADULT IP CONSULT  INTERVENTIONAL RADIOLOGY ADULT/PEDS IP CONSULT    Code Status   Full Code    Time Spent on this Encounter   I, Juan C Vera PA-C, personally saw the patient today and spent greater than 30 minutes discharging this patient.       Juan C Vera PA-C  [facility] MED SURG  [address] [address]  Phone: [phone]  ______________________________________________________________________    Physical Exam   Vital Signs: Temp: 97.9  F (36.6  C) Temp src: Oral BP: (!) 146/75 Pulse: 77   Resp: 18 SpO2: 98 % O2 Device: None (Room air)    Weight: 151 lbs 0 oz    Exam:  General: Appears stated age, no acute distress  Head: Normocephalic. No masses, lesions, tenderness or abnormalities  Cardiovascular: S1/S2, Normal rate and rhythm   Respiratory: Normal respiratory effort, lung fields clear to auscultation  Gastrointestinal: Bowel sounds normal  Musculoskeletal: extremities normal- no gross deformities noted and normal muscle tone  Neuropsych: Speaks clearly, answers questions appropriately        Primary Care Physician   Debbie Vila    Discharge Orders      Reason for your hospital stay    You underwent a laparoscopic cholecystectomy procedure.     Activity    Your activity upon discharge: activity as tolerated     ADULT [facility] Specialty Follow-up and recommended labs and tests    Follow up: Follow up with general surgery clinic in 1-2 weeks. You will receive a call from a general surgery nurse to set-up follow-up.     Appointments on [place] and/or [facility] (with [facility] or [facility] provider or service). Call [phone] if you haven't heard regarding these appointments within 7 days of discharge.     Discharge Instructions    Discharge Instructions  Activity  - No strenuous exercise for 3-4 weeks  - No lifting, pushing, pulling more than 15-20 pounds for 3-4 weeks  - Do not strain with bowel movements  - Do not drive until you can press the brake pedal quickly and fully without pain  - Do not operate a motor vehicle while taking narcotic pain medications    Incisions  - The type of wound closure used for your incision:  Dermabond  - You may shower and get incisions wet starting 24 hrs after surgery  - Do not scrub incisions or submerge wounds (e.g. bath, pool, hot tub, etc.) for 2 weeks or until the glue or steri-strips have come off  - Avoid applying any lotions or ointments near the areas where the Dermabond glue was used    Medications  - Do not take any additional Tylenol (acetaminophen) while using a narcotic pain medication which includes acetaminophen  - Do not take more than 4,000mg of acetaminophen in any 24 hour period, as this can cause liver damage  - Take stool softeners such as Senna or Miralax while you are using narcotics, but stop if you develop diarrhea  - Wean yourself off of narcotic pain medications    Follow-Up:  - Call your primary care provider to touch base regarding your recent admission.    - Call or return sooner than your regularly scheduled visit if you develop any  of the following: fever >101.5, uncontrolled pain, uncontrolled nausea or vomiting, as well as increased redness, swelling, or drainage from your wound.   -  A nurse from the General Surgery Clinic will contact you 24 hours, or next business day, after your discharge from the hospital.  If you have questions or to schedule a follow up appointment please call the General Surgery Clinic at [phone].  Call [phone] and ask to speak with the Surgery resident on call if you are having troubles in the evenings, at night, or on the weekend.  -  If you are receiving home care please inform your home care nurse of our contact number.     Reason for your hospital stay    You are hospitalized for suspected choledocholithiasis.  Stone passed without immediate intervention, subsequently had cholecystectomy performed during hospitalization.     Activity    Your activity upon discharge: no lifting, driving, or strenuous exercise for 4 weeks     Diet    Follow this diet upon discharge: Current Diet:Orders Placed This Encounter      Regular Diet Adult     Diet    Follow this diet upon discharge: Current Diet:Orders Placed This Encounter      Regular Diet Adult      Diet     Hospital Follow-up with Existing Primary Care Provider (PCP)            Significant Results and Procedures   Most Recent 3 CBC's:  Recent Labs   Lab Test 04/23/25  0547 04/22/25  0538 04/21/25  0633   WBC 13.1* 17.6* 10.8   HGB 11.3* 11.5* 10.1*   MCV 88 90 87    259 221     Most Recent 3 BMP's:  Recent Labs   Lab Test 04/23/25  0547 04/22/25  0538 04/21/25  1127 04/21/25  0633   * 134*  --  137   POTASSIUM 3.7 4.3  --  3.5   CHLORIDE 98 99  --  104   CO2 27 22  --  24   BUN 10.0 7.9*  --  7.7*   CR 0.55 0.57  --  0.61   ANIONGAP 9 13  --  9   AIRC 8.6* 8.5*  --  8.1*   * 115* 121* 128*       Discharge Medications   Current Discharge Medication List        CONTINUE these medications which have CHANGED    Details   benzonatate (TESSALON)  200 MG capsule Take 1 capsule (200 mg) by mouth 3 times daily as needed for cough (cough).  Qty: 20 capsule, Refills: 0    Associated Diagnoses: Abdominal pain, right upper quadrant      oxyCODONE (ROXICODONE) 5 MG tablet Take 1 tablet (5 mg) by mouth every 6 hours as needed for pain or severe pain.  Qty: 28 tablet, Refills: 0    Associated Diagnoses: Acute bilateral thoracic back pain           CONTINUE these medications which have NOT CHANGED    Details   acetaminophen (PAIN RELIEF EXTRA STRENGTH) 500 MG tablet TAKE 1-2 TABLETS (500-1,000 MG) BY MOUTH EVERY 6 HOURS AS NEEDED FOR MILD PAIN  Qty: 100 tablet, Refills: 1    Associated Diagnoses: SI (sacroiliac) joint dysfunction; Primary generalized (osteo)arthritis      albuterol (PROAIR HFA/PROVENTIL HFA/VENTOLIN HFA) 108 (90 Base) MCG/ACT inhaler Inhale 2 puffs into the lungs every 6 hours as needed for shortness of breath, wheezing or cough.  Qty: 18 g, Refills: 0    Comments: Pharmacy may dispense brand covered by insurance (Proair, or proventil or ventolin or generic albuterol inhaler)  Associated Diagnoses: Acute bronchitis with symptoms > 10 days      Ascorbic Acid (VITAMIN C CR PO) Take 1 packet by mouth daily.      Cyanocobalamin (VITAMIN B 12 PO) Taking 2 gummies by mouth daily      cyclobenzaprine (FLEXERIL) 10 MG tablet Take 1 tablet (10 mg) by mouth daily.  Qty: 90 tablet, Refills: 1    Comments: Profile  Associated Diagnoses: Back muscle spasm      ferrous sulfate (FEROSUL) 325 (65 Fe) MG tablet Take 1 tablet (325 mg) by mouth daily (with breakfast).  Qty: 90 tablet, Refills: 1    Comments: Profile  Associated Diagnoses: Iron deficiency anemia, unspecified iron deficiency anemia type      GLUCOSAMINE CHONDROITIN COMPLX PO Take  by mouth.      lisinopril (ZESTRIL) 2.5 MG tablet Take 1 tablet (2.5 mg) by mouth daily.  Qty: 90 tablet, Refills: 2    Associated Diagnoses: Iron deficiency anemia, unspecified iron deficiency anemia type      MAGNESIUM OXIDE PO  Take 400 mg by mouth daily      NIFEdipine ER OSMOTIC (PROCARDIA XL) 30 MG 24 hr tablet Take 1 tablet (30 mg) by mouth daily.  Qty: 90 tablet, Refills: 3    Associated Diagnoses: Raynaud's disease without gangrene      omeprazole (PRILOSEC) 20 MG DR capsule Take 20 mg by mouth daily.      melatonin 3 MG tablet Take 1 tablet by mouth nightly as needed for sleep.  Qty: 30 tablet, Refills: 0    Comments: Total 9 m g  Associated Diagnoses: Medication monitoring encounter           STOP taking these medications       DTx Lo - Wellness ([facility]) MISC Comments:   Reason for Stopping:         HYDROcodone-acetaminophen (NORCO) 5-325 MG tablet Comments:   Reason for Stopping:         meloxicam (MOBIC) 15 MG tablet Comments:   Reason for Stopping:         predniSONE (DELTASONE) 20 MG tablet Comments:   Reason for Stopping:         predniSONE (DELTASONE) 20 MG tablet Comments:   Reason for Stopping:         zolpidem (AMBIEN) 5 MG tablet Comments:   Reason for Stopping:             Allergies   Allergies   Allergen Reactions    Desyrel [Trazodone Hcl]      Sig mood disturbance      Lamictal [Lamotrigine]

## 2025-04-24 ENCOUNTER — PATIENT OUTREACH (OUTPATIENT)
Dept: SURGERY | Facility: CLINIC | Age: 60
End: 2025-04-24
Payer: COMMERCIAL

## 2025-04-24 ENCOUNTER — PATIENT OUTREACH (OUTPATIENT)
Dept: CARE COORDINATION | Facility: CLINIC | Age: 60
End: 2025-04-24
Payer: COMMERCIAL

## 2025-04-24 ENCOUNTER — DOCUMENTATION ONLY (OUTPATIENT)
Facility: CLINIC | Age: 60
End: 2025-04-24
Payer: COMMERCIAL

## 2025-04-24 LAB
ATRIAL RATE - MUSE: 122 BPM
BACTERIA BLD CULT: NORMAL
BACTERIA BLD CULT: NORMAL
DIASTOLIC BLOOD PRESSURE - MUSE: NORMAL MMHG
INTERPRETATION ECG - MUSE: NORMAL
P AXIS - MUSE: 71 DEGREES
PR INTERVAL - MUSE: 144 MS
QRS DURATION - MUSE: 96 MS
QT - MUSE: 298 MS
QTC - MUSE: 424 MS
R AXIS - MUSE: 20 DEGREES
SYSTOLIC BLOOD PRESSURE - MUSE: NORMAL MMHG
T AXIS - MUSE: 39 DEGREES
VENTRICULAR RATE- MUSE: 122 BPM

## 2025-04-24 NOTE — PROGRESS NOTES
04/24/25    9:19 AM     Maryann Riggs is a patient of Dr. Marvin Stewart that underwent laparoscopic cholecystectomy approximately 3 days ago (4/21).  Attempted to contact patient via telephone for a status update and review post-op  teaching.  LM on VM to call office.  Await return call.      Of note:  Pathology:  Pending  Wound:  Skin Sealant  Follow-up:  Routine  Restrictions:  - No strenuous exercise for 3-4 weeks  - No lifting, pushing, pulling more than 15-20 pounds for 3-4 weeks  New medications:  Levaquin, Tessalon, Oxycodone  Equipment/Supplies:  None  Diet:  Regular

## 2025-04-24 NOTE — PROGRESS NOTES
Prior Authorization Approval    Medication: oxyCODONE HCl 5MG tablets  Qty: 28 tabs  Day Supply: 7  Expected CoPay: $1.64        Reference #: [id number] , [place] Case ID #: [id number]   Authorization Effective Date: 4/24/2025  Authorization Expiration Date: 5/24/2025    Insurance Company:  [employer] Phone: [phone]    Submitted via: KIM Salmeron  [facility] Liaison (A - L)  Phone [phone]  Fax [phone]  Available on Teams & Vocera

## 2025-04-24 NOTE — PROGRESS NOTES
[facility]    Background: Transitional Care Management program identified per system criteria and reviewed by [facility] team for possible outreach.    Assessment: Upon chart review, [facility] Team member will not proceed with patient outreach related to this episode of Transitional Care Management program due to reason below:    Patient has active communication with a nurse, provider or care team for reason of post-hospital follow up plan.  Outreach call by [facility] team not indicated to minimize duplicative efforts.     Patient is in active communication today with a Registered Nurse in General Surgery from [facility] [place] for Clinic Care Coordination - Post-Hospital. No W outreach call needed at this time. Chart review only.    Plan: Transitional Care Management episode addressed appropriately per reason noted above.      LOREE Fletcher  [phone]  [facility]     *Connected Care Resource Team does NOT follow patient ongoing. Referrals are identified based on internal discharge reports and the outreach is to ensure patient has an understanding of their discharge instructions.

## 2025-04-24 NOTE — PROGRESS NOTES
RN Post-Op/Post-Discharge Care Coordination Note    Spoke with Patient.    Support  Patient able to care for self independently     Health Status  Nausea/Vomiting: Patient denies nausea/vomiting.  Eating/drinking: Patient is able to eat and drink without any complaints.  Diet:  Regular  Bowel habits: Patient reports constipation, passing flatus and states that last evening was the first meal that she has had. Took a stool softener today and will medicate with a laxative tomorrow per package instructions, if needed.  Drains (SHRUTI): N/A  Fevers/chills: Patient denies any fever or chills.  Incisions: Patient denies any signs and symptoms of infection..  Wound closure:  Skin Sealant  Pain: Well managed with  Tylenol and Oxycodone. Plans to wean off of narcotics.    Activity/Restrictions  No lifting in excess of 15-20 pounds for 3-4 weeks    Equipment  None    Pathology reviewed with patient:  No, not yet available    Forms/Letters  No    All of her questions were answered including reviewing restrictions and wound care.  She will call this office if she has any further questions and/or concerns.      In lieu of a post-op clinic patient that patient would like to be contacted in approximately 7-10 days via telephone. Patient has a post hospital visit arranged with her PCP on 4/29.    A copy of this note was routed to the primary surgeon.      Whom and When to Call  Patient acknowledges understanding of how to manage any medication changes and   when to seek medical care.     Patient advised that if after hour medical concerns arise to please call [phone] and choose option 4 to speak to the physician on call.

## 2025-04-25 ENCOUNTER — APPOINTMENT (OUTPATIENT)
Dept: GENERAL RADIOLOGY | Facility: CLINIC | Age: 60
End: 2025-04-25
Attending: EMERGENCY MEDICINE
Payer: COMMERCIAL

## 2025-04-25 ENCOUNTER — HOSPITAL ENCOUNTER (EMERGENCY)
Facility: CLINIC | Age: 60
Discharge: HOME | End: 2025-04-25
Attending: EMERGENCY MEDICINE | Admitting: EMERGENCY MEDICINE
Payer: COMMERCIAL

## 2025-04-25 VITALS
RESPIRATION RATE: 18 BRPM | HEART RATE: 100 BPM | DIASTOLIC BLOOD PRESSURE: 76 MMHG | SYSTOLIC BLOOD PRESSURE: 152 MMHG | TEMPERATURE: 98.1 F | OXYGEN SATURATION: 99 %

## 2025-04-25 DIAGNOSIS — R93.89 ABNORMAL X-RAY: ICD-10-CM

## 2025-04-25 DIAGNOSIS — T40.2X5A OPIOID-INDUCED CONSTIPATION: ICD-10-CM

## 2025-04-25 DIAGNOSIS — K59.03 OPIOID-INDUCED CONSTIPATION: ICD-10-CM

## 2025-04-25 LAB
BACTERIA BLD CULT: NO GROWTH
BACTERIA BLD CULT: NO GROWTH

## 2025-04-25 PROCEDURE — 99283 EMERGENCY DEPT VISIT LOW MDM: CPT | Performed by: EMERGENCY MEDICINE

## 2025-04-25 PROCEDURE — 74019 RADEX ABDOMEN 2 VIEWS: CPT

## 2025-04-25 ASSESSMENT — ENCOUNTER SYMPTOMS
ABDOMINAL PAIN: 1
NEUROLOGICAL NEGATIVE: 1
MUSCULOSKELETAL NEGATIVE: 1
EYES NEGATIVE: 1
CONSTIPATION: 1
RESPIRATORY NEGATIVE: 1
ENDOCRINE NEGATIVE: 1
HEMATOLOGIC/LYMPHATIC NEGATIVE: 1
CARDIOVASCULAR NEGATIVE: 1
PSYCHIATRIC NEGATIVE: 1
ALLERGIC/IMMUNOLOGIC NEGATIVE: 1

## 2025-04-25 ASSESSMENT — ACTIVITIES OF DAILY LIVING (ADL)
ADLS_ACUITY_SCORE: 59

## 2025-04-25 ASSESSMENT — COLUMBIA-SUICIDE SEVERITY RATING SCALE - C-SSRS
6. HAVE YOU EVER DONE ANYTHING, STARTED TO DO ANYTHING, OR PREPARED TO DO ANYTHING TO END YOUR LIFE?: NO
1. IN THE PAST MONTH, HAVE YOU WISHED YOU WERE DEAD OR WISHED YOU COULD GO TO SLEEP AND NOT WAKE UP?: NO
2. HAVE YOU ACTUALLY HAD ANY THOUGHTS OF KILLING YOURSELF IN THE PAST MONTH?: NO

## 2025-04-26 NOTE — ED PROVIDER NOTES
History     Chief Complaint   Patient presents with    Post-op Problem     Removal of GB, now with abd pain and constipation, nausea      HPI  Maryann Riggs is a 60 year old female who presents with concern about constipation and nausea.    Patient was hospitalized and discharged 2 days ago after treatment for sepsis secondary to acute cholecystitis with possible cholangitis.  She underwent a cholecystectomy and underwent a Billroth II procedure  with dilatation due to esophageal stricture.  Patient also has a history of chronic back pain hypertension GERD and has been treated for electrolyte abnormalities including low potassium.    On examination patient arrived by car from home reporting that she has had poor stool output for the last 5 days.  She had a cholecystectomy on 4/21/2025.  She is on opioids with oxycodone for postop pain management she is also on levofloxacin.  She reports pellet-like stools and is concerned about fecal impaction.  She tried to eat today and had some abdominal pain.  She has had no vomiting due to suspicion for constipation/impaction she arrived for further assessment and care      Allergies:  Allergies   Allergen Reactions    Desyrel [Trazodone Hcl]      Sig mood disturbance      Lamictal [Lamotrigine]        Problem List:    Patient Active Problem List    Diagnosis Date Noted    Cholangitis (H) 04/19/2025     Priority: Medium    Elevated LFTs 04/19/2025     Priority: Medium    SIRS (systemic inflammatory response syndrome) (H) 04/19/2025     Priority: Medium    History of Samir-en-Y gastric bypass 04/19/2025     Priority: Medium    History of Billroth II operation 04/25/2023     Priority: Medium    Lye ingestion 04/25/2023     Priority: Medium    Lumbar radiculopathy 07/02/2020     Priority: Medium    Chronic bilateral low back pain without sciatica 09/19/2016     Priority: Medium    SI (sacroiliac) joint dysfunction; see details, pain management contract 03/01/2016      Priority: Medium    Primary generalized (osteo)arthritis 11/04/2013     Priority: Medium    Adenomatous polyp of colon 10/10/2013     Priority: Medium    Sprain of MCP joint of hand 09/23/2013     Priority: Medium     3rd mcp inflammation       Rotator cuff injury; bilateral repairs 2005 09/23/2013     Priority: Medium    Esophageal stricture 01/20/2012     Priority: Medium     Has it dilated every 14 weeks  Dr5. Gephart x 14 weeks         Episodic mood disorder 12/15/2011     Priority: Medium     Problem list name updated by automated process. Provider to review      Muscle tension headache 10/09/2011     Priority: Medium    Esophageal abnormality 10/09/2011     Priority: Medium    CARDIOVASCULAR SCREENING; LDL GOAL LESS THAN 160 05/30/2011     Priority: Medium    PTSD (post-traumatic stress disorder) 05/30/2011     Priority: Medium    TMJ (temporomandibular joint syndrome) 05/27/2011     Priority: Medium    Raynaud's disease 02/10/2011     Priority: Medium     Procardia and nitro paste        Pain in joint 01/07/2011     Priority: Medium        Past Medical History:    Past Medical History:   Diagnosis Date    Anger reaction     Cholelithiasis     Esophageal stricture     Generalized osteoarthrosis, unspecified site (aka ARTHRITIS)     PTSD (post-traumatic stress disorder)     Raynaud's disease        Past Surgical History:    Past Surgical History:   Procedure Laterality Date    COLONOSCOPY N/A 9/27/2024    Procedure: COLONOSCOPY, FLEXIBLE, WITH LESION REMOVAL USING SNARE;  Surgeon: Bk Laura MD;  Location: [facility]    IR GALLBLADDER DRAIN PLACEMENT  4/19/2025    MAMMOPLASTY REDUCTION      OTHER SURGICAL HISTORY      s/p left knee arthroscopinc surgery    ROTATOR CUFF REPAIR RT/LT  01/01/2005    SMALL BOWEL RESECTION      TUBAL LIGATION         Family History:    Family History   Problem Relation Age of Onset    Unknown/Adopted Mother     Unknown/Adopted Father     Unknown/Adopted Maternal Grandmother      Unknown/Adopted Maternal Grandfather     Unknown/Adopted Paternal Grandmother     Unknown/Adopted Paternal Grandfather        Social History:  Marital Status:  Single [1]  Social History     Tobacco Use    Smoking status: Never    Smokeless tobacco: Never   Vaping Use    Vaping status: Never Used   Substance Use Topics    Alcohol use: Yes     Comment: scoial    Drug use: No        Medications:    acetaminophen (PAIN RELIEF EXTRA STRENGTH) 500 MG tablet  albuterol (PROAIR HFA/PROVENTIL HFA/VENTOLIN HFA) 108 (90 Base) MCG/ACT inhaler  Ascorbic Acid (VITAMIN C CR PO)  benzonatate (TESSALON) 200 MG capsule  Cyanocobalamin (VITAMIN B 12 PO)  cyclobenzaprine (FLEXERIL) 10 MG tablet  ferrous sulfate (FEROSUL) 325 (65 Fe) MG tablet  GLUCOSAMINE CHONDROITIN COMPLX PO  levofloxacin (LEVAQUIN) 25 MG/ML solution  lisinopril (ZESTRIL) 2.5 MG tablet  MAGNESIUM OXIDE PO  melatonin 3 MG tablet  NIFEdipine ER OSMOTIC (PROCARDIA XL) 30 MG 24 hr tablet  omeprazole (PRILOSEC) 20 MG DR capsule  oxyCODONE (ROXICODONE) 5 MG tablet          Review of Systems   HENT: Negative.     Eyes: Negative.    Respiratory: Negative.     Cardiovascular: Negative.    Gastrointestinal:  Positive for abdominal pain and constipation.   Endocrine: Negative.    Genitourinary: Negative.    Musculoskeletal: Negative.    Skin: Negative.    Allergic/Immunologic: Negative.    Neurological: Negative.    Hematological: Negative.    Psychiatric/Behavioral: Negative.     All other systems reviewed and are negative.      Physical Exam   BP: (!) 157/87  Pulse: 100  Temp: 98.1  F (36.7  C)  Resp: 18  SpO2: 100 %      Physical Exam  Constitutional:       General: She is not in acute distress.     Appearance: Normal appearance. She is not ill-appearing, toxic-appearing or diaphoretic.   HENT:      Head: Normocephalic and atraumatic.      Nose: Nose normal.   Eyes:      Extraocular Movements: Extraocular movements intact.      Pupils: Pupils are equal, round, and reactive  to light.   Cardiovascular:      Rate and Rhythm: Normal rate and regular rhythm.   Pulmonary:      Effort: Pulmonary effort is normal. No respiratory distress.      Breath sounds: Normal breath sounds. No stridor. No wheezing, rhonchi or rales.   Chest:      Chest wall: No tenderness.   Musculoskeletal:         General: No swelling, tenderness, deformity or signs of injury.      Cervical back: Normal range of motion and neck supple.      Right lower leg: No edema.      Left lower leg: No edema.   Skin:     Capillary Refill: Capillary refill takes less than 2 seconds.      Coloration: Skin is not jaundiced.      Findings: No bruising, erythema, lesion or rash.   Neurological:      Mental Status: She is alert and oriented to person, place, and time.      Cranial Nerves: No cranial nerve deficit.      Sensory: No sensory deficit.      Motor: No weakness.      Coordination: Coordination normal.      Gait: Gait normal.      Deep Tendon Reflexes: Reflexes normal.   Psychiatric:         Mood and Affect: Mood normal.         Behavior: Behavior normal.         Thought Content: Thought content normal.         Judgment: Judgment normal.         ED Course        Procedures              Critical Care time:  none     None       ED medications: none      ED Vitals;  Vitals:    04/25/25 1942 04/25/25 1957 04/25/25 2027   BP: (!) 157/87 (!) 152/76    Pulse: 100     Resp: 18     Temp: 98.1  F (36.7  C)     TempSrc: Oral     SpO2: 100% 97% 99%     ED labs and imaging:    Results for orders placed or performed during the hospital encounter of 04/25/25   Abdomen, flat/upright (2 views)     Status: None    Narrative    EXAM: XR ABDOMEN 2 VIEWS  LOCATION: [facility]  DATE: 4/25/2025    INDICATION: S p cholecystectomy 4 d's prior. Constipation, postop abdominal pain.  Evaluate for stool burden versus ileus and or obstruction  COMPARISON: None.      Impression    IMPRESSION: Marked amount of stool throughout the  colon. Nothing for obstruction or ileus. No free air.        Assessments & Plan (with Medical Decision Making)   Assessment Summary and clinical Impression: 60-year-old female who presented with concern about a postop problem.  Patient was recently hospitalized for sepsis due to acute cholecystitis and treated for cholangitis with choledocholithiasis.  She had a cholecystectomy-4 days prior and was found to have Klebsiella pneumonia bacteremia postop diagnosis acute on chronic cholecystitis-.  She was discharged 2 days ago.On arrival she was to be afebrile.  Blood pressure was 157/87.  100% on room air.  She reported concern about constipation and fecal impaction.  Last large-volume stools 5 days early.  On oxycodone for postop pain. Chaperoned rectal exam revealed no definite evidence for fecal impaction. Abdominal imaging revealed no evidence for obstruction or ileus with marked stool burden suggestive of constipation.  Patient was given a bowel regimen for suspicion for opioid-induced constipation with low threshold to return to reexamined and or re-evaluated.    ED course and  plan:  Reviewed the medical record.  Reviewed hospital course on 4/19/25.  Reviewed visit on 4/18/25 Chaperoned  rectal exam revealed no significant fecal impaction.  Abdominal x-ray imaging reviewed independently and the radiology report was also reviewed no evidence or finding for obstruction or ileus.  Marked stool burden throughout the colon consistent with opioid-induced constipation. Reviewed bowel regimen for home given suspicion for opioid-induced constipation.  We discussed and reviewed low threshold to return for reevaluation of worsening concerns including fever, vomiting, progressive pain, poor stool output despite the bowel regimen suggested..    Disclaimer: This note consists of symbols derived from keyboarding, dictation and/or voice recognition software. As a result, there may be errors in the script that have gone  undetected. Please consider this when interpreting information found in this chart.   I have reviewed the nursing notes.    I have reviewed the findings, diagnosis, plan and need for follow up with the patient.           Medical Decision Making  The patient's presentation was of high complexity (recent hospitalization for sepsis secondary to cholecystitis with cholangitis history of esophageal stricture with recurrent dilatation blood work, intravenous medications,).    The patient's evaluation involved:  ordering and/or review of 3+ test(s) in this encounter (chaperoned digital rectal exam, x-ray imaging)    The patient's management necessitated high risk (discharge with low threshold to return for evaluation of the bowel regimen for presumed opiate induced constipation).        New Prescriptions    No medications on file       Final diagnoses:   Opioid-induced constipation   Abnormal x-ray - IMPRESSION: Marked amount of stool throughout the colon. Nothing for obstruction or ileus. No free air.       4/25/2025   [facility] EMERGENCY DEPT       Odilon Elaine MD  04/25/25 4427

## 2025-04-26 NOTE — DISCHARGE INSTRUCTIONS
1) Your evaluation today revealed concern for constipation with large burden of stool on x-ray imaging without evidence of an obstruction or ileus.  There is no significant finding to suggest a fecal impaction.  We discussed the need for a bowel regimen to help you with defecating and bowel cleanout.    2) For home we discussed the bowel regimen that could be helpful including milk of magnesia 30 to 60 mL daily over the next 3 days, 2 stool softeners including Colace with senna, MiraLAX half a Cap Daily.  If no good stool output in the next 3 days you may need to add magnesium citrate half a bottle

## 2025-04-26 NOTE — ED TRIAGE NOTES
Cholecystectomy on monday. Last BM Sunday 4/20. Has been taking oxy frequently every 6 hours and miralax daily.

## 2025-04-28 ENCOUNTER — PATIENT OUTREACH (OUTPATIENT)
Dept: SURGERY | Facility: CLINIC | Age: 60
End: 2025-04-28
Payer: COMMERCIAL

## 2025-04-28 DIAGNOSIS — C23 MALIGNANT NEOPLASM OF GALLBLADDER (H): Primary | ICD-10-CM

## 2025-04-28 LAB
PATH REPORT.COMMENTS IMP SPEC: ABNORMAL
PATH REPORT.COMMENTS IMP SPEC: YES
PATH REPORT.FINAL DX SPEC: ABNORMAL
PATH REPORT.GROSS SPEC: ABNORMAL
PATH REPORT.MICROSCOPIC SPEC OTHER STN: ABNORMAL
PATH REPORT.RELEVANT HX SPEC: ABNORMAL
PATHOLOGY SYNOPTIC REPORT: ABNORMAL
PHOTO IMAGE: ABNORMAL

## 2025-04-28 NOTE — PROGRESS NOTES
04/28/25    3:38 PM     Pathology results are now available for review by the provider. Dr. Stewart will call the patient directly after chart review.    Pathology:  A. 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 Stewart and he will be calling the patient with results and recommendations. Referral placed for the patient to consult with Surgical Oncology (Sharee or José).

## 2025-04-29 ENCOUNTER — OFFICE VISIT (OUTPATIENT)
Dept: FAMILY MEDICINE | Facility: CLINIC | Age: 60
End: 2025-04-29
Payer: COMMERCIAL

## 2025-04-29 ENCOUNTER — PATIENT OUTREACH (OUTPATIENT)
Dept: SURGERY | Facility: CLINIC | Age: 60
End: 2025-04-29

## 2025-04-29 VITALS
RESPIRATION RATE: 20 BRPM | TEMPERATURE: 99.2 F | DIASTOLIC BLOOD PRESSURE: 70 MMHG | WEIGHT: 142 LBS | BODY MASS INDEX: 21.52 KG/M2 | HEART RATE: 92 BPM | OXYGEN SATURATION: 99 % | HEIGHT: 68 IN | SYSTOLIC BLOOD PRESSURE: 130 MMHG

## 2025-04-29 DIAGNOSIS — Z02.89 ENCOUNTER FOR COMPLETION OF FORM WITH PATIENT: ICD-10-CM

## 2025-04-29 DIAGNOSIS — R74.01 TRANSAMINITIS: ICD-10-CM

## 2025-04-29 DIAGNOSIS — A41.9 SEPSIS WITH ACUTE LIVER FAILURE WITHOUT HEPATIC COMA OR SEPTIC SHOCK, DUE TO UNSPECIFIED ORGANISM (H): ICD-10-CM

## 2025-04-29 DIAGNOSIS — K72.00 SEPSIS WITH ACUTE LIVER FAILURE WITHOUT HEPATIC COMA OR SEPTIC SHOCK, DUE TO UNSPECIFIED ORGANISM (H): ICD-10-CM

## 2025-04-29 DIAGNOSIS — R65.20 SEPSIS WITH ACUTE LIVER FAILURE WITHOUT HEPATIC COMA OR SEPTIC SHOCK, DUE TO UNSPECIFIED ORGANISM (H): ICD-10-CM

## 2025-04-29 DIAGNOSIS — C23 ADENOCARCINOMA OF GALLBLADDER (H): Primary | ICD-10-CM

## 2025-04-29 DIAGNOSIS — C23 GALLBLADDER CANCER (H): Primary | ICD-10-CM

## 2025-04-29 DIAGNOSIS — Z09 HOSPITAL DISCHARGE FOLLOW-UP: ICD-10-CM

## 2025-04-29 DIAGNOSIS — K81.9 CHOLECYSTITIS: ICD-10-CM

## 2025-04-29 DIAGNOSIS — R78.81 BACTEREMIA: ICD-10-CM

## 2025-04-29 DIAGNOSIS — G47.09 OTHER INSOMNIA: ICD-10-CM

## 2025-04-29 DIAGNOSIS — K59.03 DRUG-INDUCED CONSTIPATION: ICD-10-CM

## 2025-04-29 LAB
ERYTHROCYTE [DISTWIDTH] IN BLOOD BY AUTOMATED COUNT: 13.1 % (ref 10–15)
HCT VFR BLD AUTO: 37.2 % (ref 35–47)
HGB BLD-MCNC: 11.8 G/DL (ref 11.7–15.7)
MCH RBC QN AUTO: 28.7 PG (ref 26.5–33)
MCHC RBC AUTO-ENTMCNC: 31.7 G/DL (ref 31.5–36.5)
MCV RBC AUTO: 91 FL (ref 78–100)
PLATELET # BLD AUTO: 552 10E3/UL (ref 150–450)
RBC # BLD AUTO: 4.11 10E6/UL (ref 3.8–5.2)
WBC # BLD AUTO: 8.9 10E3/UL (ref 4–11)

## 2025-04-29 PROCEDURE — 1125F AMNT PAIN NOTED PAIN PRSNT: CPT | Performed by: FAMILY MEDICINE

## 2025-04-29 PROCEDURE — 3075F SYST BP GE 130 - 139MM HG: CPT | Performed by: FAMILY MEDICINE

## 2025-04-29 PROCEDURE — 36415 COLL VENOUS BLD VENIPUNCTURE: CPT | Performed by: FAMILY MEDICINE

## 2025-04-29 PROCEDURE — 85027 COMPLETE CBC AUTOMATED: CPT | Performed by: FAMILY MEDICINE

## 2025-04-29 PROCEDURE — 99495 TRANSJ CARE MGMT MOD F2F 14D: CPT | Performed by: FAMILY MEDICINE

## 2025-04-29 PROCEDURE — 80053 COMPREHEN METABOLIC PANEL: CPT | Performed by: FAMILY MEDICINE

## 2025-04-29 PROCEDURE — 3078F DIAST BP <80 MM HG: CPT | Performed by: FAMILY MEDICINE

## 2025-04-29 RX ORDER — ZOLPIDEM TARTRATE 5 MG/1
5 TABLET ORAL
Qty: 30 TABLET | Refills: 3 | Status: SHIPPED | OUTPATIENT
Start: 2025-04-29

## 2025-04-29 ASSESSMENT — PAIN SCALES - GENERAL: PAINLEVEL_OUTOF10: MILD PAIN (3)

## 2025-04-29 NOTE — NURSING NOTE
"Chief Complaint   Patient presents with    Hospital F/U       Initial /70   Pulse 92   Temp 99.2  F (37.3  C) (Tympanic)   Resp 20   Ht 1.727 m (5' 8\")   Wt 64.4 kg (142 lb)   LMP 01/04/2014   SpO2 99%   BMI 21.59 kg/m   Estimated body mass index is 21.59 kg/m  as calculated from the following:    Height as of this encounter: 1.727 m (5' 8\").    Weight as of this encounter: 64.4 kg (142 lb).    Patient presents to the clinic using No DME    Is there anyone who you would like to be able to receive your results? No  If yes have patient fill out [facility]      "

## 2025-04-29 NOTE — TELEPHONE ENCOUNTER
Needing refills of these too:  meloxicam (MOBIC) 15 MG tablet   cyclobenzaprine (FLEXERIL) tablet 10 mg     They cancelled all of these because pt was taking oxycodone but pt is not taking anymore at all. Pt said it caused her to get constipated.       .Bea Hair PSC

## 2025-04-29 NOTE — PROGRESS NOTES
New Patient Oncology Nurse Navigator Note     Referring provider: Dr. Stewart     Referring Clinic/Organization: [facility]     Referred to: Surgical Oncology -  Hepatobiliary / GI Cancers     Requested provider (if applicable): Dr. Joseph Tolliver     Referral Received: 04/29/25       Evaluation for : Gallbladder Cancer      Clinical History (per Nurse review of records provided):      See book marked documents:     Referring MD office note  Pathology report  Imaging reports   Procedure report       Allergies   Allergen Reactions    Desyrel [Trazodone Hcl]      Sig mood disturbance      Lamictal [Lamotrigine]         Past Medical History:   Diagnosis Date    Anger reaction     buspar helping    Cholelithiasis     Esophageal stricture     dilatation every 3 months    Generalized osteoarthrosis, unspecified site (aka ARTHRITIS)     small joints    PTSD (post-traumatic stress disorder)     Raynaud's disease        Past Surgical History:   Procedure Laterality Date    COLONOSCOPY N/A 9/27/2024    Procedure: COLONOSCOPY, FLEXIBLE, WITH LESION REMOVAL USING SNARE;  Surgeon: Bk Laura MD;  Location: [facility]    IR GALLBLADDER DRAIN PLACEMENT  4/19/2025    MAMMOPLASTY REDUCTION      OTHER SURGICAL HISTORY      s/p left knee arthroscopinc surgery    ROTATOR CUFF REPAIR RT/LT  01/01/2005    SMALL BOWEL RESECTION      TUBAL LIGATION         Current Outpatient Medications   Medication Sig Dispense Refill    acetaminophen (PAIN RELIEF EXTRA STRENGTH) 500 MG tablet TAKE 1-2 TABLETS (500-1,000 MG) BY MOUTH EVERY 6 HOURS AS NEEDED FOR MILD PAIN 100 tablet 1    albuterol (PROAIR HFA/PROVENTIL HFA/VENTOLIN HFA) 108 (90 Base) MCG/ACT inhaler Inhale 2 puffs into the lungs every 6 hours as needed for shortness of breath, wheezing or cough. 18 g 0    Ascorbic Acid (VITAMIN C CR PO) Take 1 packet by mouth daily.      benzonatate (TESSALON) 200 MG capsule Take 1 capsule (200 mg) by mouth 3 times daily as needed  for cough (cough). 20 capsule 0    Cyanocobalamin (VITAMIN B 12 PO) Taking 2 gummies by mouth daily      cyclobenzaprine (FLEXERIL) 10 MG tablet Take 1 tablet (10 mg) by mouth daily. (Patient not taking: Reported on 4/29/2025) 90 tablet 1    ferrous sulfate (FEROSUL) 325 (65 Fe) MG tablet Take 1 tablet (325 mg) by mouth daily (with breakfast). (Patient not taking: Reported on 4/29/2025) 90 tablet 1    GLUCOSAMINE CHONDROITIN COMPLX PO Take  by mouth. (Patient not taking: Reported on 4/29/2025)      lisinopril (ZESTRIL) 2.5 MG tablet Take 1 tablet (2.5 mg) by mouth daily. 90 tablet 2    MAGNESIUM OXIDE PO Take 400 mg by mouth daily (Patient not taking: Reported on 4/29/2025)      melatonin 3 MG tablet Take 1 tablet by mouth nightly as needed for sleep. (Patient not taking: Reported on 4/17/2025) 30 tablet 0    NIFEdipine ER OSMOTIC (PROCARDIA XL) 30 MG 24 hr tablet Take 1 tablet (30 mg) by mouth daily. (Patient not taking: Reported on 4/29/2025) 90 tablet 3    omeprazole (PRILOSEC) 20 MG DR capsule Take 20 mg by mouth daily.      oxyCODONE (ROXICODONE) 5 MG tablet Take 1 tablet (5 mg) by mouth every 6 hours as needed for pain or severe pain. (Patient not taking: Reported on 4/29/2025) 28 tablet 0        Patient Active Problem List   Diagnosis    Raynaud's disease    TMJ (temporomandibular joint syndrome)    CARDIOVASCULAR SCREENING; LDL GOAL LESS THAN 160    PTSD (post-traumatic stress disorder)    Muscle tension headache    Esophageal abnormality    Episodic mood disorder    Esophageal stricture    Sprain of MCP joint of hand    Rotator cuff injury; bilateral repairs 2005    Primary generalized (osteo)arthritis    SI (sacroiliac) joint dysfunction; see details, pain management contract    Chronic bilateral low back pain without sciatica    Lumbar radiculopathy    Adenomatous polyp of colon    History of Billroth II operation    Lye ingestion    Pain in joint    Cholangitis (H)    Elevated LFTs    SIRS (systemic  inflammatory response syndrome) (H)    History of Samir-en-Y gastric bypass         Clinical Assessment / Barriers to Care (Per Nurse):    None at this time.     Records Location:     [facility]     Records Needed:     NONE AT THIS TIME      Additional testing needed prior to consult:     NONE AT THIS TIME    Referral updates and Plan:       Consult with Surgical Oncology     04/29/2025 1:49 PM - called and spoke with patient regarding referral and discuss scheduling consult with surgery. Discussed option and she was agreeable to proceed with a video visit on 5/1. She is aware we will also refer her to medical oncology as well and that scheduling will reach out separately for this appointment and this is non urgent at this time. She verbalized understanding and has no further questions as of now.     Silvana Abarca, RN, BSN   Surgical Oncology New Patient Nurse Navigator  [facility]  [phone]

## 2025-04-29 NOTE — TELEPHONE ENCOUNTER
RECORDS STATUS - ALL OTHER DIAGNOSIS      RECORDS RECEIVED FROM: [facility] - Internal records   DATE RECEIVED: 4/29

## 2025-04-29 NOTE — TELEPHONE ENCOUNTER
Pending Prescriptions:                       Disp   Refills    zolpidem (AMBIEN) 5 MG tablet              30 tab*3        Sig: Take 1 tablet (5 mg) by mouth nightly as needed for           sleep.    Routing refill request to provider for review/approval because:  Drug not on the G refill protocol   Drug not active on patient's medication list.  Appears that this med may have been discontinued when discharged from hospital; ok to continue?

## 2025-04-29 NOTE — PROGRESS NOTES
Assessment & Plan     61 yo F     Adenocarcinoma of gallbladder (H)  ***    Drug-induced constipation  ***    Cholecystitis  ***  - CBC with platelets; Future  - CBC with platelets    Transaminitis  ***  - Comprehensive metabolic panel (BMP + Alb, Alk Phos, ALT, AST, Total. Bili, TP); Future  - Comprehensive metabolic panel (BMP + Alb, Alk Phos, ALT, AST, Total. Bili, TP)    Bacteremia  ***    Sepsis with acute liver failure without hepatic coma or septic shock, due to unspecified organism (H)  ***    Hospital discharge follow-up  ***    Encounter for completion of form with patient  ***        MED REC REQUIRED{TIP  Click the link below to document or use med rec list, use list to pull in response :944631}  Post Medication Reconciliation Status: {MED REC LIST:821104}    {Follow-up (Optional):069917}    Subjective   Pat is a 60 year old, presenting for the following health issues:  Hospital F/U        4/29/2025    10:03 AM   Additional Questions   Roomed by Denise ORTEGA   Accompanied by self     Via the Health Maintenance [employer], the patient has reported the following services have been completed -Mammogram: [facility] 2024-05-18, this information has not been sent to the abstraction team.  HPI          4/24/2025   Post Discharge Outreach   How are your symptoms? (Red Flag symptoms escalate to triage hotline per guidelines) Improved   Does the patient have their discharge instructions?  Yes   Does the patient have questions regarding their discharge instructions?  No   Were you started on any new medications or were there changes to any of your previous medications?  Yes   Does the patient have all of their medications? Yes   Do you have questions regarding any of your medications?  No   Discharge Follow Up Appointment Date 4/29/2025   Discharge Follow Up Appointment Scheduled with? Primary Care Provider       Hospital Follow-up Visit:    Hospital/Nursing Home/IP Rehab Facility: [facility]  Most Recent Admission Date: 4/25/2025   Most Recent Admission Diagnosis:      Most Recent Discharge Date: 4/25/2025   Most Recent Discharge Diagnosis: Opioid-induced constipation - K59.03, T40.2X5A  Abnormal x-ray - R93.89   Was the patient in the ICU or did the patient experience delirium during hospitalization?  No  Do you have any other stressors you would like to discuss with your provider? No    Problems taking medications regularly:  None  Medication changes since discharge: None  Problems adhering to non-medication therapy:  None    Summary of hospitalization:  {:928337}  Diagnostic Tests/Treatments reviewed.  Follow up needed: {:058911:}  Other Healthcare Providers Involved in Patient s Care:         {those currently involved after discharge:426782::\"None\"}  Update since discharge: {:848461} {TIP  Include information from family/caregivers, SNF, Care Coordination :482631}        Plan of care communicated with {:393716}     {Reference  Coding guidelines- Moderate Complexity F2F/Video within 7 - 14 days of discharge 0525976, High Complexity F2F/Video within 7 days 7236907 or [id number] days [id number] :131713}      {additonal problems for provider to add (Optional):769807}    {ROS Picklists (Optional):449716}      Objective    /70   Pulse 92   Temp 99.2  F (37.3  C) (Tympanic)   Resp 20   Ht 1.727 m (5' 8\")   Wt 64.4 kg (142 lb)   LMP 01/04/2014   SpO2 99%   BMI 21.59 kg/m    Body mass index is 21.59 kg/m .  Physical Exam   {Exam List (Optional):752347}    {Diagnostic Test Results (Optional):527486}        Signed Electronically by: Beena Dolan MD  {Email feedback regarding this note to [email or web address]   :624061}  "

## 2025-04-29 NOTE — TELEPHONE ENCOUNTER
Medication Question or Refill      What medication are you calling about (include dose and sig)?: Zolpidem     Preferred Pharmacy:  [facility] [address] [place] [address] [address] [place] [address] [address] [facility] [address]  Phone: [phone] Fax: [phone]    Who prescribed the medication?: Debbie Vila      Do you need a refill? Yes      Could we send this information to you in [facility] or would you prefer to receive a phone call?:   Patient would prefer a phone call   Okay to leave a detailed message?: Yes at Cell number on file:    Telephone Information:   Mobile [phone]     SAMI Martinez

## 2025-04-30 LAB
ALBUMIN SERPL BCG-MCNC: 3.7 G/DL (ref 3.5–5.2)
ALP SERPL-CCNC: 738 U/L (ref 40–150)
ALT SERPL W P-5'-P-CCNC: 348 U/L (ref 0–50)
ANION GAP SERPL CALCULATED.3IONS-SCNC: 11 MMOL/L (ref 7–15)
AST SERPL W P-5'-P-CCNC: 56 U/L (ref 0–45)
BILIRUB SERPL-MCNC: 0.8 MG/DL
BUN SERPL-MCNC: 13 MG/DL (ref 8–23)
CALCIUM SERPL-MCNC: 9.4 MG/DL (ref 8.8–10.4)
CHLORIDE SERPL-SCNC: 100 MMOL/L (ref 98–107)
CREAT SERPL-MCNC: 0.62 MG/DL (ref 0.51–0.95)
EGFRCR SERPLBLD CKD-EPI 2021: >90 ML/MIN/1.73M2
GLUCOSE SERPL-MCNC: 126 MG/DL (ref 70–99)
HCO3 SERPL-SCNC: 27 MMOL/L (ref 22–29)
POTASSIUM SERPL-SCNC: 4.3 MMOL/L (ref 3.4–5.3)
PROT SERPL-MCNC: 6.9 G/DL (ref 6.4–8.3)
SODIUM SERPL-SCNC: 138 MMOL/L (ref 135–145)

## 2025-05-01 ENCOUNTER — TELEPHONE (OUTPATIENT)
Dept: FAMILY MEDICINE | Facility: CLINIC | Age: 60
End: 2025-05-01
Payer: COMMERCIAL

## 2025-05-01 ENCOUNTER — PRE VISIT (OUTPATIENT)
Dept: SURGERY | Facility: CLINIC | Age: 60
End: 2025-05-01
Payer: COMMERCIAL

## 2025-05-01 ENCOUNTER — PATIENT OUTREACH (OUTPATIENT)
Dept: SURGERY | Facility: CLINIC | Age: 60
End: 2025-05-01
Payer: COMMERCIAL

## 2025-05-01 PROBLEM — C23 MALIGNANT NEOPLASM OF GALLBLADDER (H): Status: ACTIVE | Noted: 2025-05-01

## 2025-05-01 NOTE — TELEPHONE ENCOUNTER
Medication Question: Pt called in and stated that she has this cough for about 3 weeks now. She was talking with others over this and feels that this maybe a side effect from taking lisinopril (ZESTRIL). Her concern is that she has a upcoming surgery and coughing like this  after surgery would cause issues to happen. She wants to know if she can start weening off this medication so that this isn't a issue after her surgery? What are your thoughts on this?    Could we send this information to you in [place] or would you prefer to receive a phone call?:   Patient would prefer a phone call   Okay to leave a detailed message?: Yes at Home number on file [phone] (home)    Linda Leslie/ Patient

## 2025-05-01 NOTE — PROGRESS NOTES
Maryann Riggs was contacted several days post procedure via telephone for a status update and elected to have a telephone follow-up call approximately 7-10 days after original contact in lieu of a clinic visit with Dr. Marvin Stewart. She continues to do well and the skin glue  has not peeled off.  The patients wounds are healed and the area is C/D/I.  She is afebrile, pain is improving, and an po; the patient is slowly resuming her normal activity. Discussed restrictions including no lifting in excess of 15 pounds for 2 more weeks.    Pathology was reviewed with the patient:  already addressed with Dr. Stewart    All of Maryann S Oneil question's were answered and  she would like to return to the clinic on a PRN basis. The patient is aware that  she may schedule a post op appointment at any time.    A copy of this note was routed to the patients surgeon.

## 2025-05-07 ENCOUNTER — ANCILLARY PROCEDURE (OUTPATIENT)
Dept: MRI IMAGING | Facility: CLINIC | Age: 60
End: 2025-05-07
Attending: SURGERY
Payer: COMMERCIAL

## 2025-05-07 ENCOUNTER — LAB (OUTPATIENT)
Dept: LAB | Facility: CLINIC | Age: 60
End: 2025-05-07
Payer: COMMERCIAL

## 2025-05-07 DIAGNOSIS — C23 MALIGNANT NEOPLASM OF GALLBLADDER (H): ICD-10-CM

## 2025-05-07 LAB
ALBUMIN SERPL BCG-MCNC: 4 G/DL (ref 3.5–5.2)
ALP SERPL-CCNC: 302 U/L (ref 40–150)
ALT SERPL W P-5'-P-CCNC: 65 U/L (ref 0–50)
ANION GAP SERPL CALCULATED.3IONS-SCNC: 10 MMOL/L (ref 7–15)
AST SERPL W P-5'-P-CCNC: 27 U/L (ref 0–45)
BILIRUB SERPL-MCNC: 0.4 MG/DL
BUN SERPL-MCNC: 16.1 MG/DL (ref 8–23)
CALCIUM SERPL-MCNC: 9.1 MG/DL (ref 8.8–10.4)
CEA SERPL-MCNC: 2.6 NG/ML
CHLORIDE SERPL-SCNC: 101 MMOL/L (ref 98–107)
CREAT SERPL-MCNC: 0.67 MG/DL (ref 0.51–0.95)
EGFRCR SERPLBLD CKD-EPI 2021: >90 ML/MIN/1.73M2
ERYTHROCYTE [DISTWIDTH] IN BLOOD BY AUTOMATED COUNT: 12.8 % (ref 10–15)
GLUCOSE SERPL-MCNC: 145 MG/DL (ref 70–99)
HCO3 SERPL-SCNC: 26 MMOL/L (ref 22–29)
HCT VFR BLD AUTO: 35.7 % (ref 35–47)
HGB BLD-MCNC: 11.7 G/DL (ref 11.7–15.7)
INR PPP: 0.96 (ref 0.85–1.15)
MCH RBC QN AUTO: 28.9 PG (ref 26.5–33)
MCHC RBC AUTO-ENTMCNC: 32.8 G/DL (ref 31.5–36.5)
MCV RBC AUTO: 88 FL (ref 78–100)
PLATELET # BLD AUTO: 407 10E3/UL (ref 150–450)
POTASSIUM SERPL-SCNC: 4.3 MMOL/L (ref 3.4–5.3)
PROT SERPL-MCNC: 6.7 G/DL (ref 6.4–8.3)
PROTHROMBIN TIME: 13 SECONDS (ref 11.8–14.8)
RBC # BLD AUTO: 4.05 10E6/UL (ref 3.8–5.2)
SODIUM SERPL-SCNC: 137 MMOL/L (ref 135–145)
WBC # BLD AUTO: 9 10E3/UL (ref 4–11)

## 2025-05-07 PROCEDURE — 85610 PROTHROMBIN TIME: CPT | Performed by: PATHOLOGY

## 2025-05-07 PROCEDURE — 80053 COMPREHEN METABOLIC PANEL: CPT | Performed by: PATHOLOGY

## 2025-05-07 PROCEDURE — 82378 CARCINOEMBRYONIC ANTIGEN: CPT | Performed by: SURGERY

## 2025-05-07 PROCEDURE — A9585 GADOBUTROL INJECTION: HCPCS | Mod: JW | Performed by: RADIOLOGY

## 2025-05-07 PROCEDURE — 36415 COLL VENOUS BLD VENIPUNCTURE: CPT | Performed by: PATHOLOGY

## 2025-05-07 PROCEDURE — 74183 MRI ABD W/O CNTR FLWD CNTR: CPT | Performed by: RADIOLOGY

## 2025-05-07 PROCEDURE — 99000 SPECIMEN HANDLING OFFICE-LAB: CPT | Performed by: PATHOLOGY

## 2025-05-07 PROCEDURE — 86301 IMMUNOASSAY TUMOR CA 19-9: CPT | Mod: 90 | Performed by: PATHOLOGY

## 2025-05-07 PROCEDURE — 85027 COMPLETE CBC AUTOMATED: CPT | Performed by: PATHOLOGY

## 2025-05-07 RX ORDER — GADOBUTROL 604.72 MG/ML
7.5 INJECTION INTRAVENOUS ONCE
Status: COMPLETED | OUTPATIENT
Start: 2025-05-07 | End: 2025-05-07

## 2025-05-07 RX ADMIN — GADOBUTROL 6.5 ML: 604.72 INJECTION INTRAVENOUS at 12:55

## 2025-05-08 ENCOUNTER — TELEPHONE (OUTPATIENT)
Dept: SURGERY | Facility: CLINIC | Age: 60
End: 2025-05-08
Payer: COMMERCIAL

## 2025-05-08 LAB — CANCER AG19-9 SERPL IA-ACNC: 17 U/ML

## 2025-05-08 NOTE — TELEPHONE ENCOUNTER
STD/Return to Work forms received via clinic from patient.      Forms to be completed and put in folder for provider to approve.    Fax #:  [phone]    Carlotta Ruff

## 2025-05-12 ENCOUNTER — PATIENT OUTREACH (OUTPATIENT)
Dept: SURGERY | Facility: CLINIC | Age: 60
End: 2025-05-12

## 2025-05-12 ENCOUNTER — PREP FOR PROCEDURE (OUTPATIENT)
Dept: ONCOLOGY | Facility: CLINIC | Age: 60
End: 2025-05-12

## 2025-05-12 ENCOUNTER — VIRTUAL VISIT (OUTPATIENT)
Dept: SURGERY | Facility: CLINIC | Age: 60
End: 2025-05-12
Attending: SURGERY
Payer: COMMERCIAL

## 2025-05-12 VITALS — HEIGHT: 68 IN | WEIGHT: 142 LBS | BODY MASS INDEX: 21.52 KG/M2

## 2025-05-12 DIAGNOSIS — C23 GALLBLADDER CANCER (H): Primary | ICD-10-CM

## 2025-05-12 DIAGNOSIS — C23 MALIGNANT NEOPLASM OF GALLBLADDER (H): Primary | ICD-10-CM

## 2025-05-12 PROCEDURE — 1126F AMNT PAIN NOTED NONE PRSNT: CPT | Mod: 93 | Performed by: SURGERY

## 2025-05-12 PROCEDURE — 98014 SYNCH AUDIO-ONLY EST MOD 30: CPT | Mod: 24 | Performed by: SURGERY

## 2025-05-12 ASSESSMENT — PAIN SCALES - GENERAL: PAINLEVEL_OUTOF10: NO PAIN (0)

## 2025-05-12 NOTE — LETTER
5/12/2025      Maryann Riggs  [address] [place] [address]      Dear Colleague,    Thank you for referring your patient, Maryann Riggs, to the [facility]. Please see a copy of my visit note below.    Virtual Visit Details    Type of service:  Telephone Visit   Phone call duration: 20 minutes   Originating Location (pt. Location): Home    Distant Location (provider location):  On-site  Telephone visit completed due to the patient did not consent to a video visit.    I had a long conversation with the patient today updating her about her MRI results.  She has ongoing dilation of her biliary tree with smooth tapering in the head of the pancreas.  There are no stones, or masses.  Her LFTs continue to have high alkaline phosphatase.    I discussed with the patient that I think we should go ahead with scheduling surgery in the form of a central hepatectomy and portal lymphadenectomy however I will ask my gastroenterology colleagues to consider ERCP, potentially even on the same day of surgery.  We will have to coordinate with them whether it needs to be done before, during, or after.    During our discussion the patient commented that she has decided against any chemotherapy if offered.  I discussed that chemotherapy has been shown to help people with gallbladder cancer live longer, and reduce the risk of this cancer coming back.  I encouraged her to at least visit with medical oncology to hear about what chemotherapy might be like for her and also to discuss future surveillance which should still be done even if she decided against chemotherapy.    I also asked the patient whether she was comfortable with moving forward with surgery, given that she had decided to decline chemotherapy.  I discussed with her that the best treatment for gallbladder cancer is to move forward with a second surgery as we currently have planned and systemic chemotherapy and removing 1 or both of  those treatments increases her risk of this cancer comes back.  I also discussed that if the cancer does return, typically our only treatment option would be palliative chemotherapy, or palliative care which would have a very short expected survival, typically less than 1 year.    The patient seems well-informed about her risk of this cancer returning.  To be clear I also discussed that even if we do surgery and chemotherapy, unfortunately there is a possibility that this cancer returns but that risk is certainly higher if she decides not to pursue these treatments.    Based on our discussion today the patient would like to go ahead and schedule our planned open central hepatectomy and portal lymph node dissection with intraoperative ultrasound.  We agree that she would contact us prior to the date of surgery if she changed her mind and decided not to do it.  She was comfortable with that plan.    20 minutes was spent on telephone call today.  15 minutes was spent on review of imaging, coordination of care, documentation.    Again, thank you for allowing me to participate in the care of your patient.        Sincerely,        Joseph Kumar MD    Electronically signed

## 2025-05-12 NOTE — PROGRESS NOTES
Outgoing call                               Discussion with Patient/Care Team:                                                      Returned call to patient informed her that Dr. Kumar doesn't need a PET scan to further evaluate the liver. Informed her that he has the information needed for recommended surgery at this time.    She stated that she has been thinking about if she want surgery or not and at this time she doesn't want to proceed with surgery. Informed her I would update Dr. Kumar regarding her discuss.     Follow up/Plan                                                       PRN     Silvana Abarca RN, BSN  Surgical Oncology and Hepatobiliary Surgery

## 2025-05-12 NOTE — PROGRESS NOTES
Virtual Visit Details    Type of service:  Telephone Visit   Phone call duration: 20 minutes   Originating Location (pt. Location): Home    Distant Location (provider location):  On-site  Telephone visit completed due to the patient did not consent to a video visit.    I had a long conversation with the patient today updating her about her MRI results.  She has ongoing dilation of her biliary tree with smooth tapering in the head of the pancreas.  There are no stones, or masses.  Her LFTs continue to have high alkaline phosphatase.    I discussed with the patient that I think we should go ahead with scheduling surgery in the form of a central hepatectomy and portal lymphadenectomy however I will ask my gastroenterology colleagues to consider ERCP, potentially even on the same day of surgery.  We will have to coordinate with them whether it needs to be done before, during, or after.    During our discussion the patient commented that she has decided against any chemotherapy if offered.  I discussed that chemotherapy has been shown to help people with gallbladder cancer live longer, and reduce the risk of this cancer coming back.  I encouraged her to at least visit with medical oncology to hear about what chemotherapy might be like for her and also to discuss future surveillance which should still be done even if she decided against chemotherapy.    I also asked the patient whether she was comfortable with moving forward with surgery, given that she had decided to decline chemotherapy.  I discussed with her that the best treatment for gallbladder cancer is to move forward with a second surgery as we currently have planned and systemic chemotherapy and removing 1 or both of those treatments increases her risk of this cancer comes back.  I also discussed that if the cancer does return, typically our only treatment option would be palliative chemotherapy, or palliative care which would have a very short expected  survival, typically less than 1 year.    The patient seems well-informed about her risk of this cancer returning.  To be clear I also discussed that even if we do surgery and chemotherapy, unfortunately there is a possibility that this cancer returns but that risk is certainly higher if she decides not to pursue these treatments.    Based on our discussion today the patient would like to go ahead and schedule our planned open central hepatectomy and portal lymph node dissection with intraoperative ultrasound.  We agree that she would contact us prior to the date of surgery if she changed her mind and decided not to do it.  She was comfortable with that plan.    20 minutes was spent on telephone call today.  15 minutes was spent on review of imaging, coordination of care, documentation.

## 2025-05-12 NOTE — NURSING NOTE
Patient reviewed medications and allergies in [place] during e-check in and said everything looked correct.      Current patient location: [address] [place] [id number]    Is the patient currently in the state of [place]? YES    Visit mode: VIDEO    If the visit is dropped, the patient can be reconnected by:TELEPHONE VISIT: Phone number:   Telephone Information:   Mobile [phone]       Will anyone else be joining the visit? Jessica [phone]  (If patient encounters technical issues they should call [phone])    Are changes needed to the allergy or medication list? Pt declined med review and Pt stated no med changes    Are refills needed on medications prescribed by this physician? NO    Rooming Documentation:  Questionnaire(s) completed    Reason for visit: RECHECK (Wondering the surgery date due to work. )    Ronda CACERES

## 2025-05-14 ENCOUNTER — OFFICE VISIT (OUTPATIENT)
Dept: FAMILY MEDICINE | Facility: CLINIC | Age: 60
End: 2025-05-14
Payer: COMMERCIAL

## 2025-05-14 VITALS
HEIGHT: 68 IN | BODY MASS INDEX: 22.19 KG/M2 | DIASTOLIC BLOOD PRESSURE: 80 MMHG | OXYGEN SATURATION: 98 % | RESPIRATION RATE: 16 BRPM | SYSTOLIC BLOOD PRESSURE: 138 MMHG | WEIGHT: 146.4 LBS | TEMPERATURE: 97.5 F | HEART RATE: 64 BPM

## 2025-05-14 DIAGNOSIS — Z82.3 FAMILY HISTORY OF STROKE: ICD-10-CM

## 2025-05-14 DIAGNOSIS — C23 MALIGNANT NEOPLASM OF GALLBLADDER (H): ICD-10-CM

## 2025-05-14 DIAGNOSIS — R74.01 TRANSAMINITIS: Primary | ICD-10-CM

## 2025-05-14 DIAGNOSIS — Z02.89 ENCOUNTER FOR COMPLETION OF FORM WITH PATIENT: ICD-10-CM

## 2025-05-14 PROCEDURE — 99214 OFFICE O/P EST MOD 30 MIN: CPT | Performed by: FAMILY MEDICINE

## 2025-05-14 PROCEDURE — 3079F DIAST BP 80-89 MM HG: CPT | Performed by: FAMILY MEDICINE

## 2025-05-14 PROCEDURE — 3049F LDL-C 100-129 MG/DL: CPT | Performed by: FAMILY MEDICINE

## 2025-05-14 PROCEDURE — 3075F SYST BP GE 130 - 139MM HG: CPT | Performed by: FAMILY MEDICINE

## 2025-05-14 NOTE — PROGRESS NOTES
Assessment & Plan     {Diag Picklist:217142}            {Follow-up (Optional):452635}    Subjective   Pat is a 60 year old, presenting for the following health issues:  Forms      5/14/2025     3:52 PM   Additional Questions   Roomed by jacky   Accompanied by self         5/14/2025     3:52 PM   Patient Reported Additional Medications   Patient reports taking the following new medications none     History of Present Illness       Reason for visit:  Return to work physical    She eats 2-3 servings of fruits and vegetables daily.She consumes 0 sweetened beverage(s) daily.She exercises with enough effort to increase her heart rate 60 or more minutes per day.  She exercises with enough effort to increase her heart rate 5 days per week.   She is taking medications regularly.        {MA/LPN/RN Pre-Provider Visit Orders- hCG/UA/Strep (Optional):960068}  {SUPERLIST (Optional):138477}  {additonal problems for provider to add (Optional):433134}    {ROS Picklists (Optional):141731}      Objective    LMP 01/04/2014   There is no height or weight on file to calculate BMI.  Physical Exam   {Exam List (Optional):336315}    {Diagnostic Test Results (Optional):101666}        Signed Electronically by: Beena Dolan MD  {Email feedback regarding this note to [email or web address]   :[id number]}   MD

## 2025-05-20 DIAGNOSIS — L20.82 FLEXURAL ECZEMA: ICD-10-CM

## 2025-05-20 NOTE — TELEPHONE ENCOUNTER
Requested Prescriptions   Pending Prescriptions Disp Refills    triamcinolone (KENALOG) 0.1 % external cream 45 g 0     Sig: Apply topically 2 times daily.   MEDICATION IS NOT ACTIVE ON CURRENT MEDICATION LIST.      Topical Steroids and Nonsteroidals Protocol Failed - 5/20/2025  4:54 PM        Failed - Medication is active on med list and the sig matches. RN to manually verify dose and sig if red X/fail.     If the protocol passes (green check), you do not need to verify med dose and sig.    A prescription matches if they are the same clinical intention.    For Example: once daily and every morning are the same.    The protocol can not identify upper and lower case letters as matching and will fail.     For Example: Take 1 tablet (50 mg) by mouth daily     TAKE 1 TABLET (50 MG) BY MOUTH DAILY    For all fails (red x), verify dose and sig.    If the refill does match what is on file, the RN can still proceed to approve the refill request.       If they do not match, route to the appropriate provider.             Passed - Patient is age 6 or older        Passed - Authorizing prescriber's most recent note related to this medication read.     If refill request is for ophthalmic use, please forward request to provider for approval.          Passed - High potency steroid not ordered        Passed - Recent (12 month) or future (90 days) visit with authorizing provider's specialty (provided they have been seen in the past 15 months)     The patient must have completed an in-person or virtual visit within the past 12 months or has a future visit scheduled within the next 90 days with the authorizing provider s specialty.  Urgent care and e-visits do not qualify as an office visit for this protocol.          Last office visit: 5/14/2025 ; last virtual visit: 4/17/2025 with prescribing provider:     Future Office Visit:      Julie Behrendt RN

## 2025-05-20 NOTE — TELEPHONE ENCOUNTER
Refill on Discontinued medication: Pt requested a refill on medication. She hasn't used in along time however she was out gardening and now her hands have broken out.     Pending Prescriptions:                       Disp   Refills    triamcinolone (KENALOG) 0.1 % external cr*45 g   0            Sig: Apply topically 2 times daily.    Pharmacy:  [facility], [place] -  [address] [place] [address]   Phone: [phone]    Linda Leslie/ Patient

## 2025-05-21 RX ORDER — TRIAMCINOLONE ACETONIDE 1 MG/G
CREAM TOPICAL 2 TIMES DAILY
Qty: 45 G | Refills: 0 | Status: SHIPPED | OUTPATIENT
Start: 2025-05-21

## 2025-05-23 ENCOUNTER — TELEPHONE (OUTPATIENT)
Dept: GASTROENTEROLOGY | Facility: CLINIC | Age: 60
End: 2025-05-23

## 2025-05-23 ENCOUNTER — ONCOLOGY VISIT (OUTPATIENT)
Dept: ONCOLOGY | Facility: CLINIC | Age: 60
End: 2025-05-23
Attending: SURGERY
Payer: COMMERCIAL

## 2025-05-23 VITALS
WEIGHT: 151.2 LBS | SYSTOLIC BLOOD PRESSURE: 134 MMHG | RESPIRATION RATE: 18 BRPM | TEMPERATURE: 97.8 F | OXYGEN SATURATION: 100 % | DIASTOLIC BLOOD PRESSURE: 78 MMHG | HEIGHT: 68 IN | HEART RATE: 84 BPM | BODY MASS INDEX: 22.91 KG/M2

## 2025-05-23 DIAGNOSIS — C23 GALLBLADDER CANCER (H): Primary | ICD-10-CM

## 2025-05-23 PROCEDURE — 99213 OFFICE O/P EST LOW 20 MIN: CPT | Performed by: INTERNAL MEDICINE

## 2025-05-23 ASSESSMENT — PAIN SCALES - GENERAL: PAINLEVEL_OUTOF10: NO PAIN (0)

## 2025-05-23 NOTE — TELEPHONE ENCOUNTER
Advanced Endoscopy     Referring provider:    Friedell, Peter E, MD     Referred to: [facility]     Provider Requested:  none specified     Referral Received:  5/23/25     Records received:  EPIC    Labs 5/7/25  CEA  ng/mL 2.6     Cancer Antigen 19-9  <=35 U/mL 17       MRCP 5/7/25 with contrast  IMPRESSION:   1. Stable to minimally increased mild intra and extrahepatic biliary  dilation with smooth tapering of the common bile duct towards the  ampulla within the pancreatic head. No evidence of choledocholithiasis  or other appreciable obstructing lesion at the major papilla.  Cholangitis remains a consideration given mild smooth intra and  extrahepatic biliary wall hyperenhancement.  2. Cholecystectomy changes with a mildly fluid distended cystic duct  remnant and small amount of loculated fluid and soft tissue thickening  in the cholecystectomy bed, likely postsurgical. Mild likely reactive  enhancement of the hepatic parenchyma along the gallbladder fossa but  no appreciable highly suspicious hepatic parenchymal or  cholecystectomy bed lesion.  3. Several prominent errol hepatitis and aortocaval retroperitoneal  lymph nodes are unchanged, technically indeterminate but of low  suspicion and favored reactive.  4. Geographic region of parenchymal fibrosis at the junction of  hepatic segments 7 and 8, likely related to prior  infectious/inflammatory insult.  5. Cystic foci in the pancreatic head/uncinate process and pancreatic  tail, likely sidebranch intraductal papillary mucinous neoplasms.  Suggested follow-up criteria below.  6. The gastric antrum remains collapsed on all sequences and may be  strictured with a gastrojejunostomy along the greater curvature of the  stomach.    Final Diagnosis   A. 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  Cholelithiasis. Acute cholecystitis is considered less likely  since the gallbladder is not distended and there is no pericholecystic  fluid. The gallbladder wall discontinuity as described in the CT scan  is not seen. Asymmetric thickening of portion of the gallbladder wall  abutting the liver suggests reactive thickening due to underlying  liver disease such as hepatitis, given the presence of hepatomegaly  with periportal edema. If there is continued clinical suspicion of  acute cholecystitis HIDA scan may be considered for confirmation.  2.  There is no choledocholithiasis or biliary dilatation.  3.  Hepatomegaly with diffuse steatosis and periportal edema.    IR Gallbladder drain placement 4/19/25  Impression:  Procedure aborted due to small, nondistended, extremely mobile  gallbladder, prohibiting safe dilation for placement of a drain.    Abd US 4/19/25  IMPRESSION:   1. Cholelithiasis, mild gallbladder wall thickening, question trace  pericholecystic fluid near the gallbladder fundus; acute cholecystitis  not excluded.  2. Dilated visualized common bile duct with prominent main pancreatic  duct in the visualized pancreas, no definite obstructive lesion  demonstrated, consider MRCP for further evaluation.  3. Mild hepatomegaly.  4. Increased hepatic echogenicity which may be seen with hepatic  parenchymal disease including steatosis.  5. Prominent IVC which may at times be seen with volume  overload/congestive changes     Images received:  PACs    Insurance Coverage:  Work Comp    Evaluation for:  Gallbladder cancer (H) ,    GI referral for upper  gi endoscopy and EUS of errol hepatic lymph nodes         Clinical History (per RN review):     Referring providers office note 5/23/25  60-year-old woman with P2A PNX gallbladder cancer found incidentally at the time of cholecystectomy for acute cholecystitis and choledocholithiasis. She is opposed to chemotherapy. She also is reluctant to undergo radical surgery for her gallbladder cancer. I advised at this point that she proceed with upper GI endoscopy and endoscopic ultrasound to evaluate the enlarged hepatic lymph nodes noted on abdominal MRI since if these are involved then the chance of developing recurrent disease is 100% without treatment..     Ms. Maryann Riggs is a 60 year old woman with a history of esophageal stricture and gastric surgery for lye ingestion with persistent esophageal stricture which requires dilatation about every 3 months.  On 4/21/2025 she underwent gallbladder surgery for acute cholecystitis.  Incidentally found was a 2.3 cm gallbladder cancer moderately differentiated in the fundus of the gallbladder cancer is 0.02 cm from the liver up parenchyma and 8.2 cm from the cystic duct margin.  Following surgery she made an uneventful recovery however she has had persistent elevation of liver enzymes.  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     Presently she feels well.  She has gained some weight.  Her appetite and digestion are normal.  She is not having chills, fevers or sweats.  She does not have cough, chest pain or shortness of breath.  She had some postoperative constipation but this has resolved.  She is able to go about her daily activities without difficulty.  She does not smoke cigarettes.  She gave up alcohol at the time of her gallbladder surgery.  She is adopted and knows of no family history of cancer.  Provider review date:   Provider Decision for clinic consultation/Orders:            Referral updates/Patient contacted:

## 2025-05-23 NOTE — PROGRESS NOTES
[facility] Hematology and Oncology Consult Note    Patient: Maryann Riggs  MRN: [id number]  Date of Service: May 23, 2025       Reason for Visit    I was consulted by Joseph Kumar MD for evaluation of patient with carcinoma of the gallbladder found at the time of cholecystectomy for cholecystitis and choledocho lithiasis      Encounter Diagnoses Assessment and Plan:    Problem List Items Addressed This Visit       Gallbladder cancer (H) - Primary    Relevant Orders    Adult GI  Referral - Consult Only   60-year-old woman with P2A PNX gallbladder cancer found incidentally at the time of cholecystectomy for acute cholecystitis and choledocholithiasis.  She is opposed to chemotherapy.  She also is reluctant to undergo radical surgery for her gallbladder cancer.  I advised at this point that she proceed with upper GI endoscopy and endoscopic ultrasound to evaluate the enlarged hepatic lymph nodes noted on abdominal MRI since if these are involved then the chance of developing recurrent disease is 100% without treatment..        ______________________________________________________________________________    Staging History   Cancer Staging   Gallbladder cancer (H)  Staging form: Gallbladder, AJCC 8th Edition  - Pathologic stage from 4/21/2025: pT2a, pNx, cM0 - Unsigned    ECOG Performance  0 - Independent, fully active, able to carry on all pre-disease performance without restriction.    History of Present Illness    Ms. Maryann Riggs is a 60 year old woman with a history of esophageal stricture and gastric surgery for lye ingestion with persistent esophageal stricture which requires dilatation about every 3 months.  On 4/21/2025 she underwent gallbladder surgery for acute cholecystitis.  Incidentally found was a 2.3 cm gallbladder cancer moderately differentiated in the fundus of the gallbladder cancer is 0.02 cm from the liver up parenchyma and 8.2 cm from the cystic duct margin.   "Following surgery she made an uneventful recovery however she has had persistent elevation of liver enzymes.  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    Presently she feels well.  She has gained some weight.  Her appetite and digestion are normal.  She is not having chills, fevers or sweats.  She does not have cough, chest pain or shortness of breath.  She had some postoperative constipation but this has resolved.  She is able to go about her daily activities without difficulty.  She does not smoke cigarettes.  She gave up alcohol at the time of her gallbladder surgery.  She is adopted and knows of no family history of cancer.    Review of systems.  Pertinent Findings are included in the History of Present Illness    Physical Exam    /78 (BP Location: Right arm, Patient Position: Sitting, Cuff Size: Adult Regular)   Pulse 84   Temp 97.8  F (36.6  C) (Tympanic)   Resp 18   Ht 1.727 m (5' 8\")   Wt 68.6 kg (151 lb 3.2 oz)   LMP 01/04/2014   SpO2 100%   BMI 22.99 kg/m       GENERAL APPEARANCE: 60-year-old woman in no apparent distress.  HEAD: Atraumatic; normocephalic; without lesions.  EYES: Conjunctiva, corneas and eyelids normal; pupils equal, round, reactive to light; No Icterus.  MOUTH/OROPHARYNX: Oral mucosa intact  NECK: Supple with no nodes.  LUNGS:  Clear to auscultation and percussion with no extra sounds.  HEART: Regular rhythm and rate; S1 and S2 normal; no murmurs noted.  ABDOMEN: Soft; no masses or tenderness, no hepatosplenomegaly.  NEUROLOGIC: Alert and oriented.  No obvious focal findings.  EXTREMITIES: No cyanosis, or edema.  SKIN: No abnormal bruising or bleeding. No suspicious lesions noted on exposed skin.  PSYCHIATRIC: Mental status normal; no apparent psychiatric issues    Medications:    Current Outpatient Medications   Medication Sig Dispense Refill    Ascorbic Acid (VITAMIN C CR PO) Take 1 packet by mouth daily.      Cyanocobalamin (VITAMIN B " 12 PO) Taking 2 gummies by mouth daily      ferrous sulfate (FEROSUL) 325 (65 Fe) MG tablet Take 1 tablet (325 mg) by mouth daily (with breakfast). 90 tablet 1    omeprazole (PRILOSEC) 20 MG DR capsule Take 20 mg by mouth daily.      triamcinolone (KENALOG) 0.1 % external cream Apply topically 2 times daily. 45 g 0    acetaminophen (PAIN RELIEF EXTRA STRENGTH) 500 MG tablet TAKE 1-2 TABLETS (500-1,000 MG) BY MOUTH EVERY 6 HOURS AS NEEDED FOR MILD PAIN 100 tablet 1    albuterol (PROAIR HFA/PROVENTIL HFA/VENTOLIN HFA) 108 (90 Base) MCG/ACT inhaler Inhale 2 puffs into the lungs every 6 hours as needed for shortness of breath, wheezing or cough. 18 g 0    benzonatate (TESSALON) 200 MG capsule Take 1 capsule (200 mg) by mouth 3 times daily as needed for cough (cough). 20 capsule 0    cyclobenzaprine (FLEXERIL) 10 MG tablet Take 1 tablet (10 mg) by mouth daily. (Patient not taking: Reported on 4/29/2025) 90 tablet 1    GLUCOSAMINE CHONDROITIN COMPLX PO Take  by mouth. (Patient not taking: Reported on 4/29/2025)      lisinopril (ZESTRIL) 2.5 MG tablet Take 1 tablet (2.5 mg) by mouth daily. 90 tablet 2    MAGNESIUM OXIDE PO Take 400 mg by mouth daily (Patient not taking: Reported on 4/29/2025)      melatonin 3 MG tablet Take 1 tablet by mouth nightly as needed for sleep. (Patient not taking: Reported on 4/17/2025) 30 tablet 0    NIFEdipine ER OSMOTIC (PROCARDIA XL) 30 MG 24 hr tablet Take 1 tablet (30 mg) by mouth daily. (Patient not taking: Reported on 4/29/2025) 90 tablet 3    zolpidem (AMBIEN) 5 MG tablet Take 1 tablet (5 mg) by mouth nightly as needed for sleep. (Patient not taking: Reported on 5/14/2025) 30 tablet 3     No current facility-administered medications for this visit.           Past History    Past Medical History:   Diagnosis Date    Anger reaction     buspar helping    Cholelithiasis     Esophageal stricture     dilatation every 3 months    Generalized osteoarthrosis, unspecified site (aka ARTHRITIS)      small joints    PTSD (post-traumatic stress disorder)     Raynaud's disease      Past Surgical History:   Procedure Laterality Date    COLONOSCOPY N/A 9/27/2024    Procedure: COLONOSCOPY, FLEXIBLE, WITH LESION REMOVAL USING SNARE;  Surgeon: Bk Laura MD;  Location: [facility]    IR GALLBLADDER DRAIN PLACEMENT  4/19/2025    MAMMOPLASTY REDUCTION      OTHER SURGICAL HISTORY      s/p left knee arthroscopinc surgery    ROTATOR CUFF REPAIR RT/LT  01/01/2005    SMALL BOWEL RESECTION      TUBAL LIGATION       Allergies   Allergen Reactions    Desyrel [Trazodone Hcl]      Sig mood disturbance      Lamictal [Lamotrigine]      Family History   Problem Relation Age of Onset    Unknown/Adopted Mother     Unknown/Adopted Father     Unknown/Adopted Maternal Grandmother     Unknown/Adopted Maternal Grandfather     Unknown/Adopted Paternal Grandmother     Unknown/Adopted Paternal Grandfather      Social History     Socioeconomic History    Marital status: Single     Spouse name: None    Number of children: None    Years of education: None    Highest education level: None   Occupational History     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 Yes     Comment: unaware - pt adopted     Social Drivers of Health     Financial Resource Strain: Low Risk  (4/20/2025)    Financial Resource Strain     Within the past 12 months, have you or your family members you live with been unable to get utilities (heat, electricity) when it was really needed?: No   Food Insecurity: Low Risk  (4/20/2025)    Food Insecurity     Within the past 12 months, did you worry that your food would run out before you got money to buy more?:  No     Within the past 12 months, did the food you bought just not last and you didn t have money to get more?: No   Transportation Needs: Low Risk  (4/20/2025)    Transportation Needs     Within the past 12 months, has lack of transportation kept you from medical appointments, getting your medicines, non-medical meetings or appointments, work, or from getting things that you need?: No   Physical Activity: Unknown (6/25/2024)    Exercise Vital Sign     Days of Exercise per Week: 7 days   Stress: No Stress Concern Present (6/25/2024)    [employer] - Occupational Stress Questionnaire     Feeling of Stress : Only a little   Social Connections: Unknown (6/25/2024)    Social Connection and Isolation Panel [NHANES]     Frequency of Social Gatherings with Friends and Family: Once a week   Interpersonal Safety: Low Risk  (4/20/2025)    Interpersonal Safety     Do you feel physically and emotionally safe where you currently live?: Yes     Within the past 12 months, have you been hit, slapped, kicked or otherwise physically hurt by someone?: No     Within the past 12 months, have you been humiliated or emotionally abused in other ways by your partner or ex-partner?: No   Housing Stability: Low Risk  (4/20/2025)    Housing Stability     Do you have housing? : Yes     Are you worried about losing your housing?: No           Lab Results    No results found for this or any previous visit (from the past 240 hours).    Imaging Results    MR Abdomen MRCP w/o & w Contrast  Result Date: 5/7/2025  MRCP Without and With Contrast CLINICAL HISTORY: Malignant neoplasm of gallbladder (H). Patient with recent MRI - however, liver function tests continue to worsen. Suspect choledocholithiasis. Endoscopy not possible due to altered anatomy. DATE: 5/7/2025 1:18 PM TECHNIQUE: Images were acquired with and without intravenous gadolinium contrast through the upper abdomen. The following MR images were acquired without  intravenous contrast: TrueFISP, multiplanar T2-weighted, axial T1 in/out of phase, T2-weighted MRCP images, axial diffusion-weighted and axial apparent diffusion coefficient. T1-weighted images were obtained before contrast at the multiple time points following contrast injection. 3-D reformatted images were generated by the technologist. Contrast dose: 6.5mL Gadavist Comparison study: MRI 4/19/2025 FINDINGS: Liver: Noncirrhotic morphology of the liver. Mild diffuse hepatic steatosis. Persistent geographic progressive enhancement at the junction of hepatic segments 7 and 8 with associated mildly increased T2 signal, similar to prior MRI, though with resolution of the previously seen multifocal nodular and geographic arterial enhancement throughout the remainder of the hepatic parenchyma on 4/19/2025. Geographic nonmasslike enhancement along the gallbladder fossa on late arterial phase images, becoming homogeneous to adjacent parenchyma on later phase images. No focal masslike enhancement. Biliary system: The gallbladder is surgically absent with a mildly fluid distended cystic duct remnant and small amount of loculated fluid and soft tissue thickening in the cholecystectomy bed. Stable to minimally increased mild intra and extrahepatic biliary dilation with smooth tapering of the common bile duct towards the ampulla within the pancreatic head. The upper common bile duct measures up to 10 mm in diameter, previously 10 mm, and the lower common bile duct measures up to 9 mm, previously 7 mm. Mild smooth intra and extrahepatic biliary wall hyperenhancement most conspicuous on 3 minute delay images. No intraductal filling defect or clear biliary stricture. Spleen: Not enlarged. Kidneys: No hydronephrosis. No renal mass. Adrenal glands: Within normal limits. Pancreas: Preservation of intrinsic T1 hyperintense parenchymal signal. No pancreatic ductal dilation. No pancreatic mass. Cystic foci in the pancreatic head/uncinate  process and tail measuring up to 12 mm in the head with suspected communication to the main pancreatic duct (series 34 image 42). Gastrointestinal: Gastrojejunal anastomosis along the greater curvature of the stomach. Collapsed gastric antrum on all sequences. Tiny periampullary duodenal diverticulum. The visualized small and large bowel is nondistended. Lymph nodes: Several prominent errol hepatic lymph nodes measuring up to 13 mm in short axis and subcentimeter aortocaval retroperitoneal lymph nodes, unchanged. Blood vessels: The major abdominal arteries and portal vessels are patent. Lung bases: Grossly clear. Bones and soft tissues: No suspicious osseous lesion. Multilevel degenerative changes of the spine. Postoperative incisional changes the ventral abdominal wall. Ascites: None.     IMPRESSION: 1. Stable to minimally increased mild intra and extrahepatic biliary dilation with smooth tapering of the common bile duct towards the ampulla within the pancreatic head. No evidence of choledocholithiasis or other appreciable obstructing lesion at the major papilla. Cholangitis remains a consideration given mild smooth intra and extrahepatic biliary wall hyperenhancement. 2. Cholecystectomy changes with a mildly fluid distended cystic duct remnant and small amount of loculated fluid and soft tissue thickening in the cholecystectomy bed, likely postsurgical. Mild likely reactive enhancement of the hepatic parenchyma along the gallbladder fossa but no appreciable highly suspicious hepatic parenchymal or cholecystectomy bed lesion. 3. Several prominent errol hepatitis and aortocaval retroperitoneal lymph nodes are unchanged, technically indeterminate but of low suspicion and favored reactive. 4. Geographic region of parenchymal fibrosis at the junction of hepatic segments 7 and 8, likely related to prior infectious/inflammatory insult. 5. Cystic foci in the pancreatic head/uncinate process and pancreatic tail, likely  "sidebranch intraductal papillary mucinous neoplasms. Suggested follow-up criteria below. 6. The gastric antrum remains collapsed on all sequences and may be strictured with a gastrojejunostomy along the greater curvature of the stomach. International evidence-based [place] guidelines for the management of intraductal papillary mucinous neoplasm of the pancreas: Surveillance is recommended if no high risk stigmata or worrisome features are present (primary imaging modalities recommended are MRI/MRCP and MDCT): Size of largest cyst: *  Less than 20 mm: Follow-up imaging in 6 months once, then every 18 months if no change. Stop surveillance if stable for 5 years. *  More than 20 mm but less than 30 mm: Follow-up imaging at 6 months, 12 months, then yearly if no change. *  More than 30 mm- follow up imaging every 6 months. GI consultation for surgery/endoscopic ultrasound is recommended for cysts with \"high-risk stigmata\" of high grade dysplasia or invasive carcinoma, such as: 1. Obstructive jaundice in a patient with cystic lesion of the head of the pancreas 2. Enhancing mural nodule > 5mm or solid component 3. Main pancreatic duct >10mm 4. Suspicious or positive results of cytology If worrisome features are present, GI consultation is recommended. Worrisome features on imagin. Cyst more than or equal to 30 mm 2. Thickened or enhancing cyst walls 3. Main pancreatic duct > 5mm and < 10mm. 4. Abrupt change in caliber of pancreatic duct with distal atrophy 5. Lymphadenopathy 6. Cyst growth rate > 2.5mm/year *Reference: International evidence-based [place] guidelines for the management of intraductal papillary mucinous neoplasm of the pancreas Pancreatology: 24:(); 255-270. https://doi.org/10.1016/j.[email or web address] I have personally reviewed the examination and initial interpretation and I agree with the findings. ARIANE MACHUCA DO   SYSTEM ID:  [id number]    Abdomen, flat/upright (2 views)  Result Date: " 4/25/2025  EXAM: XR ABDOMEN 2 VIEWS LOCATION: [facility] DATE: 4/25/2025 INDICATION: S p cholecystectomy 4 d's prior. Constipation, postop abdominal pain.  Evaluate for stool burden versus ileus and or obstruction COMPARISON: None.     IMPRESSION: Marked amount of stool throughout the colon. Nothing for obstruction or ileus. No free air.        I spent 60 minutes on the patient's visit today.  This included preparation for the visit, face-to-face time with the patient and documentation following the visit.  It did not include teaching or procedure time.    Signed by: Peter E. Friedell, MD

## 2025-05-23 NOTE — LETTER
"5/23/2025      Maryann Riggs  [address]   [place] [address]      Dear Colleague,    Thank you for referring your patient, Maryann Riggs, to the [facility]. Please see a copy of my visit note below.    Oncology Rooming Note    May 23, 2025 11:12 AM   Maryann Riggs is a 60 year old female who presents for:    Chief Complaint   Patient presents with     Oncology Clinic Visit     Gallbladder cancer - provider visit only     Initial Vitals: /78 (BP Location: Right arm, Patient Position: Sitting, Cuff Size: Adult Regular)   Pulse 84   Temp 97.8  F (36.6  C) (Tympanic)   Resp 18   Ht 1.727 m (5' 8\")   Wt 68.6 kg (151 lb 3.2 oz)   LMP 01/04/2014   SpO2 100%   BMI 22.99 kg/m   Estimated body mass index is 22.99 kg/m  as calculated from the following:    Height as of this encounter: 1.727 m (5' 8\").    Weight as of this encounter: 68.6 kg (151 lb 3.2 oz). Body surface area is 1.81 meters squared.  No Pain (0) Comment: Data Unavailable   Patient's last menstrual period was 01/04/2014.  Allergies reviewed: Yes  Medications reviewed: Yes    Medications: Medication refills not needed today.  Pharm[facility] [id number] [place] [id number] [address] [facility]    Frailty Screening:   Is the patient here for a new oncology consult visit in cancer care? 1. Yes. Over the past month, have you experienced difficulty or required a caregiver to assist with:   1. Balance, walking or general mobility (including any falls)? NO  2. Completion of self-care tasks such as bathing, dressing, toileting, grooming/hygiene?  NO  3. Concentration or memory that affects your daily life?  NO     PHQ9:  Did this patient require a PHQ9?: No      Clinical concerns: New consult.        Jovita Spence MA              [facility] Hematology and Oncology Consult Note    Patient: Maryann Riggs  MRN: [id number]  Date of Service: May 23, 2025       Reason for " Visit    I was consulted by Joseph Kumar MD for evaluation of patient with carcinoma of the gallbladder found at the time of cholecystectomy for cholecystitis and choledocho lithiasis      Encounter Diagnoses Assessment and Plan:    Problem List Items Addressed This Visit       Gallbladder cancer (H) - Primary    Relevant Orders    Adult GI  Referral - Consult Only   60-year-old woman with P2A PNX gallbladder cancer found incidentally at the time of cholecystectomy for acute cholecystitis and choledocholithiasis.  She is opposed to chemotherapy.  She also is reluctant to undergo radical surgery for her gallbladder cancer.  I advised at this point that she proceed with upper GI endoscopy and endoscopic ultrasound to evaluate the enlarged hepatic lymph nodes noted on abdominal MRI since if these are involved then the chance of developing recurrent disease is 100% without treatment..        ______________________________________________________________________________    Staging History   Cancer Staging   Gallbladder cancer (H)  Staging form: Gallbladder, AJCC 8th Edition  - Pathologic stage from 4/21/2025: pT2a, pNx, cM0 - Unsigned    ECOG Performance  0 - Independent, fully active, able to carry on all pre-disease performance without restriction.    History of Present Illness    Ms. Maryann Riggs is a 60 year old woman with a history of esophageal stricture and gastric surgery for lye ingestion with persistent esophageal stricture which requires dilatation about every 3 months.  On 4/21/2025 she underwent gallbladder surgery for acute cholecystitis.  Incidentally found was a 2.3 cm gallbladder cancer moderately differentiated in the fundus of the gallbladder cancer is 0.02 cm from the liver up parenchyma and 8.2 cm from the cystic duct margin.  Following surgery she made an uneventful recovery however she has had persistent elevation of liver enzymes.  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    Presently she feels well.  She has gained some weight.  Her appetite and digestion are normal.  She is not having chills, fevers or sweats.  She does not have cough, chest pain or shortness of breath.  She had some postoperative constipation but this has resolved.  She is able to go about her daily activities without difficulty.  She does not smoke cigarettes.  She gave up alcohol at the time of her gallbladder surgery.  She is adopted and knows of no family history of cancer.    Review of systems.  Pertinent Findings are included in the History of Present Illness    Physical Exam    /78 (BP Location: Right arm, Patient Position: Sitting, Cuff Size: Adult Regular)   Pulse 84   Temp 97.8  F (36.6  C) (Tympanic)   Resp 18   Ht 1.727 m (5' 8\")   Wt 68.6 kg (151 lb 3.2 oz)   LMP 01/04/2014   SpO2 100%   BMI 22.99 kg/m       GENERAL APPEARANCE: 60-year-old woman in no apparent distress.  HEAD: Atraumatic; normocephalic; without lesions.  EYES: Conjunctiva, corneas and eyelids normal; pupils equal, round, reactive to light; No Icterus.  MOUTH/OROPHARYNX: Oral mucosa intact  NECK: Supple with no nodes.  LUNGS:  Clear to auscultation and percussion with no extra sounds.  HEART: Regular rhythm and rate; S1 and S2 normal; no murmurs noted.  ABDOMEN: Soft; no masses or tenderness, no hepatosplenomegaly.  NEUROLOGIC: Alert and oriented.  No obvious focal findings.  EXTREMITIES: No cyanosis, or edema.  SKIN: No abnormal bruising or bleeding. No suspicious lesions noted on exposed skin.  PSYCHIATRIC: Mental status normal; no apparent psychiatric issues    Medications:    Current Outpatient Medications   Medication Sig Dispense Refill     Ascorbic Acid (VITAMIN C CR PO) Take 1 packet by mouth daily.       Cyanocobalamin (VITAMIN B 12 PO) Taking 2 gummies by mouth daily       ferrous sulfate (FEROSUL) 325 (65 Fe) MG tablet Take 1 tablet (325 mg) by mouth daily (with breakfast). 90 " tablet 1     omeprazole (PRILOSEC) 20 MG DR capsule Take 20 mg by mouth daily.       triamcinolone (KENALOG) 0.1 % external cream Apply topically 2 times daily. 45 g 0     acetaminophen (PAIN RELIEF EXTRA STRENGTH) 500 MG tablet TAKE 1-2 TABLETS (500-1,000 MG) BY MOUTH EVERY 6 HOURS AS NEEDED FOR MILD PAIN 100 tablet 1     albuterol (PROAIR HFA/PROVENTIL HFA/VENTOLIN HFA) 108 (90 Base) MCG/ACT inhaler Inhale 2 puffs into the lungs every 6 hours as needed for shortness of breath, wheezing or cough. 18 g 0     benzonatate (TESSALON) 200 MG capsule Take 1 capsule (200 mg) by mouth 3 times daily as needed for cough (cough). 20 capsule 0     cyclobenzaprine (FLEXERIL) 10 MG tablet Take 1 tablet (10 mg) by mouth daily. (Patient not taking: Reported on 4/29/2025) 90 tablet 1     GLUCOSAMINE CHONDROITIN COMPLX PO Take  by mouth. (Patient not taking: Reported on 4/29/2025)       lisinopril (ZESTRIL) 2.5 MG tablet Take 1 tablet (2.5 mg) by mouth daily. 90 tablet 2     MAGNESIUM OXIDE PO Take 400 mg by mouth daily (Patient not taking: Reported on 4/29/2025)       melatonin 3 MG tablet Take 1 tablet by mouth nightly as needed for sleep. (Patient not taking: Reported on 4/17/2025) 30 tablet 0     NIFEdipine ER OSMOTIC (PROCARDIA XL) 30 MG 24 hr tablet Take 1 tablet (30 mg) by mouth daily. (Patient not taking: Reported on 4/29/2025) 90 tablet 3     zolpidem (AMBIEN) 5 MG tablet Take 1 tablet (5 mg) by mouth nightly as needed for sleep. (Patient not taking: Reported on 5/14/2025) 30 tablet 3     No current facility-administered medications for this visit.           Past History    Past Medical History:   Diagnosis Date     Anger reaction     buspar helping     Cholelithiasis      Esophageal stricture     dilatation every 3 months     Generalized osteoarthrosis, unspecified site (aka ARTHRITIS)     small joints     PTSD (post-traumatic stress disorder)      Raynaud's disease      Past Surgical History:   Procedure Laterality Date      COLONOSCOPY N/A 9/27/2024    Procedure: COLONOSCOPY, FLEXIBLE, WITH LESION REMOVAL USING SNARE;  Surgeon: Bk Laura MD;  Location: [facility]     IR GALLBLADDER DRAIN PLACEMENT  4/19/2025     MAMMOPLASTY REDUCTION       OTHER SURGICAL HISTORY      s/p left knee arthroscopinc surgery     ROTATOR CUFF REPAIR RT/LT  01/01/2005     SMALL BOWEL RESECTION       TUBAL LIGATION       Allergies   Allergen Reactions     Desyrel [Trazodone Hcl]      Sig mood disturbance       Lamictal [Lamotrigine]      Family History   Problem Relation Age of Onset     Unknown/Adopted Mother      Unknown/Adopted Father      Unknown/Adopted Maternal Grandmother      Unknown/Adopted Maternal Grandfather      Unknown/Adopted Paternal Grandmother      Unknown/Adopted Paternal Grandfather      Social History     Socioeconomic History     Marital status: Single     Spouse name: None     Number of children: None     Years of education: None     Highest education level: None   Occupational History     Employer: [employer]     Comment: Food    Tobacco Use     Smoking status: Never     Smokeless tobacco: Never   Vaping Use     Vaping status: Never Used   Substance and Sexual Activity     Alcohol use: Yes     Comment: scoial     Drug use: No     Sexual activity: Not Currently   Other Topics Concern     Parent/sibling w/ CABG, MI or angioplasty before 65F 55M? Yes     Comment: unaware - pt adopted     Social Drivers of Health     Financial Resource Strain: Low Risk  (4/20/2025)    Financial Resource Strain      Within the past 12 months, have you or your family members you live with been unable to get utilities (heat, electricity) when it was really needed?: No   Food Insecurity: Low Risk  (4/20/2025)    Food Insecurity      Within the past 12 months, did you worry that your food would run out before you got money to buy more?: No      Within the past 12 months, did the food you bought just not last and you didn t have money to  get more?: No   Transportation Needs: Low Risk  (4/20/2025)    Transportation Needs      Within the past 12 months, has lack of transportation kept you from medical appointments, getting your medicines, non-medical meetings or appointments, work, or from getting things that you need?: No   Physical Activity: Unknown (6/25/2024)    Exercise Vital Sign      Days of Exercise per Week: 7 days   Stress: No Stress Concern Present (6/25/2024)    [employer] - Occupational Stress Questionnaire      Feeling of Stress : Only a little   Social Connections: Unknown (6/25/2024)    Social Connection and Isolation Panel [NHANES]      Frequency of Social Gatherings with Friends and Family: Once a week   Interpersonal Safety: Low Risk  (4/20/2025)    Interpersonal Safety      Do you feel physically and emotionally safe where you currently live?: Yes      Within the past 12 months, have you been hit, slapped, kicked or otherwise physically hurt by someone?: No      Within the past 12 months, have you been humiliated or emotionally abused in other ways by your partner or ex-partner?: No   Housing Stability: Low Risk  (4/20/2025)    Housing Stability      Do you have housing? : Yes      Are you worried about losing your housing?: No           Lab Results    No results found for this or any previous visit (from the past 240 hours).    Imaging Results    MR Abdomen MRCP w/o & w Contrast  Result Date: 5/7/2025  MRCP Without and With Contrast CLINICAL HISTORY: Malignant neoplasm of gallbladder (H). Patient with recent MRI - however, liver function tests continue to worsen. Suspect choledocholithiasis. Endoscopy not possible due to altered anatomy. DATE: 5/7/2025 1:18 PM TECHNIQUE: Images were acquired with and without intravenous gadolinium contrast through the upper abdomen. The following MR images were acquired without intravenous contrast: TrueFISP, multiplanar T2-weighted, axial T1 in/out of phase,  T2-weighted MRCP images, axial diffusion-weighted and axial apparent diffusion coefficient. T1-weighted images were obtained before contrast at the multiple time points following contrast injection. 3-D reformatted images were generated by the technologist. Contrast dose: 6.5mL Gadavist Comparison study: MRI 4/19/2025 FINDINGS: Liver: Noncirrhotic morphology of the liver. Mild diffuse hepatic steatosis. Persistent geographic progressive enhancement at the junction of hepatic segments 7 and 8 with associated mildly increased T2 signal, similar to prior MRI, though with resolution of the previously seen multifocal nodular and geographic arterial enhancement throughout the remainder of the hepatic parenchyma on 4/19/2025. Geographic nonmasslike enhancement along the gallbladder fossa on late arterial phase images, becoming homogeneous to adjacent parenchyma on later phase images. No focal masslike enhancement. Biliary system: The gallbladder is surgically absent with a mildly fluid distended cystic duct remnant and small amount of loculated fluid and soft tissue thickening in the cholecystectomy bed. Stable to minimally increased mild intra and extrahepatic biliary dilation with smooth tapering of the common bile duct towards the ampulla within the pancreatic head. The upper common bile duct measures up to 10 mm in diameter, previously 10 mm, and the lower common bile duct measures up to 9 mm, previously 7 mm. Mild smooth intra and extrahepatic biliary wall hyperenhancement most conspicuous on 3 minute delay images. No intraductal filling defect or clear biliary stricture. Spleen: Not enlarged. Kidneys: No hydronephrosis. No renal mass. Adrenal glands: Within normal limits. Pancreas: Preservation of intrinsic T1 hyperintense parenchymal signal. No pancreatic ductal dilation. No pancreatic mass. Cystic foci in the pancreatic head/uncinate process and tail measuring up to 12 mm in the head with suspected communication to  the main pancreatic duct (series 34 image 42). Gastrointestinal: Gastrojejunal anastomosis along the greater curvature of the stomach. Collapsed gastric antrum on all sequences. Tiny periampullary duodenal diverticulum. The visualized small and large bowel is nondistended. Lymph nodes: Several prominent errol hepatic lymph nodes measuring up to 13 mm in short axis and subcentimeter aortocaval retroperitoneal lymph nodes, unchanged. Blood vessels: The major abdominal arteries and portal vessels are patent. Lung bases: Grossly clear. Bones and soft tissues: No suspicious osseous lesion. Multilevel degenerative changes of the spine. Postoperative incisional changes the ventral abdominal wall. Ascites: None.     IMPRESSION: 1. Stable to minimally increased mild intra and extrahepatic biliary dilation with smooth tapering of the common bile duct towards the ampulla within the pancreatic head. No evidence of choledocholithiasis or other appreciable obstructing lesion at the major papilla. Cholangitis remains a consideration given mild smooth intra and extrahepatic biliary wall hyperenhancement. 2. Cholecystectomy changes with a mildly fluid distended cystic duct remnant and small amount of loculated fluid and soft tissue thickening in the cholecystectomy bed, likely postsurgical. Mild likely reactive enhancement of the hepatic parenchyma along the gallbladder fossa but no appreciable highly suspicious hepatic parenchymal or cholecystectomy bed lesion. 3. Several prominent errol hepatitis and aortocaval retroperitoneal lymph nodes are unchanged, technically indeterminate but of low suspicion and favored reactive. 4. Geographic region of parenchymal fibrosis at the junction of hepatic segments 7 and 8, likely related to prior infectious/inflammatory insult. 5. Cystic foci in the pancreatic head/uncinate process and pancreatic tail, likely sidebranch intraductal papillary mucinous neoplasms. Suggested follow-up criteria  "below. 6. The gastric antrum remains collapsed on all sequences and may be strictured with a gastrojejunostomy along the greater curvature of the stomach. International evidence-based Kyoto guidelines for the management of intraductal papillary mucinous neoplasm of the pancreas: Surveillance is recommended if no high risk stigmata or worrisome features are present (primary imaging modalities recommended are MRI/MRCP and MDCT): Size of largest cyst: *  Less than 20 mm: Follow-up imaging in 6 months once, then every 18 months if no change. Stop surveillance if stable for 5 years. *  More than 20 mm but less than 30 mm: Follow-up imaging at 6 months, 12 months, then yearly if no change. *  More than 30 mm- follow up imaging every 6 months. GI consultation for surgery/endoscopic ultrasound is recommended for cysts with \"high-risk stigmata\" of high grade dysplasia or invasive carcinoma, such as: 1. Obstructive jaundice in a patient with cystic lesion of the head of the pancreas 2. Enhancing mural nodule > 5mm or solid component 3. Main pancreatic duct >10mm 4. Suspicious or positive results of cytology If worrisome features are present, GI consultation is recommended. Worrisome features on imagin. Cyst more than or equal to 30 mm 2. Thickened or enhancing cyst walls 3. Main pancreatic duct > 5mm and < 10mm. 4. Abrupt change in caliber of pancreatic duct with distal atrophy 5. Lymphadenopathy 6. Cyst growth rate > 2.5mm/year *Reference: International evidence-based Kyoto guidelines for the management of intraductal papillary mucinous neoplasm of the pancreas Pancreatology: 24:(); 255-270. https://doi.org/10.1016/j.sanchez.2023.12.009 I have personally reviewed the examination and initial interpretation and I agree with the findings. ARIANE MACHUCA DO   SYSTEM ID:  [id number]    Abdomen, flat/upright (2 views)  Result Date: 2025  EXAM: XR ABDOMEN 2 VIEWS LOCATION: [facility] DATE: " 4/25/2025 INDICATION: S p cholecystectomy 4 d's prior. Constipation, postop abdominal pain.  Evaluate for stool burden versus ileus and or obstruction COMPARISON: None.     IMPRESSION: Marked amount of stool throughout the colon. Nothing for obstruction or ileus. No free air.        I spent 60 minutes on the patient's visit today.  This included preparation for the visit, face-to-face time with the patient and documentation following the visit.  It did not include teaching or procedure time.    Signed by: Peter E. Friedell, MD          Again, thank you for allowing me to participate in the care of your patient.        Sincerely,        Peter E. Friedell, MD    Electronically signed

## 2025-05-23 NOTE — PROGRESS NOTES
"Oncology Rooming Note    May 23, 2025 11:12 AM   Maryann Riggs is a 60 year old female who presents for:    Chief Complaint   Patient presents with    Oncology Clinic Visit     Gallbladder cancer - provider visit only     Initial Vitals: /78 (BP Location: Right arm, Patient Position: Sitting, Cuff Size: Adult Regular)   Pulse 84   Temp 97.8  F (36.6  C) (Tympanic)   Resp 18   Ht 1.727 m (5' 8\")   Wt 68.6 kg (151 lb 3.2 oz)   LMP 01/04/2014   SpO2 100%   BMI 22.99 kg/m   Estimated body mass index is 22.99 kg/m  as calculated from the following:    Height as of this encounter: 1.727 m (5' 8\").    Weight as of this encounter: 68.6 kg (151 lb 3.2 oz). Body surface area is 1.81 meters squared.  No Pain (0) Comment: Data Unavailable   Patient's last menstrual period was 01/04/2014.  Allergies reviewed: Yes  Medications reviewed: Yes    Medications: Medication refills not needed today.  Pharmacy name entered into [employer]: [facility] [id number] [place] [address] [address] [facility]    Frailty Screening:   Is the patient here for a new oncology consult visit in cancer care? 1. Yes. Over the past month, have you experienced difficulty or required a caregiver to assist with:   1. Balance, walking or general mobility (including any falls)? NO  2. Completion of self-care tasks such as bathing, dressing, toileting, grooming/hygiene?  NO  3. Concentration or memory that affects your daily life?  NO     PHQ9:  Did this patient require a PHQ9?: No      Clinical concerns: New consult.        Jovita Spence MA            "

## 2025-05-24 ENCOUNTER — LAB (OUTPATIENT)
Dept: LAB | Facility: CLINIC | Age: 60
End: 2025-05-24
Payer: COMMERCIAL

## 2025-05-24 DIAGNOSIS — R74.01 TRANSAMINITIS: ICD-10-CM

## 2025-05-24 DIAGNOSIS — Z82.3 FAMILY HISTORY OF STROKE: ICD-10-CM

## 2025-05-24 LAB
ALBUMIN SERPL BCG-MCNC: 4 G/DL (ref 3.5–5.2)
ALP SERPL-CCNC: 151 U/L (ref 40–150)
ALT SERPL W P-5'-P-CCNC: 28 U/L (ref 0–50)
ANION GAP SERPL CALCULATED.3IONS-SCNC: 10 MMOL/L (ref 7–15)
AST SERPL W P-5'-P-CCNC: 22 U/L (ref 0–45)
BILIRUB SERPL-MCNC: 0.6 MG/DL
BUN SERPL-MCNC: 16.7 MG/DL (ref 8–23)
CALCIUM SERPL-MCNC: 9.3 MG/DL (ref 8.8–10.4)
CHLORIDE SERPL-SCNC: 101 MMOL/L (ref 98–107)
CHOLEST SERPL-MCNC: 204 MG/DL
CREAT SERPL-MCNC: 0.84 MG/DL (ref 0.51–0.95)
EGFRCR SERPLBLD CKD-EPI 2021: 78 ML/MIN/1.73M2
FASTING STATUS PATIENT QL REPORTED: YES
FASTING STATUS PATIENT QL REPORTED: YES
GLUCOSE SERPL-MCNC: 174 MG/DL (ref 70–99)
HCO3 SERPL-SCNC: 27 MMOL/L (ref 22–29)
HDLC SERPL-MCNC: 72 MG/DL
LDLC SERPL CALC-MCNC: 122 MG/DL
NONHDLC SERPL-MCNC: 132 MG/DL
POTASSIUM SERPL-SCNC: 3.9 MMOL/L (ref 3.4–5.3)
PROT SERPL-MCNC: 6.5 G/DL (ref 6.4–8.3)
SODIUM SERPL-SCNC: 138 MMOL/L (ref 135–145)
TRIGL SERPL-MCNC: 51 MG/DL

## 2025-05-24 PROCEDURE — 36415 COLL VENOUS BLD VENIPUNCTURE: CPT

## 2025-05-24 PROCEDURE — 80061 LIPID PANEL: CPT

## 2025-05-24 PROCEDURE — 80053 COMPREHEN METABOLIC PANEL: CPT

## 2025-05-26 ENCOUNTER — PATIENT OUTREACH (OUTPATIENT)
Dept: CARE COORDINATION | Facility: CLINIC | Age: 60
End: 2025-05-26
Payer: COMMERCIAL

## 2025-05-30 ENCOUNTER — PREP FOR PROCEDURE (OUTPATIENT)
Dept: GASTROENTEROLOGY | Facility: CLINIC | Age: 60
End: 2025-05-30
Payer: COMMERCIAL

## 2025-05-30 DIAGNOSIS — C23 GALLBLADDER CANCER (H): Primary | ICD-10-CM

## 2025-05-30 DIAGNOSIS — R59.1 LYMPHADENOPATHY: ICD-10-CM

## 2025-05-31 PROBLEM — Z02.89 ENCOUNTER FOR COMPLETION OF FORM WITH PATIENT: Status: ACTIVE | Noted: 2025-05-31

## 2025-06-02 ENCOUNTER — RESULTS FOLLOW-UP (OUTPATIENT)
Dept: FAMILY MEDICINE | Facility: CLINIC | Age: 60
End: 2025-06-02

## 2025-06-05 ENCOUNTER — ANESTHESIA (OUTPATIENT)
Dept: SURGERY | Facility: CLINIC | Age: 60
End: 2025-06-05
Payer: COMMERCIAL

## 2025-06-05 ENCOUNTER — ANESTHESIA EVENT (OUTPATIENT)
Dept: SURGERY | Facility: CLINIC | Age: 60
End: 2025-06-05
Payer: COMMERCIAL

## 2025-06-05 ENCOUNTER — HOSPITAL ENCOUNTER (OUTPATIENT)
Facility: CLINIC | Age: 60
Discharge: HOME | End: 2025-06-05
Attending: INTERNAL MEDICINE | Admitting: INTERNAL MEDICINE
Payer: COMMERCIAL

## 2025-06-05 ENCOUNTER — APPOINTMENT (OUTPATIENT)
Dept: GENERAL RADIOLOGY | Facility: CLINIC | Age: 60
End: 2025-06-05
Attending: INTERNAL MEDICINE
Payer: COMMERCIAL

## 2025-06-05 VITALS
HEIGHT: 68 IN | WEIGHT: 143.96 LBS | RESPIRATION RATE: 14 BRPM | TEMPERATURE: 98.3 F | SYSTOLIC BLOOD PRESSURE: 155 MMHG | OXYGEN SATURATION: 97 % | BODY MASS INDEX: 21.82 KG/M2 | DIASTOLIC BLOOD PRESSURE: 92 MMHG | HEART RATE: 88 BPM

## 2025-06-05 PROCEDURE — 250N000009 HC RX 250: Performed by: STUDENT IN AN ORGANIZED HEALTH CARE EDUCATION/TRAINING PROGRAM

## 2025-06-05 PROCEDURE — 88312 SPECIAL STAINS GROUP 1: CPT | Mod: 26 | Performed by: PATHOLOGY

## 2025-06-05 PROCEDURE — 360N000075 HC SURGERY LEVEL 2, PER MIN: Performed by: INTERNAL MEDICINE

## 2025-06-05 PROCEDURE — 999N000141 HC STATISTIC PRE-PROCEDURE NURSING ASSESSMENT: Performed by: INTERNAL MEDICINE

## 2025-06-05 PROCEDURE — 272N000001 HC OR GENERAL SUPPLY STERILE: Performed by: INTERNAL MEDICINE

## 2025-06-05 PROCEDURE — 272N000002 HC OR SUPPLY OTHER OPNP: Performed by: INTERNAL MEDICINE

## 2025-06-05 PROCEDURE — 250N000011 HC RX IP 250 OP 636: Performed by: STUDENT IN AN ORGANIZED HEALTH CARE EDUCATION/TRAINING PROGRAM

## 2025-06-05 PROCEDURE — 88341 IMHCHEM/IMCYTCHM EA ADD ANTB: CPT | Mod: TC | Performed by: INTERNAL MEDICINE

## 2025-06-05 PROCEDURE — 258N000003 HC RX IP 258 OP 636: Performed by: STUDENT IN AN ORGANIZED HEALTH CARE EDUCATION/TRAINING PROGRAM

## 2025-06-05 PROCEDURE — 250N000025 HC SEVOFLURANE, PER MIN: Performed by: INTERNAL MEDICINE

## 2025-06-05 PROCEDURE — 250N000009 HC RX 250: Performed by: INTERNAL MEDICINE

## 2025-06-05 PROCEDURE — 710N000012 HC RECOVERY PHASE 2, PER MINUTE: Performed by: INTERNAL MEDICINE

## 2025-06-05 PROCEDURE — 999N000179 XR SURGERY CARM FLUORO LESS THAN 5 MIN W STILLS

## 2025-06-05 PROCEDURE — 88313 SPECIAL STAINS GROUP 2: CPT | Mod: 26 | Performed by: PATHOLOGY

## 2025-06-05 PROCEDURE — 88341 IMHCHEM/IMCYTCHM EA ADD ANTB: CPT | Mod: 26 | Performed by: PATHOLOGY

## 2025-06-05 PROCEDURE — 88342 IMHCHEM/IMCYTCHM 1ST ANTB: CPT | Mod: 26 | Performed by: PATHOLOGY

## 2025-06-05 PROCEDURE — 710N000010 HC RECOVERY PHASE 1, LEVEL 2, PER MIN: Performed by: INTERNAL MEDICINE

## 2025-06-05 PROCEDURE — 370N000017 HC ANESTHESIA TECHNICAL FEE, PER MIN: Performed by: INTERNAL MEDICINE

## 2025-06-05 PROCEDURE — 88305 TISSUE EXAM BY PATHOLOGIST: CPT | Mod: 26 | Performed by: PATHOLOGY

## 2025-06-05 PROCEDURE — C1769 GUIDE WIRE: HCPCS | Performed by: INTERNAL MEDICINE

## 2025-06-05 RX ORDER — PROCHLORPERAZINE MALEATE 5 MG/1
10 TABLET ORAL EVERY 6 HOURS PRN
Status: DISCONTINUED | OUTPATIENT
Start: 2025-06-05 | End: 2025-06-05 | Stop reason: HOSPADM

## 2025-06-05 RX ORDER — SODIUM CHLORIDE, SODIUM LACTATE, POTASSIUM CHLORIDE, CALCIUM CHLORIDE 600; 310; 30; 20 MG/100ML; MG/100ML; MG/100ML; MG/100ML
INJECTION, SOLUTION INTRAVENOUS CONTINUOUS
Status: DISCONTINUED | OUTPATIENT
Start: 2025-06-05 | End: 2025-06-05 | Stop reason: HOSPADM

## 2025-06-05 RX ORDER — NALOXONE HYDROCHLORIDE 0.4 MG/ML
0.1 INJECTION, SOLUTION INTRAMUSCULAR; INTRAVENOUS; SUBCUTANEOUS
Status: DISCONTINUED | OUTPATIENT
Start: 2025-06-05 | End: 2025-06-05 | Stop reason: HOSPADM

## 2025-06-05 RX ORDER — ONDANSETRON 4 MG/1
4 TABLET, ORALLY DISINTEGRATING ORAL EVERY 30 MIN PRN
Status: DISCONTINUED | OUTPATIENT
Start: 2025-06-05 | End: 2025-06-05 | Stop reason: HOSPADM

## 2025-06-05 RX ORDER — LABETALOL HYDROCHLORIDE 5 MG/ML
10 INJECTION, SOLUTION INTRAVENOUS
Status: DISCONTINUED | OUTPATIENT
Start: 2025-06-05 | End: 2025-06-05 | Stop reason: HOSPADM

## 2025-06-05 RX ORDER — DEXAMETHASONE SODIUM PHOSPHATE 4 MG/ML
4 INJECTION, SOLUTION INTRA-ARTICULAR; INTRALESIONAL; INTRAMUSCULAR; INTRAVENOUS; SOFT TISSUE
Status: DISCONTINUED | OUTPATIENT
Start: 2025-06-05 | End: 2025-06-05 | Stop reason: HOSPADM

## 2025-06-05 RX ORDER — HYDROMORPHONE HCL IN WATER/PF 6 MG/30 ML
0.4 PATIENT CONTROLLED ANALGESIA SYRINGE INTRAVENOUS EVERY 5 MIN PRN
Status: DISCONTINUED | OUTPATIENT
Start: 2025-06-05 | End: 2025-06-05 | Stop reason: HOSPADM

## 2025-06-05 RX ORDER — NALOXONE HYDROCHLORIDE 0.4 MG/ML
0.1 INJECTION, SOLUTION INTRAMUSCULAR; INTRAVENOUS; SUBCUTANEOUS
Status: CANCELLED | OUTPATIENT
Start: 2025-06-05

## 2025-06-05 RX ORDER — LIDOCAINE HYDROCHLORIDE 20 MG/ML
INJECTION, SOLUTION INFILTRATION; PERINEURAL PRN
Status: DISCONTINUED | OUTPATIENT
Start: 2025-06-05 | End: 2025-06-05

## 2025-06-05 RX ORDER — FLUMAZENIL 0.1 MG/ML
0.2 INJECTION, SOLUTION INTRAVENOUS
Status: DISCONTINUED | OUTPATIENT
Start: 2025-06-05 | End: 2025-06-05 | Stop reason: HOSPADM

## 2025-06-05 RX ORDER — NALOXONE HYDROCHLORIDE 0.4 MG/ML
0.2 INJECTION, SOLUTION INTRAMUSCULAR; INTRAVENOUS; SUBCUTANEOUS
Status: DISCONTINUED | OUTPATIENT
Start: 2025-06-05 | End: 2025-06-05 | Stop reason: HOSPADM

## 2025-06-05 RX ORDER — PROPOFOL 10 MG/ML
INJECTION, EMULSION INTRAVENOUS PRN
Status: DISCONTINUED | OUTPATIENT
Start: 2025-06-05 | End: 2025-06-05

## 2025-06-05 RX ORDER — DEXAMETHASONE SODIUM PHOSPHATE 4 MG/ML
INJECTION, SOLUTION INTRA-ARTICULAR; INTRALESIONAL; INTRAMUSCULAR; INTRAVENOUS; SOFT TISSUE PRN
Status: DISCONTINUED | OUTPATIENT
Start: 2025-06-05 | End: 2025-06-05

## 2025-06-05 RX ORDER — NALOXONE HYDROCHLORIDE 0.4 MG/ML
0.4 INJECTION, SOLUTION INTRAMUSCULAR; INTRAVENOUS; SUBCUTANEOUS
Status: DISCONTINUED | OUTPATIENT
Start: 2025-06-05 | End: 2025-06-05 | Stop reason: HOSPADM

## 2025-06-05 RX ORDER — OXYCODONE HYDROCHLORIDE 5 MG/1
5 TABLET ORAL
Refills: 0 | Status: CANCELLED | OUTPATIENT
Start: 2025-06-05

## 2025-06-05 RX ORDER — ONDANSETRON 2 MG/ML
4 INJECTION INTRAMUSCULAR; INTRAVENOUS EVERY 30 MIN PRN
Status: CANCELLED | OUTPATIENT
Start: 2025-06-05

## 2025-06-05 RX ORDER — DEXAMETHASONE SODIUM PHOSPHATE 4 MG/ML
4 INJECTION, SOLUTION INTRA-ARTICULAR; INTRALESIONAL; INTRAMUSCULAR; INTRAVENOUS; SOFT TISSUE
Status: CANCELLED | OUTPATIENT
Start: 2025-06-05

## 2025-06-05 RX ORDER — FENTANYL CITRATE 50 UG/ML
INJECTION, SOLUTION INTRAMUSCULAR; INTRAVENOUS PRN
Status: DISCONTINUED | OUTPATIENT
Start: 2025-06-05 | End: 2025-06-05

## 2025-06-05 RX ORDER — ONDANSETRON 2 MG/ML
4 INJECTION INTRAMUSCULAR; INTRAVENOUS EVERY 30 MIN PRN
Status: DISCONTINUED | OUTPATIENT
Start: 2025-06-05 | End: 2025-06-05 | Stop reason: HOSPADM

## 2025-06-05 RX ORDER — HYDRALAZINE HYDROCHLORIDE 20 MG/ML
2.5-5 INJECTION INTRAMUSCULAR; INTRAVENOUS EVERY 10 MIN PRN
Status: DISCONTINUED | OUTPATIENT
Start: 2025-06-05 | End: 2025-06-05 | Stop reason: HOSPADM

## 2025-06-05 RX ORDER — ACETAMINOPHEN 325 MG/1
975 TABLET ORAL ONCE
Status: CANCELLED | OUTPATIENT
Start: 2025-06-05 | End: 2025-06-05

## 2025-06-05 RX ORDER — FENTANYL CITRATE 50 UG/ML
50 INJECTION, SOLUTION INTRAMUSCULAR; INTRAVENOUS EVERY 5 MIN PRN
Status: DISCONTINUED | OUTPATIENT
Start: 2025-06-05 | End: 2025-06-05 | Stop reason: HOSPADM

## 2025-06-05 RX ORDER — ONDANSETRON 2 MG/ML
INJECTION INTRAMUSCULAR; INTRAVENOUS PRN
Status: DISCONTINUED | OUTPATIENT
Start: 2025-06-05 | End: 2025-06-05

## 2025-06-05 RX ORDER — HYDROMORPHONE HCL IN WATER/PF 6 MG/30 ML
0.2 PATIENT CONTROLLED ANALGESIA SYRINGE INTRAVENOUS EVERY 5 MIN PRN
Status: DISCONTINUED | OUTPATIENT
Start: 2025-06-05 | End: 2025-06-05 | Stop reason: HOSPADM

## 2025-06-05 RX ORDER — SODIUM CHLORIDE, SODIUM LACTATE, POTASSIUM CHLORIDE, CALCIUM CHLORIDE 600; 310; 30; 20 MG/100ML; MG/100ML; MG/100ML; MG/100ML
INJECTION, SOLUTION INTRAVENOUS CONTINUOUS PRN
Status: DISCONTINUED | OUTPATIENT
Start: 2025-06-05 | End: 2025-06-05

## 2025-06-05 RX ORDER — ONDANSETRON 2 MG/ML
4 INJECTION INTRAMUSCULAR; INTRAVENOUS EVERY 6 HOURS PRN
Status: DISCONTINUED | OUTPATIENT
Start: 2025-06-05 | End: 2025-06-05 | Stop reason: HOSPADM

## 2025-06-05 RX ORDER — FENTANYL CITRATE 50 UG/ML
25 INJECTION, SOLUTION INTRAMUSCULAR; INTRAVENOUS EVERY 5 MIN PRN
Status: DISCONTINUED | OUTPATIENT
Start: 2025-06-05 | End: 2025-06-05 | Stop reason: HOSPADM

## 2025-06-05 RX ORDER — ONDANSETRON 4 MG/1
4 TABLET, ORALLY DISINTEGRATING ORAL EVERY 30 MIN PRN
Status: CANCELLED | OUTPATIENT
Start: 2025-06-05

## 2025-06-05 RX ORDER — ONDANSETRON 4 MG/1
4 TABLET, ORALLY DISINTEGRATING ORAL EVERY 6 HOURS PRN
Status: DISCONTINUED | OUTPATIENT
Start: 2025-06-05 | End: 2025-06-05 | Stop reason: HOSPADM

## 2025-06-05 RX ADMIN — FENTANYL CITRATE 100 MCG: 50 INJECTION INTRAMUSCULAR; INTRAVENOUS at 12:31

## 2025-06-05 RX ADMIN — PHENYLEPHRINE HYDROCHLORIDE 100 MCG: 10 INJECTION INTRAVENOUS at 13:01

## 2025-06-05 RX ADMIN — PROPOFOL 50 MG: 10 INJECTION, EMULSION INTRAVENOUS at 12:36

## 2025-06-05 RX ADMIN — Medication 100 MG: at 13:52

## 2025-06-05 RX ADMIN — ONDANSETRON 4 MG: 2 INJECTION INTRAMUSCULAR; INTRAVENOUS at 13:50

## 2025-06-05 RX ADMIN — DEXAMETHASONE SODIUM PHOSPHATE 4 MG: 4 INJECTION, SOLUTION INTRAMUSCULAR; INTRAVENOUS at 12:40

## 2025-06-05 RX ADMIN — PHENYLEPHRINE HYDROCHLORIDE 100 MCG: 10 INJECTION INTRAVENOUS at 12:52

## 2025-06-05 RX ADMIN — Medication 50 MG: at 12:33

## 2025-06-05 RX ADMIN — LIDOCAINE HYDROCHLORIDE 60 MG: 20 INJECTION, SOLUTION INFILTRATION; PERINEURAL at 12:33

## 2025-06-05 RX ADMIN — PHENYLEPHRINE HYDROCHLORIDE 100 MCG: 10 INJECTION INTRAVENOUS at 13:06

## 2025-06-05 RX ADMIN — PROPOFOL 150 MG: 10 INJECTION, EMULSION INTRAVENOUS at 12:33

## 2025-06-05 RX ADMIN — PHENYLEPHRINE HYDROCHLORIDE 50 MCG: 10 INJECTION INTRAVENOUS at 13:42

## 2025-06-05 RX ADMIN — PHENYLEPHRINE HYDROCHLORIDE 200 MCG: 10 INJECTION INTRAVENOUS at 12:56

## 2025-06-05 RX ADMIN — SODIUM CHLORIDE, SODIUM LACTATE, POTASSIUM CHLORIDE, AND CALCIUM CHLORIDE: .6; .31; .03; .02 INJECTION, SOLUTION INTRAVENOUS at 12:25

## 2025-06-05 ASSESSMENT — ACTIVITIES OF DAILY LIVING (ADL)
ADLS_ACUITY_SCORE: 53

## 2025-06-05 ASSESSMENT — ENCOUNTER SYMPTOMS: SEIZURES: 0

## 2025-06-05 NOTE — ANESTHESIA POSTPROCEDURE EVALUATION
Patient: Maryann Riggs    Procedure: Procedure(s):  ENDOSCOPIC ULTRASOUND, ESOPHAGOSCOPY / UPPER GASTROINTESTINAL TRACT (GI)  Esophagoscopy, gastroscopy, duodenoscopy (EGD), combined; biopsy and dilation       Anesthesia Type:  MAC    Note:  Disposition: Outpatient   Postop Pain Control: Uneventful            Sign Out: Well controlled pain   PONV: No   Neuro/Psych: Uneventful            Sign Out: Acceptable/Baseline neuro status   Airway/Respiratory: Uneventful            Sign Out: Acceptable/Baseline resp. status   CV/Hemodynamics: Uneventful            Sign Out: Acceptable CV status; No obvious hypovolemia; No obvious fluid overload   Other NRE: NONE   DID A NON-ROUTINE EVENT OCCUR? No           Last vitals:  Vitals Value Taken Time   /90 06/05/25 15:00   Temp 36.5  C (97.7  F) 06/05/25 15:00   Pulse 89 06/05/25 15:06   Resp 15 06/05/25 15:06   SpO2 98 % 06/05/25 15:06   Vitals shown include unfiled device data.    Electronically Signed By: Mike May MD  June 5, 2025  3:06 PM

## 2025-06-05 NOTE — ANESTHESIA CARE TRANSFER NOTE
Patient: Maryann Riggs    Procedure: Procedure(s):  ENDOSCOPIC ULTRASOUND, ESOPHAGOSCOPY / UPPER GASTROINTESTINAL TRACT (GI)  Esophagoscopy, gastroscopy, duodenoscopy (EGD), combined; biopsy and dilation       Diagnosis: Gallbladder cancer (H) [C23]  Lymphadenopathy [R59.1]  Diagnosis Additional Information: No value filed.    Anesthesia Type:   MAC     Note:    Oropharynx: oropharynx clear of all foreign objects and spontaneously breathing  Level of Consciousness: awake  Oxygen Supplementation: room air    Independent Airway: airway patency satisfactory and stable  Dentition: dentition unchanged  Vital Signs Stable: post-procedure vital signs reviewed and stable  Report to RN Given: handoff report given  Patient transferred to: PACU    Handoff Report: Identifed the Patient, Identified the Reponsible Provider, Reviewed the pertinent medical history, Discussed the surgical course, Reviewed Intra-OP anesthesia mangement and issues during anesthesia, Set expectations for post-procedure period and Allowed opportunity for questions and acknowledgement of understanding      Vitals:  Vitals Value Taken Time   BP     Temp     Pulse 86 06/05/25 14:22   Resp 14 06/05/25 14:22   SpO2 97 % 06/05/25 14:22   Vitals shown include unfiled device data.    Electronically Signed By: CONTRERAS Ortiz CRNA  June 5, 2025  2:23 PM

## 2025-06-05 NOTE — ANESTHESIA PROCEDURE NOTES
Airway       Patient location during procedure: OR       Procedure Start/Stop Times: 6/5/2025 12:35 PM  Staff -        CRNA: Da Guillen APRN CRNA       Performed By: CRNA  Consent for Airway        Urgency: elective  Indications and Patient Condition       Indications for airway management: ny-procedural       Induction type:intravenous       Mask difficulty assessment: 1 - vent by mask    Final Airway Details       Final airway type: endotracheal airway       Successful airway: ETT - single  Endotracheal Airway Details        ETT size (mm): 7.0       Cuffed: yes       Successful intubation technique: direct laryngoscopy       DL Blade Type: Torres 2       Grade View of Cords: 1       Adjucts: stylet       Position: Right       Measured from: lips       Secured at (cm): 21       Bite block used: None    Post intubation assessment        Placement verified by: capnometry, equal breath sounds and chest rise        Number of attempts at approach: 1       Secured with: tape       Ease of procedure: easy       Dentition: Intact and Unchanged    Medication(s) Administered   Medication Administration Time: 6/5/2025 12:35 PM

## 2025-06-05 NOTE — BRIEF OP NOTE
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 No focal abnormality.    Procedure: ENDOSCOPIC ULTRASOUND, ESOPHAGOSCOPY / UPPER GASTROINTESTINAL TRACT (GI), N/A - Esophagus  Esophagoscopy, gastroscopy, duodenoscopy (EGD), combined; biopsy and dilation, N/A - Esophagus    Surgeon: Surgeons and Role:     * Jeremy Joseph MD - Primary  Anesthesia: General   Estimated Blood Loss: None    Drains: None  Specimens:   ID Type Source Tests Collected by Time Destination   1 : duodenal stump and nodules Biopsy Small Intestine, Duodenum SURGICAL PATHOLOGY EXAM Jeremy Joseph MD 6/5/2025  1:17 PM      Findings:     Endoscopy showed multifocal stricturing of the esophagus. This was tortuous and there were scattered regions with resistance to scope passage.  There was evidence of prior distal gastrectomy (more subtotal than a typical Billroth 2) with a Billroth 2-type anastomosis. Both limbs were explored.    The duodenal limb had nodular polypoid pale mucosa in the stump. This may be lymphangiectasia but biopsies were obtained.    The major and minor papillae were normal.    A Savary wire was passed and the esophagus sequentially dilated with 14, 15 and 16 mm dilators. The endoscope then passed easily and there were no significant mucosal tears.    The EUS scope was passed using endoscopic, fluroscopic and sonographic guidance with minimal resistance.    EUS exam was limited by the post-op anatomy but grossly normal. No focal lesions were seen to biopsy. No abnormal lymph nodes were seen in the region of the errol hepatis or along the hepatic artery.     The extrahepatic bile duct was dilate to 11 mm.     AMBREEN Joseph MD  Professor of Medicine  Division of Gastroenterology, Hepatology and Nutrition  [facility]  [place]    Complications: None.  Implants: * No implants in log *

## 2025-06-05 NOTE — ANESTHESIA PREPROCEDURE EVALUATION
Anesthesia Pre-Procedure Evaluation    Patient: Maryann Riggs   MRN: [id number] : 1965          Procedure : Procedure(s):  ENDOSCOPIC ULTRASOUND, ESOPHAGOSCOPY / UPPER GASTROINTESTINAL TRACT (GI) with fluoroscopy         Past Medical History:   Diagnosis Date    Anger reaction     buspar helping    Cholelithiasis     Esophageal stricture     dilatation every 3 months    Generalized osteoarthrosis, unspecified site (aka ARTHRITIS)     small joints    PTSD (post-traumatic stress disorder)     Raynaud's disease       Past Surgical History:   Procedure Laterality Date    COLONOSCOPY N/A 2024    Procedure: COLONOSCOPY, FLEXIBLE, WITH LESION REMOVAL USING SNARE;  Surgeon: Bk Laura MD;  Location: [facility]    IR GALLBLADDER DRAIN PLACEMENT  2025    MAMMOPLASTY REDUCTION      OTHER SURGICAL HISTORY      s/p left knee arthroscopinc surgery    ROTATOR CUFF REPAIR RT/LT  2005    SMALL BOWEL RESECTION      TUBAL LIGATION        Allergies   Allergen Reactions    Desyrel [Trazodone Hcl]      Sig mood disturbance      Lamictal [Lamotrigine]       Social History     Tobacco Use    Smoking status: Never    Smokeless tobacco: Never   Substance Use Topics    Alcohol use: Not Currently      Wt Readings from Last 1 Encounters:   25 65.3 kg (143 lb 15.4 oz)        Anesthesia Evaluation   Pt has had prior anesthetic. Type: General and MAC.    No history of anesthetic complications       ROS/MED HX  ENT/Pulmonary:    (-) asthma   Neurologic:    (-) no seizures and no CVA   Cardiovascular:  - neg cardiovascular ROS     METS/Exercise Tolerance:     Hematologic:       Musculoskeletal:       GI/Hepatic: Comment: Gallbladder cancer with lymphadenopathy     Esophageal stricture, undergoes dilations every 5 months. Last was 4/15/25; no symptoms currently     S/p hien en y bypass      Renal/Genitourinary:       Endo:       Psychiatric/Substance Use:       Infectious Disease:       Malignancy:       Other:     "          Physical Exam  Airway  Mallampati: II  TM distance: >3 FB  Neck ROM: full  Mouth opening: >= 4 cm    Cardiovascular   Rhythm: regular  Rate: normal rate     Dental   (+) Multiple visibly decayed, broken teeth        Pulmonary Breath sounds clear to auscultation        Neurological   Other Findings       OUTSIDE LABS:  CBC:   Lab Results   Component Value Date    WBC 9.0 05/07/2025    WBC 8.9 04/29/2025    HGB 11.7 05/07/2025    HGB 11.8 04/29/2025    HCT 35.7 05/07/2025    HCT 37.2 04/29/2025     05/07/2025     (H) 04/29/2025     BMP:   Lab Results   Component Value Date     05/24/2025     05/07/2025    POTASSIUM 3.9 05/24/2025    POTASSIUM 4.3 05/07/2025    CHLORIDE 101 05/24/2025    CHLORIDE 101 05/07/2025    CO2 27 05/24/2025    CO2 26 05/07/2025    BUN 16.7 05/24/2025    BUN 16.1 05/07/2025    CR 0.84 05/24/2025    CR 0.67 05/07/2025     (H) 05/24/2025     (H) 05/07/2025     COAGS:   Lab Results   Component Value Date    INR 0.96 05/07/2025     POC: No results found for: \"BGM\", \"HCG\", \"HCGS\"  HEPATIC:   Lab Results   Component Value Date    ALBUMIN 4.0 05/24/2025    PROTTOTAL 6.5 05/24/2025    ALT 28 05/24/2025    AST 22 05/24/2025    GGT 10 09/12/2014    ALKPHOS 151 (H) 05/24/2025    BILITOTAL 0.6 05/24/2025     OTHER:   Lab Results   Component Value Date    LACT 0.8 04/19/2025    A1C 6.0 (H) 12/09/2022    ARIC 9.3 05/24/2025    PHOS 3.3 08/23/2013    MAG 2.6 (H) 06/25/2024    LIPASE 169 (H) 04/19/2025    AMYLASE 45 03/06/2008    TSH 1.750 09/12/2014    CRP  10/10/2014     <0.2  Reference Values:   Low Risk:           <1.0 mg/L   Average Risk:       1.0-3.0 mg/L   High Risk:          >3.0 mg/L   Acute Inflammation: >8.0 mg/L      SED 4 10/10/2014       Anesthesia Plan    ASA Status:  3      NPO Status: NPO Appropriate   Anesthesia Type: General.  Airway: oral.  Induction: intravenous.  Maintenance: TIVA.   Techniques and Equipment:       - Monitoring Plan: " standard ASA monitoring, processed EEG monitor     Consents    Anesthesia Plan(s) and associated risks, benefits, and realistic alternatives discussed. Questions answered and patient/representative(s) expressed understanding.     - Discussed: anesthesiologist, CRNA, surgeon     - Discussed with:  Patient               Postoperative Care    Pain management: multimodal analgesia.     Comments:                   GIULIA PALOMINO MD    I have reviewed the pertinent notes and labs in the chart from the past 30 days and (re)examined the patient.  Any updates or changes from those notes are reflected in this note.    Clinically Significant Risk Factors Present on Admission                   # Hypertension: Home medication list includes antihypertensive(s)

## 2025-06-05 NOTE — PROVIDER NOTIFICATION
Notified MD Joseph to see if he needs to see pt before pt leaves.   Addendum: MD Joseph responded and came to bedside to speak with pt.

## 2025-06-09 LAB
PATH REPORT.COMMENTS IMP SPEC: NORMAL
PATH REPORT.FINAL DX SPEC: NORMAL
PATH REPORT.GROSS SPEC: NORMAL
PATH REPORT.MICROSCOPIC SPEC OTHER STN: NORMAL
PATH REPORT.RELEVANT HX SPEC: NORMAL
PHOTO IMAGE: NORMAL

## 2025-06-10 ENCOUNTER — RESULTS FOLLOW-UP (OUTPATIENT)
Dept: MULTI SPECIALTY CLINIC | Facility: CLINIC | Age: 60
End: 2025-06-10

## 2025-06-12 ENCOUNTER — TELEPHONE (OUTPATIENT)
Dept: FAMILY MEDICINE | Facility: CLINIC | Age: 60
End: 2025-06-12
Payer: COMMERCIAL

## 2025-06-12 NOTE — TELEPHONE ENCOUNTER
Patient Quality Outreach    Patient is due for the following:   Breast Cancer Screening - Mammogram    Action(s) Taken:   No follow up needed at this time.    Type of outreach:    Chart review performed, no outreach needed.    Questions for provider review:    None         Radha Abarca CMA  Chart routed to None.

## 2025-06-16 LAB — UPPER EUS: NORMAL

## 2025-07-22 ENCOUNTER — OFFICE VISIT (OUTPATIENT)
Dept: FAMILY MEDICINE | Facility: CLINIC | Age: 60
End: 2025-07-22
Payer: COMMERCIAL

## 2025-07-22 VITALS
DIASTOLIC BLOOD PRESSURE: 80 MMHG | RESPIRATION RATE: 24 BRPM | BODY MASS INDEX: 22.6 KG/M2 | SYSTOLIC BLOOD PRESSURE: 128 MMHG | TEMPERATURE: 98.1 F | HEIGHT: 67 IN | WEIGHT: 144 LBS | HEART RATE: 82 BPM | OXYGEN SATURATION: 99 %

## 2025-07-22 DIAGNOSIS — Z12.31 ENCOUNTER FOR SCREENING MAMMOGRAM FOR BREAST CANCER: ICD-10-CM

## 2025-07-22 DIAGNOSIS — Z00.00 ROUTINE GENERAL MEDICAL EXAMINATION AT A HEALTH CARE FACILITY: ICD-10-CM

## 2025-07-22 DIAGNOSIS — M62.830 BACK MUSCLE SPASM: ICD-10-CM

## 2025-07-22 DIAGNOSIS — G47.09 OTHER INSOMNIA: ICD-10-CM

## 2025-07-22 DIAGNOSIS — R73.9 HIGH BLOOD SUGAR: ICD-10-CM

## 2025-07-22 LAB
ALBUMIN SERPL BCG-MCNC: 4 G/DL (ref 3.5–5.2)
ALP SERPL-CCNC: 119 U/L (ref 40–150)
ALT SERPL W P-5'-P-CCNC: 31 U/L (ref 0–50)
ANION GAP SERPL CALCULATED.3IONS-SCNC: 12 MMOL/L (ref 7–15)
AST SERPL W P-5'-P-CCNC: 25 U/L (ref 0–45)
BILIRUB SERPL-MCNC: 0.3 MG/DL
BUN SERPL-MCNC: 25.1 MG/DL (ref 8–23)
CALCIUM SERPL-MCNC: 9.2 MG/DL (ref 8.8–10.4)
CHLORIDE SERPL-SCNC: 103 MMOL/L (ref 98–107)
CHOLEST SERPL-MCNC: 169 MG/DL
CREAT SERPL-MCNC: 0.7 MG/DL (ref 0.51–0.95)
EGFRCR SERPLBLD CKD-EPI 2021: >90 ML/MIN/1.73M2
ERYTHROCYTE [DISTWIDTH] IN BLOOD BY AUTOMATED COUNT: 12.6 % (ref 10–15)
EST. AVERAGE GLUCOSE BLD GHB EST-MCNC: 128 MG/DL
FASTING STATUS PATIENT QL REPORTED: NO
FASTING STATUS PATIENT QL REPORTED: NO
GLUCOSE SERPL-MCNC: 106 MG/DL (ref 70–99)
HBA1C MFR BLD: 6.1 % (ref 0–5.6)
HCO3 SERPL-SCNC: 26 MMOL/L (ref 22–29)
HCT VFR BLD AUTO: 41.7 % (ref 35–47)
HDLC SERPL-MCNC: 60 MG/DL
HGB BLD-MCNC: 13.4 G/DL (ref 11.7–15.7)
LDLC SERPL CALC-MCNC: 90 MG/DL
MCH RBC QN AUTO: 27.2 PG (ref 26.5–33)
MCHC RBC AUTO-ENTMCNC: 32.1 G/DL (ref 31.5–36.5)
MCV RBC AUTO: 85 FL (ref 78–100)
NONHDLC SERPL-MCNC: 109 MG/DL
PLATELET # BLD AUTO: 237 10E3/UL (ref 150–450)
POTASSIUM SERPL-SCNC: 4.1 MMOL/L (ref 3.4–5.3)
PROT SERPL-MCNC: 6.9 G/DL (ref 6.4–8.3)
RBC # BLD AUTO: 4.92 10E6/UL (ref 3.8–5.2)
SODIUM SERPL-SCNC: 141 MMOL/L (ref 135–145)
TRIGL SERPL-MCNC: 93 MG/DL
TSH SERPL DL<=0.005 MIU/L-ACNC: 1.06 UIU/ML (ref 0.3–4.2)
WBC # BLD AUTO: 7.1 10E3/UL (ref 4–11)

## 2025-07-22 PROCEDURE — 3048F LDL-C <100 MG/DL: CPT | Performed by: NURSE PRACTITIONER

## 2025-07-22 PROCEDURE — 36415 COLL VENOUS BLD VENIPUNCTURE: CPT | Performed by: NURSE PRACTITIONER

## 2025-07-22 PROCEDURE — 3074F SYST BP LT 130 MM HG: CPT | Performed by: NURSE PRACTITIONER

## 2025-07-22 PROCEDURE — 80053 COMPREHEN METABOLIC PANEL: CPT | Performed by: NURSE PRACTITIONER

## 2025-07-22 PROCEDURE — 85027 COMPLETE CBC AUTOMATED: CPT | Performed by: NURSE PRACTITIONER

## 2025-07-22 PROCEDURE — 3079F DIAST BP 80-89 MM HG: CPT | Performed by: NURSE PRACTITIONER

## 2025-07-22 PROCEDURE — 3044F HG A1C LEVEL LT 7.0%: CPT | Performed by: NURSE PRACTITIONER

## 2025-07-22 PROCEDURE — 99214 OFFICE O/P EST MOD 30 MIN: CPT | Mod: 25 | Performed by: NURSE PRACTITIONER

## 2025-07-22 PROCEDURE — 1125F AMNT PAIN NOTED PAIN PRSNT: CPT | Performed by: NURSE PRACTITIONER

## 2025-07-22 PROCEDURE — 84443 ASSAY THYROID STIM HORMONE: CPT | Performed by: NURSE PRACTITIONER

## 2025-07-22 PROCEDURE — 99396 PREV VISIT EST AGE 40-64: CPT | Performed by: NURSE PRACTITIONER

## 2025-07-22 PROCEDURE — 80061 LIPID PANEL: CPT | Performed by: NURSE PRACTITIONER

## 2025-07-22 PROCEDURE — 83036 HEMOGLOBIN GLYCOSYLATED A1C: CPT | Performed by: NURSE PRACTITIONER

## 2025-07-22 RX ORDER — LANCETS
EACH MISCELLANEOUS
Qty: 100 EACH | Refills: 6 | Status: SHIPPED | OUTPATIENT
Start: 2025-07-22

## 2025-07-22 RX ORDER — ZOLPIDEM TARTRATE 5 MG/1
5 TABLET ORAL
Qty: 30 TABLET | Refills: 3 | Status: SHIPPED | OUTPATIENT
Start: 2025-07-22

## 2025-07-22 RX ORDER — CYCLOBENZAPRINE HCL 10 MG
10 TABLET ORAL DAILY
Qty: 90 TABLET | Refills: 3 | Status: SHIPPED | OUTPATIENT
Start: 2025-07-22

## 2025-07-22 SDOH — HEALTH STABILITY: PHYSICAL HEALTH: ON AVERAGE, HOW MANY DAYS PER WEEK DO YOU ENGAGE IN MODERATE TO STRENUOUS EXERCISE (LIKE A BRISK WALK)?: 5 DAYS

## 2025-07-22 SDOH — HEALTH STABILITY: PHYSICAL HEALTH: ON AVERAGE, HOW MANY MINUTES DO YOU ENGAGE IN EXERCISE AT THIS LEVEL?: 150+ MIN

## 2025-07-22 ASSESSMENT — SOCIAL DETERMINANTS OF HEALTH (SDOH): HOW OFTEN DO YOU GET TOGETHER WITH FRIENDS OR RELATIVES?: ONCE A WEEK

## 2025-07-22 ASSESSMENT — PAIN SCALES - GENERAL: PAINLEVEL_OUTOF10: MILD PAIN (3)

## 2025-07-22 NOTE — PROGRESS NOTES
Preventive Care Visit  [facility]  Debbie Vila, CONTRERAS MOORE, Family Medicine  Jul 22, 2025  {Provider  Link to SmartSet :454910}    {PROVIDER CHARTING PREFERENCE:566522}    Subjective   Pat is a 60 year old, presenting for the following:  Physical        7/22/2025     9:36 AM   Additional Questions   Roomed by Radha        Via the Health Maintenance questionnaire, the patient has reported the following services have been completed -Mammogram: [facility] 2023-09-25, this information has not been sent to the abstraction team.    Healthy Habits:     Taking medications regularly:  0  History of Present Illness       Reason for visit:  Med recheck   She is taking medications regularly.    Lab work for liver    Would like CGM for blood sugar monitoring      {SUPERLIST (Optional):582932}    Advance Care Planning  {The storyboard will display whether the patient has ACP docs on file. Hover over the Code section in the storyboard to access the ACP documents. :333860}  {(AWV REQUIRED) Advance Care Planning Reviewed:177446}        7/22/2025   General Health   How would you rate your overall physical health? Good   Feel stress (tense, anxious, or unable to sleep) Not at all         7/22/2025   Nutrition   Three or more servings of calcium each day? Yes   Diet: Regular (no restrictions)   How many servings of fruit and vegetables per day? (!) 2-3   How many sweetened beverages each day? 0-1         7/22/2025   Exercise   Days per week of moderate/strenous exercise 5 days   Average minutes spent exercising at this level 150+ min         7/22/2025   Social Factors   Frequency of gathering with friends or relatives Once a week   Worry food won't last until get money to buy more No   Food not last or not have enough money for food? No   Do you have housing? (Housing is defined as stable permanent housing and does not include staying outside in a car, in a tent, in an abandoned building, in an overnight  shelter, or couch-surfing.) Yes   Are you worried about losing your housing? No   Lack of transportation? No   Unable to get utilities (heat,electricity)? No         7/22/2025   Fall Risk   Fallen 2 or more times in the past year? No    Trouble with walking or balance? No        Proxy-reported          7/22/2025   Dental   Dentist two times every year? Yes       {Rooming Staff Patient needs a PHQ as part of the AWV.  Use this link to complete and then refresh the note to pull results Link to PHQ2 Assessment :710731}    Today's PHQ-2 Score:       4/17/2025    10:56 AM   PHQ-2 ( 1999 [employer])   Q1: Little interest or pleasure in doing things 0   Q2: Feeling down, depressed or hopeless 0   PHQ-2 Score 0         7/22/2025   Substance Use   Alcohol more than 3/day or more than 7/wk No   Do you use any other substances recreationally? No     Social History     Tobacco Use    Smoking status: Never    Smokeless tobacco: Never   Vaping Use    Vaping status: Never Used   Substance Use Topics    Alcohol use: Not Currently    Drug use: No     {Provider  If there are gaps in the social history shown above, please follow the link to update and then refresh the note Link to Social and Substance History :210681}      11/18/2022   LAST FHS-7 RESULTS   1st degree relative breast or ovarian cancer No   Any relative bilateral breast cancer No   Any male have breast cancer No   Any ONE woman have BOTH breast AND ovarian cancer No   Any woman with breast cancer before 50yrs No   2 or more relatives with breast AND/OR ovarian cancer No   2 or more relatives with breast AND/OR bowel cancer No     {If any of the questions to the FHS7 are answered yes, consider referral for genetic counseling.    Additional indications for genetic referral include personal history of breast or ovarian cancer, genetic mutation in 1st degree relative which increases risk of breast cancer including BRCA1, BRCA2, MEY, PALB 2, TP53, CHEK2, PTEN, CDH1, STK11 (per  "ACS) and/or 1st degree relative with history of pancreatic or high-risk prostate cancer (per NCCN):666577}   Mammogram Screening - Mammogram every 1-2 years updated in Health Maintenance based on mutual decision making        7/22/2025   STI Screening   New sexual partner(s) since last STI/HIV test? No     History of abnormal Pap smear: { :611267}        Latest Ref Rng & Units 12/9/2022     9:08 AM 9/16/2016     2:46 PM 9/16/2016    12:00 AM   PAP / HPV   PAP  Negative for Intraepithelial Lesion or Malignancy (NILM)      PAP (Historical)    NIL    HPV 16 DNA Negative Negative  Negative     HPV 18 DNA Negative Negative  Negative     Other HR HPV Negative Negative  Negative       ASCVD Risk   The 10-year ASCVD risk score (Emily MONSIVAIS, et al., 2019) is: 4.7%    Values used to calculate the score:      Age: 60 years      Sex: Female      Is Non- : No      Diabetic: No      Tobacco smoker: No      Systolic Blood Pressure: 128 mmHg      Is BP treated: Yes      HDL Cholesterol: 72 mg/dL      Total Cholesterol: 204 mg/dL    {Link to Fracture Risk Assessment Tool (Optional):162672}    {Provider  REQUIRED FOR AWV Use the storyboard to review patient history, after sections have been marked as reviewed, refresh note to capture documentation:138678}   Reviewed and updated as needed this visit by Provider                    {HISTORY OPTIONS (Optional):173889}    {ROS Picklists (Optional):667478}     Objective    Exam  /80   Pulse 82   Temp 98.1  F (36.7  C) (Tympanic)   Resp 24   Ht 1.708 m (5' 7.25\")   Wt 65.3 kg (144 lb)   LMP 01/04/2014   SpO2 99%   BMI 22.39 kg/m     Estimated body mass index is 22.39 kg/m  as calculated from the following:    Height as of this encounter: 1.708 m (5' 7.25\").    Weight as of this encounter: 65.3 kg (144 lb).    Physical Exam  {Exam Choices (Optional):389139}        Signed Electronically by: CONTRERAS Sales CNP  {Email feedback regarding this " note to [email or web address]   :720328}   Gallbladder cancer (H)    Encounter for completion of form with patient     Past Surgical History:   Procedure Laterality Date    COLONOSCOPY N/A 9/27/2024    Procedure: COLONOSCOPY, FLEXIBLE, WITH LESION REMOVAL USING SNARE;  Surgeon: Bk Laura MD;  Location: [facility]    ENDOSCOPIC ULTRASOUND UPPER GASTROINTESTINAL TRACT (GI) N/A 6/5/2025    Procedure: ENDOSCOPIC ULTRASOUND, ESOPHAGOSCOPY / UPPER GASTROINTESTINAL TRACT (GI);  Surgeon: Jeremy Joseph MD;  Location: [facility]    ESOPHAGOSCOPY, GASTROSCOPY, DUODENOSCOPY (EGD), COMBINED N/A 6/5/2025    Procedure: Esophagoscopy, gastroscopy, duodenoscopy (EGD), combined; biopsy and dilation;  Surgeon: Jeremy Joseph MD;  Location: [facility]    IR GALLBLADDER DRAIN PLACEMENT  4/19/2025    MAMMOPLASTY REDUCTION      OTHER SURGICAL HISTORY      s/p left knee arthroscopinc surgery    ROTATOR CUFF REPAIR RT/LT  01/01/2005    SMALL BOWEL RESECTION      TUBAL LIGATION         Social History     Tobacco Use    Smoking status: Never    Smokeless tobacco: Never   Substance Use Topics    Alcohol use: Not Currently     Family History   Problem Relation Age of Onset    Unknown/Adopted Mother     Unknown/Adopted Father     Unknown/Adopted Maternal Grandmother     Unknown/Adopted Maternal Grandfather     Unknown/Adopted Paternal Grandmother     Unknown/Adopted Paternal Grandfather          Current Outpatient Medications   Medication Sig Dispense Refill    Ascorbic Acid (VITAMIN C CR PO) Take 1 packet by mouth daily.      blood glucose (NO BRAND SPECIFIED) test strip Use to test blood sugar 1 times daily or as directed. To accompany: Blood Glucose Monitor Brands: per insurance. 100 strip 6    blood glucose monitoring (NO BRAND SPECIFIED) meter device kit Use to test blood sugar 1 times daily or as directed. Preferred blood glucose meter OR supplies to accompany: Blood Glucose Monitor Brands: per insurance. 1 kit 0    Cyanocobalamin (VITAMIN B 12 PO) Taking 2 gummies by mouth  daily      cyclobenzaprine (FLEXERIL) 10 MG tablet Take 1 tablet (10 mg) by mouth daily. 90 tablet 3    ferrous sulfate (FEROSUL) 325 (65 Fe) MG tablet Take 1 tablet (325 mg) by mouth daily (with breakfast). 90 tablet 1    MAGNESIUM OXIDE PO Take 400 mg by mouth daily.      omeprazole (PRILOSEC) 20 MG DR capsule Take 20 mg by mouth daily.      thin (NO BRAND SPECIFIED) lancets Use with lancing device to check glucose 1 times daily per  direction. To accompany: Blood Glucose Monitor Brands: per insurance. 100 each 6    triamcinolone (KENALOG) 0.1 % external cream Apply topically 2 times daily. 45 g 0    Vitamin D-Vitamin K (VITAMIN K2-VITAMIN D3 PO) Take by mouth.      zolpidem (AMBIEN) 5 MG tablet Take 1 tablet (5 mg) by mouth nightly as needed for sleep. 30 tablet 3    NIFEdipine ER OSMOTIC (PROCARDIA XL) 30 MG 24 hr tablet Take 1 tablet (30 mg) by mouth daily. (Patient not taking: Reported on 7/22/2025) 90 tablet 3     Allergies   Allergen Reactions    Desyrel [Trazodone Hcl]      Sig mood disturbance      Lamictal [Lamotrigine]      Recent Labs   Lab Test 07/22/25  1030 05/24/25  0944 05/07/25  1127 04/18/25  2315 06/25/24  1516 12/09/22  0943 12/18/21  0906 07/09/21  1552 11/26/19  1056   A1C 6.1*  --   --   --   --  6.0*  --   --   --    LDL 90 122*  --   --  114* 116*   < >  --   --    HDL 60 72  --   --  84 76   < >  --   --    TRIG 93 51  --   --  94 118   < >  --   --    ALT 31 28 65*   < > 41  --   --   --   --    CR 0.70 0.84 0.67   < > 0.72 0.71   < > 0.69 0.70   GFRESTIMATED >90 78 >90   < > >90 >90   < > >90 >90   GFRESTBLACK  --   --   --   --   --   --   --  >90 >90   POTASSIUM 4.1 3.9 4.3   < > 3.8 4.0   < > 3.5 3.6   TSH 1.06  --   --   --   --   --   --   --   --     < > = values in this interval not displayed.               Review of Systems  Constitutional, HEENT, cardiovascular, pulmonary, gi and gu systems are negative, except as otherwise noted.     Objective    Exam  /80    "Pulse 82   Temp 98.1  F (36.7  C) (Tympanic)   Resp 24   Ht 1.708 m (5' 7.25\")   Wt 65.3 kg (144 lb)   LMP 01/04/2014   SpO2 99%   BMI 22.39 kg/m     Estimated body mass index is 22.39 kg/m  as calculated from the following:    Height as of this encounter: 1.708 m (5' 7.25\").    Weight as of this encounter: 65.3 kg (144 lb).    Physical Exam  GENERAL: alert and no distress  EYES: Eyes grossly normal to inspection, PERRL and conjunctivae and sclerae normal  HENT: ear canals and TM's normal, nose and mouth without ulcers or lesions  NECK: no adenopathy, no asymmetry, masses, or scars  RESP: lungs clear to auscultation - no rales, rhonchi or wheezes  CV: regular rate and rhythm, normal S1 S2, no S3 or S4, no murmur, click or rub, no peripheral edema  ABDOMEN: soft, nontender, no hepatosplenomegaly, no masses and bowel sounds normal  MS: no gross musculoskeletal defects noted, no edema  SKIN: no suspicious lesions or rashes  NEURO: Normal strength and tone, mentation intact and speech normal  PSYCH: mentation appears normal, affect normal/bright        Signed Electronically by: CONTRERAS Sales CNP    "

## 2025-07-22 NOTE — PATIENT INSTRUCTIONS
Patient Education   Preventive Care Advice   This is general advice given by our system to help you stay healthy. However, your care team may have specific advice just for you. Please talk to your care team about your preventive care needs.  Nutrition  Eat 5 or more servings of fruits and vegetables each day.  Try wheat bread, brown rice and whole grain pasta (instead of white bread, rice, and pasta).  Get enough calcium and vitamin D. Check the label on foods and aim for 100% of the RDA (recommended daily allowance).  Lifestyle  Exercise at least 150 minutes each week  (30 minutes a day, 5 days a week).  Do muscle strengthening activities 2 days a week. These help control your weight and prevent disease.  No smoking.  Wear sunscreen to prevent skin cancer.  Have a dental exam and cleaning every 6 months.  Yearly exams  See your health care team every year to talk about:  Any changes in your health.  Any medicines your care team has prescribed.  Preventive care, family planning, and ways to prevent chronic diseases.  Shots (vaccines)   HPV shots (up to age 26), if you've never had them before.  Hepatitis B shots (up to age 59), if you've never had them before.  COVID-19 shot: Get this shot when it's due.  Flu shot: Get a flu shot every year.  Tetanus shot: Get a tetanus shot every 10 years.  Pneumococcal, hepatitis A, and RSV shots: Ask your care team if you need these based on your risk.  Shingles shot (for age 50 and up)  General health tests  Diabetes screening:  Starting at age 35, Get screened for diabetes at least every 3 years.  If you are younger than age 35, ask your care team if you should be screened for diabetes.  Cholesterol test: At age 39, start having a cholesterol test every 5 years, or more often if advised.  Bone density scan (DEXA): At age 50, ask your care team if you should have this scan for osteoporosis (brittle bones).  Hepatitis C: Get tested at least once in your life.  STIs (sexually  transmitted infections)  Before age 24: Ask your care team if you should be screened for STIs.  After age 24: Get screened for STIs if you're at risk. You are at risk for STIs (including HIV) if:  You are sexually active with more than one person.  You don't use condoms every time.  You or a partner was diagnosed with a sexually transmitted infection.  If you are at risk for HIV, ask about PrEP medicine to prevent HIV.  Get tested for HIV at least once in your life, whether you are at risk for HIV or not.  Cancer screening tests  Cervical cancer screening: If you have a cervix, begin getting regular cervical cancer screening tests starting at age 21.  Breast cancer scan (mammogram): If you've ever had breasts, begin having regular mammograms starting at age 40. This is a scan to check for breast cancer.  Colon cancer screening: It is important to start screening for colon cancer at age 45.  Have a colonoscopy test every 10 years (or more often if you're at risk) Or, ask your provider about stool tests like a FIT test every year or Cologuard test every 3 years.  To learn more about your testing options, visit:   .  For help making a decision, visit:   https://bit.ly/aa96547.  Prostate cancer screening test: If you have a prostate, ask your care team if a prostate cancer screening test (PSA) at age 55 is right for you.  Lung cancer screening: If you are a current or former smoker ages 50 to 80, ask your care team if ongoing lung cancer screenings are right for you.  For informational purposes only. Not to replace the advice of your health care provider. Copyright   2023 [employer]. All rights reserved. Clinically reviewed by the [facility] Transitions Program. [employer] [id number] - REV 01/24.

## (undated) DEVICE — GLOVE BIOGEL PI MICRO SZ 7.5 48575

## (undated) DEVICE — PAD CHUX UNDERPAD 23X36" 676105

## (undated) DEVICE — NDL INSUFFLATION 13GA 120MM C2201

## (undated) DEVICE — KIT CONNECTOR FOR OLYMPUS ENDOSCOPES DEFENDO 100310

## (undated) DEVICE — Device

## (undated) DEVICE — GLOVE BIOGEL PI MICRO INDICATOR UNDERGLOVE SZ 7.0 48970

## (undated) DEVICE — PACK ENDOSCOPY GI CUSTOM UMMC

## (undated) DEVICE — ENDO TROCAR BLUNT TIP KII BALLOON 12X100MM C0R47

## (undated) DEVICE — SU DERMABOND ADVANCED .7ML DNX12

## (undated) DEVICE — PAD CHUX UNDERPAD 23X24" 7136

## (undated) DEVICE — ENDO FORCEP ENDOJAW BIOPSY 2.8MMX230CM FB-220U

## (undated) DEVICE — ESU PENCIL SMOKE EVAC W/ROCKER SWITCH 0703-047-000

## (undated) DEVICE — TUBING SUCTION 10'X3/16" N510

## (undated) DEVICE — CLIP ENDO HEMO-LOC GREEN MED/LG 544230

## (undated) DEVICE — GLOVE BIOGEL PI MICRO INDICATOR UNDERGLOVE SZ 7.5 48975

## (undated) DEVICE — ESU ENDO SCISSORS 5MM CVD 5DCS

## (undated) DEVICE — SOL NACL 0.9% INJ 1000ML BAG 2B1324X

## (undated) DEVICE — TIP CAUTERY L HOOK 36CM E377336C

## (undated) DEVICE — ENDO POUCH UNIV RETRIEVAL SYSTEM INZII 10MM CD001

## (undated) DEVICE — SOL WATER IRRIG 1000ML BOTTLE 2F7114

## (undated) DEVICE — SYR 10ML FINGER CONTROL W/O NDL 309695

## (undated) DEVICE — SU VICRYL 0 UR-6 27" J603H

## (undated) DEVICE — ENDO PROBE COVER ULTRASOUND BALLOON LATEX  MAJ-249

## (undated) DEVICE — SYR 10ML LL W/O NDL 302995

## (undated) DEVICE — BITE BLOCK ENDO W/DENTAL RIM 54FR SBT-114-100

## (undated) DEVICE — LINEN TOWEL PACK X6 WHITE 5487

## (undated) DEVICE — SUCTION IRR STRYKERFLOW II W/TIP 250-070-520

## (undated) DEVICE — SYR 30ML LL W/O NDL 302832

## (undated) DEVICE — LABEL MEDICATION SYSTEM 3303-P

## (undated) DEVICE — SUCTION MANIFOLD NEPTUNE 2 SYS 4 PORT 0702-020-000

## (undated) DEVICE — COVER CAMERA IN-LIGHT DISP LT-C02

## (undated) DEVICE — LINEN TOWEL PACK X5 5464

## (undated) DEVICE — ESU GROUND PAD ADULT W/CORD E7507

## (undated) DEVICE — ENDO TUBING CO2 SMARTCAP STERILE DISP 100145CO2EXT

## (undated) DEVICE — ENDO SNARE EXACTO COLD 9MM LOOP 2.4MMX230CM 00711115

## (undated) DEVICE — SU PDS II 0 ENDOLOOP EZ10G

## (undated) DEVICE — ENDO CAP AND TUBING STERILE FOR ENDOGATOR  100130

## (undated) DEVICE — PREP CHLORAPREP 26ML TINTED HI-LITE ORANGE 930815

## (undated) DEVICE — ESU HOLDER LAP INST DISP PURPLE LONG 330MM H-PRO-330

## (undated) DEVICE — KIT ENDO FIRST STEP DISINFECTANT 200ML W/POUCH EP-4

## (undated) DEVICE — ENDO TROCAR SLEEVE KII Z-THREADED 05X100MM CTS02

## (undated) DEVICE — ANTIFOG SOLUTION W/FOAM PAD 31142527

## (undated) DEVICE — ESU LIGASURE MARYLAND LAPAROSCOPIC SLR/DVDR 5MMX37CM LF1937

## (undated) DEVICE — SU MONOCRYL 4-0 PS-2 18" UND Y496G

## (undated) DEVICE — ENDO TROCAR FIRST ENTRY KII FIOS Z-THRD 05X100MM CTF03

## (undated) DEVICE — CLIP APPLIER ENDO 5MM M/L LIGAMAX EL5ML

## (undated) DEVICE — DEVICE SUTURE PASSER 14GA WECK EFX EFXSP2

## (undated) DEVICE — ENDO TROCAR FIRST ENTRY KII FIOS Z-THRD 12X100MM CTF73

## (undated) RX ORDER — PROPOFOL 10 MG/ML
INJECTION, EMULSION INTRAVENOUS
Status: DISPENSED
Start: 2024-09-27

## (undated) RX ORDER — FENTANYL CITRATE 50 UG/ML
INJECTION, SOLUTION INTRAMUSCULAR; INTRAVENOUS
Status: DISPENSED
Start: 2025-04-21

## (undated) RX ORDER — HYDROMORPHONE HYDROCHLORIDE 1 MG/ML
INJECTION, SOLUTION INTRAMUSCULAR; INTRAVENOUS; SUBCUTANEOUS
Status: DISPENSED
Start: 2025-04-21

## (undated) RX ORDER — SODIUM CHLORIDE 9 MG/ML
INJECTION, SOLUTION INTRAVENOUS
Status: DISPENSED
Start: 2025-04-21

## (undated) RX ORDER — HYDROMORPHONE HCL IN WATER/PF 6 MG/30 ML
PATIENT CONTROLLED ANALGESIA SYRINGE INTRAVENOUS
Status: DISPENSED
Start: 2025-04-21

## (undated) RX ORDER — PROPOFOL 10 MG/ML
INJECTION, EMULSION INTRAVENOUS
Status: DISPENSED
Start: 2025-04-21

## (undated) RX ORDER — LIDOCAINE HYDROCHLORIDE 10 MG/ML
INJECTION, SOLUTION EPIDURAL; INFILTRATION; INTRACAUDAL; PERINEURAL
Status: DISPENSED
Start: 2024-09-27

## (undated) RX ORDER — EPHEDRINE SULFATE 50 MG/ML
INJECTION, SOLUTION INTRAMUSCULAR; INTRAVENOUS; SUBCUTANEOUS
Status: DISPENSED
Start: 2025-04-21

## (undated) RX ORDER — ONDANSETRON 2 MG/ML
INJECTION INTRAMUSCULAR; INTRAVENOUS
Status: DISPENSED
Start: 2025-06-05

## (undated) RX ORDER — INDOCYANINE GREEN AND WATER 25 MG
KIT INJECTION
Status: DISPENSED
Start: 2025-04-21

## (undated) RX ORDER — FENTANYL CITRATE 50 UG/ML
INJECTION, SOLUTION INTRAMUSCULAR; INTRAVENOUS
Status: DISPENSED
Start: 2025-04-19

## (undated) RX ORDER — ONDANSETRON 2 MG/ML
INJECTION INTRAMUSCULAR; INTRAVENOUS
Status: DISPENSED
Start: 2025-04-19

## (undated) RX ORDER — ONDANSETRON 2 MG/ML
INJECTION INTRAMUSCULAR; INTRAVENOUS
Status: DISPENSED
Start: 2025-04-21

## (undated) RX ORDER — FENTANYL CITRATE 50 UG/ML
INJECTION, SOLUTION INTRAMUSCULAR; INTRAVENOUS
Status: DISPENSED
Start: 2025-06-05

## (undated) RX ORDER — HEPARIN SODIUM 5000 [USP'U]/.5ML
INJECTION, SOLUTION INTRAVENOUS; SUBCUTANEOUS
Status: DISPENSED
Start: 2025-04-21

## (undated) RX ORDER — LIDOCAINE HYDROCHLORIDE 10 MG/ML
INJECTION, SOLUTION EPIDURAL; INFILTRATION; INTRACAUDAL; PERINEURAL
Status: DISPENSED
Start: 2025-04-19